# Patient Record
Sex: FEMALE | Race: WHITE | NOT HISPANIC OR LATINO | Employment: OTHER | URBAN - METROPOLITAN AREA
[De-identification: names, ages, dates, MRNs, and addresses within clinical notes are randomized per-mention and may not be internally consistent; named-entity substitution may affect disease eponyms.]

---

## 2017-01-25 RX ORDER — AMLODIPINE BESYLATE 5 MG/1
TABLET ORAL
Qty: 30 TABLET | Refills: 4 | Status: SHIPPED | OUTPATIENT
Start: 2017-01-25 | End: 2019-04-22 | Stop reason: SDUPTHER

## 2017-03-13 ENCOUNTER — OFFICE VISIT (OUTPATIENT)
Dept: CARDIOLOGY | Facility: CLINIC | Age: 77
End: 2017-03-13

## 2017-03-13 VITALS
WEIGHT: 152 LBS | HEART RATE: 68 BPM | OXYGEN SATURATION: 98 % | SYSTOLIC BLOOD PRESSURE: 110 MMHG | DIASTOLIC BLOOD PRESSURE: 62 MMHG | BODY MASS INDEX: 29.84 KG/M2 | HEIGHT: 60 IN

## 2017-03-13 DIAGNOSIS — I10 ESSENTIAL HYPERTENSION: ICD-10-CM

## 2017-03-13 DIAGNOSIS — T44.7X5D: ICD-10-CM

## 2017-03-13 DIAGNOSIS — R00.2 PALPITATIONS: Primary | ICD-10-CM

## 2017-03-13 PROCEDURE — 99214 OFFICE O/P EST MOD 30 MIN: CPT | Performed by: INTERNAL MEDICINE

## 2017-03-13 NOTE — PROGRESS NOTES
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     History of Present Illness:        Dear Dr. Morales:      I again had the pleasure of seeing your patient in cardiology office on 2017.   As you well know, she is a very pleasant 77 year-old Frisian female who presents for  follow-up. The patient was admitted in 2015 after a syncopal episode. Upon  presentation in the emergency department, she was found to have severe bradycardia with  what appeared to be sinus arrest with junctional escape beats. Her heart rate was as low  as the 20s at times, and she was having very significant pauses. She was actually given  atropine in the emergency room initially. She was on both clonidine and metoprolol at the  time and these were discontinued. She was briefly placed on a Dopamine drip, and her  heart rate actually improved. She ultimately did not require a permanent pacemaker. An  echocardiogram obtained on 2015 showed an ejection fraction of 60% to 65% and no  significant valvular disease. She also has a history of significant hypertension, which  has been somewhat difficult to control in the past.      The patient presents today for follow-up.  Her only complaint is palpitations.  She does state   that at times, she will feel her heart fluttering, and this can last for up to 2 hours.  She does   not feel her heart racing during these occasions, but states that when she lies down, it   typically goes away.  She has not had any chest pain or shortness of breath.  Her blood   pressure has been excellent.    Past Medical History   Diagnosis Date   • Colon cancer      s/p partial colectomy in    • Diabetes mellitus    • Gallbladder disease    • Hyperlipidemia   "  • Hypertension    • Left arm pain    • Syncope      Secondary to medications (clonidine and metoprolol) in 3/15.  Had severe bradycardia (sinus arrest with junctional escape beats).  Resolved after medications discontinued.       Past Surgical History   Procedure Laterality Date   • Colectomy partial / total       2011 for colon cancer   • Uterine fibroid surgery       s/p uterine fibroma resection       Current Outpatient Prescriptions on File Prior to Visit   Medication Sig Dispense Refill   • amLODIPine (NORVASC) 5 MG tablet TAKE 1 TABLET BY MOUTH EVERY DAY 30 tablet 4   • aspirin 81 MG tablet Take by mouth.     • atorvastatin (LIPITOR) 20 MG tablet Take by mouth.     • fenofibrate (TRICOR) 145 MG tablet Take 1 tablet by mouth daily.     • isosorbide mononitrate (IMDUR) 60 MG 24 hr tablet Take by mouth.     • metFORMIN (GLUCOPHAGE) 500 MG tablet Take 1 tablet by mouth daily.     • valsartan-hydrochlorothiazide (DIOVAN-HCT) 320-25 MG per tablet Take 1 tablet by mouth daily.       No current facility-administered medications on file prior to visit.      Allergies as of 03/13/2017   • (No Known Allergies)     Social History     Social History   • Marital status:      Spouse name: N/A   • Number of children: N/A   • Years of education: N/A     Occupational History   • Not on file.     Social History Main Topics   • Smoking status: Never Smoker   • Smokeless tobacco: Never Used   • Alcohol use No   • Drug use: No   • Sexual activity: Not on file     Other Topics Concern   • Not on file     Social History Narrative     Family History   Problem Relation Age of Onset   • Heart disease Mother      pacemaker placement   • Colon cancer Brother        Review of Systems   Cardiovascular: Positive for palpitations.   All other systems reviewed and are negative.    Objective:     Vitals:    03/13/17 1307   BP: 110/62   Pulse: 68   SpO2: 98%   Weight: 152 lb (68.9 kg)   Height: 60\" (152.4 cm)     Body mass index is " 29.69 kg/(m^2).    Physical Exam   Constitutional: She is oriented to person, place, and time. She appears well-developed and well-nourished.   HENT:   Head: Normocephalic and atraumatic.   Eyes: Conjunctivae are normal.   Neck: Neck supple.   Cardiovascular: Normal rate and regular rhythm.  Exam reveals no gallop and no friction rub.    No murmur heard.  Pulmonary/Chest: Effort normal and breath sounds normal.   Abdominal: Soft. There is no tenderness.   Musculoskeletal: She exhibits no edema.   Neurological: She is alert and oriented to person, place, and time.   Skin: Skin is warm.   Psychiatric: She has a normal mood and affect. Her behavior is normal.     Lab Review:   Procedures    Cardiac Procedures:  1. Echocardiogram on 03/23/2015 revealed an ejection fraction of 60-65%. There was   no significant valvular disease noted.   2. Lexiscan Myoview stress test on 10/05/2015 was normal with no evidence of ischemia   or infarction.     Assessment:       Diagnosis Plan   1. Palpitations  Holter Monitor - 24 Hour   2. Essential hypertension     3. Adverse reaction to beta-blocker, subsequent encounter       Plan:       As noted, the patient has been having palpitations recently as described above.   I am going to   check a 24-hour Holter monitor at this time to ensure that she does not have any significant   arrhythmia such as atrial fibrillation.  Her blood pressure has been under excellent control on   the amlodipine, Imdur, and Diovan HCT.  Again, she has had significant bradycardia with beta   blockers in the past and is not a good candidate for these.  I would also avoid AV abby blockers   as well.  I will see her back in the office in 6 months, and further plans will be made pending the   results of the Holter monitor.

## 2017-09-14 ENCOUNTER — OFFICE VISIT (OUTPATIENT)
Dept: CARDIOLOGY | Facility: CLINIC | Age: 77
End: 2017-09-14

## 2017-09-14 VITALS
WEIGHT: 151.5 LBS | OXYGEN SATURATION: 98 % | DIASTOLIC BLOOD PRESSURE: 64 MMHG | SYSTOLIC BLOOD PRESSURE: 112 MMHG | HEIGHT: 61 IN | BODY MASS INDEX: 28.6 KG/M2 | HEART RATE: 76 BPM

## 2017-09-14 DIAGNOSIS — I10 ESSENTIAL HYPERTENSION: Primary | ICD-10-CM

## 2017-09-14 DIAGNOSIS — T44.7X5D: ICD-10-CM

## 2017-09-14 PROCEDURE — 99213 OFFICE O/P EST LOW 20 MIN: CPT | Performed by: INTERNAL MEDICINE

## 2017-09-14 RX ORDER — LANCETS
EACH MISCELLANEOUS
Refills: 8 | COMMUNITY
Start: 2017-06-30 | End: 2021-09-13 | Stop reason: SDUPTHER

## 2017-09-14 RX ORDER — OFLOXACIN 3 MG/ML
SOLUTION/ DROPS OPHTHALMIC
Refills: 1 | COMMUNITY
Start: 2017-08-28 | End: 2021-01-22 | Stop reason: SDUPTHER

## 2017-09-14 RX ORDER — OXYBUTYNIN CHLORIDE 5 MG/1
TABLET ORAL
Refills: 5 | COMMUNITY
Start: 2017-08-28 | End: 2021-09-13 | Stop reason: SDUPTHER

## 2017-09-17 NOTE — PROGRESS NOTES
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     History of Present Illness:    Dear Dr. Morales:      I again had the pleasure of seeing your patient in cardiology office on 2017.  As   you well know, she is a very pleasant 77 year-old Spanish female who presents for  follow-up. The patient was admitted in 2015 after a syncopal episode. Upon  presentation in the emergency department, she was found to have severe bradycardia   with what appeared to be sinus arrest with junctional escape beats. Her heart rate was   as low as the 20s at times, and she was having very significant pauses. She was   actually given atropine in the emergency room initially. She was on both clonidine and   metoprolol at the time and these were discontinued. She was briefly placed on a   Dopamine drip, and her heart rate actually improved. She ultimately did not require a   permanent pacemaker. An echocardiogram obtained on 2015 showed an   ejection fraction of 60% to 65% and no significant valvular disease. She also has a   history of hypertension,     The patient presents today for follow-up.   overall, she states that she is doing very well.    Her blood pressure is excellent today at 112/64.  She has not had any significant chest   pain or shortness of breath.  She has had no event since her last visit.    Past Medical History:   Diagnosis Date   • Colon cancer     s/p partial colectomy in    • Diabetes mellitus    • Gallbladder disease    • Hyperlipidemia    • Hypertension    • Left arm pain    • Syncope     Secondary to medications (clonidine and metoprolol) in 3/15.  Had severe bradycardia (sinus arrest with junctional escape beats).  Resolved after medications  "discontinued.       Past Surgical History:   Procedure Laterality Date   • COLECTOMY PARTIAL / TOTAL      2011 for colon cancer   • UTERINE FIBROID SURGERY      s/p uterine fibroma resection       Current Outpatient Prescriptions on File Prior to Visit   Medication Sig Dispense Refill   • amLODIPine (NORVASC) 5 MG tablet TAKE 1 TABLET BY MOUTH EVERY DAY 30 tablet 4   • aspirin 81 MG tablet Take by mouth.     • fenofibrate (TRICOR) 145 MG tablet Take 1 tablet by mouth daily.     • isosorbide mononitrate (IMDUR) 60 MG 24 hr tablet Take by mouth.     • metFORMIN (GLUCOPHAGE) 500 MG tablet Take 1 tablet by mouth daily.     • valsartan-hydrochlorothiazide (DIOVAN-HCT) 320-25 MG per tablet Take 1 tablet by mouth daily.       No current facility-administered medications on file prior to visit.      Allergies as of 09/14/2017   • (No Known Allergies)     Social History     Social History   • Marital status:      Spouse name: N/A   • Number of children: N/A   • Years of education: N/A     Occupational History   • Not on file.     Social History Main Topics   • Smoking status: Never Smoker   • Smokeless tobacco: Never Used   • Alcohol use No   • Drug use: No   • Sexual activity: Not on file     Other Topics Concern   • Not on file     Social History Narrative     Family History   Problem Relation Age of Onset   • Heart disease Mother      pacemaker placement   • Colon cancer Brother        Review of Systems   All other systems reviewed and are negative.    Objective:     Vitals:    09/14/17 1348   BP: 112/64   BP Location: Right arm   Pulse: 76   SpO2: 98%   Weight: 151 lb 8 oz (68.7 kg)   Height: 61\" (154.9 cm)     Body mass index is 28.63 kg/(m^2).    Physical Exam   Constitutional: She is oriented to person, place, and time. She appears well-developed and well-nourished.   HENT:   Head: Normocephalic and atraumatic.   Eyes: Conjunctivae are normal.   Neck: Neck supple.   Cardiovascular: Normal rate and regular " rhythm.  Exam reveals no gallop and no friction rub.    No murmur heard.  Pulmonary/Chest: Effort normal and breath sounds normal.   Abdominal: Soft. There is no tenderness.   Musculoskeletal: She exhibits no edema.   Neurological: She is alert and oriented to person, place, and time.   Skin: Skin is warm.   Psychiatric: She has a normal mood and affect. Her behavior is normal.     Lab Review:   Procedures    Cardiac Procedures:  1. Echocardiogram on 03/23/2015 revealed an ejection fraction of 60-65%. There   was no significant valvular disease noted.   2. Lexiscan Myoview stress test on 10/05/2015 was normal with no evidence of   ischemia or infarction.     Assessment:       Diagnosis Plan   1. Essential hypertension     2. Adverse reaction to beta-blocker, subsequent encounter       Plan:       The patient seems to be doing very well at this point.  She has had no chest pain   or shortness of breath.  Her blood pressure is under excellent control.  She is   going to continue on the amlodipine at 5 mg per day, Imdur at 60 mg per day, and   Diovan HCT at 320/25 mg per day.  She continues to take the aspirin empirically   given her diabetes.  Again, she had significant bradycardia with beta blockers in   the past, and is not a good candidate for these medications (or any AV abby   blocking agents in general).  I will see her back in the office within the next 6   months unless other issues arise.

## 2018-01-05 RX ORDER — ISOSORBIDE MONONITRATE 60 MG/1
60 TABLET, EXTENDED RELEASE ORAL DAILY
Qty: 90 TABLET | Refills: 3 | Status: SHIPPED | OUTPATIENT
Start: 2018-01-05 | End: 2018-12-26 | Stop reason: SDUPTHER

## 2018-04-12 ENCOUNTER — OFFICE VISIT (OUTPATIENT)
Dept: CARDIOLOGY | Facility: CLINIC | Age: 78
End: 2018-04-12

## 2018-04-12 VITALS
SYSTOLIC BLOOD PRESSURE: 108 MMHG | OXYGEN SATURATION: 98 % | HEART RATE: 70 BPM | WEIGHT: 150 LBS | HEIGHT: 59 IN | BODY MASS INDEX: 30.24 KG/M2 | DIASTOLIC BLOOD PRESSURE: 62 MMHG

## 2018-04-12 DIAGNOSIS — I10 ESSENTIAL HYPERTENSION: Primary | ICD-10-CM

## 2018-04-12 DIAGNOSIS — T44.7X5D: ICD-10-CM

## 2018-04-12 PROCEDURE — 99213 OFFICE O/P EST LOW 20 MIN: CPT | Performed by: INTERNAL MEDICINE

## 2018-04-12 NOTE — PROGRESS NOTES
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     History of Present Illness:    Dear Dr. Morales:      I again had the pleasure of seeing your patient in cardiology office on 2018.  As   you well know, she is a very pleasant 78 year-old [place]bourgish female who presents for  follow-up. The patient was admitted in 2015 after a syncopal episode. Upon  presentation in the emergency department, she was found to have severe bradycardia   with what appeared to be sinus arrest with junctional escape beats. Her heart rate was   as low as the 20s at times, and she was having very significant pauses. She was   actually given atropine in the emergency room initially. She was on both clonidine and   metoprolol at the time and these were discontinued. She was briefly placed on a   Dopamine drip, and her heart rate actually improved. She ultimately did not require a   permanent pacemaker. An echocardiogram obtained on 2015 showed an   ejection fraction of 60% to 65% and no significant valvular disease. She also has a   history of hypertension,      The patient presents today for follow-up.  Overall, she is doing very well.  Her blood   pressure has been excellent.  Today, her blood pressure is 108/62.  She has not had   any chest discomfort.  She still has baseline shortness of breath with exertion,   although this is unchanged.    Past Medical History:   Diagnosis Date   • Colon cancer     s/p partial colectomy in    • Diabetes mellitus    • Gallbladder disease    • Hyperlipidemia    • Hypertension    • Left arm pain    • Syncope     Secondary to medications (clonidine and metoprolol) in 3/15.  Had severe bradycardia (sinus arrest with junctional escape beats).   Resolved after medications discontinued.       Past Surgical History:   Procedure Laterality Date   • COLECTOMY PARTIAL / TOTAL      2011 for colon cancer   • UTERINE FIBROID SURGERY      s/p uterine fibroma resection       Current Outpatient Prescriptions on File Prior to Visit   Medication Sig Dispense Refill   • ACCU-CHEK FASTCLIX LANCETS misc USE TO TEST TID  8   • amLODIPine (NORVASC) 5 MG tablet TAKE 1 TABLET BY MOUTH EVERY DAY 30 tablet 4   • aspirin 81 MG tablet Take by mouth.     • fenofibrate (TRICOR) 145 MG tablet Take 1 tablet by mouth daily.     • isosorbide mononitrate (IMDUR) 60 MG 24 hr tablet Take 1 tablet by mouth Daily. 90 tablet 3   • metFORMIN (GLUCOPHAGE) 500 MG tablet Take 1 tablet by mouth daily.     • ofloxacin (OCUFLOX) 0.3 % ophthalmic solution INSTILL 1 TO 2 GTS AEY Q 2-3 HOURS DURING DAY  1   • ONE TOUCH ULTRA TEST test strip U TO TEST BLOOD SUGAR QD UTD  2   • oxybutynin (DITROPAN) 5 MG tablet TK 1/2 - 1 T PO 1-2 TIMES DAILY  5   • valsartan-hydrochlorothiazide (DIOVAN-HCT) 320-25 MG per tablet Take 1 tablet by mouth daily.       No current facility-administered medications on file prior to visit.      Allergies as of 04/12/2018   • (No Known Allergies)     Social History     Social History   • Marital status:      Spouse name: N/A   • Number of children: N/A   • Years of education: N/A     Occupational History   • Not on file.     Social History Main Topics   • Smoking status: Never Smoker   • Smokeless tobacco: Never Used   • Alcohol use No   • Drug use: No   • Sexual activity: Not on file     Other Topics Concern   • Not on file     Social History Narrative   • No narrative on file     Family History   Problem Relation Age of Onset   • Heart disease Mother      pacemaker placement   • Colon cancer Brother        Review of Systems   All other systems reviewed and are negative.     Objective:     Vitals:    04/12/18 1158   BP: 108/62   Pulse: 70   SpO2: 98%   Weight: 68 kg (150  "lb)   Height: 151 cm (59.45\")     Body mass index is 29.84 kg/m².    Physical Exam   Constitutional: She is oriented to person, place, and time. She appears well-developed and well-nourished.   HENT:   Head: Normocephalic and atraumatic.   Eyes: Conjunctivae are normal.   Neck: Neck supple.   Cardiovascular: Normal rate and regular rhythm.  Exam reveals no gallop and no friction rub.    No murmur heard.  Pulmonary/Chest: Effort normal and breath sounds normal.   Abdominal: Soft. There is no tenderness.   Musculoskeletal: She exhibits no edema.   Neurological: She is alert and oriented to person, place, and time.   Skin: Skin is warm.   Psychiatric: She has a normal mood and affect. Her behavior is normal.     Lab Review:   Procedures    Cardiac Procedures:  1. Echocardiogram on 03/23/2015 revealed an ejection fraction of 60-65%. There   was no significant valvular disease noted.   2. Lexiscan Myoview stress test on 10/05/2015 was normal with no evidence of   ischemia or infarction.     Assessment:       Diagnosis Plan   1. Essential hypertension     2. Adverse effect of beta-blocker, subsequent encounter       Plan:       The patient seems to be doing very well.  Again, her blood pressure is excellent.  She is   going to remain on the amlodipine 5 mg per day, Imdur 60 mg per day, and Diovan HCT   320/25 mg per day.  She will continue on the aspirin 81 mg per day empirically given her   diabetes.  Again, she did have significant bradycardia with beta blockers in the past, and   is not a good candidate for AV abby blocking agents or beta blockers in the future.  For   now, I will plan on seeing her back in the office within 6 months.      "

## 2018-10-08 ENCOUNTER — OFFICE VISIT (OUTPATIENT)
Dept: CARDIOLOGY | Facility: CLINIC | Age: 78
End: 2018-10-08

## 2018-10-08 VITALS
SYSTOLIC BLOOD PRESSURE: 118 MMHG | DIASTOLIC BLOOD PRESSURE: 62 MMHG | WEIGHT: 151.6 LBS | HEART RATE: 71 BPM | OXYGEN SATURATION: 99 % | BODY MASS INDEX: 30.56 KG/M2 | HEIGHT: 59 IN

## 2018-10-08 DIAGNOSIS — I10 ESSENTIAL HYPERTENSION: Primary | ICD-10-CM

## 2018-10-08 DIAGNOSIS — T44.7X5D: ICD-10-CM

## 2018-10-08 PROCEDURE — 99213 OFFICE O/P EST LOW 20 MIN: CPT | Performed by: INTERNAL MEDICINE

## 2018-12-26 RX ORDER — ISOSORBIDE MONONITRATE 60 MG/1
60 TABLET, EXTENDED RELEASE ORAL DAILY
Qty: 90 TABLET | Refills: 3 | Status: SHIPPED | OUTPATIENT
Start: 2018-12-26 | End: 2018-12-27 | Stop reason: SDUPTHER

## 2018-12-27 RX ORDER — ISOSORBIDE MONONITRATE 60 MG/1
60 TABLET, EXTENDED RELEASE ORAL DAILY
Qty: 90 TABLET | Refills: 3 | Status: SHIPPED | OUTPATIENT
Start: 2018-12-27 | End: 2019-04-22 | Stop reason: SDUPTHER

## 2018-12-28 RX ORDER — ISOSORBIDE MONONITRATE 60 MG/1
60 TABLET, EXTENDED RELEASE ORAL DAILY
Qty: 90 TABLET | Refills: 3 | Status: SHIPPED | OUTPATIENT
Start: 2018-12-28 | End: 2019-04-22 | Stop reason: SDUPTHER

## 2019-04-22 ENCOUNTER — OFFICE VISIT (OUTPATIENT)
Dept: CARDIOLOGY | Facility: CLINIC | Age: 79
End: 2019-04-22

## 2019-04-22 VITALS
SYSTOLIC BLOOD PRESSURE: 126 MMHG | OXYGEN SATURATION: 99 % | HEIGHT: 59 IN | DIASTOLIC BLOOD PRESSURE: 62 MMHG | WEIGHT: 143 LBS | HEART RATE: 75 BPM | BODY MASS INDEX: 28.83 KG/M2

## 2019-04-22 DIAGNOSIS — I10 ESSENTIAL HYPERTENSION: ICD-10-CM

## 2019-04-22 DIAGNOSIS — R42 LIGHTHEADEDNESS: Primary | ICD-10-CM

## 2019-04-22 PROCEDURE — 99214 OFFICE O/P EST MOD 30 MIN: CPT | Performed by: NURSE PRACTITIONER

## 2019-04-22 PROCEDURE — 93000 ELECTROCARDIOGRAM COMPLETE: CPT | Performed by: NURSE PRACTITIONER

## 2019-04-22 RX ORDER — ISOSORBIDE MONONITRATE 60 MG/1
60 TABLET, EXTENDED RELEASE ORAL DAILY
Qty: 90 TABLET | Refills: 3 | Status: SHIPPED | OUTPATIENT
Start: 2019-04-22 | End: 2021-06-01

## 2019-04-22 RX ORDER — AMLODIPINE BESYLATE 5 MG/1
5 TABLET ORAL DAILY
Qty: 90 TABLET | Refills: 3 | Status: SHIPPED | OUTPATIENT
Start: 2019-04-22 | End: 2019-12-16 | Stop reason: SDUPTHER

## 2019-04-22 RX ORDER — PHENAZOPYRIDINE HYDROCHLORIDE 100 MG/1
TABLET, FILM COATED ORAL
Refills: 3 | COMMUNITY
Start: 2019-03-07 | End: 2021-02-02 | Stop reason: SDDI

## 2019-04-22 RX ORDER — ATORVASTATIN CALCIUM 20 MG/1
TABLET, FILM COATED ORAL DAILY
Refills: 0 | COMMUNITY
Start: 2019-03-02 | End: 2019-09-10 | Stop reason: SDUPTHER

## 2019-04-22 NOTE — PROGRESS NOTES
[facility]      Patient Name: Ayla Castellano  :1940  Age: 79 y.o.  Primary Cardiologist: Enrrique Lanza MD  Encounter Provider:  LENA Jackman      Chief Complaint:   Chief Complaint   Patient presents with   • Follow-up     6 months         HPI  Ayla Castellano is a 79 y.o. female with a history significant for bradycardia secondary to beta-blockers, syncope, hypertension, hyperlipidemia, diabetes.  Patient presents today for semiannual reevaluation.  Patient is new to me but I have reviewed her prior medical records.  Patient's daughter presents and is acting as .  Patient states that from a cardiac standpoint she is doing well.  She does report some episodes of lightheadedness.  Reported that patient has had diarrhea secondary to a known history of gallbladder disease.  She states that she gets lightheaded from time to time, especially with position changes.  There have been no episodes of hypotension or low heart rate.  She also reports some fatigue that resolves after taking an afternoon nap.  She is requesting refills of amlodipine and isosorbide mononitrate.  She denies chest pain, shortness of breath, palpitations, swelling to the lower extremities.    The following portions of the patient's history were reviewed and updated as appropriate: allergies, current medications, past family history, past medical history, past social history, past surgical history and problem list.    Current Outpatient Medications on File Prior to Visit   Medication Sig   • ACCU-CHEK FASTCLIX LANCETS misc USE TO TEST TID   • amLODIPine (NORVASC) 5 MG tablet TAKE 1 TABLET BY MOUTH EVERY DAY   • aspirin 81 MG tablet Take by mouth.   • atorvastatin (LIPITOR) 20 MG tablet Take  by mouth Daily.   • fenofibrate (TRICOR) 145 MG tablet Take 1 tablet by mouth daily.   • isosorbide mononitrate (IMDUR) 60 MG 24 hr tablet TAKE 1 TABLET BY MOUTH DAILY   • metFORMIN (GLUCOPHAGE)  "500 MG tablet Take 1 tablet by mouth daily.   • ofloxacin (OCUFLOX) 0.3 % ophthalmic solution INSTILL 1 TO 2 GTS AEY Q 2-3 HOURS DURING DAY   • ONE TOUCH ULTRA TEST test strip U TO TEST BLOOD SUGAR QD UTD   • oxybutynin (DITROPAN) 5 MG tablet TK 1/2 - 1 T PO 1-2 TIMES DAILY   • phenazopyridine (PYRIDIUM) 100 MG tablet TK 1 T PO TID AFTER MEALS PRN   • valsartan-hydrochlorothiazide (DIOVAN-HCT) 320-25 MG per tablet Take 1 tablet by mouth daily.   • [DISCONTINUED] isosorbide mononitrate (IMDUR) 60 MG 24 hr tablet Take 1 tablet by mouth Daily.     No current facility-administered medications on file prior to visit.          Review of Systems   Constitution: Negative for malaise/fatigue.   Cardiovascular: Negative for chest pain and leg swelling.   Respiratory: Negative for shortness of breath.    Gastrointestinal: Positive for diarrhea.   Neurological: Positive for light-headedness.   All other systems reviewed and are negative.      OBJECTIVE:   Vital Signs  Vitals:    04/22/19 1354   BP: 126/62   Pulse: 75   SpO2: 99%     Estimated body mass index is 28.88 kg/m² as calculated from the following:    Height as of this encounter: 149.9 cm (59\").    Weight as of this encounter: 64.9 kg (143 lb).    Physical Exam   Constitutional: She is oriented to person, place, and time. Vital signs are normal. She appears well-developed and well-nourished. She is active.   Eyes: Conjunctivae are normal.   Neck: Carotid bruit is not present.   Cardiovascular: Normal rate, regular rhythm and normal heart sounds.   Pulmonary/Chest: Breath sounds normal.   Abdominal: Normal appearance.   Musculoskeletal:   No cyanosis, clubbing, edema  Normal ROM   Neurological: She is alert and oriented to person, place, and time. GCS eye subscore is 4. GCS verbal subscore is 5. GCS motor subscore is 6.   Skin: Skin is warm, dry and intact.   Psychiatric: She has a normal mood and affect. Her speech is normal and behavior is normal. Judgment and thought " content normal. Cognition and memory are normal.         ECG 12 Lead  Date/Time: 4/22/2019 2:04 PM  Performed by: Soraya Juan APRN  Authorized by: Soraya Juan APRN   Previous ECG: no previous ECG available  Rhythm: sinus rhythm  Rate: normal  BPM: 72  Conduction: conduction normal  ST Segments: ST segments normal  T Waves: T waves normal  QRS axis: normal    Clinical impression: normal ECG            Cardiac Procedures:  1. Echo 3/23/15.  EF 60-65%.  No significant valvular disease noted.  2. Lexiscan stress test 10/5/15.  Normal without evidence of ischemia or infarction.        ASSESSMENT:      Diagnosis Plan   1. Lightheadedness     2. Essential hypertension           PLAN OF CARE:     1. Lightheadedness.  Patient reports episodes of lightheadedness that are worse with position changes.  She adds that she has chronic diarrhea secondary to known gallbladder disease.  Patient states that it has been recommended that she have her gallbladder removed, however she is not willing to have the procedure.  She states that she feels her lightheadedness is directly related to her diarrhea.  She denies any episodes of hypotension and reports that her blood pressure averages in the 120s/60s.  She also denies any episodes of low heart rate.  2. Hypertension.  Blood pressure averaging in the 120s/60s.  Blood pressure is good in office today.  She will continue with amlodipine 5 mg daily, valsartan HCTZ 320/25 mg daily.  3. Follow-up with Dr. Lanza in 1 year.  Sooner for any problems or complications.        Thank you for allowing me to participate in the care of your patient,      Sincerely,   LENA Jackman  [facility]  04/22/19  2:16 PM    **Dragon Disclaimer:**  Much of this encounter note is an electronic transcription/translation of spoken language to printed text. The electronic translation of spoken language may permit erroneous, or at times, nonsensical words or phrases to be  inadvertently transcribed. Although I have reviewed the note for such errors, some may still exist.

## 2019-08-15 RX ORDER — FLUOCINONIDE TOPICAL SOLUTION USP, 0.05% 0.5 MG/ML
SOLUTION TOPICAL
Qty: 60 ML | Refills: 0 | Status: SHIPPED | OUTPATIENT
Start: 2019-08-15 | End: 2021-02-04

## 2019-09-10 RX ORDER — ATORVASTATIN CALCIUM 20 MG/1
20 TABLET, FILM COATED ORAL DAILY
Qty: 90 TABLET | Refills: 0 | Status: SHIPPED | OUTPATIENT
Start: 2019-09-10 | End: 2020-07-08 | Stop reason: SDUPTHER

## 2019-09-26 DIAGNOSIS — E13.8 DIABETES MELLITUS OF OTHER TYPE WITH COMPLICATION, UNSPECIFIED WHETHER LONG TERM INSULIN USE: Primary | ICD-10-CM

## 2019-09-30 DIAGNOSIS — E13.8 DIABETES MELLITUS OF OTHER TYPE WITH COMPLICATION, UNSPECIFIED WHETHER LONG TERM INSULIN USE: ICD-10-CM

## 2019-10-03 DIAGNOSIS — E13.69 OTHER SPECIFIED DIABETES MELLITUS WITH OTHER SPECIFIED COMPLICATION, UNSPECIFIED WHETHER LONG TERM INSULIN USE (HCC): ICD-10-CM

## 2019-10-07 DIAGNOSIS — E13.69 OTHER SPECIFIED DIABETES MELLITUS WITH OTHER SPECIFIED COMPLICATION, UNSPECIFIED WHETHER LONG TERM INSULIN USE (HCC): ICD-10-CM

## 2019-11-11 ENCOUNTER — OFFICE VISIT (OUTPATIENT)
Dept: CARDIOLOGY | Facility: CLINIC | Age: 79
End: 2019-11-11

## 2019-11-11 VITALS
WEIGHT: 143 LBS | BODY MASS INDEX: 28.83 KG/M2 | RESPIRATION RATE: 15 BRPM | HEART RATE: 72 BPM | HEIGHT: 59 IN | DIASTOLIC BLOOD PRESSURE: 82 MMHG | SYSTOLIC BLOOD PRESSURE: 120 MMHG | OXYGEN SATURATION: 96 %

## 2019-11-11 DIAGNOSIS — T44.7X5D: ICD-10-CM

## 2019-11-11 DIAGNOSIS — I10 ESSENTIAL HYPERTENSION: Primary | ICD-10-CM

## 2019-11-11 PROCEDURE — 99213 OFFICE O/P EST LOW 20 MIN: CPT | Performed by: INTERNAL MEDICINE

## 2019-11-11 RX ORDER — CYANOCOBALAMIN (VITAMIN B-12) 1000 MCG
1 TABLET, EXTENDED RELEASE ORAL DAILY
Refills: 0 | COMMUNITY
Start: 2019-11-01 | End: 2021-02-02 | Stop reason: SDDI

## 2019-11-11 RX ORDER — HYDROCHLOROTHIAZIDE 25 MG/1
25 TABLET ORAL DAILY
Refills: 0 | COMMUNITY
Start: 2019-11-01 | End: 2020-05-28

## 2019-11-11 RX ORDER — CLONIDINE HYDROCHLORIDE 0.1 MG/1
TABLET ORAL DAILY
Refills: 11 | COMMUNITY
Start: 2019-10-06 | End: 2021-02-02 | Stop reason: SDDI

## 2019-11-11 RX ORDER — VALSARTAN 160 MG/1
TABLET ORAL DAILY
Refills: 0 | COMMUNITY
Start: 2019-11-01 | End: 2020-05-28

## 2019-11-11 NOTE — PROGRESS NOTES
Date of Office Visit: 2019  Encounter Provider: Israel Lanza MD  Place of Service: [facility] CARDIOLOGY  Patient Name: Ayla Castellano  :1940    Chief complaint: Follow-up for syncope secondary to medication induced   bradycardia, hypertension.     History of Present Illness:    Dear Dr. Morales:      I again had the pleasure of seeing your patient in cardiology office on 2019.  As   you well know, she is a very pleasant 79 year-old Arabic female who presents for  follow-up. The patient was admitted in 3/2015 after a syncopal episode. Upon  presentation in the emergency department, she was found to have severe bradycardia   with what appeared to be sinus arrest with junctional escape beats. Her heart rate was   as low as the 20s at times, and she was having very significant pauses. She was   actually given atropine in the emergency room initially. She was on both clonidine and   metoprolol at the time and these were discontinued. She was briefly placed on a   Dopamine drip, and her heart rate actually improved. She ultimately did not require a   permanent pacemaker. An echocardiogram obtained on 3/23/2015 showed an   ejection fraction of 60% to 65% and no significant valvular disease. She also has a   history of hypertension,      The patient presents today for follow-up.   Unfortunately, she did develop acute   cholecystitis, and underwent a cholecystectomy several weeks ago at [facility].    She did well with the surgery, and is recovering postoperatively.  However, she is   anemic, and her hemoglobin has recently been around 8.8.  It is felt that she is losing   blood in her GI tract, she needs a repeat colonoscopy.  She has had a history of colon   cancer.  She denied any significant chest pain or shortness of breath.      Past Medical History:   Diagnosis Date   • Colon cancer (CMS/HCC)     s/p partial colectomy in    • Diabetes mellitus  (CMS/HCC)    • Gallbladder disease    • Hyperlipidemia    • Hypertension    • Left arm pain    • Syncope     Secondary to medications (clonidine and metoprolol) in 3/15.  Had severe bradycardia (sinus arrest with junctional escape beats).  Resolved after medications discontinued.       Past Surgical History:   Procedure Laterality Date   • CHOLECYSTECTOMY  10/2019   • COLECTOMY PARTIAL / TOTAL      2011 for colon cancer   • UTERINE FIBROID SURGERY      s/p uterine fibroma resection       Current Outpatient Medications on File Prior to Visit   Medication Sig Dispense Refill   • ACCU-CHEK FASTCLIX LANCETS misc USE TO TEST TID  8   • amLODIPine (NORVASC) 5 MG tablet Take 1 tablet by mouth Daily. 90 tablet 3   • aspirin 81 MG tablet Take by mouth.     • Cyanocobalamin (VITAMIN B-12 ER) 1000 MCG tablet controlled-release Take 1 tablet by mouth Daily.  0   • fenofibrate (TRICOR) 145 MG tablet Take 1 tablet by mouth daily.     • hydroCHLOROthiazide (HYDRODIURIL) 25 MG tablet Take 25 mg by mouth Daily.  0   • isosorbide mononitrate (IMDUR) 60 MG 24 hr tablet Take 1 tablet by mouth Daily. 90 tablet 3   • metFORMIN (GLUCOPHAGE) 500 MG tablet Take 1 tablet by mouth 3 (Three) Times a Day. 90 tablet 1   • ONE TOUCH ULTRA TEST test strip USE TO TEST BLOOD SUGAR EVERY DAY. (E11.9) 100 each 2   • oxybutynin (DITROPAN) 5 MG tablet TK 1/2 - 1 T PO 1-2 TIMES DAILY  5   • phenazopyridine (PYRIDIUM) 100 MG tablet TK 1 T PO TID AFTER MEALS PRN  3   • atorvastatin (LIPITOR) 20 MG tablet Take 1 tablet by mouth Daily. 90 tablet 0   • cloNIDine (CATAPRES) 0.1 MG tablet Take  by mouth Daily.  11   • fluocinonide (LIDEX) 0.05 % external solution APPLY SPARINGLY EXTERNALLY TO THE AFFECTED AREA 2 TO 4 TIMES A DAY AS DIRECTED. 60 mL 0   • ofloxacin (OCUFLOX) 0.3 % ophthalmic solution INSTILL 1 TO 2 GTS AEY Q 2-3 HOURS DURING DAY  1   • valsartan (DIOVAN) 160 MG tablet Take  by mouth Daily.  0   • valsartan-hydrochlorothiazide (DIOVAN-HCT) 320-25  "MG per tablet Take 1 tablet by mouth daily.       No current facility-administered medications on file prior to visit.      Allergies as of 11/11/2019   • (No Known Allergies)     Social History     Socioeconomic History   • Marital status:      Spouse name: Not on file   • Number of children: Not on file   • Years of education: Not on file   • Highest education level: Not on file   Tobacco Use   • Smoking status: Never Smoker   • Smokeless tobacco: Never Used   Substance and Sexual Activity   • Alcohol use: No   • Drug use: No     Family History   Problem Relation Age of Onset   • Heart disease Mother         pacemaker placement   • Colon cancer Brother        Review of Systems   Constitution: Positive for malaise/fatigue.   All other systems reviewed and are negative.     Objective:     Vitals:    11/11/19 1410   BP: 120/82   BP Location: Right arm   Patient Position: Sitting   Cuff Size: Adult   Pulse: 72   Resp: 15   SpO2: 96%   Weight: 64.9 kg (143 lb)   Height: 149.9 cm (59.02\")     Body mass index is 28.87 kg/m².    Physical Exam   Constitutional: She is oriented to person, place, and time. She appears well-developed and well-nourished.   HENT:   Head: Normocephalic and atraumatic.   Eyes: Conjunctivae are normal.   Neck: Neck supple.   Cardiovascular: Normal rate and regular rhythm. Exam reveals no gallop and no friction rub.   No murmur heard.  Pulmonary/Chest: Effort normal and breath sounds normal.   Abdominal: Soft. There is no tenderness.   Musculoskeletal: She exhibits no edema.   Neurological: She is alert and oriented to person, place, and time.   Skin: Skin is warm.   Psychiatric: She has a normal mood and affect. Her behavior is normal.     Lab Review:   Procedures    Cardiac Procedures:  1. Echocardiogram on 3/23/2015 revealed an ejection fraction of 60-65%. There   was no significant valvular disease noted.   2. Lexiscan Myoview stress test on 10/5/2015 was normal with no evidence of "   ischemia or infarction.     Assessment:       Diagnosis Plan   1. Essential hypertension     2. Adverse effect of beta-blocker, subsequent encounter       Plan:       Again, she did recently undergo an urgent cholecystectomy for acute cholecystitis at [facility] at the end of October 2019.  She has been asymptomatic from a cardiac standpoint with no chest pain or shortness of breath.  Her blood pressure has been good at home, and her pulse is 72 today.  She will continue on the amlodipine at 5 mg/day, Diovan/HCT, and Imdur.  Again, she is not a good candidate for beta-blockers or AV abby blockers secondary to significant bradycardia with beta-blockers in the past.  She does need a colonoscopy for her anemia and history of colon cancer.  She is clear from a cardiac standpoint to proceed if needed.  I will see her back in the office in 6 months unless other issues arise.

## 2019-12-16 RX ORDER — AMLODIPINE BESYLATE 5 MG/1
5 TABLET ORAL DAILY
Qty: 90 TABLET | Refills: 0 | Status: SHIPPED | OUTPATIENT
Start: 2019-12-16 | End: 2020-03-19 | Stop reason: SDUPTHER

## 2020-03-19 RX ORDER — AMLODIPINE BESYLATE 5 MG/1
5 TABLET ORAL DAILY
Qty: 90 TABLET | Refills: 0 | Status: SHIPPED | OUTPATIENT
Start: 2020-03-19 | End: 2020-07-09 | Stop reason: SDUPTHER

## 2020-05-15 DIAGNOSIS — E13.69 OTHER SPECIFIED DIABETES MELLITUS WITH OTHER SPECIFIED COMPLICATION, UNSPECIFIED WHETHER LONG TERM INSULIN USE (HCC): ICD-10-CM

## 2020-05-20 ENCOUNTER — OFFICE VISIT (OUTPATIENT)
Dept: CARDIOLOGY | Facility: CLINIC | Age: 80
End: 2020-05-20

## 2020-05-20 VITALS
SYSTOLIC BLOOD PRESSURE: 122 MMHG | HEIGHT: 59 IN | OXYGEN SATURATION: 96 % | BODY MASS INDEX: 29.35 KG/M2 | WEIGHT: 145.6 LBS | HEART RATE: 83 BPM | DIASTOLIC BLOOD PRESSURE: 78 MMHG

## 2020-05-20 DIAGNOSIS — I10 ESSENTIAL HYPERTENSION: Primary | ICD-10-CM

## 2020-05-20 DIAGNOSIS — T44.7X5D: ICD-10-CM

## 2020-05-20 PROCEDURE — 99213 OFFICE O/P EST LOW 20 MIN: CPT | Performed by: INTERNAL MEDICINE

## 2020-05-20 RX ORDER — HYDROXYUREA 500 MG/1
500 CAPSULE ORAL 3 TIMES WEEKLY
COMMUNITY
Start: 2020-04-25 | End: 2022-07-08 | Stop reason: SDUPTHER

## 2020-05-21 NOTE — PROGRESS NOTES
Date of Office Visit:  2020  Encounter Provider: Israel Lanza MD  Place of Service: [facility] CARDIOLOGY  Patient Name: Ayla Castellano  :1940    Chief complaint: Follow-up for syncope secondary to medication induced   bradycardia, hypertension.     History of Present Illness:    Dear Dr. Morales:      I again had the pleasure of seeing your patient in cardiology office on 2020.  As   you well know, she is a very pleasant 80 year-old [place] female who presents for  follow-up. The patient was admitted in 3/2015 after a syncopal episode. Upon  presentation in the emergency department, she was found to have severe bradycardia   with what appeared to be sinus arrest with junctional escape beats. Her heart rate was   as low as the 20s at times, and she was having very significant pauses. She was   actually given atropine in the emergency room initially. She was on both clonidine and   metoprolol at the time and these were discontinued. She was briefly placed on a   Dopamine drip, and her heart rate actually improved. She ultimately did not require a   permanent pacemaker. An echocardiogram obtained on 3/23/2015 showed an   ejection fraction of 60% to 65% and no significant valvular disease. She also has a   history of hypertension,      The patient presents today for follow-up.   She has been doing well.  She did have a   full work-up for her anemia, and this evidently did not show any significant issues.    She got IV iron, and her hemoglobin is now above 12.  She feels much better.  She   has not had any chest pain or significant shortness of breath.  She does have edema   in her ankles which gets worse at the end of the day.      Past Medical History:   Diagnosis Date   • Colon cancer (CMS/HCC)     s/p partial colectomy in    • Diabetes mellitus (CMS/HCC)    • Gallbladder disease    • Hyperlipidemia    • Hypertension    • Left arm pain    • Syncope      Secondary to medications (clonidine and metoprolol) in 3/15.  Had severe bradycardia (sinus arrest with junctional escape beats).  Resolved after medications discontinued.       Past Surgical History:   Procedure Laterality Date   • CHOLECYSTECTOMY  10/2019   • COLECTOMY PARTIAL / TOTAL      2011 for colon cancer   • UTERINE FIBROID SURGERY      s/p uterine fibroma resection       Current Outpatient Medications on File Prior to Visit   Medication Sig Dispense Refill   • ACCU-CHEK FASTCLIX LANCETS misc USE TO TEST TID  8   • amLODIPine (NORVASC) 5 MG tablet Take 1 tablet by mouth Daily. 90 tablet 0   • aspirin 81 MG tablet Take by mouth.     • atorvastatin (LIPITOR) 20 MG tablet Take 1 tablet by mouth Daily. 90 tablet 0   • cloNIDine (CATAPRES) 0.1 MG tablet Take  by mouth Daily.  11   • Cyanocobalamin (B-12) 2500 MCG sublingual tablet DISSOLVE 1 T UNDER THE TONGUE QD     • Cyanocobalamin (VITAMIN B-12 ER) 1000 MCG tablet controlled-release Take 1 tablet by mouth Daily.  0   • fenofibrate (TRICOR) 145 MG tablet Take 1 tablet by mouth daily.     • fluocinonide (LIDEX) 0.05 % external solution APPLY SPARINGLY EXTERNALLY TO THE AFFECTED AREA 2 TO 4 TIMES A DAY AS DIRECTED. 60 mL 0   • hydroCHLOROthiazide (HYDRODIURIL) 25 MG tablet Take 25 mg by mouth Daily.  0   • hydroxyurea (HYDREA) 500 MG capsule TK 1 C PO QOD     • isosorbide mononitrate (IMDUR) 60 MG 24 hr tablet Take 1 tablet by mouth Daily. 90 tablet 3   • metFORMIN (GLUCOPHAGE) 500 MG tablet Take 1 tablet by mouth 3 (Three) Times a Day. 90 tablet 0   • ofloxacin (OCUFLOX) 0.3 % ophthalmic solution INSTILL 1 TO 2 GTS AEY Q 2-3 HOURS DURING DAY  1   • ONE TOUCH ULTRA TEST test strip USE TO TEST BLOOD SUGAR EVERY DAY. (E11.9) 100 each 2   • oxybutynin (DITROPAN) 5 MG tablet TK 1/2 - 1 T PO 1-2 TIMES DAILY  5   • phenazopyridine (PYRIDIUM) 100 MG tablet TK 1 T PO TID AFTER MEALS PRN  3   • valsartan (DIOVAN) 160 MG tablet Take  by mouth Daily.  0   •  "valsartan-hydrochlorothiazide (DIOVAN-HCT) 320-25 MG per tablet Take 1 tablet by mouth daily.       No current facility-administered medications on file prior to visit.      Allergies as of 05/20/2020   • (No Known Allergies)     Social History     Socioeconomic History   • Marital status:      Spouse name: Not on file   • Number of children: Not on file   • Years of education: Not on file   • Highest education level: Not on file   Tobacco Use   • Smoking status: Never Smoker   • Smokeless tobacco: Never Used   Substance and Sexual Activity   • Alcohol use: No   • Drug use: No     Family History   Problem Relation Age of Onset   • Heart disease Mother         pacemaker placement   • Colon cancer Brother        Review of Systems   Constitution: Positive for malaise/fatigue.   All other systems reviewed and are negative.     Objective:     Vitals:    05/20/20 1339   BP: 122/78   Pulse: 83   SpO2: 96%   Weight: 66 kg (145 lb 9.6 oz)   Height: 149.9 cm (59.02\")     Body mass index is 29.39 kg/m².    Physical Exam   Constitutional: She is oriented to person, place, and time. She appears well-developed and well-nourished.   HENT:   Head: Normocephalic and atraumatic.   Eyes: Conjunctivae are normal.   Neck: Neck supple.   Cardiovascular: Normal rate and regular rhythm. Exam reveals no gallop and no friction rub.   No murmur heard.  Pulmonary/Chest: Effort normal and breath sounds normal.   Abdominal: Soft. There is no tenderness.   Musculoskeletal: She exhibits no edema.   Neurological: She is alert and oriented to person, place, and time.   Skin: Skin is warm.   Psychiatric: She has a normal mood and affect. Her behavior is normal.     Lab Review:   Procedures    Cardiac Procedures:  1. Echocardiogram on 3/23/2015 revealed an ejection fraction of 60-65%. There was   no significant valvular disease noted.   2. Lexiscan Myoview stress test on 10/5/2015: Normal with no evidence of ischemia   or infarction. "     Assessment:       Diagnosis Plan   1. Essential hypertension     2. Adverse effect of beta-blocker, subsequent encounter       Plan:       The patient seems to be stable at this point.  Again, her hemoglobin has improved substantially with IV iron treatments.  The swelling in her ankles is not obvious today, although it evidently gets significant by the end of the day.  I feel that this is very likely from the amlodipine.  I initially wanted to change to another medication, although she is comfortable with taking the amlodipine, and she wants to stay on it.  Instead, we discussed elevating her legs at a 45 degree angle when recumbent at home, as well as potentially using compression stockings.  If the edema becomes a significant issue in the future, she will call me.    She will remain on the amlodipine for now.  She will also continue on the Diovan HCT and Imdur.  She is not a good candidate for beta-blockers or AV abby blockers secondary to significant bradycardia with beta-blockers in the past.  I will plan on seeing her back in the office in 6 months.

## 2020-05-28 ENCOUNTER — OFFICE VISIT (OUTPATIENT)
Dept: FAMILY MEDICINE CLINIC | Facility: CLINIC | Age: 80
End: 2020-05-28

## 2020-05-28 VITALS
TEMPERATURE: 97 F | WEIGHT: 147 LBS | HEART RATE: 84 BPM | HEIGHT: 59 IN | BODY MASS INDEX: 29.64 KG/M2 | OXYGEN SATURATION: 98 % | SYSTOLIC BLOOD PRESSURE: 124 MMHG | DIASTOLIC BLOOD PRESSURE: 78 MMHG

## 2020-05-28 DIAGNOSIS — I25.118 CORONARY ARTERY DISEASE OF NATIVE HEART WITH STABLE ANGINA PECTORIS, UNSPECIFIED VESSEL OR LESION TYPE (HCC): ICD-10-CM

## 2020-05-28 DIAGNOSIS — I10 ESSENTIAL HYPERTENSION: ICD-10-CM

## 2020-05-28 DIAGNOSIS — E78.5 HYPERLIPIDEMIA, UNSPECIFIED HYPERLIPIDEMIA TYPE: ICD-10-CM

## 2020-05-28 DIAGNOSIS — E11.9 TYPE 2 DIABETES MELLITUS WITHOUT COMPLICATION, WITHOUT LONG-TERM CURRENT USE OF INSULIN (HCC): Primary | ICD-10-CM

## 2020-05-28 PROCEDURE — 99214 OFFICE O/P EST MOD 30 MIN: CPT | Performed by: FAMILY MEDICINE

## 2020-05-28 RX ORDER — KETOCONAZOLE 20 MG/G
CREAM TOPICAL DAILY
Qty: 30 G | Refills: 11 | Status: SHIPPED | OUTPATIENT
Start: 2020-05-28 | End: 2021-09-13 | Stop reason: SDUPTHER

## 2020-05-28 RX ORDER — ISOSORBIDE MONONITRATE 60 MG/1
60 TABLET, EXTENDED RELEASE ORAL DAILY
Qty: 30 TABLET | Refills: 11 | Status: SHIPPED | OUTPATIENT
Start: 2020-05-28 | End: 2021-02-04 | Stop reason: SDUPTHER

## 2020-05-28 RX ORDER — TRIAMCINOLONE ACETONIDE 1 MG/G
CREAM TOPICAL 3 TIMES DAILY
Qty: 80 G | Refills: 6 | Status: SHIPPED | OUTPATIENT
Start: 2020-05-28 | End: 2021-02-04

## 2020-05-28 NOTE — PROGRESS NOTES
Juliet Castellano is a 80 y.o. female.     Chief Complaint   Patient presents with   • Establish Care   • Hyperlipidemia   • Hypertension   • Diabetes       Hyperlipidemia   This is a chronic problem. The current episode started more than 1 year ago. The problem is controlled. Recent lipid tests were reviewed and are normal.   Hypertension   This is a chronic problem. The current episode started more than 1 year ago. The problem has been waxing and waning since onset. The problem is controlled.   Diabetes   She presents for her follow-up diabetic visit. She has type 2 diabetes mellitus. Her disease course has been stable. There are no hypoglycemic associated symptoms. There are no diabetic associated symptoms. Symptoms are stable.          The following portions of the patient's history were reviewed and updated as appropriate: allergies, current medications, past family history, past medical history, past social history, past surgical history and problem list.    Past Medical History:   Diagnosis Date   • Colon cancer (CMS/HCC)     s/p partial colectomy in 2011   • Diabetes mellitus (CMS/HCC)    • Gallbladder disease    • Hyperlipidemia    • Hypertension    • Left arm pain    • Syncope     Secondary to medications (clonidine and metoprolol) in 3/15.  Had severe bradycardia (sinus arrest with junctional escape beats).  Resolved after medications discontinued.       Past Surgical History:   Procedure Laterality Date   • CHOLECYSTECTOMY  10/2019   • COLECTOMY PARTIAL / TOTAL      2011 for colon cancer   • UTERINE FIBROID SURGERY      s/p uterine fibroma resection       Family History   Problem Relation Age of Onset   • Heart disease Mother         pacemaker placement   • Colon cancer Brother        Social History     Socioeconomic History   • Marital status:      Spouse name: Not on file   • Number of children: Not on file   • Years of education: Not on file   • Highest education level: Not on  file   Tobacco Use   • Smoking status: Never Smoker   • Smokeless tobacco: Never Used   Substance and Sexual Activity   • Alcohol use: No   • Drug use: No       Current Outpatient Medications on File Prior to Visit   Medication Sig Dispense Refill   • ACCU-CHEK FASTCLIX LANCETS misc USE TO TEST TID  8   • amLODIPine (NORVASC) 5 MG tablet Take 1 tablet by mouth Daily. 90 tablet 0   • aspirin 81 MG tablet Take by mouth.     • atorvastatin (LIPITOR) 20 MG tablet Take 1 tablet by mouth Daily. 90 tablet 0   • Cyanocobalamin (B-12) 2500 MCG sublingual tablet DISSOLVE 1 T UNDER THE TONGUE QD     • Cyanocobalamin (VITAMIN B-12 ER) 1000 MCG tablet controlled-release Take 1 tablet by mouth Daily.  0   • hydroxyurea (HYDREA) 500 MG capsule TK 1 C PO QOD     • isosorbide mononitrate (IMDUR) 60 MG 24 hr tablet Take 1 tablet by mouth Daily. 90 tablet 3   • metFORMIN (GLUCOPHAGE) 500 MG tablet Take 1 tablet by mouth 3 (Three) Times a Day. 90 tablet 0   • ofloxacin (OCUFLOX) 0.3 % ophthalmic solution INSTILL 1 TO 2 GTS AEY Q 2-3 HOURS DURING DAY  1   • ONE TOUCH ULTRA TEST test strip USE TO TEST BLOOD SUGAR EVERY DAY. (E11.9) 100 each 2   • oxybutynin (DITROPAN) 5 MG tablet TK 1/2 - 1 T PO 1-2 TIMES DAILY  5   • valsartan-hydrochlorothiazide (DIOVAN-HCT) 320-25 MG per tablet Take 1 tablet by mouth daily.     • cloNIDine (CATAPRES) 0.1 MG tablet Take  by mouth Daily.  11   • fenofibrate (TRICOR) 145 MG tablet Take 1 tablet by mouth daily.     • fluocinonide (LIDEX) 0.05 % external solution APPLY SPARINGLY EXTERNALLY TO THE AFFECTED AREA 2 TO 4 TIMES A DAY AS DIRECTED. 60 mL 0   • phenazopyridine (PYRIDIUM) 100 MG tablet TK 1 T PO TID AFTER MEALS PRN  3   • [DISCONTINUED] hydroCHLOROthiazide (HYDRODIURIL) 25 MG tablet Take 25 mg by mouth Daily.  0   • [DISCONTINUED] valsartan (DIOVAN) 160 MG tablet Take  by mouth Daily.  0     No current facility-administered medications on file prior to visit.        Review of Systems  "  Constitutional: Negative.    Respiratory: Negative.    Cardiovascular: Negative.        No results found for this or any previous visit (from the past 4704 hour(s)).  Objective   Vitals:    05/28/20 1137   BP: 124/78   BP Location: Left arm   Patient Position: Sitting   Cuff Size: Large Adult   Pulse: 84   Temp: 97 °F (36.1 °C)   TempSrc: Temporal   SpO2: 98%   Weight: 66.7 kg (147 lb)   Height: 149.9 cm (59.02\")     Body mass index is 29.67 kg/m².  Physical Exam   Constitutional: She appears well-developed and well-nourished. No distress.   Cardiovascular: Normal rate and regular rhythm.   Pulmonary/Chest: Effort normal and breath sounds normal. No respiratory distress. She has no wheezes.   Skin: She is not diaphoretic.   Nursing note and vitals reviewed.        Assessment/Plan   Ayla was seen today for establish care, hyperlipidemia, hypertension and diabetes.    Diagnoses and all orders for this visit:    Type 2 diabetes mellitus without complication, without long-term current use of insulin (CMS/[facility])  -     Hemoglobin A1c  -     Comprehensive Metabolic Panel  -     Lipid Panel With LDL / HDL Ratio  -     Microalbumin / Creatinine Urine Ratio - Urine, Clean Catch    Coronary artery disease of native heart with stable angina pectoris, unspecified vessel or lesion type (CMS/[facility])  -     isosorbide mononitrate (IMDUR) 60 MG 24 hr tablet; Take 1 tablet by mouth Daily.  -     diclofenac (VOLTAREN) 1 % gel gel; Apply 4 g topically to the appropriate area as directed 3 (Three) Times a Day.  -     triamcinolone (KENALOG) 0.1 % cream; Apply  topically to the appropriate area as directed 3 (Three) Times a Day.  -     ketoconazole (NIZORAL) 2 % cream; Apply  topically to the appropriate area as directed Daily.    Essential hypertension  -     Comprehensive Metabolic Panel  -     Lipid Panel With LDL / HDL Ratio    Hyperlipidemia, unspecified hyperlipidemia type  -     Comprehensive Metabolic Panel  -     Lipid Panel " With LDL / HDL Ratio  -     Microalbumin / Creatinine Urine Ratio - Urine, Clean Catch    Return in about 3 months (around 8/28/2020) for HYPERTENSION, DIABETES.

## 2020-05-29 LAB
ALBUMIN SERPL-MCNC: 4.8 G/DL (ref 3.5–5.2)
ALBUMIN/CREAT UR: 6 MG/G CREAT (ref 0–29)
ALBUMIN/GLOB SERPL: 2.3 G/DL
ALP SERPL-CCNC: 53 U/L (ref 39–117)
ALT SERPL-CCNC: 18 U/L (ref 1–33)
AST SERPL-CCNC: 20 U/L (ref 1–32)
BILIRUB SERPL-MCNC: 0.6 MG/DL (ref 0.2–1.2)
BUN SERPL-MCNC: 24 MG/DL (ref 8–23)
BUN/CREAT SERPL: 27.9 (ref 7–25)
CALCIUM SERPL-MCNC: 10.8 MG/DL (ref 8.6–10.5)
CHLORIDE SERPL-SCNC: 100 MMOL/L (ref 98–107)
CHOLEST SERPL-MCNC: 137 MG/DL (ref 0–200)
CO2 SERPL-SCNC: 31.5 MMOL/L (ref 22–29)
CREAT SERPL-MCNC: 0.86 MG/DL (ref 0.57–1)
CREAT UR-MCNC: 102.2 MG/DL
GLOBULIN SER CALC-MCNC: 2.1 GM/DL
GLUCOSE SERPL-MCNC: 90 MG/DL (ref 65–99)
HBA1C MFR BLD: 5.32 % (ref 4.8–5.6)
HDLC SERPL-MCNC: 33 MG/DL (ref 40–60)
LDLC SERPL CALC-MCNC: 70 MG/DL (ref 0–100)
LDLC/HDLC SERPL: 2.13 {RATIO}
MICROALBUMIN UR-MCNC: 5.9 UG/ML
POTASSIUM SERPL-SCNC: 4.1 MMOL/L (ref 3.5–5.2)
PROT SERPL-MCNC: 6.9 G/DL (ref 6–8.5)
SODIUM SERPL-SCNC: 141 MMOL/L (ref 136–145)
TRIGL SERPL-MCNC: 168 MG/DL (ref 0–150)
VLDLC SERPL CALC-MCNC: 33.6 MG/DL (ref 5–40)

## 2020-06-04 ENCOUNTER — TELEPHONE (OUTPATIENT)
Dept: FAMILY MEDICINE CLINIC | Facility: CLINIC | Age: 80
End: 2020-06-04

## 2020-06-04 DIAGNOSIS — M81.0 OSTEOPOROSIS WITHOUT CURRENT PATHOLOGICAL FRACTURE, UNSPECIFIED OSTEOPOROSIS TYPE: Primary | ICD-10-CM

## 2020-06-04 PROBLEM — M81.8 OTHER OSTEOPOROSIS WITHOUT CURRENT PATHOLOGICAL FRACTURE: Status: ACTIVE | Noted: 2020-06-04

## 2020-06-04 NOTE — TELEPHONE ENCOUNTER
Patients daughter called and would like to know where patient can go to get a Prolia shot. Patient used to get them done at [facility]    Please advise     [phone]

## 2020-06-04 NOTE — TELEPHONE ENCOUNTER
Returned call and let them know that it will be schedule but most likely at [facility]. Patient is aware and will wait for call.

## 2020-06-08 ENCOUNTER — TELEPHONE (OUTPATIENT)
Dept: FAMILY MEDICINE CLINIC | Facility: CLINIC | Age: 80
End: 2020-06-08

## 2020-06-08 NOTE — TELEPHONE ENCOUNTER
Johnson called regarding a prolia injection for patient.  She is trying to get the PA approved and is needing either a Dexa or any mention in a office note regarding osteopetrosis.  I didn't see anything, so she wanted to send you a message to see if you guys remember this being done.  Her phone number is [phone].

## 2020-06-11 DIAGNOSIS — E13.69 OTHER SPECIFIED DIABETES MELLITUS WITH OTHER SPECIFIED COMPLICATION, UNSPECIFIED WHETHER LONG TERM INSULIN USE (HCC): ICD-10-CM

## 2020-06-17 ENCOUNTER — APPOINTMENT (OUTPATIENT)
Dept: ONCOLOGY | Facility: HOSPITAL | Age: 80
End: 2020-06-17

## 2020-07-08 DIAGNOSIS — E78.5 HYPERLIPIDEMIA, UNSPECIFIED HYPERLIPIDEMIA TYPE: Primary | ICD-10-CM

## 2020-07-08 RX ORDER — ATORVASTATIN CALCIUM 20 MG/1
20 TABLET, FILM COATED ORAL DAILY
Qty: 90 TABLET | Refills: 0 | Status: SHIPPED | OUTPATIENT
Start: 2020-07-08 | End: 2020-07-09 | Stop reason: SDUPTHER

## 2020-07-09 ENCOUNTER — TELEPHONE (OUTPATIENT)
Dept: FAMILY MEDICINE CLINIC | Facility: CLINIC | Age: 80
End: 2020-07-09

## 2020-07-09 ENCOUNTER — OFFICE VISIT (OUTPATIENT)
Dept: FAMILY MEDICINE CLINIC | Facility: CLINIC | Age: 80
End: 2020-07-09

## 2020-07-09 VITALS
HEIGHT: 59 IN | DIASTOLIC BLOOD PRESSURE: 76 MMHG | SYSTOLIC BLOOD PRESSURE: 122 MMHG | HEART RATE: 75 BPM | TEMPERATURE: 97.1 F | WEIGHT: 149.13 LBS | OXYGEN SATURATION: 97 % | BODY MASS INDEX: 30.06 KG/M2

## 2020-07-09 DIAGNOSIS — E11.9 TYPE 2 DIABETES MELLITUS WITHOUT COMPLICATION, WITHOUT LONG-TERM CURRENT USE OF INSULIN (HCC): ICD-10-CM

## 2020-07-09 DIAGNOSIS — E78.5 HYPERLIPIDEMIA, UNSPECIFIED HYPERLIPIDEMIA TYPE: ICD-10-CM

## 2020-07-09 DIAGNOSIS — I10 ESSENTIAL HYPERTENSION: ICD-10-CM

## 2020-07-09 DIAGNOSIS — Z00.00 MEDICARE ANNUAL WELLNESS VISIT, SUBSEQUENT: Primary | ICD-10-CM

## 2020-07-09 DIAGNOSIS — E83.52 HYPERCALCEMIA: ICD-10-CM

## 2020-07-09 PROCEDURE — G0439 PPPS, SUBSEQ VISIT: HCPCS | Performed by: FAMILY MEDICINE

## 2020-07-09 PROCEDURE — 99213 OFFICE O/P EST LOW 20 MIN: CPT | Performed by: FAMILY MEDICINE

## 2020-07-09 RX ORDER — VALSARTAN AND HYDROCHLOROTHIAZIDE 320; 25 MG/1; MG/1
1 TABLET, FILM COATED ORAL DAILY
Qty: 90 TABLET | Refills: 3 | Status: SHIPPED | OUTPATIENT
Start: 2020-07-09 | End: 2021-06-24

## 2020-07-09 RX ORDER — AMLODIPINE BESYLATE 5 MG/1
5 TABLET ORAL DAILY
Qty: 90 TABLET | Refills: 3 | Status: SHIPPED | OUTPATIENT
Start: 2020-07-09 | End: 2021-06-28

## 2020-07-09 RX ORDER — ATORVASTATIN CALCIUM 20 MG/1
20 TABLET, FILM COATED ORAL DAILY
Qty: 90 TABLET | Refills: 3 | Status: SHIPPED | OUTPATIENT
Start: 2020-07-09 | End: 2021-08-30

## 2020-07-09 NOTE — PROGRESS NOTES
The ABCs of the Annual Wellness Visit  Subsequent Medicare Wellness Visit    Chief Complaint   Patient presents with   • Medicare Wellness-subsequent       Subjective   History of Present Illness:  Ayla Castellano is a 80 y.o. female who presents for a Subsequent Medicare Wellness Visit.    HEALTH RISK ASSESSMENT    Recent Hospitalizations:  Recently treated at the following:  [facility]    Current Medical Providers:  Patient Care Team:  Manisha Morales MD as PCP - General  Manisha Morales MD as PCP - Family Medicine    Smoking Status:  Social History     Tobacco Use   Smoking Status Never Smoker   Smokeless Tobacco Never Used       Alcohol Consumption:  Social History     Substance and Sexual Activity   Alcohol Use No       Depression Screen:   No flowsheet data found.    Fall Risk Screen:  STEADI Fall Risk Assessment has not been completed.    Health Habits and Functional and Cognitive Screening:  No flowsheet data found.      Does the patient have evidence of cognitive impairment? No    Asprin use counseling:Does not need ASA (and currently is not on it)    Age-appropriate Screening Schedule:  Refer to the list below for future screening recommendations based on patient's age, sex and/or medical conditions. Orders for these recommended tests are listed in the plan section. The patient has been provided with a written plan.    Health Maintenance   Topic Date Due   • TDAP/TD VACCINES (1 - Tdap) 01/01/1951   • ZOSTER VACCINE (1 of 2) 01/01/1992   • DXA SCAN  06/08/2020   • INFLUENZA VACCINE  08/01/2020   • DIABETIC EYE EXAM  10/23/2020   • HEMOGLOBIN A1C  11/28/2020   • LIPID PANEL  05/28/2021   • URINE MICROALBUMIN  05/28/2021   • COLONOSCOPY  12/17/2029   • MAMMOGRAM  Discontinued          The following portions of the patient's history were reviewed and updated as appropriate: allergies, current medications, past family history, past medical history, past social history, past surgical  history and problem list.    Outpatient Medications Prior to Visit   Medication Sig Dispense Refill   • ACCU-CHEK FASTCLIX LANCETS misc USE TO TEST TID  8   • amLODIPine (NORVASC) 5 MG tablet Take 1 tablet by mouth Daily. 90 tablet 0   • aspirin 81 MG tablet Take by mouth.     • atorvastatin (LIPITOR) 20 MG tablet Take 1 tablet by mouth Daily. 90 tablet 0   • cloNIDine (CATAPRES) 0.1 MG tablet Take  by mouth Daily.  11   • Cyanocobalamin (B-12) 2500 MCG sublingual tablet DISSOLVE 1 T UNDER THE TONGUE QD     • Cyanocobalamin (VITAMIN B-12 ER) 1000 MCG tablet controlled-release Take 1 tablet by mouth Daily.  0   • diclofenac (VOLTAREN) 1 % gel gel Apply 4 g topically to the appropriate area as directed 3 (Three) Times a Day. 5 tube 11   • fenofibrate (TRICOR) 145 MG tablet Take 1 tablet by mouth daily.     • fluocinonide (LIDEX) 0.05 % external solution APPLY SPARINGLY EXTERNALLY TO THE AFFECTED AREA 2 TO 4 TIMES A DAY AS DIRECTED. 60 mL 0   • hydroxyurea (HYDREA) 500 MG capsule TK 1 C PO QOD     • isosorbide mononitrate (IMDUR) 60 MG 24 hr tablet Take 1 tablet by mouth Daily. 90 tablet 3   • isosorbide mononitrate (IMDUR) 60 MG 24 hr tablet Take 1 tablet by mouth Daily. 30 tablet 11   • ketoconazole (NIZORAL) 2 % cream Apply  topically to the appropriate area as directed Daily. 30 g 11   • metFORMIN (GLUCOPHAGE) 500 MG tablet TAKE 1 TABLET BY MOUTH THREE TIMES DAILY 270 tablet 1   • ofloxacin (OCUFLOX) 0.3 % ophthalmic solution INSTILL 1 TO 2 GTS AEY Q 2-3 HOURS DURING DAY  1   • ONE TOUCH ULTRA TEST test strip USE TO TEST BLOOD SUGAR EVERY DAY. (E11.9) 100 each 2   • oxybutynin (DITROPAN) 5 MG tablet TK 1/2 - 1 T PO 1-2 TIMES DAILY  5   • phenazopyridine (PYRIDIUM) 100 MG tablet TK 1 T PO TID AFTER MEALS PRN  3   • triamcinolone (KENALOG) 0.1 % cream Apply  topically to the appropriate area as directed 3 (Three) Times a Day. 80 g 6   • valsartan-hydrochlorothiazide (DIOVAN-HCT) 320-25 MG per tablet Take 1 tablet by  "mouth daily.       No facility-administered medications prior to visit.        Patient Active Problem List   Diagnosis   • Hypertension   • Coronary artery disease of native heart with stable angina pectoris (CMS/[facility])   • Type 2 diabetes mellitus without complication, without long-term current use of insulin (CMS/[facility])   • Hyperlipidemia   • Other osteoporosis without current pathological fracture   • Medicare annual wellness visit, subsequent       Advanced Care Planning:  ACP discussion was held with the patient during this visit. Patient has an advance directive in EMR which is still valid.     Review of Systems    Compared to one year ago, the patient feels her physical health is worse.  Compared to one year ago, the patient feels her mental health is the same.    Reviewed chart for potential of high risk medication in the elderly: yes  Reviewed chart for potential of harmful drug interactions in the elderly:yes    Objective         Vitals:    07/09/20 1433   BP: 122/76   Pulse: 75   Temp: 97.1 °F (36.2 °C)   SpO2: 97%   Weight: 67.6 kg (149 lb 2 oz)   Height: 149.9 cm (59.02\")       Body mass index is 30.1 kg/m².  Discussed the patient's BMI with her. The BMI is above average; BMI management plan is completed.    Physical Exam    Lab Results   Component Value Date    GLU 90 05/28/2020    CHLPL 137 05/28/2020    TRIG 168 (H) 05/28/2020    HDL 33 (L) 05/28/2020    LDL 70 05/28/2020    VLDL 33.6 05/28/2020    HGBA1C 5.32 05/28/2020        Assessment/Plan   Medicare Risks and Personalized Health Plan  CMS Preventative Services Quick Reference  Fall Risk    The above risks/problems have been discussed with the patient.  Pertinent information has been shared with the patient in the After Visit Summary.  Follow up plans and orders are seen below in the Assessment/Plan Section.    Diagnoses and all orders for this visit:    1. Medicare annual wellness visit, subsequent (Primary)    2. Hyperlipidemia, unspecified " hyperlipidemia type    3. Type 2 diabetes mellitus without complication, without long-term current use of insulin (CMS/[facility])    4. Hypercalcemia    5. Essential hypertension      Follow Up:  Return in about 3 months (around 10/9/2020) for DIABETES.      An After Visit Summary and PPPS were given to the patient.

## 2020-07-09 NOTE — TELEPHONE ENCOUNTER
I have no number to contact Nataly, please contact nataly and tell her these are the labs that were drawn today.    Order Code Tests Ordered (Total: 2)    Order Code Tests Ordered      [id number] PTH, Intact & Calcium    [id number] Calcium, Ionized

## 2020-07-09 NOTE — PROGRESS NOTES
Juliet Castellano is a 80 y.o. female.     Chief Complaint   Patient presents with   • Medicare Wellness-subsequent       Diabetes   She presents for her follow-up diabetic visit. She has type 2 diabetes mellitus. Her disease course has been stable. Pertinent negatives for hypoglycemia include no dizziness. There are no diabetic associated symptoms. Pertinent negatives for diabetes include no blurred vision, no chest pain, no fatigue, no polydipsia and no polyuria.   Hypertension   This is a chronic problem. The current episode started more than 1 year ago. The problem is unchanged. The problem is controlled. Pertinent negatives include no blurred vision, chest pain, palpitations or shortness of breath.          The following portions of the patient's history were reviewed and updated as appropriate: allergies, current medications, past family history, past medical history, past social history, past surgical history and problem list.    Past Medical History:   Diagnosis Date   • Colon cancer (CMS/[facility])     s/p partial colectomy in 2011   • Diabetes mellitus (CMS/[facility])    • Gallbladder disease    • Hyperlipidemia    • Hypertension    • Left arm pain    • Syncope     Secondary to medications (clonidine and metoprolol) in 3/15.  Had severe bradycardia (sinus arrest with junctional escape beats).  Resolved after medications discontinued.       Past Surgical History:   Procedure Laterality Date   • CHOLECYSTECTOMY  10/2019   • COLECTOMY PARTIAL / TOTAL      2011 for colon cancer   • UTERINE FIBROID SURGERY      s/p uterine fibroma resection       Family History   Problem Relation Age of Onset   • Heart disease Mother         pacemaker placement   • Colon cancer Brother        Social History     Socioeconomic History   • Marital status:      Spouse name: Not on file   • Number of children: Not on file   • Years of education: Not on file   • Highest education level: Not on file   Tobacco Use   •  Smoking status: Never Smoker   • Smokeless tobacco: Never Used   Substance and Sexual Activity   • Alcohol use: No   • Drug use: No       Current Outpatient Medications on File Prior to Visit   Medication Sig Dispense Refill   • ACCU-CHEK FASTCLIX LANCETS misc USE TO TEST TID  8   • aspirin 81 MG tablet Take by mouth.     • cloNIDine (CATAPRES) 0.1 MG tablet Take  by mouth Daily.  11   • Cyanocobalamin (B-12) 2500 MCG sublingual tablet DISSOLVE 1 T UNDER THE TONGUE QD     • Cyanocobalamin (VITAMIN B-12 ER) 1000 MCG tablet controlled-release Take 1 tablet by mouth Daily.  0   • diclofenac (VOLTAREN) 1 % gel gel Apply 4 g topically to the appropriate area as directed 3 (Three) Times a Day. 5 tube 11   • fenofibrate (TRICOR) 145 MG tablet Take 1 tablet by mouth daily.     • fluocinonide (LIDEX) 0.05 % external solution APPLY SPARINGLY EXTERNALLY TO THE AFFECTED AREA 2 TO 4 TIMES A DAY AS DIRECTED. 60 mL 0   • hydroxyurea (HYDREA) 500 MG capsule TK 1 C PO QOD     • isosorbide mononitrate (IMDUR) 60 MG 24 hr tablet Take 1 tablet by mouth Daily. 90 tablet 3   • isosorbide mononitrate (IMDUR) 60 MG 24 hr tablet Take 1 tablet by mouth Daily. 30 tablet 11   • ketoconazole (NIZORAL) 2 % cream Apply  topically to the appropriate area as directed Daily. 30 g 11   • metFORMIN (GLUCOPHAGE) 500 MG tablet TAKE 1 TABLET BY MOUTH THREE TIMES DAILY 270 tablet 1   • ofloxacin (OCUFLOX) 0.3 % ophthalmic solution INSTILL 1 TO 2 GTS AEY Q 2-3 HOURS DURING DAY  1   • ONE TOUCH ULTRA TEST test strip USE TO TEST BLOOD SUGAR EVERY DAY. (E11.9) 100 each 2   • oxybutynin (DITROPAN) 5 MG tablet TK 1/2 - 1 T PO 1-2 TIMES DAILY  5   • phenazopyridine (PYRIDIUM) 100 MG tablet TK 1 T PO TID AFTER MEALS PRN  3   • triamcinolone (KENALOG) 0.1 % cream Apply  topically to the appropriate area as directed 3 (Three) Times a Day. 80 g 6   • [DISCONTINUED] amLODIPine (NORVASC) 5 MG tablet Take 1 tablet by mouth Daily. 90 tablet 0   • [DISCONTINUED]  atorvastatin (LIPITOR) 20 MG tablet Take 1 tablet by mouth Daily. 90 tablet 0   • [DISCONTINUED] valsartan-hydrochlorothiazide (DIOVAN-HCT) 320-25 MG per tablet Take 1 tablet by mouth daily.       No current facility-administered medications on file prior to visit.        Review of Systems   Constitutional: Negative for fatigue and unexpected weight gain.   Eyes: Negative for blurred vision.   Respiratory: Negative for cough, chest tightness and shortness of breath.    Cardiovascular: Negative for chest pain, palpitations and leg swelling.   Gastrointestinal: Negative for nausea and vomiting.   Endocrine: Negative for polydipsia and polyuria.   Genitourinary: Negative for frequency.   Skin: Negative for skin lesions.   Neurological: Negative for dizziness, light-headedness, numbness and headache.       Recent Results (from the past 4704 hour(s))   Hemoglobin A1c    Collection Time: 05/28/20 12:04 PM   Result Value Ref Range    Hemoglobin A1C 5.32 4.80 - 5.60 %   Comprehensive Metabolic Panel    Collection Time: 05/28/20 12:04 PM   Result Value Ref Range    Glucose 90 65 - 99 mg/dL    BUN 24 (H) 8 - 23 mg/dL    Creatinine 0.86 0.57 - 1.00 mg/dL    eGFR Non African Am 64 >60 mL/min/1.73    eGFR African Am 77 >60 mL/min/1.73    BUN/Creatinine Ratio 27.9 (H) 7.0 - 25.0    Sodium 141 136 - 145 mmol/L    Potassium 4.1 3.5 - 5.2 mmol/L    Chloride 100 98 - 107 mmol/L    Total CO2 31.5 (H) 22.0 - 29.0 mmol/L    Calcium 10.8 (H) 8.6 - 10.5 mg/dL    Total Protein 6.9 6.0 - 8.5 g/dL    Albumin 4.80 3.50 - 5.20 g/dL    Globulin 2.1 gm/dL    A/G Ratio 2.3 g/dL    Total Bilirubin 0.6 0.2 - 1.2 mg/dL    Alkaline Phosphatase 53 39 - 117 U/L    AST (SGOT) 20 1 - 32 U/L    ALT (SGPT) 18 1 - 33 U/L   Lipid Panel With LDL / HDL Ratio    Collection Time: 05/28/20 12:04 PM   Result Value Ref Range    Total Cholesterol 137 0 - 200 mg/dL    Triglycerides 168 (H) 0 - 150 mg/dL    HDL Cholesterol 33 (L) 40 - 60 mg/dL    VLDL Cholesterol 33.6  "5 - 40 mg/dL    LDL Cholesterol  70 0 - 100 mg/dL    LDL/HDL Ratio 2.13    Microalbumin / Creatinine Urine Ratio - Urine, Clean Catch    Collection Time: 05/28/20 12:04 PM   Result Value Ref Range    Creatinine, Urine 102.2 Not Estab. mg/dL    Microalbumin, Urine 5.9 Not Estab. ug/mL    Microalbumin/Creatinine Ratio 6 0 - 29 mg/g creat     Objective   Vitals:    07/09/20 1433   BP: 122/76   Pulse: 75   Temp: 97.1 °F (36.2 °C)   SpO2: 97%   Weight: 67.6 kg (149 lb 2 oz)   Height: 149.9 cm (59.02\")     Body mass index is 30.1 kg/m².  Physical Exam   Constitutional: She appears well-developed and well-nourished. No distress.   Cardiovascular: Normal rate and regular rhythm.   Pulmonary/Chest: Effort normal and breath sounds normal. No respiratory distress. She has no wheezes.   Skin: She is not diaphoretic.   Nursing note and vitals reviewed.        Assessment/Plan   Ayla was seen today for medicare wellness-subsequent.    Diagnoses and all orders for this visit:    Medicare annual wellness visit, subsequent  -     Calcium, Ionized    Hyperlipidemia, unspecified hyperlipidemia type  -     atorvastatin (LIPITOR) 20 MG tablet; Take 1 tablet by mouth Daily.    Type 2 diabetes mellitus without complication, without long-term current use of insulin (CMS/[facility])    Hypercalcemia  -     PTH, Intact & Calcium    Essential hypertension  -     valsartan-hydrochlorothiazide (DIOVAN-HCT) 320-25 MG per tablet; Take 1 tablet by mouth Daily.  -     amLODIPine (NORVASC) 5 MG tablet; Take 1 tablet by mouth Daily.      Return in about 3 months (around 10/9/2020) for DIABETES.           "

## 2020-07-09 NOTE — TELEPHONE ENCOUNTER
Nataly from [facility] called regarding the procedure we scheduled for tomorrow at the hospital and states in the blood work, they need just a calcium not a idonized calcium.  She wants to know if there is still blood here it can be run off that.

## 2020-07-10 ENCOUNTER — APPOINTMENT (OUTPATIENT)
Dept: ONCOLOGY | Facility: HOSPITAL | Age: 80
End: 2020-07-10

## 2020-07-10 NOTE — TELEPHONE ENCOUNTER
Called and spoke Nataly and she stated that she needed the Calcium results prior to her proila test.  Informed Nataly her Dr. Morales that the test results should be in some time today.

## 2020-07-10 NOTE — TELEPHONE ENCOUNTER
She is within [facility], she could see those labs, but she said they need just a calcium ran.  She was hoping that her blood was still here and just a calcium test could be ordered and ran using the blood that was already drawn.  Actually, she just called asking about the labs in the system, asking if they could get resulted so they wouldn't have to draw labs again when she arrives this afternoon.  Her number if needed is [phone].

## 2020-07-11 LAB
CA-I SERPL ISE-MCNC: 6 MG/DL (ref 4.5–5.6)
CALCIUM SERPL-MCNC: 10.6 MG/DL (ref 8.7–10.3)
INTACT PTH: ABNORMAL
PTH-INTACT SERPL-MCNC: 38 PG/ML (ref 15–65)

## 2020-10-05 ENCOUNTER — OFFICE VISIT (OUTPATIENT)
Dept: FAMILY MEDICINE CLINIC | Facility: CLINIC | Age: 80
End: 2020-10-05

## 2020-10-05 VITALS
SYSTOLIC BLOOD PRESSURE: 112 MMHG | OXYGEN SATURATION: 95 % | DIASTOLIC BLOOD PRESSURE: 84 MMHG | HEART RATE: 87 BPM | TEMPERATURE: 98.1 F | WEIGHT: 153 LBS | HEIGHT: 59 IN | BODY MASS INDEX: 30.84 KG/M2

## 2020-10-05 DIAGNOSIS — I10 ESSENTIAL HYPERTENSION: Primary | ICD-10-CM

## 2020-10-05 DIAGNOSIS — H61.23 BILATERAL IMPACTED CERUMEN: ICD-10-CM

## 2020-10-05 DIAGNOSIS — E78.2 MIXED HYPERLIPIDEMIA: ICD-10-CM

## 2020-10-05 DIAGNOSIS — E11.9 TYPE 2 DIABETES MELLITUS WITHOUT COMPLICATION, WITHOUT LONG-TERM CURRENT USE OF INSULIN (HCC): ICD-10-CM

## 2020-10-05 PROCEDURE — 99214 OFFICE O/P EST MOD 30 MIN: CPT | Performed by: FAMILY MEDICINE

## 2020-10-05 NOTE — PROGRESS NOTES
Juliet Castellano is a 80 y.o. female.     Follow up on diabetes    Diabetes  She presents for her follow-up diabetic visit. She has type 2 diabetes mellitus. Her disease course has been stable. Pertinent negatives for hypoglycemia include no dizziness. There are no diabetic associated symptoms. Pertinent negatives for diabetes include no blurred vision, no chest pain, no fatigue, no polydipsia and no polyuria. Symptoms are stable.   Hypertension  This is a chronic problem. The current episode started more than 1 year ago. The problem is unchanged. The problem is controlled. Pertinent negatives include no blurred vision, chest pain, palpitations or shortness of breath.   Hyperlipidemia  This is a chronic problem. The current episode started more than 1 year ago. The problem is controlled. Recent lipid tests were reviewed and are normal. Pertinent negatives include no chest pain or shortness of breath.          The following portions of the patient's history were reviewed and updated as appropriate: allergies, current medications, past family history, past medical history, past social history, past surgical history and problem list.    Past Medical History:   Diagnosis Date   • Colon cancer (CMS/[facility])     s/p partial colectomy in 2011   • Diabetes mellitus (CMS/[facility])    • Gallbladder disease    • Hyperlipidemia    • Hypertension    • Left arm pain    • Syncope     Secondary to medications (clonidine and metoprolol) in 3/15.  Had severe bradycardia (sinus arrest with junctional escape beats).  Resolved after medications discontinued.       Past Surgical History:   Procedure Laterality Date   • CHOLECYSTECTOMY  10/2019   • COLECTOMY PARTIAL / TOTAL      2011 for colon cancer   • UTERINE FIBROID SURGERY      s/p uterine fibroma resection       Family History   Problem Relation Age of Onset   • Heart disease Mother         pacemaker placement   • Colon cancer Brother        Social History     Socioeconomic  History   • Marital status:      Spouse name: Not on file   • Number of children: Not on file   • Years of education: Not on file   • Highest education level: Not on file   Tobacco Use   • Smoking status: Never Smoker   • Smokeless tobacco: Never Used   Substance and Sexual Activity   • Alcohol use: No   • Drug use: No       Current Outpatient Medications on File Prior to Visit   Medication Sig Dispense Refill   • ACCU-CHEK FASTCLIX LANCETS misc USE TO TEST TID  8   • amLODIPine (NORVASC) 5 MG tablet Take 1 tablet by mouth Daily. 90 tablet 3   • aspirin 81 MG tablet Take by mouth.     • atorvastatin (LIPITOR) 20 MG tablet Take 1 tablet by mouth Daily. 90 tablet 3   • cloNIDine (CATAPRES) 0.1 MG tablet Take  by mouth Daily.  11   • Cyanocobalamin (B-12) 2500 MCG sublingual tablet DISSOLVE 1 T UNDER THE TONGUE QD     • Cyanocobalamin (VITAMIN B-12 ER) 1000 MCG tablet controlled-release Take 1 tablet by mouth Daily.  0   • diclofenac (VOLTAREN) 1 % gel gel Apply 4 g topically to the appropriate area as directed 3 (Three) Times a Day. 5 tube 11   • fenofibrate (TRICOR) 145 MG tablet Take 1 tablet by mouth daily.     • fluocinonide (LIDEX) 0.05 % external solution APPLY SPARINGLY EXTERNALLY TO THE AFFECTED AREA 2 TO 4 TIMES A DAY AS DIRECTED. 60 mL 0   • hydroxyurea (HYDREA) 500 MG capsule TK 1 C PO QOD     • isosorbide mononitrate (IMDUR) 60 MG 24 hr tablet Take 1 tablet by mouth Daily. 90 tablet 3   • isosorbide mononitrate (IMDUR) 60 MG 24 hr tablet Take 1 tablet by mouth Daily. 30 tablet 11   • ketoconazole (NIZORAL) 2 % cream Apply  topically to the appropriate area as directed Daily. 30 g 11   • metFORMIN (GLUCOPHAGE) 500 MG tablet TAKE 1 TABLET BY MOUTH THREE TIMES DAILY 270 tablet 1   • ofloxacin (OCUFLOX) 0.3 % ophthalmic solution INSTILL 1 TO 2 GTS AEY Q 2-3 HOURS DURING DAY  1   • ONE TOUCH ULTRA TEST test strip USE TO TEST BLOOD SUGAR EVERY DAY. (E11.9) 100 each 2   • oxybutynin (DITROPAN) 5 MG tablet  TK 1/2 - 1 T PO 1-2 TIMES DAILY  5   • phenazopyridine (PYRIDIUM) 100 MG tablet TK 1 T PO TID AFTER MEALS PRN  3   • triamcinolone (KENALOG) 0.1 % cream Apply  topically to the appropriate area as directed 3 (Three) Times a Day. 80 g 6   • valsartan-hydrochlorothiazide (DIOVAN-HCT) 320-25 MG per tablet Take 1 tablet by mouth Daily. 90 tablet 3     No current facility-administered medications on file prior to visit.        Review of Systems   Constitutional: Negative for fatigue and unexpected weight gain.   Eyes: Negative for blurred vision.   Respiratory: Negative for cough, chest tightness and shortness of breath.    Cardiovascular: Negative for chest pain, palpitations and leg swelling.   Gastrointestinal: Negative for nausea and vomiting.   Endocrine: Negative for polydipsia and polyuria.   Genitourinary: Negative for frequency.   Skin: Negative for skin lesions.        Cold feet   Neurological: Negative for dizziness, light-headedness, numbness and headache.       Recent Results (from the past 4704 hour(s))   Hemoglobin A1c    Collection Time: 05/28/20 12:04 PM    Specimen: Blood    BLOOD   Result Value Ref Range    Hemoglobin A1C 5.32 4.80 - 5.60 %   Comprehensive Metabolic Panel    Collection Time: 05/28/20 12:04 PM    Specimen: Blood    BLOOD   Result Value Ref Range    Glucose 90 65 - 99 mg/dL    BUN 24 (H) 8 - 23 mg/dL    Creatinine 0.86 0.57 - 1.00 mg/dL    eGFR Non African Am 64 >60 mL/min/1.73    eGFR African Am 77 >60 mL/min/1.73    BUN/Creatinine Ratio 27.9 (H) 7.0 - 25.0    Sodium 141 136 - 145 mmol/L    Potassium 4.1 3.5 - 5.2 mmol/L    Chloride 100 98 - 107 mmol/L    Total CO2 31.5 (H) 22.0 - 29.0 mmol/L    Calcium 10.8 (H) 8.6 - 10.5 mg/dL    Total Protein 6.9 6.0 - 8.5 g/dL    Albumin 4.80 3.50 - 5.20 g/dL    Globulin 2.1 gm/dL    A/G Ratio 2.3 g/dL    Total Bilirubin 0.6 0.2 - 1.2 mg/dL    Alkaline Phosphatase 53 39 - 117 U/L    AST (SGOT) 20 1 - 32 U/L    ALT (SGPT) 18 1 - 33 U/L   Lipid Panel  "With LDL / HDL Ratio    Collection Time: 05/28/20 12:04 PM    Specimen: Blood    BLOOD   Result Value Ref Range    Total Cholesterol 137 0 - 200 mg/dL    Triglycerides 168 (H) 0 - 150 mg/dL    HDL Cholesterol 33 (L) 40 - 60 mg/dL    VLDL Cholesterol 33.6 5 - 40 mg/dL    LDL Cholesterol  70 0 - 100 mg/dL    LDL/HDL Ratio 2.13    Microalbumin / Creatinine Urine Ratio - Urine, Clean Catch    Collection Time: 05/28/20 12:04 PM    Specimen: Urine, Clean Catch    BLOOD   Result Value Ref Range    Creatinine, Urine 102.2 Not Estab. mg/dL    Microalbumin, Urine 5.9 Not Estab. ug/mL    Microalbumin/Creatinine Ratio 6 0 - 29 mg/g creat   PTH, Intact & Calcium    Collection Time: 07/09/20  3:04 PM    Specimen: Blood   Result Value Ref Range    Calcium 10.6 (H) 8.7 - 10.3 mg/dL    PTH, Intact 38 15 - 65 pg/mL    PTH, Intact Comment    Calcium, Ionized    Collection Time: 07/09/20  3:04 PM    Specimen: Blood   Result Value Ref Range    Ionized Calcium 6.0 (H) 4.5 - 5.6 mg/dL     Objective   Vitals:    10/05/20 0756   BP: 112/84   BP Location: Left arm   Patient Position: Sitting   Cuff Size: Adult   Pulse: 87   Temp: 98.1 °F (36.7 °C)   TempSrc: Temporal   SpO2: 95%   Weight: 69.4 kg (153 lb)   Height: 149.9 cm (59.02\")     Body mass index is 30.89 kg/m².  Physical Exam  Vitals signs and nursing note reviewed.   Constitutional:       General: She is not in acute distress.     Appearance: She is well-developed. She is not diaphoretic.   HENT:      Right Ear: There is impacted cerumen.      Left Ear: There is impacted cerumen.   Cardiovascular:      Rate and Rhythm: Normal rate and regular rhythm.      Pulses: Normal pulses.      Heart sounds: Normal heart sounds.   Pulmonary:      Effort: Pulmonary effort is normal. No respiratory distress.      Breath sounds: Normal breath sounds. No wheezing.             Ayla was seen today for diabetes, hyperlipidemia and hypertension.    Diagnoses and all orders for this " visit:    Essential hypertension  -     Comprehensive Metabolic Panel  -     Lipid Panel With LDL / HDL Ratio  -     Microalbumin / Creatinine Urine Ratio - Urine, Clean Catch    Type 2 diabetes mellitus without complication, without long-term current use of insulin (CMS/[facility])  -     Hemoglobin A1c  -     Comprehensive Metabolic Panel  -     Lipid Panel With LDL / HDL Ratio  -     Microalbumin / Creatinine Urine Ratio - Urine, Clean Catch  -     Urinalysis without microscopic (no culture) - Urine, Clean Catch; Future    Mixed hyperlipidemia  -     Comprehensive Metabolic Panel  -     Lipid Panel With LDL / HDL Ratio    Bilateral impacted cerumen  -     Cancel: Ear Cerumen Removal  -     Ambulatory Referral to ENT (Otolaryngology)        Ear Cerumen Removal    Date/Time: 10/5/2020 8:08 AM  Performed by: Manisha Morales MD  Authorized by: Manisha Morales MD   Location details: left ear and right ear  Procedure type: irrigation       Return in about 3 months (around 1/5/2021) for DIABETES, HYPERTENSION.

## 2020-10-06 LAB
ALBUMIN SERPL-MCNC: 4.5 G/DL (ref 3.5–5.2)
ALBUMIN/CREAT UR: 21 MG/G CREAT (ref 0–29)
ALBUMIN/GLOB SERPL: 2.4 G/DL
ALP SERPL-CCNC: 86 U/L (ref 39–117)
ALT SERPL-CCNC: 18 U/L (ref 1–33)
AST SERPL-CCNC: 21 U/L (ref 1–32)
BILIRUB SERPL-MCNC: 0.5 MG/DL (ref 0–1.2)
BUN SERPL-MCNC: 24 MG/DL (ref 8–23)
BUN/CREAT SERPL: 25.8 (ref 7–25)
CALCIUM SERPL-MCNC: 10.6 MG/DL (ref 8.6–10.5)
CHLORIDE SERPL-SCNC: 106 MMOL/L (ref 98–107)
CHOLEST SERPL-MCNC: 144 MG/DL (ref 0–200)
CO2 SERPL-SCNC: 27.9 MMOL/L (ref 22–29)
CREAT SERPL-MCNC: 0.93 MG/DL (ref 0.57–1)
CREAT UR-MCNC: 94 MG/DL
GLOBULIN SER CALC-MCNC: 1.9 GM/DL
GLUCOSE SERPL-MCNC: 105 MG/DL (ref 65–99)
HBA1C MFR BLD: 5.3 % (ref 4.8–5.6)
HDLC SERPL-MCNC: 33 MG/DL (ref 40–60)
LDLC SERPL CALC-MCNC: 84 MG/DL (ref 0–100)
LDLC/HDLC SERPL: 2.54 {RATIO}
MICROALBUMIN UR-MCNC: 20.1 UG/ML
POTASSIUM SERPL-SCNC: 4.8 MMOL/L (ref 3.5–5.2)
PROT SERPL-MCNC: 6.4 G/DL (ref 6–8.5)
SODIUM SERPL-SCNC: 143 MMOL/L (ref 136–145)
TRIGL SERPL-MCNC: 136 MG/DL (ref 0–150)
VLDLC SERPL CALC-MCNC: 27.2 MG/DL

## 2020-12-08 DIAGNOSIS — E13.69 OTHER SPECIFIED DIABETES MELLITUS WITH OTHER SPECIFIED COMPLICATION, UNSPECIFIED WHETHER LONG TERM INSULIN USE (HCC): ICD-10-CM

## 2021-01-06 ENCOUNTER — TELEPHONE (OUTPATIENT)
Dept: ONCOLOGY | Facility: HOSPITAL | Age: 81
End: 2021-01-06

## 2021-01-06 NOTE — TELEPHONE ENCOUNTER
Spoke with patient's daughter who states patient needs appt with MD for labs to be collected for prolia injection on Monday 1/11/21.  Pt unable to get last injection due to elevated calcium level.  Daughter instructed of need for CBC, CMP, magnesium and phosporus and she v/u. She plans to contact MD today to make appt for this week.

## 2021-01-06 NOTE — TELEPHONE ENCOUNTER
----- Message from Jay Blandon [facility] sent at 1/5/2021  1:54 PM EST -----  Regarding: prolia labs  This patient will need electrolyte labs (or lab results) prior to their scheduled Prolia injection scheduled for 1/11/21.   Thank you!

## 2021-01-11 ENCOUNTER — APPOINTMENT (OUTPATIENT)
Dept: ONCOLOGY | Facility: HOSPITAL | Age: 81
End: 2021-01-11

## 2021-01-22 ENCOUNTER — OFFICE VISIT (OUTPATIENT)
Dept: FAMILY MEDICINE CLINIC | Facility: CLINIC | Age: 81
End: 2021-01-22

## 2021-01-22 VITALS
HEART RATE: 69 BPM | SYSTOLIC BLOOD PRESSURE: 108 MMHG | HEIGHT: 59 IN | TEMPERATURE: 98.3 F | DIASTOLIC BLOOD PRESSURE: 65 MMHG | OXYGEN SATURATION: 95 % | BODY MASS INDEX: 31.04 KG/M2 | WEIGHT: 154 LBS

## 2021-01-22 DIAGNOSIS — E11.9 TYPE 2 DIABETES MELLITUS WITHOUT COMPLICATION, WITHOUT LONG-TERM CURRENT USE OF INSULIN (HCC): ICD-10-CM

## 2021-01-22 DIAGNOSIS — R53.82 CHRONIC FATIGUE: ICD-10-CM

## 2021-01-22 DIAGNOSIS — E78.2 MIXED HYPERLIPIDEMIA: ICD-10-CM

## 2021-01-22 DIAGNOSIS — I25.118 CORONARY ARTERY DISEASE OF NATIVE HEART WITH STABLE ANGINA PECTORIS, UNSPECIFIED VESSEL OR LESION TYPE (HCC): ICD-10-CM

## 2021-01-22 DIAGNOSIS — Z78.0 POSTMENOPAUSE: ICD-10-CM

## 2021-01-22 DIAGNOSIS — E55.9 VITAMIN D DEFICIENCY: ICD-10-CM

## 2021-01-22 DIAGNOSIS — I10 ESSENTIAL HYPERTENSION: Primary | ICD-10-CM

## 2021-01-22 DIAGNOSIS — M81.8 OTHER OSTEOPOROSIS WITHOUT CURRENT PATHOLOGICAL FRACTURE: ICD-10-CM

## 2021-01-22 PROCEDURE — 99214 OFFICE O/P EST MOD 30 MIN: CPT | Performed by: FAMILY MEDICINE

## 2021-01-22 RX ORDER — FERROUS SULFATE 325(65) MG
TABLET ORAL
COMMUNITY
Start: 2020-10-22 | End: 2021-02-02 | Stop reason: SDDI

## 2021-01-22 RX ORDER — OFLOXACIN 3 MG/ML
2 SOLUTION/ DROPS OPHTHALMIC 4 TIMES DAILY
Qty: 10 ML | Refills: 11 | Status: SHIPPED | OUTPATIENT
Start: 2021-01-22

## 2021-01-23 LAB
25(OH)D3+25(OH)D2 SERPL-MCNC: 50.1 NG/ML (ref 30–100)
ALBUMIN SERPL-MCNC: 4.4 G/DL (ref 3.5–5.2)
ALBUMIN/GLOB SERPL: 1.8 G/DL
ALP SERPL-CCNC: 70 U/L (ref 39–117)
ALT SERPL-CCNC: 18 U/L (ref 1–33)
AST SERPL-CCNC: 25 U/L (ref 1–32)
BASOPHILS # BLD AUTO: ABNORMAL 10*3/UL
BASOPHILS # BLD MANUAL: 0 10*3/MM3 (ref 0–0.2)
BASOPHILS NFR BLD MANUAL: 0 % (ref 0–1.5)
BILIRUB SERPL-MCNC: 0.6 MG/DL (ref 0–1.2)
BUN SERPL-MCNC: 28 MG/DL (ref 8–23)
BUN/CREAT SERPL: 30.8 (ref 7–25)
CALCIUM SERPL-MCNC: 10.9 MG/DL (ref 8.6–10.5)
CHLORIDE SERPL-SCNC: 105 MMOL/L (ref 98–107)
CHOLEST SERPL-MCNC: 145 MG/DL (ref 0–200)
CO2 SERPL-SCNC: 27.6 MMOL/L (ref 22–29)
CREAT SERPL-MCNC: 0.91 MG/DL (ref 0.57–1)
DIFFERENTIAL COMMENT: ABNORMAL
EOSINOPHIL # BLD AUTO: ABNORMAL 10*3/UL
EOSINOPHIL # BLD MANUAL: 0.55 10*3/MM3 (ref 0–0.4)
EOSINOPHIL NFR BLD AUTO: ABNORMAL %
EOSINOPHIL NFR BLD MANUAL: 3 % (ref 0.3–6.2)
ERYTHROCYTE [DISTWIDTH] IN BLOOD BY AUTOMATED COUNT: 15.5 % (ref 12.3–15.4)
FOLATE SERPL-MCNC: >20 NG/ML (ref 4.78–24.2)
GLOBULIN SER CALC-MCNC: 2.4 GM/DL
GLUCOSE SERPL-MCNC: 96 MG/DL (ref 65–99)
HBA1C MFR BLD: 5.4 % (ref 4.8–5.6)
HCT VFR BLD AUTO: 35.5 % (ref 34–46.6)
HDLC SERPL-MCNC: 32 MG/DL (ref 40–60)
HGB BLD-MCNC: 12 G/DL (ref 12–15.9)
LDLC SERPL CALC-MCNC: 83 MG/DL (ref 0–100)
LDLC/HDLC SERPL: 2.43 {RATIO}
LYMPHOCYTES # BLD AUTO: ABNORMAL 10*3/UL
LYMPHOCYTES # BLD MANUAL: 3.14 10*3/MM3 (ref 0.7–3.1)
LYMPHOCYTES NFR BLD AUTO: ABNORMAL %
LYMPHOCYTES NFR BLD MANUAL: 17 % (ref 19.6–45.3)
MAGNESIUM SERPL-MCNC: 1.7 MG/DL (ref 1.6–2.4)
MCH RBC QN AUTO: 31.5 PG (ref 26.6–33)
MCHC RBC AUTO-ENTMCNC: 33.8 G/DL (ref 31.5–35.7)
MCV RBC AUTO: 93.2 FL (ref 79–97)
MONOCYTES # BLD MANUAL: 0.55 10*3/MM3 (ref 0.1–0.9)
MONOCYTES NFR BLD AUTO: ABNORMAL %
MONOCYTES NFR BLD MANUAL: 3 % (ref 5–12)
NEUTROPHILS # BLD MANUAL: 14.21 10*3/MM3 (ref 1.7–7)
NEUTROPHILS NFR BLD AUTO: ABNORMAL %
NEUTROPHILS NFR BLD MANUAL: 77 % (ref 42.7–76)
PHOSPHATE SERPL-MCNC: 3.3 MG/DL (ref 2.5–4.5)
PLATELET # BLD AUTO: 433 10*3/MM3 (ref 140–450)
PLATELET BLD QL SMEAR: ABNORMAL
POTASSIUM SERPL-SCNC: 4.7 MMOL/L (ref 3.5–5.2)
PROT SERPL-MCNC: 6.8 G/DL (ref 6–8.5)
RBC # BLD AUTO: 3.81 10*6/MM3 (ref 3.77–5.28)
RBC MORPH BLD: ABNORMAL
SODIUM SERPL-SCNC: 140 MMOL/L (ref 136–145)
TRIGL SERPL-MCNC: 176 MG/DL (ref 0–150)
VIT B12 SERPL-MCNC: >2000 PG/ML (ref 211–946)
VLDLC SERPL CALC-MCNC: 30 MG/DL (ref 5–40)
WBC # BLD AUTO: 18.46 10*3/MM3 (ref 3.4–10.8)

## 2021-01-25 ENCOUNTER — TELEPHONE (OUTPATIENT)
Dept: FAMILY MEDICINE CLINIC | Facility: CLINIC | Age: 81
End: 2021-01-25

## 2021-01-25 ENCOUNTER — TELEPHONE (OUTPATIENT)
Dept: ONCOLOGY | Facility: CLINIC | Age: 81
End: 2021-01-25

## 2021-01-25 DIAGNOSIS — E83.52 HYPERCALCEMIA: ICD-10-CM

## 2021-01-25 DIAGNOSIS — D72.823 LEUKEMOID REACTION: Primary | ICD-10-CM

## 2021-01-25 NOTE — TELEPHONE ENCOUNTER
Caller: Daily Castellano    Relationship to patient: Daughter    Best call back number: [phone]    Additional notes: Daughter is asking if she can come to the 2/2/21 new pt appt.    Pt needs her assistance and only speaks [place] citizen so she can translate.

## 2021-01-27 LAB
BASOPHILS # BLD AUTO: ABNORMAL 10*3/UL
BASOPHILS # BLD MANUAL: 0.5 10*3/MM3 (ref 0–0.2)
BASOPHILS NFR BLD MANUAL: 3.1 % (ref 0–1.5)
DIFFERENTIAL COMMENT: ABNORMAL
EOSINOPHIL # BLD AUTO: ABNORMAL 10*3/UL
EOSINOPHIL # BLD MANUAL: 1.16 10*3/MM3 (ref 0–0.4)
EOSINOPHIL NFR BLD AUTO: ABNORMAL %
EOSINOPHIL NFR BLD MANUAL: 7.2 % (ref 0.3–6.2)
ERYTHROCYTE [DISTWIDTH] IN BLOOD BY AUTOMATED COUNT: 15.6 % (ref 12.3–15.4)
HCT VFR BLD AUTO: 32.7 % (ref 34–46.6)
HGB BLD-MCNC: 11.1 G/DL (ref 12–15.9)
LYMPHOCYTES # BLD AUTO: ABNORMAL 10*3/UL
LYMPHOCYTES # BLD MANUAL: 2.5 10*3/MM3 (ref 0.7–3.1)
LYMPHOCYTES NFR BLD AUTO: ABNORMAL %
LYMPHOCYTES NFR BLD MANUAL: 15.5 % (ref 19.6–45.3)
MCH RBC QN AUTO: 31.4 PG (ref 26.6–33)
MCHC RBC AUTO-ENTMCNC: 33.9 G/DL (ref 31.5–35.7)
MCV RBC AUTO: 92.4 FL (ref 79–97)
MONOCYTES # BLD MANUAL: 0.84 10*3/MM3 (ref 0.1–0.9)
MONOCYTES NFR BLD AUTO: ABNORMAL %
MONOCYTES NFR BLD MANUAL: 5.2 % (ref 5–12)
NEUTROPHILS # BLD MANUAL: 11.64 10*3/MM3 (ref 1.7–7)
NEUTROPHILS NFR BLD AUTO: ABNORMAL %
NEUTROPHILS NFR BLD MANUAL: 72.2 % (ref 42.7–76)
PLATELET # BLD AUTO: 388 10*3/MM3 (ref 140–450)
PLATELET BLD QL SMEAR: ABNORMAL
RBC # BLD AUTO: 3.54 10*6/MM3 (ref 3.77–5.28)
RBC MORPH BLD: ABNORMAL
WBC # BLD AUTO: 16.12 10*3/MM3 (ref 3.4–10.8)

## 2021-01-28 LAB
CA-I SERPL ISE-MCNC: 6 MG/DL (ref 4.5–5.6)
CALCIUM SERPL-MCNC: 10.4 MG/DL (ref 8.7–10.3)
INTACT PTH: ABNORMAL
PTH-INTACT SERPL-MCNC: 43 PG/ML (ref 15–65)

## 2021-02-02 ENCOUNTER — LAB (OUTPATIENT)
Dept: LAB | Facility: HOSPITAL | Age: 81
End: 2021-02-02

## 2021-02-02 ENCOUNTER — CONSULT (OUTPATIENT)
Dept: ONCOLOGY | Facility: CLINIC | Age: 81
End: 2021-02-02

## 2021-02-02 VITALS
SYSTOLIC BLOOD PRESSURE: 123 MMHG | HEART RATE: 68 BPM | DIASTOLIC BLOOD PRESSURE: 59 MMHG | OXYGEN SATURATION: 99 % | BODY MASS INDEX: 32.72 KG/M2 | RESPIRATION RATE: 18 BRPM | TEMPERATURE: 97.3 F | HEIGHT: 58 IN | WEIGHT: 155.9 LBS

## 2021-02-02 DIAGNOSIS — Z85.038 HISTORY OF COLON CANCER: ICD-10-CM

## 2021-02-02 DIAGNOSIS — D50.9 IRON DEFICIENCY ANEMIA, UNSPECIFIED IRON DEFICIENCY ANEMIA TYPE: ICD-10-CM

## 2021-02-02 DIAGNOSIS — D47.1 MYELOPROLIFERATIVE DISORDER (HCC): Primary | ICD-10-CM

## 2021-02-02 DIAGNOSIS — E83.52 HYPERCALCEMIA: ICD-10-CM

## 2021-02-02 DIAGNOSIS — T45.4X5A ADVERSE EFFECT OF PREPARATION OF IRON COMPOUND, INITIAL ENCOUNTER: ICD-10-CM

## 2021-02-02 DIAGNOSIS — D72.823 LEUKEMOID REACTION: Primary | ICD-10-CM

## 2021-02-02 LAB
ALBUMIN SERPL-MCNC: 4.4 G/DL (ref 3.5–5.2)
ALBUMIN/GLOB SERPL: 1.6 G/DL (ref 1.1–2.4)
ALP SERPL-CCNC: 73 U/L (ref 38–116)
ALT SERPL W P-5'-P-CCNC: 19 U/L (ref 0–33)
ANION GAP SERPL CALCULATED.3IONS-SCNC: 11.3 MMOL/L (ref 5–15)
AST SERPL-CCNC: 24 U/L (ref 0–32)
BASOPHILS # BLD AUTO: 0.26 10*3/MM3 (ref 0–0.2)
BASOPHILS NFR BLD AUTO: 1.4 % (ref 0–1.5)
BILIRUB SERPL-MCNC: 0.6 MG/DL (ref 0.2–1.2)
BUN SERPL-MCNC: 28 MG/DL (ref 6–20)
BUN/CREAT SERPL: 32.6 (ref 7.3–30)
CALCIUM SPEC-SCNC: 10.9 MG/DL (ref 8.5–10.2)
CHLORIDE SERPL-SCNC: 105 MMOL/L (ref 98–107)
CO2 SERPL-SCNC: 24.7 MMOL/L (ref 22–29)
CREAT SERPL-MCNC: 0.86 MG/DL (ref 0.6–1.1)
DEPRECATED RDW RBC AUTO: 52.8 FL (ref 37–54)
EOSINOPHIL # BLD AUTO: 0.71 10*3/MM3 (ref 0–0.4)
EOSINOPHIL NFR BLD AUTO: 3.9 % (ref 0.3–6.2)
ERYTHROCYTE [DISTWIDTH] IN BLOOD BY AUTOMATED COUNT: 15.9 % (ref 12.3–15.4)
FERRITIN SERPL-MCNC: 26.4 NG/ML (ref 13–150)
GFR SERPL CREATININE-BSD FRML MDRD: 64 ML/MIN/1.73
GLOBULIN UR ELPH-MCNC: 2.7 GM/DL (ref 1.8–3.5)
GLUCOSE SERPL-MCNC: 103 MG/DL (ref 74–124)
HCT VFR BLD AUTO: 34.9 % (ref 34–46.6)
HGB BLD-MCNC: 11.6 G/DL (ref 12–15.9)
HGB RETIC QN AUTO: 36.8 PG (ref 29.8–36.1)
IMM GRANULOCYTES # BLD AUTO: 1.3 10*3/MM3 (ref 0–0.05)
IMM GRANULOCYTES NFR BLD AUTO: 7 % (ref 0–0.5)
IMM RETICS NFR: 25.8 % (ref 3–15.8)
IRON 24H UR-MRATE: 64 MCG/DL (ref 37–145)
IRON SATN MFR SERPL: 17 % (ref 14–48)
LDH SERPL-CCNC: 441 U/L (ref 99–259)
LYMPHOCYTES # BLD AUTO: 1.97 10*3/MM3 (ref 0.7–3.1)
LYMPHOCYTES NFR BLD AUTO: 10.7 % (ref 19.6–45.3)
MCH RBC QN AUTO: 30.4 PG (ref 26.6–33)
MCHC RBC AUTO-ENTMCNC: 33.2 G/DL (ref 31.5–35.7)
MCV RBC AUTO: 91.6 FL (ref 79–97)
MONOCYTES # BLD AUTO: 1.08 10*3/MM3 (ref 0.1–0.9)
MONOCYTES NFR BLD AUTO: 5.9 % (ref 5–12)
NEUTROPHILS NFR BLD AUTO: 13.12 10*3/MM3 (ref 1.7–7)
NEUTROPHILS NFR BLD AUTO: 71.1 % (ref 42.7–76)
NRBC BLD AUTO-RTO: 0.2 /100 WBC (ref 0–0.2)
PLATELET # BLD AUTO: 396 10*3/MM3 (ref 140–450)
PMV BLD AUTO: 11.5 FL (ref 6–12)
POTASSIUM SERPL-SCNC: 3.9 MMOL/L (ref 3.5–4.7)
PROT SERPL-MCNC: 7.1 G/DL (ref 6.3–8)
RBC # BLD AUTO: 3.81 10*6/MM3 (ref 3.77–5.28)
RETICS # AUTO: 0.08 10*6/MM3 (ref 0.02–0.13)
RETICS/RBC NFR AUTO: 2.05 % (ref 0.7–1.9)
SODIUM SERPL-SCNC: 141 MMOL/L (ref 134–145)
TIBC SERPL-MCNC: 379 MCG/DL (ref 249–505)
TRANSFERRIN SERPL-MCNC: 271 MG/DL (ref 200–360)
URATE SERPL-MCNC: 9.1 MG/DL (ref 2.8–7.4)
WBC # BLD AUTO: 18.44 10*3/MM3 (ref 3.4–10.8)

## 2021-02-02 PROCEDURE — 82728 ASSAY OF FERRITIN: CPT | Performed by: INTERNAL MEDICINE

## 2021-02-02 PROCEDURE — 85025 COMPLETE CBC W/AUTO DIFF WBC: CPT

## 2021-02-02 PROCEDURE — 83615 LACTATE (LD) (LDH) ENZYME: CPT | Performed by: INTERNAL MEDICINE

## 2021-02-02 PROCEDURE — 84550 ASSAY OF BLOOD/URIC ACID: CPT | Performed by: INTERNAL MEDICINE

## 2021-02-02 PROCEDURE — 88377 M/PHMTRC ALYS ISHQUANT/SEMIQ: CPT

## 2021-02-02 PROCEDURE — 85046 RETICYTE/HGB CONCENTRATE: CPT | Performed by: INTERNAL MEDICINE

## 2021-02-02 PROCEDURE — 99205 OFFICE O/P NEW HI 60 MIN: CPT | Performed by: INTERNAL MEDICINE

## 2021-02-02 PROCEDURE — 36415 COLL VENOUS BLD VENIPUNCTURE: CPT

## 2021-02-02 PROCEDURE — 83540 ASSAY OF IRON: CPT | Performed by: INTERNAL MEDICINE

## 2021-02-02 PROCEDURE — 80053 COMPREHEN METABOLIC PANEL: CPT | Performed by: INTERNAL MEDICINE

## 2021-02-02 PROCEDURE — 84466 ASSAY OF TRANSFERRIN: CPT | Performed by: INTERNAL MEDICINE

## 2021-02-02 RX ORDER — DIPHENHYDRAMINE HCL 25 MG
25 CAPSULE ORAL ONCE
Status: CANCELLED | OUTPATIENT
Start: 2021-02-12

## 2021-02-02 RX ORDER — DIPHENHYDRAMINE HCL 25 MG
25 CAPSULE ORAL ONCE
Status: CANCELLED | OUTPATIENT
Start: 2021-02-05

## 2021-02-02 RX ORDER — SODIUM CHLORIDE 9 MG/ML
250 INJECTION, SOLUTION INTRAVENOUS ONCE
Status: CANCELLED | OUTPATIENT
Start: 2021-02-05

## 2021-02-02 RX ORDER — FAMOTIDINE 10 MG/ML
20 INJECTION, SOLUTION INTRAVENOUS ONCE
Status: CANCELLED | OUTPATIENT
Start: 2021-02-12

## 2021-02-02 RX ORDER — SODIUM CHLORIDE 9 MG/ML
250 INJECTION, SOLUTION INTRAVENOUS ONCE
Status: CANCELLED | OUTPATIENT
Start: 2021-02-12

## 2021-02-02 RX ORDER — FAMOTIDINE 10 MG/ML
20 INJECTION, SOLUTION INTRAVENOUS ONCE
Status: CANCELLED | OUTPATIENT
Start: 2021-02-05

## 2021-02-02 NOTE — PROGRESS NOTES
Subjective     REASON FOR CONSULTATION:  Provide an opinion on any further workup or treatment on:    Elevated WBC count                       REQUESTING PHYSICIAN: Manisha Morales MD    RECORDS OBTAINED: Records of the patients history including those obtained from the referring provider were reviewed and summarized in detail.    HISTORY OF PRESENT ILLNESS:    Ayla Castellano is a 81 y.o. patient who was referred for evaluation of elevated WBC count. She is accompanied by her daughter who assisted with the history. She was noted by her PCP to have an elevated WBC count. On 1/22/2021, WBC was elevated at 18,460. Labs were repeated on 1/26/2020 and WBC were elevated at 16,120.    The patient was hospitalized in 2020 at [facility]. She was found to have an elevated PLT count. She was found to be anemic as well. She was seen by Dr. Wilson. She was given IV iron during the hospital stay. 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 She was placed on Hydroxyurea 500 mg every other day. She was noted to have a decrease in her Hgb level and the dose of Hydrea was reduced to 500 mg every 3 days and subsequently to every 4 days.    The patient started feeling tired recently. She used to be more active but her activity currently consists of cooking her food. She is not able to clean her home.    Patient has decreased bone density. She was previously on Prolia. She was found to have a high calcium level and Prolia was placed on hold.      REVIEW OF SYSTEMS:  Review of Systems   Constitutional: Positive for fatigue. Negative for fever and unexpected weight change.   HENT: Negative for nosebleeds and voice change.    Eyes: Negative for visual disturbance.   Respiratory: Negative for cough and shortness of breath.    Cardiovascular: Negative for chest pain and leg swelling.   Gastrointestinal: Negative for abdominal pain, blood in stool,  constipation, diarrhea, nausea and vomiting.   Genitourinary: Negative for frequency and hematuria.   Musculoskeletal: Negative for back pain and joint swelling.   Skin: Negative for rash.   Neurological: Negative for dizziness and headaches.   Hematological: Negative for adenopathy. Does not bruise/bleed easily.   Psychiatric/Behavioral: Negative for dysphoric mood. The patient is not nervous/anxious.          Past Medical History:   Diagnosis Date   • Colon cancer (CMS/HCC)     s/p partial colectomy in 2011   • Diabetes mellitus (CMS/HCC)    • Gallbladder disease    • Hyperlipidemia    • Hypertension    • Left arm pain    • Syncope     Secondary to medications (clonidine and metoprolol) in 3/15.  Had severe bradycardia (sinus arrest with junctional escape beats).  Resolved after medications discontinued.       Past Surgical History:   Procedure Laterality Date   • CHOLECYSTECTOMY  10/2019   • COLECTOMY PARTIAL / TOTAL      2011 for colon cancer   • COLONOSCOPY  01/20/2020   • UTERINE FIBROID SURGERY      s/p uterine fibroma resection       Social History     Socioeconomic History   • Marital status:      Spouse name: Not on file   • Number of children: Not on file   • Years of education: Not on file   • Highest education level: Not on file   Tobacco Use   • Smoking status: Never Smoker   • Smokeless tobacco: Never Used   Substance and Sexual Activity   • Alcohol use: No   • Drug use: No   • Sexual activity: Defer       Family History   Problem Relation Age of Onset   • Heart disease Mother         pacemaker placement   • Colon cancer Brother         MEDICATIONS:    Current Outpatient Medications:   •  ACCU-CHEK FASTCLIX LANCETS misc, USE TO TEST TID, Disp: , Rfl: 8  •  amLODIPine (NORVASC) 5 MG tablet, Take 1 tablet by mouth Daily., Disp: 90 tablet, Rfl: 3  •  aspirin 81 MG tablet, Take by mouth., Disp: , Rfl:   •  hydroxyurea (HYDREA) 500 MG capsule, TK 1 C PO QOD, Disp: , Rfl:   •  metFORMIN (GLUCOPHAGE)  500 MG tablet, TAKE 1 TABLET BY MOUTH THREE TIMES DAILY, Disp: 270 tablet, Rfl: 1  •  oxybutynin (DITROPAN) 5 MG tablet, TK 1/2 - 1 T PO 1-2 TIMES DAILY, Disp: , Rfl: 5  •  valsartan-hydrochlorothiazide (DIOVAN-HCT) 320-25 MG per tablet, Take 1 tablet by mouth Daily., Disp: 90 tablet, Rfl: 3  •  atorvastatin (LIPITOR) 20 MG tablet, Take 1 tablet by mouth Daily., Disp: 90 tablet, Rfl: 3  •  diclofenac (VOLTAREN) 1 % gel gel, Apply 4 g topically to the appropriate area as directed 3 (Three) Times a Day., Disp: 5 tube, Rfl: 11  •  fenofibrate (TRICOR) 145 MG tablet, Take 1 tablet by mouth daily., Disp: , Rfl:   •  fluocinonide (LIDEX) 0.05 % external solution, APPLY SPARINGLY EXTERNALLY TO THE AFFECTED AREA 2 TO 4 TIMES A DAY AS DIRECTED., Disp: 60 mL, Rfl: 0  •  fluocinonide-emollient (LIDEX-E) 0.05 % cream, Apply  topically to the appropriate area as directed Daily., Disp: 60 g, Rfl: 11  •  isosorbide mononitrate (IMDUR) 60 MG 24 hr tablet, Take 1 tablet by mouth Daily., Disp: 90 tablet, Rfl: 3  •  isosorbide mononitrate (IMDUR) 60 MG 24 hr tablet, Take 1 tablet by mouth Daily., Disp: 30 tablet, Rfl: 11  •  ketoconazole (NIZORAL) 2 % cream, Apply  topically to the appropriate area as directed Daily., Disp: 30 g, Rfl: 11  •  mupirocin (Bactroban) 2 % ointment, Apply  topically to the appropriate area as directed 3 (Three) Times a Day., Disp: 30 g, Rfl: 11  •  ofloxacin (OCUFLOX) 0.3 % ophthalmic solution, Administer 2 drops to both eyes 4 (Four) Times a Day., Disp: 10 mL, Rfl: 11  •  ONE TOUCH ULTRA TEST test strip, USE TO TEST BLOOD SUGAR EVERY DAY. (E11.9), Disp: 100 each, Rfl: 2  •  triamcinolone (KENALOG) 0.1 % cream, Apply  topically to the appropriate area as directed 3 (Three) Times a Day., Disp: 80 g, Rfl: 6     ALLERGIES:  Allergies   Allergen Reactions   • Latex Unknown - Low Severity     blisters        Objective   VITAL SIGNS:  Vitals:    02/02/21 0757   BP: 123/59   Pulse: 68   Resp: 18   Temp: 97.3 °F  "(36.3 °C)   TempSrc: Temporal   SpO2: 99%   Weight: 70.7 kg (155 lb 14.4 oz)   Height: 148 cm (58.27\")  Comment: new ht   PainSc: 0-No pain       Wt Readings from Last 3 Encounters:   02/02/21 70.7 kg (155 lb 14.4 oz)   01/22/21 69.9 kg (154 lb)   10/05/20 69.4 kg (153 lb)       PHYSICAL EXAMINATION  GENERAL:  The patient appears in fair general condition, not in acute distress.  SKIN: No skin rashes. No ecchymosis.  HEAD:  Normocephalic.  EYES:  No Jaundice. No Pallor. Pupils equal. EOMI.  NECK:  Supple with Good ROM. No Thyromegaly. No Masses.  LYMPHATICS:  No cervical or supraclavicular lymphadenopathy.  CHEST: Normal respiratory effort. Lungs clear to auscultation.   CARDIAC:  Normal S1 & S2. No murmur. No edema.  ABDOMEN:  Soft. RUQ tenderness. No rebound or rigidity.  No Hepatomegaly. No Splenomegaly. No masses.  EXTREMITIES:  No Calf tenderness.  NEUROLOGICAL:  No Focal neurological deficits.       RESULT REVIEW:   Results from last 7 days   Lab Units 02/02/21  0730 01/26/21  0933   WBC 10*3/mm3 18.44* 16.12*   NEUTROS ABS 10*3/mm3 13.12* 11.64*   HEMOGLOBIN g/dL 11.6* 11.1*   HEMATOCRIT % 34.9 32.7*   PLATELETS 10*3/mm3 396 388     Results from last 7 days   Lab Units 02/02/21  0907   SODIUM mmol/L 141   POTASSIUM mmol/L 3.9   CHLORIDE mmol/L 105   CO2 mmol/L 24.7   BUN mg/dL 28*   CREATININE mg/dL 0.86   CALCIUM mg/dL 10.9*   ALBUMIN g/dL 4.40   BILIRUBIN mg/dL 0.6   ALK PHOS U/L 73   ALT (SGPT) U/L 19   AST (SGOT) U/L 24       Component      Latest Ref Rng & Units 1/22/2021 2/2/2021   Iron      37 - 145 mcg/dL  64   Iron Saturation      14 - 48 %  17   Transferrin      200 - 360 mg/dL  271   TIBC      249 - 505 mcg/dL  379   Vitamin B-12      211 - 946 pg/mL >2000 (H)    Folate      4.78 - 24.20 ng/mL >20.00    Ferritin      13.00 - 150.00 ng/mL  26.40     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    Assessment/Plan   *Myeloproliferative disorder, OBINNA-2 mutation positive. OBINNA-2 mutation test " was positive for the V617F on 2/20/2020. She was under the care of an outside Hematologist. According to the daughter, she was diagnosed with Essential thrombocythemia. On review of the labs, she had an elevated WBC and basophil counts. Therefore, a diagnosis of myeloproliferative disorder or polycythemia vera is favored since ET is limited to elevation of the platelet count.     The WBC count has been gradually increasing indicating that her disease is not under a good control likely due to the dose of Hydroxyurea not being effective with the dose being currently 500 mg every 4 days.     *Anemia. This is attributed to iron deficiency with a transferrin saturation of 17% and a ferritin of 26. The patient did not tolerate oral in the past with development of upset stomach, abdominal pain, headaches and dizziness when she took the oral iron in the past.    Patient received IV iron the 2020 and tolerated that well.     *Hypercalcemia. She has a history of osteopenia. She was previously on Prolia but it was placed on hold due to the elevated calcium level. I explained to the patient and daughter that Prolia treatment is expected to result in improvement in the calcium level by allowing the calcium to shift to the bone with improvement in the osteoblastic activity. Her PCP is evaluating the cause of hypercalcemia.    *History of colon cancer, stage I. She is s/p partial colectomy by Dr. Ku. She did not require adjuvant chemotherapy or radiation therapy.      PLAN:    1.  We will give IV Injectafer x 2 doses, 1 week apart.   2.  Increase Hydrea 500 mg every 3 days for now.  3.  Ok to receive Prolia. I explained that this medicine can be given at our office if she wishes to receive it here.  4.  Obtain BCR-ABL by FISH.   5.  Follow up in 2 months with a repeat CBC, CMP, Ferritin and iron panel. If the hemoglobin level improves and WBC remains elevated, I would recommend increasing the dose of Hydroxyurea to every  other day.      Izzy Martinez MD  02/02/21

## 2021-02-03 ENCOUNTER — TELEPHONE (OUTPATIENT)
Dept: ONCOLOGY | Facility: CLINIC | Age: 81
End: 2021-02-03

## 2021-02-03 NOTE — TELEPHONE ENCOUNTER
Spoke with pt daughter, infomred her of Dr Martinez's recommendation to increase hydrea to 500mg every third day.  Confirmed injectafer appts with her as well.  Asked her to notify they pharmacy should they need a refill on the hydrea.  She v/u.

## 2021-02-03 NOTE — TELEPHONE ENCOUNTER
Provider: MONET  Caller: IVONNE JUSTICE  Relationship to Patient: CARE GIVER   Phone Number: [phone]  Reason for Call: PT NEEDS CARE GIVER TO COME TO APT WITH HER.  PLEASE CALL IVONNE JUSTICE TO LET HIM KNOW IF OK TO COME WITH PT

## 2021-02-03 NOTE — TELEPHONE ENCOUNTER
----- Message from Izzy Martinez MD sent at 2/2/2021  9:41 PM EST -----  Please inform the patient's daughter that her iron levels are low. I would recommend Injectafer x 2 doses as we discussed during the office consultation.    Since we expect the hemoglobin to improve with the IV iron, please ask the daugher to increase Hydrea to 500 mg every 3 days.    Ami, please schedule the Injectafer x 2 doses.    Thank you

## 2021-02-04 ENCOUNTER — OFFICE VISIT (OUTPATIENT)
Dept: CARDIOLOGY | Facility: CLINIC | Age: 81
End: 2021-02-04

## 2021-02-04 VITALS
BODY MASS INDEX: 29.83 KG/M2 | OXYGEN SATURATION: 98 % | HEIGHT: 61 IN | DIASTOLIC BLOOD PRESSURE: 68 MMHG | WEIGHT: 158 LBS | SYSTOLIC BLOOD PRESSURE: 132 MMHG | HEART RATE: 72 BPM

## 2021-02-04 DIAGNOSIS — I10 ESSENTIAL HYPERTENSION: Primary | ICD-10-CM

## 2021-02-04 DIAGNOSIS — R07.89 CHEST PAIN, ATYPICAL: ICD-10-CM

## 2021-02-04 PROCEDURE — 93000 ELECTROCARDIOGRAM COMPLETE: CPT | Performed by: NURSE PRACTITIONER

## 2021-02-04 PROCEDURE — 99213 OFFICE O/P EST LOW 20 MIN: CPT | Performed by: NURSE PRACTITIONER

## 2021-02-04 NOTE — PROGRESS NOTES
"    CARDIOLOGY        Patient Name: Ayla Castellano  :1940  Age: 81 y.o.  Primary Cardiologist: Enrrique Lanza MD  Encounter Provider:  LENA Jackman    Date of Service: 21          CHIEF COMPLAINT / REASON FOR OFFICE VISIT     Hypertension (6 month f/u )      HISTORY OF PRESENT ILLNESS       HPI  Ayla Castellano is a 81 y.o. female who presents today for semiannual evaluation.     Pt has a  history significant for CAD, hyperlipidemia, type 2 diabetes, hypertension.    Patient reports that she has done fairly well over the past 6 months.  Presents today with her daughter who acts as a Citizen of [place] .  Patient reports that a couple of months ago she did have a few elevations in her blood pressure was averaging in the upper 140s.  No readings greater than 150.  Patient also notes that 2 to 3 weeks ago she had 1 episode of chest discomfort with radiation down the left arm that lasted for several minutes.  She has had no subsequent episodes since that time.  She did check her blood pressure at that time and states that it was actually low for her.  Patient does have history of chronic anemia secondary to low iron.  She has an appointment for an iron infusion tomorrow.  She does report some generalized fatigue secondary to her anemia.  Denies any blood in the stool, blood in the urine.  She is otherwise asymptomatic and denies shortness of breath, dyspnea with exertion, palpitations, lightheadedness, lower extremity edema.      The following portions of the patient's history were reviewed and updated as appropriate: allergies, current medications, past family history, past medical history, past social history, past surgical history and problem list.      VITAL SIGNS     Visit Vitals  /68 (BP Location: Right arm, Patient Position: Sitting, Cuff Size: Adult)   Pulse 72   Ht 154.9 cm (61\")   Wt 71.7 kg (158 lb)   SpO2 98%   BMI 29.85 kg/m²         Wt Readings from Last 3 " Encounters:   02/04/21 71.7 kg (158 lb)   02/02/21 70.7 kg (155 lb 14.4 oz)   01/22/21 69.9 kg (154 lb)     Body mass index is 29.85 kg/m².      REVIEW OF SYSTEMS   Review of Systems   Constitution: Positive for malaise/fatigue. Negative for chills, fever, weight gain and weight loss.   Cardiovascular: Positive for chest pain. Negative for leg swelling.   Respiratory: Negative for cough, snoring and wheezing.    Hematologic/Lymphatic: Negative for bleeding problem. Does not bruise/bleed easily.   Skin: Negative for color change.   Musculoskeletal: Negative for falls, joint pain and myalgias.   Gastrointestinal: Negative for melena.   Genitourinary: Negative for hematuria.   Neurological: Negative for excessive daytime sleepiness.   Psychiatric/Behavioral: Negative for depression. The patient is not nervous/anxious.            PHYSICAL EXAMINATION     Vitals signs and nursing note reviewed.   Constitutional:       Appearance: Normal appearance. Well-developed.   Eyes:      Conjunctiva/sclera: Conjunctivae normal.   Neck:      Vascular: No carotid bruit.   Pulmonary:      Breath sounds: Normal breath sounds.   Cardiovascular:      Normal rate. Regular rhythm. Normal S1 with normal intensity. Normal S2 with normal intensity.      Murmurs: There is no murmur.      No gallop. No click. No rub.   Edema:     Peripheral edema absent.   Musculoskeletal: Normal range of motion.   Skin:     General: Skin is warm and dry.   Neurological:      Mental Status: Alert and oriented to person, place, and time.      GCS: GCS eye subscore is 4. GCS verbal subscore is 5. GCS motor subscore is 6.   Psychiatric:         Speech: Speech normal.         Behavior: Behavior normal.         Thought Content: Thought content normal.         Judgment: Judgment normal.           REVIEWED DATA       ECG 12 Lead    Date/Time: 2/4/2021 2:00 PM  Performed by: Soraya Juan APRN  Authorized by: Soraya Juan APRN   Comparison: compared with  previous ECG from 4/22/2019  Similar to previous ECG  Rhythm: sinus rhythm  Rate: normal  BPM: 75  Conduction: conduction normal  ST Segments: ST segments normal  T Waves: T waves normal  QRS axis: normal  Other: no other findings    Clinical impression: normal ECG            Cardiac Procedures:  1. Echo 3/23/15.  EF 60-65%.  No significant valvular disease noted.  2. Lexiscan stress test 10/5/15.  Normal without evidence of ischemia or infarction.  3. 24-hour Holter 3/16/2017.  Normal monitor study.             Lipid Panel    Lipid Panel 5/28/20 10/5/20 1/22/21   Total Cholesterol 137 144 145   Triglycerides 168 (A) 136 176 (A)   HDL Cholesterol 33 (A) 33 (A) 32 (A)   VLDL Cholesterol 33.6 27.2 30   LDL Cholesterol  70 84 83   LDL/HDL Ratio 2.13 2.54 2.43   (A) Abnormal value       Comments are available for some flowsheets but are not being displayed.               ASSESSMENT & PLAN      Diagnosis Plan   1. Essential hypertension     2. Chest pain, atypical           SUMMARY/DISCUSSION  1. Hypertension.  Patient blood pressure controlled in office today at 132/68.  Reports that she has had a few intermittent readings in the upper 140s.  I informed patient and daughter to continue monitoring the blood pressure readings to ensure that she does not have consistent readings in the upper 140s-150s.  She will call the office if she does.  At this time she will continue with her current regimen of amlodipine 5 mg/day, valsartan/HCTZ 320/25 mg/day.  2. Atypical chest pain.  Patient had 1 episode 2 to 3 weeks ago of chest discomfort with radiation down the left arm.  She was unable to ascertain any exacerbating or alleviating symptoms.  Pain lasted for several minutes and then resolved.  She has had no subsequent episodes.  I advised the patient to monitor for recurrent episodes and to notify the office if they occur so that we can determine if any subsequent testing is required.  3. No changes to patient's regimen.  She  will follow-up with Dr. Lanza in 6 months.        MEDICATIONS         Discharge Medications          Accurate as of February 4, 2021  2:13 PM. If you have any questions, ask your nurse or doctor.            Continue These Medications      Instructions Start Date   Accu-Chek FastClix Lancets misc   USE TO TEST TID      amLODIPine 5 MG tablet  Commonly known as: NORVASC   5 mg, Oral, Daily      aspirin 81 MG tablet   Oral      atorvastatin 20 MG tablet  Commonly known as: LIPITOR   20 mg, Oral, Daily      diclofenac 1 % gel gel  Commonly known as: VOLTAREN   4 g, Topical, 3 Times Daily      hydroxyurea 500 MG capsule  Commonly known as: HYDREA   TK 1 C PO QOD      isosorbide mononitrate 60 MG 24 hr tablet  Commonly known as: IMDUR   60 mg, Oral, Daily      ketoconazole 2 % cream  Commonly known as: NIZORAL   Topical, Daily      metFORMIN 500 MG tablet  Commonly known as: GLUCOPHAGE   TAKE 1 TABLET BY MOUTH THREE TIMES DAILY      mupirocin 2 % ointment  Commonly known as: Bactroban   Topical, 3 Times Daily      ofloxacin 0.3 % ophthalmic solution  Commonly known as: OCUFLOX   2 drops, Both Eyes, 4 Times Daily      ONE TOUCH ULTRA TEST test strip  Generic drug: glucose blood   USE TO TEST BLOOD SUGAR EVERY DAY. (E11.9)      oxybutynin 5 MG tablet  Commonly known as: DITROPAN   TK 1/2 - 1 T PO 1-2 TIMES DAILY      valsartan-hydrochlorothiazide 320-25 MG per tablet  Commonly known as: DIOVAN-HCT   1 tablet, Oral, Daily         Stop These Medications    fluocinonide 0.05 % external solution  Commonly known as: LIDEX  Stopped by: LENA Jackman     fluocinonide-emollient 0.05 % cream  Commonly known as: LIDEX-E  Stopped by: LENA Jackman     triamcinolone 0.1 % cream  Commonly known as: KENALOG  Stopped by: LENA Jackman                **Dragon Disclaimer:   Much of this encounter note is an electronic transcription/translation of spoken language to printed text. The electronic translation of  spoken language may permit erroneous, or at times, nonsensical words or phrases to be inadvertently transcribed. Although I have reviewed the note for such errors, some may still exist.

## 2021-02-05 ENCOUNTER — INFUSION (OUTPATIENT)
Dept: ONCOLOGY | Facility: HOSPITAL | Age: 81
End: 2021-02-05

## 2021-02-05 VITALS
TEMPERATURE: 97.5 F | OXYGEN SATURATION: 95 % | WEIGHT: 155.2 LBS | DIASTOLIC BLOOD PRESSURE: 79 MMHG | RESPIRATION RATE: 16 BRPM | HEART RATE: 65 BPM | BODY MASS INDEX: 29.32 KG/M2 | SYSTOLIC BLOOD PRESSURE: 159 MMHG

## 2021-02-05 DIAGNOSIS — T45.4X5A ADVERSE EFFECT OF PREPARATION OF IRON COMPOUND, INITIAL ENCOUNTER: ICD-10-CM

## 2021-02-05 DIAGNOSIS — D50.9 IRON DEFICIENCY ANEMIA, UNSPECIFIED IRON DEFICIENCY ANEMIA TYPE: Primary | ICD-10-CM

## 2021-02-05 LAB — REF LAB TEST METHOD: NORMAL

## 2021-02-05 PROCEDURE — 96375 TX/PRO/DX INJ NEW DRUG ADDON: CPT

## 2021-02-05 PROCEDURE — 25010000002 FERRIC CARBOXYMALTOSE 750 MG/15ML SOLUTION 15 ML VIAL: Performed by: INTERNAL MEDICINE

## 2021-02-05 PROCEDURE — 63710000001 DIPHENHYDRAMINE PER 50 MG: Performed by: INTERNAL MEDICINE

## 2021-02-05 PROCEDURE — 96374 THER/PROPH/DIAG INJ IV PUSH: CPT

## 2021-02-05 RX ORDER — DIPHENHYDRAMINE HCL 25 MG
25 CAPSULE ORAL ONCE
Status: COMPLETED | OUTPATIENT
Start: 2021-02-05 | End: 2021-02-05

## 2021-02-05 RX ORDER — SODIUM CHLORIDE 9 MG/ML
250 INJECTION, SOLUTION INTRAVENOUS ONCE
Status: COMPLETED | OUTPATIENT
Start: 2021-02-05 | End: 2021-02-05

## 2021-02-05 RX ORDER — FAMOTIDINE 10 MG/ML
20 INJECTION, SOLUTION INTRAVENOUS ONCE
Status: COMPLETED | OUTPATIENT
Start: 2021-02-05 | End: 2021-02-05

## 2021-02-05 RX ADMIN — FERRIC CARBOXYMALTOSE INJECTION 750 MG: 50 INJECTION, SOLUTION INTRAVENOUS at 10:42

## 2021-02-05 RX ADMIN — SODIUM CHLORIDE 250 ML: 9 INJECTION, SOLUTION INTRAVENOUS at 10:22

## 2021-02-05 RX ADMIN — DIPHENHYDRAMINE HYDROCHLORIDE 25 MG: 25 CAPSULE ORAL at 10:23

## 2021-02-05 RX ADMIN — FAMOTIDINE 20 MG: 10 INJECTION, SOLUTION INTRAVENOUS at 10:23

## 2021-02-09 LAB
Lab: NORMAL
METHYLMALONATE SERPL-SCNC: 124 NMOL/L (ref 0–378)

## 2021-02-12 ENCOUNTER — INFUSION (OUTPATIENT)
Dept: ONCOLOGY | Facility: HOSPITAL | Age: 81
End: 2021-02-12

## 2021-02-12 VITALS
RESPIRATION RATE: 16 BRPM | OXYGEN SATURATION: 97 % | WEIGHT: 152.8 LBS | SYSTOLIC BLOOD PRESSURE: 133 MMHG | TEMPERATURE: 97.2 F | HEART RATE: 77 BPM | BODY MASS INDEX: 28.87 KG/M2 | DIASTOLIC BLOOD PRESSURE: 70 MMHG

## 2021-02-12 DIAGNOSIS — D50.9 IRON DEFICIENCY ANEMIA, UNSPECIFIED IRON DEFICIENCY ANEMIA TYPE: Primary | ICD-10-CM

## 2021-02-12 DIAGNOSIS — T45.4X5A ADVERSE EFFECT OF PREPARATION OF IRON COMPOUND, INITIAL ENCOUNTER: ICD-10-CM

## 2021-02-12 PROCEDURE — 63710000001 DIPHENHYDRAMINE PER 50 MG: Performed by: INTERNAL MEDICINE

## 2021-02-12 PROCEDURE — 96375 TX/PRO/DX INJ NEW DRUG ADDON: CPT

## 2021-02-12 PROCEDURE — 96374 THER/PROPH/DIAG INJ IV PUSH: CPT

## 2021-02-12 PROCEDURE — 25010000002 FERRIC CARBOXYMALTOSE 750 MG/15ML SOLUTION 15 ML VIAL: Performed by: INTERNAL MEDICINE

## 2021-02-12 PROCEDURE — 96365 THER/PROPH/DIAG IV INF INIT: CPT

## 2021-02-12 RX ORDER — DIPHENHYDRAMINE HCL 25 MG
25 CAPSULE ORAL ONCE
Status: COMPLETED | OUTPATIENT
Start: 2021-02-12 | End: 2021-02-12

## 2021-02-12 RX ORDER — SODIUM CHLORIDE 9 MG/ML
250 INJECTION, SOLUTION INTRAVENOUS ONCE
Status: COMPLETED | OUTPATIENT
Start: 2021-02-12 | End: 2021-02-12

## 2021-02-12 RX ORDER — FAMOTIDINE 10 MG/ML
20 INJECTION, SOLUTION INTRAVENOUS ONCE
Status: COMPLETED | OUTPATIENT
Start: 2021-02-12 | End: 2021-02-12

## 2021-02-12 RX ADMIN — SODIUM CHLORIDE 250 ML: 9 INJECTION, SOLUTION INTRAVENOUS at 13:55

## 2021-02-12 RX ADMIN — FAMOTIDINE 20 MG: 10 INJECTION, SOLUTION INTRAVENOUS at 13:55

## 2021-02-12 RX ADMIN — FERRIC CARBOXYMALTOSE INJECTION 750 MG: 50 INJECTION, SOLUTION INTRAVENOUS at 13:58

## 2021-02-12 RX ADMIN — DIPHENHYDRAMINE HYDROCHLORIDE 25 MG: 25 CAPSULE ORAL at 13:55

## 2021-03-30 ENCOUNTER — LAB (OUTPATIENT)
Dept: LAB | Facility: HOSPITAL | Age: 81
End: 2021-03-30

## 2021-03-30 ENCOUNTER — OFFICE VISIT (OUTPATIENT)
Dept: ONCOLOGY | Facility: CLINIC | Age: 81
End: 2021-03-30

## 2021-03-30 VITALS
OXYGEN SATURATION: 99 % | HEIGHT: 58 IN | DIASTOLIC BLOOD PRESSURE: 73 MMHG | RESPIRATION RATE: 16 BRPM | WEIGHT: 154.5 LBS | HEART RATE: 65 BPM | SYSTOLIC BLOOD PRESSURE: 119 MMHG | BODY MASS INDEX: 32.43 KG/M2 | TEMPERATURE: 97.7 F

## 2021-03-30 DIAGNOSIS — E83.52 HYPERCALCEMIA: ICD-10-CM

## 2021-03-30 DIAGNOSIS — D50.9 IRON DEFICIENCY ANEMIA, UNSPECIFIED IRON DEFICIENCY ANEMIA TYPE: ICD-10-CM

## 2021-03-30 DIAGNOSIS — Z79.899 ENCOUNTER FOR LONG-TERM CURRENT USE OF HIGH RISK MEDICATION: ICD-10-CM

## 2021-03-30 DIAGNOSIS — D47.1 MYELOPROLIFERATIVE DISORDER (HCC): Primary | ICD-10-CM

## 2021-03-30 DIAGNOSIS — D47.1 MYELOPROLIFERATIVE DISORDER (HCC): ICD-10-CM

## 2021-03-30 DIAGNOSIS — Z85.038 HISTORY OF COLON CANCER: ICD-10-CM

## 2021-03-30 LAB
ALBUMIN SERPL-MCNC: 4.2 G/DL (ref 3.5–5.2)
ALBUMIN/GLOB SERPL: 1.6 G/DL (ref 1.1–2.4)
ALP SERPL-CCNC: 81 U/L (ref 38–116)
ALT SERPL W P-5'-P-CCNC: 18 U/L (ref 0–33)
ANION GAP SERPL CALCULATED.3IONS-SCNC: 8.3 MMOL/L (ref 5–15)
AST SERPL-CCNC: 19 U/L (ref 0–32)
BASOPHILS # BLD AUTO: 0.28 10*3/MM3 (ref 0–0.2)
BASOPHILS NFR BLD AUTO: 1.5 % (ref 0–1.5)
BILIRUB SERPL-MCNC: 0.7 MG/DL (ref 0.2–1.2)
BUN SERPL-MCNC: 19 MG/DL (ref 6–20)
BUN/CREAT SERPL: 19.8 (ref 7.3–30)
CALCIUM SPEC-SCNC: 10.9 MG/DL (ref 8.5–10.2)
CHLORIDE SERPL-SCNC: 106 MMOL/L (ref 98–107)
CO2 SERPL-SCNC: 26.7 MMOL/L (ref 22–29)
CREAT SERPL-MCNC: 0.96 MG/DL (ref 0.6–1.1)
DEPRECATED RDW RBC AUTO: 66.8 FL (ref 37–54)
EOSINOPHIL # BLD AUTO: 0.78 10*3/MM3 (ref 0–0.4)
EOSINOPHIL NFR BLD AUTO: 4.3 % (ref 0.3–6.2)
ERYTHROCYTE [DISTWIDTH] IN BLOOD BY AUTOMATED COUNT: 18.1 % (ref 12.3–15.4)
FERRITIN SERPL-MCNC: 601.6 NG/ML (ref 13–150)
GFR SERPL CREATININE-BSD FRML MDRD: 56 ML/MIN/1.73
GLOBULIN UR ELPH-MCNC: 2.6 GM/DL (ref 1.8–3.5)
GLUCOSE SERPL-MCNC: 111 MG/DL (ref 74–124)
HCT VFR BLD AUTO: 34.2 % (ref 34–46.6)
HGB BLD-MCNC: 11.7 G/DL (ref 12–15.9)
IMM GRANULOCYTES # BLD AUTO: 1.61 10*3/MM3 (ref 0–0.05)
IMM GRANULOCYTES NFR BLD AUTO: 8.9 % (ref 0–0.5)
IRON 24H UR-MRATE: 147 MCG/DL (ref 37–145)
IRON SATN MFR SERPL: 58 % (ref 14–48)
LYMPHOCYTES # BLD AUTO: 1.73 10*3/MM3 (ref 0.7–3.1)
LYMPHOCYTES NFR BLD AUTO: 9.6 % (ref 19.6–45.3)
MCH RBC QN AUTO: 34.3 PG (ref 26.6–33)
MCHC RBC AUTO-ENTMCNC: 34.2 G/DL (ref 31.5–35.7)
MCV RBC AUTO: 100.3 FL (ref 79–97)
MONOCYTES # BLD AUTO: 0.99 10*3/MM3 (ref 0.1–0.9)
MONOCYTES NFR BLD AUTO: 5.5 % (ref 5–12)
NEUTROPHILS NFR BLD AUTO: 12.7 10*3/MM3 (ref 1.7–7)
NEUTROPHILS NFR BLD AUTO: 70.2 % (ref 42.7–76)
NRBC BLD AUTO-RTO: 0.1 /100 WBC (ref 0–0.2)
PLATELET # BLD AUTO: 362 10*3/MM3 (ref 140–450)
PMV BLD AUTO: 9.9 FL (ref 6–12)
POTASSIUM SERPL-SCNC: 4.3 MMOL/L (ref 3.5–4.7)
PROT SERPL-MCNC: 6.8 G/DL (ref 6.3–8)
RBC # BLD AUTO: 3.41 10*6/MM3 (ref 3.77–5.28)
SODIUM SERPL-SCNC: 141 MMOL/L (ref 134–145)
TIBC SERPL-MCNC: 253 MCG/DL (ref 249–505)
TRANSFERRIN SERPL-MCNC: 181 MG/DL (ref 200–360)
WBC # BLD AUTO: 18.09 10*3/MM3 (ref 3.4–10.8)

## 2021-03-30 PROCEDURE — 84466 ASSAY OF TRANSFERRIN: CPT

## 2021-03-30 PROCEDURE — 99214 OFFICE O/P EST MOD 30 MIN: CPT | Performed by: INTERNAL MEDICINE

## 2021-03-30 PROCEDURE — 82728 ASSAY OF FERRITIN: CPT

## 2021-03-30 PROCEDURE — 80053 COMPREHEN METABOLIC PANEL: CPT

## 2021-03-30 PROCEDURE — 83540 ASSAY OF IRON: CPT

## 2021-03-30 PROCEDURE — 85025 COMPLETE CBC W/AUTO DIFF WBC: CPT

## 2021-03-30 PROCEDURE — 36415 COLL VENOUS BLD VENIPUNCTURE: CPT

## 2021-03-30 NOTE — PROGRESS NOTES
Subjective     CHIEF COMPLAINT:      Chief Complaint   Patient presents with   • Follow-up     no concerns       HISTORY OF PRESENT ILLNESS:     Ayla Castellano is a 81 y.o. female patient who returns today for follow up on her myeloproliferative disorder. She returns today for follow up accompanied by her daughter. She is now taking Hydrea 500 mg every 3 days. She states that she felt better after the iron infusions.     Patient reports pain in the right groin and back. She noticed a change in her urine and passed a stone. After she passed the stone, the pain subsided. She has follow up with Urology, Dr. Reynoso, in April.     ROS:  Relevant ROS is in the HPI.     Past medical, surgical, social and family history were reviewed.     MEDICATIONS:    Current Outpatient Medications:   •  ACCU-CHEK FASTCLIX LANCETS misc, USE TO TEST TID, Disp: , Rfl: 8  •  amLODIPine (NORVASC) 5 MG tablet, Take 1 tablet by mouth Daily., Disp: 90 tablet, Rfl: 3  •  aspirin 81 MG tablet, Take by mouth., Disp: , Rfl:   •  atorvastatin (LIPITOR) 20 MG tablet, Take 1 tablet by mouth Daily., Disp: 90 tablet, Rfl: 3  •  diclofenac (VOLTAREN) 1 % gel gel, Apply 4 g topically to the appropriate area as directed 3 (Three) Times a Day., Disp: 5 tube, Rfl: 11  •  hydroxyurea (HYDREA) 500 MG capsule, TK 1 C PO QOD, Disp: , Rfl:   •  isosorbide mononitrate (IMDUR) 60 MG 24 hr tablet, Take 1 tablet by mouth Daily., Disp: 90 tablet, Rfl: 3  •  ketoconazole (NIZORAL) 2 % cream, Apply  topically to the appropriate area as directed Daily., Disp: 30 g, Rfl: 11  •  metFORMIN (GLUCOPHAGE) 500 MG tablet, TAKE 1 TABLET BY MOUTH THREE TIMES DAILY, Disp: 270 tablet, Rfl: 1  •  ofloxacin (OCUFLOX) 0.3 % ophthalmic solution, Administer 2 drops to both eyes 4 (Four) Times a Day., Disp: 10 mL, Rfl: 11  •  ONE TOUCH ULTRA TEST test strip, USE TO TEST BLOOD SUGAR EVERY DAY. (E11.9), Disp: 100 each, Rfl: 2  •  oxybutynin (DITROPAN) 5 MG tablet, TK 1/2 - 1 T PO 1-2  "TIMES DAILY, Disp: , Rfl: 5  •  valsartan-hydrochlorothiazide (DIOVAN-HCT) 320-25 MG per tablet, Take 1 tablet by mouth Daily., Disp: 90 tablet, Rfl: 3  •  mupirocin (Bactroban) 2 % ointment, Apply  topically to the appropriate area as directed 3 (Three) Times a Day., Disp: 30 g, Rfl: 11    Objective   VITAL SIGNS:     Vitals:    03/30/21 0849   BP: 119/73   Pulse: 65   Resp: 16   Temp: 97.7 °F (36.5 °C)   TempSrc: Temporal   SpO2: 99%   Weight: 70.1 kg (154 lb 8 oz)   Height: 148 cm (58.27\")   PainSc: 0-No pain     Body mass index is 31.99 kg/m².     Wt Readings from Last 5 Encounters:   03/30/21 70.1 kg (154 lb 8 oz)   02/12/21 69.3 kg (152 lb 12.8 oz)   02/05/21 70.4 kg (155 lb 3.2 oz)   02/04/21 71.7 kg (158 lb)   02/02/21 70.7 kg (155 lb 14.4 oz)       PHYSICAL EXAMINATION:   GENERAL: The patient appears in good general condition, not in acute distress.   SKIN: No ecchymosis.  EYES: No jaundice.  LYMPHATICS: No cervical lymphadenopathy.   CHEST: Normal respiratory effort. Lung clear bilaterally. No added sounds.   CVS: Normal S1 and S2. No murmurs. No edema.  ABDOMEN: Soft. RUQ tenderness. No Hepatomegaly. No Splenomegaly. No masses.  EXTREMITIES: No calf tenderness.     DIAGNOSTIC DATA:     Results from last 7 days   Lab Units 03/30/21  0828   WBC 10*3/mm3 18.09*   NEUTROS ABS 10*3/mm3 12.70*   HEMOGLOBIN g/dL 11.7*   HEMATOCRIT % 34.2   PLATELETS 10*3/mm3 362     Results from last 7 days   Lab Units 03/30/21  0828   SODIUM mmol/L 141   POTASSIUM mmol/L 4.3   CHLORIDE mmol/L 106   CO2 mmol/L 26.7   BUN mg/dL 19   CREATININE mg/dL 0.96   CALCIUM mg/dL 10.9*   ALBUMIN g/dL 4.20   BILIRUBIN mg/dL 0.7   ALK PHOS U/L 81   ALT (SGPT) U/L 18   AST (SGOT) U/L 19   GLUCOSE mg/dL 111     Component      Latest Ref Rng & Units 1/22/2021 2/2/2021 3/30/2021   Iron      37 - 145 mcg/dL  64 147 (H)   Iron Saturation      14 - 48 %  17 58 (H)   Transferrin      200 - 360 mg/dL  271 181 (L)   TIBC      249 - 505 mcg/dL  379 253 "   Vitamin B-12      211 - 946 pg/mL >2000 (H)     Folate      4.78 - 24.20 ng/mL >20.00     Methylmalonic Acid      0 - 378 nmol/L  124    DISCLAIMER        Comment    Ferritin      13.00 - 150.00 ng/mL  26.40 601.60 (H)         Assessment/Plan   *Myeloproliferative disorder, OBINNA-2 mutation positive.   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   · She was under the care of an outside Hematologist.   · According to the daughter, she was diagnosed with Essential thrombocythemia.   · On review of the labs, she had an elevated WBC and basophil counts.   · Therefore, a diagnosis of myeloproliferative disorder or polycythemia vera is favored over ET since ET is limited to elevation of the platelet count.   · BCR ABL was obtained and was negative by FISH on 2/2/2021.  · The WBC count was gradually increasing indicating that her disease is not under a good control likely due to the dose of Hydroxyurea not being effective (500 mg every 4 days).   · The dose was increased to every 3 days on 2/2/2021.  · WBC is today at 18,090.  · Due to the persistence of the leukocytosis, I recommended increasing the dose of Hydrea to 4 days a week.     *Iron deficiency anemia.   · This is attributed to iron deficiency with a transferrin saturation of 17% and a ferritin of 26.   · The patient did not tolerate oral in the past with development of upset stomach, abdominal pain, headaches and dizziness when she took the oral iron in the past.  · Patient received IV iron the 2020 and tolerated that well.   · Patient was given IV Injectafer on 2/5/2021 and 2/12/2021.  · Hemoglobin improved to 11.7 today. Iron stores improved.  · Patient noticed improvement in her fatigue.     *Hypercalcemia. This is being evaluated by her PCP. She has a history of osteopenia. She is going to have a bone density in April, requested by her PCP.     *History of colon cancer, stage I.   · She is s/p partial colectomy by Dr. Ku.   · She did  not require adjuvant chemotherapy or radiation therapy.      PLAN:    1.  Increase Hydrea to 500 mg on Mondays Wednesdays Fridays and Sundays.  2.  Patient does not need additional IV iron at this point.  3.  We will schedule the patient for a CBC in 2 months with RN review.   4.  I will see the patient in 4 months for follow up with a CBC CMP Ferritin and Iron panel.         Izzy Martinez MD  03/30/21

## 2021-04-12 ENCOUNTER — HOSPITAL ENCOUNTER (OUTPATIENT)
Dept: BONE DENSITY | Facility: HOSPITAL | Age: 81
Discharge: HOME | End: 2021-04-12
Admitting: FAMILY MEDICINE

## 2021-04-12 DIAGNOSIS — Z78.0 POSTMENOPAUSE: ICD-10-CM

## 2021-04-12 PROCEDURE — 77080 DXA BONE DENSITY AXIAL: CPT

## 2021-04-23 ENCOUNTER — OFFICE VISIT (OUTPATIENT)
Dept: FAMILY MEDICINE CLINIC | Facility: CLINIC | Age: 81
End: 2021-04-23

## 2021-04-23 VITALS
WEIGHT: 155.13 LBS | OXYGEN SATURATION: 95 % | TEMPERATURE: 97 F | DIASTOLIC BLOOD PRESSURE: 68 MMHG | HEIGHT: 58 IN | BODY MASS INDEX: 32.57 KG/M2 | SYSTOLIC BLOOD PRESSURE: 128 MMHG | HEART RATE: 69 BPM

## 2021-04-23 DIAGNOSIS — E83.52 HYPERCALCEMIA: ICD-10-CM

## 2021-04-23 DIAGNOSIS — M81.0 AGE-RELATED OSTEOPOROSIS WITHOUT CURRENT PATHOLOGICAL FRACTURE: ICD-10-CM

## 2021-04-23 DIAGNOSIS — E11.9 TYPE 2 DIABETES MELLITUS WITHOUT COMPLICATION, WITHOUT LONG-TERM CURRENT USE OF INSULIN (HCC): ICD-10-CM

## 2021-04-23 DIAGNOSIS — I10 ESSENTIAL HYPERTENSION: Primary | ICD-10-CM

## 2021-04-23 DIAGNOSIS — E78.2 MIXED HYPERLIPIDEMIA: ICD-10-CM

## 2021-04-23 DIAGNOSIS — I25.118 CORONARY ARTERY DISEASE OF NATIVE HEART WITH STABLE ANGINA PECTORIS, UNSPECIFIED VESSEL OR LESION TYPE (HCC): ICD-10-CM

## 2021-04-23 PROBLEM — M79.603 ARM PAIN: Status: ACTIVE | Noted: 2021-04-23

## 2021-04-23 PROBLEM — R07.9 CHEST PAIN: Status: ACTIVE | Noted: 2021-04-23

## 2021-04-23 PROCEDURE — 99214 OFFICE O/P EST MOD 30 MIN: CPT | Performed by: FAMILY MEDICINE

## 2021-04-23 RX ORDER — FLUOCINONIDE CREAM (EMULSIFIED BASE) 0.5 MG/G
CREAM TOPICAL
COMMUNITY
Start: 2021-04-02 | End: 2021-09-13 | Stop reason: SDUPTHER

## 2021-04-23 NOTE — PROGRESS NOTES
Jluiet Castellano is a 81 y.o. female.     Chief Complaint   Patient presents with   • Hypertension   • Follow-up       History of Present Illness     DM stable  HTN: stable  Osteoporosis follow up.    The following portions of the patient's history were reviewed and updated as appropriate: allergies, current medications, past family history, past medical history, past social history, past surgical history and problem list.    Past Medical History:   Diagnosis Date   • Colitis    • Colon cancer (CMS/HCC)     s/p partial colectomy in 2011   • Diabetes mellitus (CMS/HCC)    • Gallbladder disease    • Hyperlipidemia    • Hypertension    • Left arm pain    • Syncope     Secondary to medications (clonidine and metoprolol) in 3/15.  Had severe bradycardia (sinus arrest with junctional escape beats).  Resolved after medications discontinued.       Past Surgical History:   Procedure Laterality Date   • CHOLECYSTECTOMY  10/2019   • COLECTOMY PARTIAL / TOTAL      2011 for colon cancer   • COLONOSCOPY  01/20/2020   • UTERINE FIBROID SURGERY      s/p uterine fibroma resection       Family History   Problem Relation Age of Onset   • Heart disease Mother         pacemaker placement   • Stroke Mother    • Colon cancer Brother    • Stroke Father    • Diabetes Other    • Kidney disease Other    • Hyperlipidemia Other    • Arthritis Other        Social History     Socioeconomic History   • Marital status:      Spouse name: Not on file   • Number of children: Not on file   • Years of education: Not on file   • Highest education level: Not on file   Tobacco Use   • Smoking status: Never Smoker   • Smokeless tobacco: Never Used   Substance and Sexual Activity   • Alcohol use: No   • Drug use: No   • Sexual activity: Defer       Current Outpatient Medications on File Prior to Visit   Medication Sig Dispense Refill   • ACCU-CHEK FASTCLIX LANCETS misc USE TO TEST TID  8   • amLODIPine (NORVASC) 5 MG tablet Take 1  tablet by mouth Daily. 90 tablet 3   • aspirin 81 MG tablet Take by mouth.     • atorvastatin (LIPITOR) 20 MG tablet Take 1 tablet by mouth Daily. 90 tablet 3   • diclofenac (VOLTAREN) 1 % gel gel Apply 4 g topically to the appropriate area as directed 3 (Three) Times a Day. 5 tube 11   • Fluocinonide Emulsified Base 0.05 % cream APPLY TOPICALLY TO THE AFFECTED AREA AS DIRECTED DAILY     • hydroxyurea (HYDREA) 500 MG capsule Take 500 mg by mouth Take As Directed. Monday Wednesday Friday and Sunday     • isosorbide mononitrate (IMDUR) 60 MG 24 hr tablet Take 1 tablet by mouth Daily. 90 tablet 3   • ketoconazole (NIZORAL) 2 % cream Apply  topically to the appropriate area as directed Daily. 30 g 11   • metFORMIN (GLUCOPHAGE) 500 MG tablet TAKE 1 TABLET BY MOUTH THREE TIMES DAILY 270 tablet 1   • mupirocin (Bactroban) 2 % ointment Apply  topically to the appropriate area as directed 3 (Three) Times a Day. 30 g 11   • ofloxacin (OCUFLOX) 0.3 % ophthalmic solution Administer 2 drops to both eyes 4 (Four) Times a Day. 10 mL 11   • ONE TOUCH ULTRA TEST test strip USE TO TEST BLOOD SUGAR EVERY DAY. (E11.9) 100 each 2   • oxybutynin (DITROPAN) 5 MG tablet TK 1/2 - 1 T PO 1-2 TIMES DAILY  5   • valsartan-hydrochlorothiazide (DIOVAN-HCT) 320-25 MG per tablet Take 1 tablet by mouth Daily. 90 tablet 3     No current facility-administered medications on file prior to visit.       Review of Systems    Recent Results (from the past 4704 hour(s))   Hemoglobin A1c    Collection Time: 01/22/21  8:32 AM    Specimen: Blood   Result Value Ref Range    Hemoglobin A1C 5.40 4.80 - 5.60 %   Comprehensive Metabolic Panel    Collection Time: 01/22/21  8:32 AM    Specimen: Blood   Result Value Ref Range    Glucose 96 65 - 99 mg/dL    BUN 28 (H) 8 - 23 mg/dL    Creatinine 0.91 0.57 - 1.00 mg/dL    eGFR Non African Am 60 (L) >60 mL/min/1.73    eGFR African Am 72 >60 mL/min/1.73    BUN/Creatinine Ratio 30.8 (H) 7.0 - 25.0    Sodium 140 136 - 145  mmol/L    Potassium 4.7 3.5 - 5.2 mmol/L    Chloride 105 98 - 107 mmol/L    Total CO2 27.6 22.0 - 29.0 mmol/L    Calcium 10.9 (H) 8.6 - 10.5 mg/dL    Total Protein 6.8 6.0 - 8.5 g/dL    Albumin 4.40 3.50 - 5.20 g/dL    Globulin 2.4 gm/dL    A/G Ratio 1.8 g/dL    Total Bilirubin 0.6 0.0 - 1.2 mg/dL    Alkaline Phosphatase 70 39 - 117 U/L    AST (SGOT) 25 1 - 32 U/L    ALT (SGPT) 18 1 - 33 U/L   Lipid Panel With LDL / HDL Ratio    Collection Time: 01/22/21  8:32 AM    Specimen: Blood   Result Value Ref Range    Total Cholesterol 145 0 - 200 mg/dL    Triglycerides 176 (H) 0 - 150 mg/dL    HDL Cholesterol 32 (L) 40 - 60 mg/dL    VLDL Cholesterol Jeff 30 5 - 40 mg/dL    LDL Chol Calc (NIH) 83 0 - 100 mg/dL    LDL/HDL RATIO 2.43    Vitamin B12 & Folate    Collection Time: 01/22/21  8:32 AM    Specimen: Blood   Result Value Ref Range    Vitamin B-12 >2000 (H) 211 - 946 pg/mL    Folate >20.00 4.78 - 24.20 ng/mL   Magnesium    Collection Time: 01/22/21  8:32 AM    Specimen: Blood   Result Value Ref Range    Magnesium 1.7 1.6 - 2.4 mg/dL   Phosphorus    Collection Time: 01/22/21  8:32 AM    Specimen: Blood   Result Value Ref Range    Phosphorus 3.3 2.5 - 4.5 mg/dL   Vitamin D 25 Hydroxy    Collection Time: 01/22/21  8:32 AM    Specimen: Blood   Result Value Ref Range    25 Hydroxy, Vitamin D 50.1 30.0 - 100.0 ng/mL   CBC & Differential    Collection Time: 01/22/21  8:32 AM   Result Value Ref Range    WBC 18.46 (H) 3.40 - 10.80 10*3/mm3    RBC 3.81 3.77 - 5.28 10*6/mm3    Hemoglobin 12.0 12.0 - 15.9 g/dL    Hematocrit 35.5 34.0 - 46.6 %    MCV 93.2 79.0 - 97.0 fL    MCH 31.5 26.6 - 33.0 pg    MCHC 33.8 31.5 - 35.7 g/dL    RDW 15.5 (H) 12.3 - 15.4 %    Platelets 433 140 - 450 10*3/mm3    Neutrophil Rel % CANCELED     Lymphocyte Rel % CANCELED     Monocyte Rel % CANCELED     Eosinophil Rel % CANCELED     Lymphocytes Absolute CANCELED     Eosinophils Absolute CANCELED     Basophils Absolute CANCELED    Manual Differential     Collection Time: 01/22/21  8:32 AM   Result Value Ref Range    Neutrophil Rel % 77.0 (H) 42.7 - 76.0 %    Lymphocyte Rel % 17.0 (L) 19.6 - 45.3 %    Monocyte Rel % 3.0 (L) 5.0 - 12.0 %    Eosinophil Rel % 3.0 0.3 - 6.2 %    Basophil Rel % 0.0 0.0 - 1.5 %    Neutrophils Absolute 14.21 (H) 1.70 - 7.00 10*3/mm3    Lymphocytes Absolute 3.14 (H) 0.70 - 3.10 10*3/mm3    Monocytes Absolute 0.55 0.10 - 0.90 10*3/mm3    Eosinophil Abs 0.55 (H) 0.00 - 0.40 10*3/mm3    Basophils Absolute 0.00 0.00 - 0.20 10*3/mm3    Differential Comment Comment     Comment Comment     Plt Comment Comment    PTH, Intact & Calcium    Collection Time: 01/26/21  9:32 AM    Specimen: Blood   Result Value Ref Range    Calcium 10.4 (H) 8.7 - 10.3 mg/dL    PTH, Intact 43 15 - 65 pg/mL    PTH, Intact Comment    Calcium, Ionized    Collection Time: 01/26/21  9:32 AM    Specimen: Blood   Result Value Ref Range    Ionized Calcium 6.0 (H) 4.5 - 5.6 mg/dL   CBC & Differential    Collection Time: 01/26/21  9:33 AM    Specimen: Blood   Result Value Ref Range    WBC 16.12 (H) 3.40 - 10.80 10*3/mm3    RBC 3.54 (L) 3.77 - 5.28 10*6/mm3    Hemoglobin 11.1 (L) 12.0 - 15.9 g/dL    Hematocrit 32.7 (L) 34.0 - 46.6 %    MCV 92.4 79.0 - 97.0 fL    MCH 31.4 26.6 - 33.0 pg    MCHC 33.9 31.5 - 35.7 g/dL    RDW 15.6 (H) 12.3 - 15.4 %    Platelets 388 140 - 450 10*3/mm3    Neutrophil Rel % CANCELED     Lymphocyte Rel % CANCELED     Monocyte Rel % CANCELED     Eosinophil Rel % CANCELED     Lymphocytes Absolute CANCELED     Eosinophils Absolute CANCELED     Basophils Absolute CANCELED    Manual Differential    Collection Time: 01/26/21  9:33 AM   Result Value Ref Range    Neutrophil Rel % 72.2 42.7 - 76.0 %    Lymphocyte Rel % 15.5 (L) 19.6 - 45.3 %    Monocyte Rel % 5.2 5.0 - 12.0 %    Eosinophil Rel % 7.2 (H) 0.3 - 6.2 %    Basophil Rel % 3.1 (H) 0.0 - 1.5 %    Neutrophils Absolute 11.64 (H) 1.70 - 7.00 10*3/mm3    Lymphocytes Absolute 2.50 0.70 - 3.10 10*3/mm3    Monocytes  Absolute 0.84 0.10 - 0.90 10*3/mm3    Eosinophil Abs 1.16 (H) 0.00 - 0.40 10*3/mm3    Basophils Absolute 0.50 (H) 0.00 - 0.20 10*3/mm3    Differential Comment Comment     Comment Comment     Plt Comment Comment    CBC Auto Differential    Collection Time: 02/02/21  7:30 AM    Specimen: Blood   Result Value Ref Range    WBC 18.44 (H) 3.40 - 10.80 10*3/mm3    RBC 3.81 3.77 - 5.28 10*6/mm3    Hemoglobin 11.6 (L) 12.0 - 15.9 g/dL    Hematocrit 34.9 34.0 - 46.6 %    MCV 91.6 79.0 - 97.0 fL    MCH 30.4 26.6 - 33.0 pg    MCHC 33.2 31.5 - 35.7 g/dL    RDW 15.9 (H) 12.3 - 15.4 %    RDW-SD 52.8 37.0 - 54.0 fl    MPV 11.5 6.0 - 12.0 fL    Platelets 396 140 - 450 10*3/mm3    Neutrophil % 71.1 42.7 - 76.0 %    Lymphocyte % 10.7 (L) 19.6 - 45.3 %    Monocyte % 5.9 5.0 - 12.0 %    Eosinophil % 3.9 0.3 - 6.2 %    Basophil % 1.4 0.0 - 1.5 %    Immature Grans % 7.0 (H) 0.0 - 0.5 %    Neutrophils, Absolute 13.12 (H) 1.70 - 7.00 10*3/mm3    Lymphocytes, Absolute 1.97 0.70 - 3.10 10*3/mm3    Monocytes, Absolute 1.08 (H) 0.10 - 0.90 10*3/mm3    Eosinophils, Absolute 0.71 (H) 0.00 - 0.40 10*3/mm3    Basophils, Absolute 0.26 (H) 0.00 - 0.20 10*3/mm3    Immature Grans, Absolute 1.30 (H) 0.00 - 0.05 10*3/mm3    nRBC 0.2 0.0 - 0.2 /100 WBC   Ferritin    Collection Time: 02/02/21  9:07 AM    Specimen: Blood   Result Value Ref Range    Ferritin 26.40 13.00 - 150.00 ng/mL   Iron Profile    Collection Time: 02/02/21  9:07 AM    Specimen: Blood   Result Value Ref Range    Iron 64 37 - 145 mcg/dL    Iron Saturation 17 14 - 48 %    Transferrin 271 200 - 360 mg/dL    TIBC 379 249 - 505 mcg/dL   Retic With IRF & RET-He    Collection Time: 02/02/21  9:07 AM    Specimen: Blood   Result Value Ref Range    Immature Reticulocyte Fraction 25.8 (H) 3.0 - 15.8 %    Reticulocyte % 2.05 (H) 0.70 - 1.90 %    Reticulocyte Absolute 0.0789 0.0200 - 0.1300 10*6/mm3    Reticulocyte Hgb 36.8 (H) 29.8 - 36.1 pg   Methylmalonic Acid, Serum    Collection Time: 02/02/21   9:07 AM    Specimen: Blood   Result Value Ref Range    Methylmalonic Acid 124 0 - 378 nmol/L    Disclaimer: Comment    Comprehensive Metabolic Panel    Collection Time: 02/02/21  9:07 AM    Specimen: Blood   Result Value Ref Range    Glucose 103 74 - 124 mg/dL    BUN 28 (H) 6 - 20 mg/dL    Creatinine 0.86 0.60 - 1.10 mg/dL    Sodium 141 134 - 145 mmol/L    Potassium 3.9 3.5 - 4.7 mmol/L    Chloride 105 98 - 107 mmol/L    CO2 24.7 22.0 - 29.0 mmol/L    Calcium 10.9 (C) 8.5 - 10.2 mg/dL    Total Protein 7.1 6.3 - 8.0 g/dL    Albumin 4.40 3.50 - 5.20 g/dL    ALT (SGPT) 19 0 - 33 U/L    AST (SGOT) 24 0 - 32 U/L    Alkaline Phosphatase 73 38 - 116 U/L    Total Bilirubin 0.6 0.2 - 1.2 mg/dL    eGFR Non African Amer 64 >60 mL/min/1.73    Globulin 2.7 1.8 - 3.5 gm/dL    A/G Ratio 1.6 1.1 - 2.4 g/dL    BUN/Creatinine Ratio 32.6 (H) 7.3 - 30.0    Anion Gap 11.3 5.0 - 15.0 mmol/L   Lactate Dehydrogenase    Collection Time: 02/02/21  9:07 AM    Specimen: Blood   Result Value Ref Range     (H) 99 - 259 U/L   Uric Acid    Collection Time: 02/02/21  9:07 AM    Specimen: Blood   Result Value Ref Range    Uric Acid 9.1 (H) 2.8 - 7.4 mg/dL   BCR-ABL FISH    Collection Time: 02/02/21  9:07 AM    Specimen: Blood   Result Value Ref Range    Reference Lab Report BCR/ABL FISH    Ferritin    Collection Time: 03/30/21  8:28 AM    Specimen: Blood   Result Value Ref Range    Ferritin 601.60 (H) 13.00 - 150.00 ng/mL   Iron Profile    Collection Time: 03/30/21  8:28 AM    Specimen: Blood   Result Value Ref Range    Iron 147 (H) 37 - 145 mcg/dL    Iron Saturation 58 (H) 14 - 48 %    Transferrin 181 (L) 200 - 360 mg/dL    TIBC 253 249 - 505 mcg/dL   Comprehensive Metabolic Panel    Collection Time: 03/30/21  8:28 AM    Specimen: Blood   Result Value Ref Range    Glucose 111 74 - 124 mg/dL    BUN 19 6 - 20 mg/dL    Creatinine 0.96 0.60 - 1.10 mg/dL    Sodium 141 134 - 145 mmol/L    Potassium 4.3 3.5 - 4.7 mmol/L    Chloride 106 98 - 107  "mmol/L    CO2 26.7 22.0 - 29.0 mmol/L    Calcium 10.9 (C) 8.5 - 10.2 mg/dL    Total Protein 6.8 6.3 - 8.0 g/dL    Albumin 4.20 3.50 - 5.20 g/dL    ALT (SGPT) 18 0 - 33 U/L    AST (SGOT) 19 0 - 32 U/L    Alkaline Phosphatase 81 38 - 116 U/L    Total Bilirubin 0.7 0.2 - 1.2 mg/dL    eGFR Non African Amer 56 (L) >60 mL/min/1.73    Globulin 2.6 1.8 - 3.5 gm/dL    A/G Ratio 1.6 1.1 - 2.4 g/dL    BUN/Creatinine Ratio 19.8 7.3 - 30.0    Anion Gap 8.3 5.0 - 15.0 mmol/L   CBC Auto Differential    Collection Time: 03/30/21  8:28 AM    Specimen: Blood   Result Value Ref Range    WBC 18.09 (H) 3.40 - 10.80 10*3/mm3    RBC 3.41 (L) 3.77 - 5.28 10*6/mm3    Hemoglobin 11.7 (L) 12.0 - 15.9 g/dL    Hematocrit 34.2 34.0 - 46.6 %    .3 (H) 79.0 - 97.0 fL    MCH 34.3 (H) 26.6 - 33.0 pg    MCHC 34.2 31.5 - 35.7 g/dL    RDW 18.1 (H) 12.3 - 15.4 %    RDW-SD 66.8 (H) 37.0 - 54.0 fl    MPV 9.9 6.0 - 12.0 fL    Platelets 362 140 - 450 10*3/mm3    Neutrophil % 70.2 42.7 - 76.0 %    Lymphocyte % 9.6 (L) 19.6 - 45.3 %    Monocyte % 5.5 5.0 - 12.0 %    Eosinophil % 4.3 0.3 - 6.2 %    Basophil % 1.5 0.0 - 1.5 %    Immature Grans % 8.9 (H) 0.0 - 0.5 %    Neutrophils, Absolute 12.70 (H) 1.70 - 7.00 10*3/mm3    Lymphocytes, Absolute 1.73 0.70 - 3.10 10*3/mm3    Monocytes, Absolute 0.99 (H) 0.10 - 0.90 10*3/mm3    Eosinophils, Absolute 0.78 (H) 0.00 - 0.40 10*3/mm3    Basophils, Absolute 0.28 (H) 0.00 - 0.20 10*3/mm3    Immature Grans, Absolute 1.61 (H) 0.00 - 0.05 10*3/mm3    nRBC 0.1 0.0 - 0.2 /100 WBC     Objective   Vitals:    04/23/21 0743   BP: 128/68   Pulse: 69   Temp: 97 °F (36.1 °C)   SpO2: 95%   Weight: 70.4 kg (155 lb 2 oz)   Height: 148 cm (58.27\")     Body mass index is 32.12 kg/m².  Physical Exam  Vitals and nursing note reviewed.   Constitutional:       General: She is not in acute distress.     Appearance: She is well-developed. She is not diaphoretic.   Cardiovascular:      Rate and Rhythm: Normal rate and regular rhythm. "   Pulmonary:      Effort: Pulmonary effort is normal. No respiratory distress.      Breath sounds: Normal breath sounds. No wheezing.           Diagnoses and all orders for this visit:    1. Essential hypertension (Primary)  Comments:  stable on meds    Orders:  -     Comprehensive Metabolic Panel  -     Lipid Panel  -     CBC & Differential    2. Coronary artery disease of native heart with stable angina pectoris, unspecified vessel or lesion type (CMS/[facility])  Comments:  stable    3. Mixed hyperlipidemia  Comments:  stable    4. Type 2 diabetes mellitus without complication, without long-term current use of insulin (CMS/[facility])  Comments:  stable  Orders:  -     Comprehensive Metabolic Panel  -     Lipid Panel  -     CBC & Differential  -     Hemoglobin A1c    5. Age-related osteoporosis without current pathological fracture  -     Vitamin D 25 Hydroxy  -     Ambulatory Referral to [facility] For Infusion Treatment    6. Hypercalcemia  -     PTH, Intact & Calcium    Return in about 3 months (around 7/12/2021) for Medicare Wellness, HYPERTENSION, DIABETES.

## 2021-04-26 LAB
25(OH)D3+25(OH)D2 SERPL-MCNC: 42.8 NG/ML (ref 30–100)
ALBUMIN SERPL-MCNC: 4.5 G/DL (ref 3.5–5.2)
ALBUMIN/GLOB SERPL: 2.1 G/DL
ALP SERPL-CCNC: 82 U/L (ref 39–117)
ALT SERPL-CCNC: 33 U/L (ref 1–33)
AST SERPL-CCNC: 25 U/L (ref 1–32)
BASOPHILS # BLD MANUAL: 0.15 10*3/MM3 (ref 0–0.2)
BASOPHILS NFR BLD MANUAL: 1 % (ref 0–1.5)
BILIRUB SERPL-MCNC: 0.7 MG/DL (ref 0–1.2)
BUN SERPL-MCNC: 16 MG/DL (ref 8–23)
BUN/CREAT SERPL: 17.6 (ref 7–25)
CALCIUM SERPL-MCNC: 11.1 MG/DL (ref 8.6–10.5)
CHLORIDE SERPL-SCNC: 107 MMOL/L (ref 98–107)
CHOLEST SERPL-MCNC: 134 MG/DL (ref 0–200)
CO2 SERPL-SCNC: 27.9 MMOL/L (ref 22–29)
CREAT SERPL-MCNC: 0.91 MG/DL (ref 0.57–1)
DIFFERENTIAL COMMENT: ABNORMAL
EOSINOPHIL # BLD MANUAL: 0.62 10*3/MM3 (ref 0–0.4)
EOSINOPHIL NFR BLD MANUAL: 4.1 % (ref 0.3–6.2)
ERYTHROCYTE [DISTWIDTH] IN BLOOD BY AUTOMATED COUNT: 18.2 % (ref 12.3–15.4)
GLOBULIN SER CALC-MCNC: 2.1 GM/DL
GLUCOSE SERPL-MCNC: 117 MG/DL (ref 65–99)
HBA1C MFR BLD: 5.6 % (ref 4.8–5.6)
HCT VFR BLD AUTO: 34.2 % (ref 34–46.6)
HDLC SERPL-MCNC: 33 MG/DL (ref 40–60)
HGB BLD-MCNC: 11.7 G/DL (ref 12–15.9)
INTACT PTH: NORMAL
LDLC SERPL CALC-MCNC: 70 MG/DL (ref 0–100)
LYMPHOCYTES # BLD MANUAL: 0.92 10*3/MM3 (ref 0.7–3.1)
LYMPHOCYTES NFR BLD MANUAL: 6.1 % (ref 19.6–45.3)
MCH RBC QN AUTO: 35 PG (ref 26.6–33)
MCHC RBC AUTO-ENTMCNC: 34.2 G/DL (ref 31.5–35.7)
MCV RBC AUTO: 102.4 FL (ref 79–97)
MONOCYTES # BLD MANUAL: 0.47 10*3/MM3 (ref 0.1–0.9)
MONOCYTES NFR BLD MANUAL: 3.1 % (ref 5–12)
NEUTROPHILS # BLD MANUAL: 12.36 10*3/MM3 (ref 1.7–7)
NEUTROPHILS NFR BLD MANUAL: 81.6 % (ref 42.7–76)
NRBC BLD AUTO-RTO: 0.1 /100 WBC (ref 0–0.2)
PLATELET # BLD AUTO: 375 10*3/MM3 (ref 140–450)
PLATELET BLD QL SMEAR: ABNORMAL
POTASSIUM SERPL-SCNC: 4.5 MMOL/L (ref 3.5–5.2)
PROT SERPL-MCNC: 6.6 G/DL (ref 6–8.5)
PTH-INTACT SERPL-MCNC: 58 PG/ML (ref 15–65)
RBC # BLD AUTO: 3.34 10*6/MM3 (ref 3.77–5.28)
RBC MORPH BLD: ABNORMAL
SODIUM SERPL-SCNC: 145 MMOL/L (ref 136–145)
TRIGL SERPL-MCNC: 180 MG/DL (ref 0–150)
VLDLC SERPL CALC-MCNC: 31 MG/DL (ref 5–40)
WBC # BLD AUTO: 15.15 10*3/MM3 (ref 3.4–10.8)

## 2021-05-04 ENCOUNTER — INFUSION (OUTPATIENT)
Dept: ONCOLOGY | Facility: HOSPITAL | Age: 81
End: 2021-05-04

## 2021-05-04 VITALS — BODY MASS INDEX: 32.42 KG/M2 | RESPIRATION RATE: 18 BRPM | TEMPERATURE: 97.1 F | HEIGHT: 58 IN

## 2021-05-04 DIAGNOSIS — M81.8 OTHER OSTEOPOROSIS WITHOUT CURRENT PATHOLOGICAL FRACTURE: Primary | ICD-10-CM

## 2021-05-04 PROCEDURE — 96372 THER/PROPH/DIAG INJ SC/IM: CPT

## 2021-05-04 PROCEDURE — 25010000002 DENOSUMAB 60 MG/ML SOLUTION PREFILLED SYRINGE: Performed by: FAMILY MEDICINE

## 2021-05-04 RX ADMIN — DENOSUMAB 60 MG: 60 INJECTION SUBCUTANEOUS at 14:05

## 2021-05-04 NOTE — PROGRESS NOTES
Arrived ambulatory for prolia injection, accompanied by daughter. Daughter states pt has had prolia before and has been over six months since last injection. Indication and side effects reviewed. Denies recent dental work. Labs and medications verified. Pt is not currently taking calcium or Vitamin D. Pt and daughter instructed to discuss with Dr. Morales if she should begin taking. Pt and daughter v/u. Prolia administered in right arm without incidence. Instructed to call prescribing MD for any concerns or questions and instructed on how to schedule future appts.  Pt vu and discharged ambulatory.

## 2021-05-25 ENCOUNTER — CLINICAL SUPPORT (OUTPATIENT)
Dept: ONCOLOGY | Facility: HOSPITAL | Age: 81
End: 2021-05-25

## 2021-05-25 ENCOUNTER — LAB (OUTPATIENT)
Dept: LAB | Facility: HOSPITAL | Age: 81
End: 2021-05-25

## 2021-05-25 DIAGNOSIS — Z79.899 ENCOUNTER FOR LONG-TERM CURRENT USE OF HIGH RISK MEDICATION: ICD-10-CM

## 2021-05-25 DIAGNOSIS — D47.1 MYELOPROLIFERATIVE DISORDER (HCC): ICD-10-CM

## 2021-05-25 DIAGNOSIS — D50.9 IRON DEFICIENCY ANEMIA, UNSPECIFIED IRON DEFICIENCY ANEMIA TYPE: ICD-10-CM

## 2021-05-25 LAB
BASOPHILS # BLD AUTO: 0.29 10*3/MM3 (ref 0–0.2)
BASOPHILS NFR BLD AUTO: 1.4 % (ref 0–1.5)
DEPRECATED RDW RBC AUTO: 61.7 FL (ref 37–54)
EOSINOPHIL # BLD AUTO: 0.7 10*3/MM3 (ref 0–0.4)
EOSINOPHIL NFR BLD AUTO: 3.4 % (ref 0.3–6.2)
ERYTHROCYTE [DISTWIDTH] IN BLOOD BY AUTOMATED COUNT: 16.3 % (ref 12.3–15.4)
HCT VFR BLD AUTO: 33 % (ref 34–46.6)
HGB BLD-MCNC: 11.6 G/DL (ref 12–15.9)
IMM GRANULOCYTES # BLD AUTO: 1.49 10*3/MM3 (ref 0–0.05)
IMM GRANULOCYTES NFR BLD AUTO: 7.3 % (ref 0–0.5)
LYMPHOCYTES # BLD AUTO: 2.03 10*3/MM3 (ref 0.7–3.1)
LYMPHOCYTES NFR BLD AUTO: 9.9 % (ref 19.6–45.3)
MCH RBC QN AUTO: 36.4 PG (ref 26.6–33)
MCHC RBC AUTO-ENTMCNC: 35.2 G/DL (ref 31.5–35.7)
MCV RBC AUTO: 103.4 FL (ref 79–97)
MONOCYTES # BLD AUTO: 0.89 10*3/MM3 (ref 0.1–0.9)
MONOCYTES NFR BLD AUTO: 4.3 % (ref 5–12)
NEUTROPHILS NFR BLD AUTO: 15.11 10*3/MM3 (ref 1.7–7)
NEUTROPHILS NFR BLD AUTO: 73.7 % (ref 42.7–76)
NRBC BLD AUTO-RTO: 0 /100 WBC (ref 0–0.2)
PLATELET # BLD AUTO: 393 10*3/MM3 (ref 140–450)
PMV BLD AUTO: 11.7 FL (ref 6–12)
RBC # BLD AUTO: 3.19 10*6/MM3 (ref 3.77–5.28)
WBC # BLD AUTO: 20.51 10*3/MM3 (ref 3.4–10.8)

## 2021-05-25 PROCEDURE — 85025 COMPLETE CBC W/AUTO DIFF WBC: CPT

## 2021-05-25 PROCEDURE — G0463 HOSPITAL OUTPT CLINIC VISIT: HCPCS

## 2021-05-25 PROCEDURE — 36415 COLL VENOUS BLD VENIPUNCTURE: CPT

## 2021-05-25 NOTE — PROGRESS NOTES
Lab Results   Component Value Date    WBC 20.51 (H) 05/25/2021    HGB 11.6 (L) 05/25/2021    HCT 33.0 (L) 05/25/2021    .4 (H) 05/25/2021     05/25/2021     CBC RESULTS R/W PT AND HER GRANDSON (HE TRANSLATED FOR THE PT). PT DENIED ANY C/O. PT CONTINUES ON HYDREA (AS FAR AS GRANDSON KNOWS, HE'LL CHECK W/ HIS MOM LATER) 500MG Q M/W/F AND SUN. THEY WILL LET US KNOW IF THIS IS NOT THE CASE. WENT OVER NEXT F/U (GAVE WRITTEN PRINTOUT). COPY OF LABS GIVEN TO PT AS WELL.

## 2021-06-01 RX ORDER — ISOSORBIDE MONONITRATE 60 MG/1
60 TABLET, EXTENDED RELEASE ORAL DAILY
Qty: 30 TABLET | Refills: 11 | Status: SHIPPED | OUTPATIENT
Start: 2021-06-01 | End: 2021-09-13 | Stop reason: SDUPTHER

## 2021-06-06 DIAGNOSIS — E13.69 OTHER SPECIFIED DIABETES MELLITUS WITH OTHER SPECIFIED COMPLICATION, UNSPECIFIED WHETHER LONG TERM INSULIN USE (HCC): ICD-10-CM

## 2021-06-24 DIAGNOSIS — I10 ESSENTIAL HYPERTENSION: ICD-10-CM

## 2021-06-24 RX ORDER — VALSARTAN AND HYDROCHLOROTHIAZIDE 320; 25 MG/1; MG/1
1 TABLET, FILM COATED ORAL DAILY
Qty: 90 TABLET | Refills: 3 | Status: SHIPPED | OUTPATIENT
Start: 2021-06-24 | End: 2021-09-13 | Stop reason: SDUPTHER

## 2021-06-27 DIAGNOSIS — I10 ESSENTIAL HYPERTENSION: ICD-10-CM

## 2021-06-28 RX ORDER — AMLODIPINE BESYLATE 5 MG/1
5 TABLET ORAL DAILY
Qty: 90 TABLET | Refills: 3 | Status: SHIPPED | OUTPATIENT
Start: 2021-06-28 | End: 2021-09-13 | Stop reason: SDUPTHER

## 2021-07-22 ENCOUNTER — OFFICE VISIT (OUTPATIENT)
Dept: FAMILY MEDICINE CLINIC | Facility: CLINIC | Age: 81
End: 2021-07-22

## 2021-07-22 VITALS
WEIGHT: 153 LBS | SYSTOLIC BLOOD PRESSURE: 140 MMHG | HEART RATE: 75 BPM | OXYGEN SATURATION: 98 % | DIASTOLIC BLOOD PRESSURE: 63 MMHG | TEMPERATURE: 97.1 F | BODY MASS INDEX: 32.12 KG/M2 | HEIGHT: 58 IN

## 2021-07-22 DIAGNOSIS — Z00.00 MEDICARE ANNUAL WELLNESS VISIT, SUBSEQUENT: Primary | ICD-10-CM

## 2021-07-22 DIAGNOSIS — E11.9 TYPE 2 DIABETES MELLITUS WITHOUT COMPLICATION, WITHOUT LONG-TERM CURRENT USE OF INSULIN (HCC): ICD-10-CM

## 2021-07-22 DIAGNOSIS — E78.2 MIXED HYPERLIPIDEMIA: ICD-10-CM

## 2021-07-22 DIAGNOSIS — I25.118 CORONARY ARTERY DISEASE OF NATIVE HEART WITH STABLE ANGINA PECTORIS, UNSPECIFIED VESSEL OR LESION TYPE (HCC): ICD-10-CM

## 2021-07-22 DIAGNOSIS — I10 ESSENTIAL HYPERTENSION: ICD-10-CM

## 2021-07-22 PROCEDURE — G0439 PPPS, SUBSEQ VISIT: HCPCS | Performed by: FAMILY MEDICINE

## 2021-07-22 PROCEDURE — 99214 OFFICE O/P EST MOD 30 MIN: CPT | Performed by: FAMILY MEDICINE

## 2021-07-22 NOTE — PROGRESS NOTES
Juliet Castellano is a 81 y.o. female.     Chief Complaint   Patient presents with   • Follow-up   • Hypertension       History of Present Illness       DM stable  HTN: stable  Osteoporosis follow up.  Huseyin artery disease follow-up stable.  Seeing cardiologist.  Myeloproliferative disorder stable.  Seeing oncologist.    The following portions of the patient's history were reviewed and updated as appropriate: allergies, current medications, past family history, past medical history, past social history, past surgical history and problem list.    Past Medical History:   Diagnosis Date   • Colitis    • Colon cancer (CMS/HCC)     s/p partial colectomy in 2011   • Diabetes mellitus (CMS/HCC)    • Gallbladder disease    • Hyperlipidemia    • Hypertension    • Left arm pain    • Syncope     Secondary to medications (clonidine and metoprolol) in 3/15.  Had severe bradycardia (sinus arrest with junctional escape beats).  Resolved after medications discontinued.       Past Surgical History:   Procedure Laterality Date   • CHOLECYSTECTOMY  10/2019   • COLECTOMY PARTIAL / TOTAL      2011 for colon cancer   • COLONOSCOPY  01/20/2020   • UTERINE FIBROID SURGERY      s/p uterine fibroma resection       Family History   Problem Relation Age of Onset   • Heart disease Mother         pacemaker placement   • Stroke Mother    • Colon cancer Brother    • Stroke Father    • Diabetes Other    • Kidney disease Other    • Hyperlipidemia Other    • Arthritis Other        Social History     Socioeconomic History   • Marital status:      Spouse name: Not on file   • Number of children: Not on file   • Years of education: Not on file   • Highest education level: Not on file   Tobacco Use   • Smoking status: Never Smoker   • Smokeless tobacco: Never Used   Substance and Sexual Activity   • Alcohol use: No   • Drug use: No   • Sexual activity: Defer       Current Outpatient Medications on File Prior to Visit   Medication  Sig Dispense Refill   • ACCU-CHEK FASTCLIX LANCETS misc USE TO TEST TID  8   • amLODIPine (NORVASC) 5 MG tablet TAKE 1 TABLET BY MOUTH DAILY 90 tablet 3   • aspirin 81 MG tablet Take by mouth.     • atorvastatin (LIPITOR) 20 MG tablet Take 1 tablet by mouth Daily. 90 tablet 3   • Cyanocobalamin (B-12) 2500 MCG sublingual tablet Place 1 tablet under the tongue Daily.     • diclofenac (VOLTAREN) 1 % gel gel Apply 4 g topically to the appropriate area as directed 3 (Three) Times a Day. 5 tube 11   • Fluocinonide Emulsified Base 0.05 % cream APPLY TOPICALLY TO THE AFFECTED AREA AS DIRECTED DAILY     • hydroxyurea (HYDREA) 500 MG capsule Take 500 mg by mouth Take As Directed. Monday Wednesday Friday and Sunday     • isosorbide mononitrate (IMDUR) 60 MG 24 hr tablet TAKE 1 TABLET BY MOUTH DAILY 30 tablet 11   • ketoconazole (NIZORAL) 2 % cream Apply  topically to the appropriate area as directed Daily. 30 g 11   • metFORMIN (GLUCOPHAGE) 500 MG tablet Take 1 tablet by mouth 3 (Three) Times a Day. 270 tablet 1   • mupirocin (Bactroban) 2 % ointment Apply  topically to the appropriate area as directed 3 (Three) Times a Day. 30 g 11   • ofloxacin (OCUFLOX) 0.3 % ophthalmic solution Administer 2 drops to both eyes 4 (Four) Times a Day. 10 mL 11   • ONE TOUCH ULTRA TEST test strip USE TO TEST BLOOD SUGAR EVERY DAY. (E11.9) 100 each 2   • oxybutynin (DITROPAN) 5 MG tablet TK 1/2 - 1 T PO 1-2 TIMES DAILY  5   • valsartan-hydrochlorothiazide (DIOVAN-HCT) 320-25 MG per tablet TAKE 1 TABLET BY MOUTH DAILY 90 tablet 3     No current facility-administered medications on file prior to visit.       Review of Systems   Constitutional: Negative.        Recent Results (from the past 4704 hour(s))   Hemoglobin A1c    Collection Time: 01/22/21  8:32 AM    Specimen: Blood   Result Value Ref Range    Hemoglobin A1C 5.40 4.80 - 5.60 %   Comprehensive Metabolic Panel    Collection Time: 01/22/21  8:32 AM    Specimen: Blood   Result Value Ref  Range    Glucose 96 65 - 99 mg/dL    BUN 28 (H) 8 - 23 mg/dL    Creatinine 0.91 0.57 - 1.00 mg/dL    eGFR Non African Am 60 (L) >60 mL/min/1.73    eGFR African Am 72 >60 mL/min/1.73    BUN/Creatinine Ratio 30.8 (H) 7.0 - 25.0    Sodium 140 136 - 145 mmol/L    Potassium 4.7 3.5 - 5.2 mmol/L    Chloride 105 98 - 107 mmol/L    Total CO2 27.6 22.0 - 29.0 mmol/L    Calcium 10.9 (H) 8.6 - 10.5 mg/dL    Total Protein 6.8 6.0 - 8.5 g/dL    Albumin 4.40 3.50 - 5.20 g/dL    Globulin 2.4 gm/dL    A/G Ratio 1.8 g/dL    Total Bilirubin 0.6 0.0 - 1.2 mg/dL    Alkaline Phosphatase 70 39 - 117 U/L    AST (SGOT) 25 1 - 32 U/L    ALT (SGPT) 18 1 - 33 U/L   Lipid Panel With LDL / HDL Ratio    Collection Time: 01/22/21  8:32 AM    Specimen: Blood   Result Value Ref Range    Total Cholesterol 145 0 - 200 mg/dL    Triglycerides 176 (H) 0 - 150 mg/dL    HDL Cholesterol 32 (L) 40 - 60 mg/dL    VLDL Cholesterol Jeff 30 5 - 40 mg/dL    LDL Chol Calc (NIH) 83 0 - 100 mg/dL    LDL/HDL RATIO 2.43    Vitamin B12 & Folate    Collection Time: 01/22/21  8:32 AM    Specimen: Blood   Result Value Ref Range    Vitamin B-12 >2000 (H) 211 - 946 pg/mL    Folate >20.00 4.78 - 24.20 ng/mL   Magnesium    Collection Time: 01/22/21  8:32 AM    Specimen: Blood   Result Value Ref Range    Magnesium 1.7 1.6 - 2.4 mg/dL   Phosphorus    Collection Time: 01/22/21  8:32 AM    Specimen: Blood   Result Value Ref Range    Phosphorus 3.3 2.5 - 4.5 mg/dL   Vitamin D 25 Hydroxy    Collection Time: 01/22/21  8:32 AM    Specimen: Blood   Result Value Ref Range    25 Hydroxy, Vitamin D 50.1 30.0 - 100.0 ng/mL   CBC & Differential    Collection Time: 01/22/21  8:32 AM   Result Value Ref Range    WBC 18.46 (H) 3.40 - 10.80 10*3/mm3    RBC 3.81 3.77 - 5.28 10*6/mm3    Hemoglobin 12.0 12.0 - 15.9 g/dL    Hematocrit 35.5 34.0 - 46.6 %    MCV 93.2 79.0 - 97.0 fL    MCH 31.5 26.6 - 33.0 pg    MCHC 33.8 31.5 - 35.7 g/dL    RDW 15.5 (H) 12.3 - 15.4 %    Platelets 433 140 - 450  10*3/mm3    Neutrophil Rel % CANCELED     Lymphocyte Rel % CANCELED     Monocyte Rel % CANCELED     Eosinophil Rel % CANCELED     Lymphocytes Absolute CANCELED     Eosinophils Absolute CANCELED     Basophils Absolute CANCELED    Manual Differential    Collection Time: 01/22/21  8:32 AM   Result Value Ref Range    Neutrophil Rel % 77.0 (H) 42.7 - 76.0 %    Lymphocyte Rel % 17.0 (L) 19.6 - 45.3 %    Monocyte Rel % 3.0 (L) 5.0 - 12.0 %    Eosinophil Rel % 3.0 0.3 - 6.2 %    Basophil Rel % 0.0 0.0 - 1.5 %    Neutrophils Absolute 14.21 (H) 1.70 - 7.00 10*3/mm3    Lymphocytes Absolute 3.14 (H) 0.70 - 3.10 10*3/mm3    Monocytes Absolute 0.55 0.10 - 0.90 10*3/mm3    Eosinophil Abs 0.55 (H) 0.00 - 0.40 10*3/mm3    Basophils Absolute 0.00 0.00 - 0.20 10*3/mm3    Differential Comment Comment     Comment Comment     Plt Comment Comment    PTH, Intact & Calcium    Collection Time: 01/26/21  9:32 AM    Specimen: Blood   Result Value Ref Range    Calcium 10.4 (H) 8.7 - 10.3 mg/dL    PTH, Intact 43 15 - 65 pg/mL    PTH, Intact Comment    Calcium, Ionized    Collection Time: 01/26/21  9:32 AM    Specimen: Blood   Result Value Ref Range    Ionized Calcium 6.0 (H) 4.5 - 5.6 mg/dL   CBC & Differential    Collection Time: 01/26/21  9:33 AM    Specimen: Blood   Result Value Ref Range    WBC 16.12 (H) 3.40 - 10.80 10*3/mm3    RBC 3.54 (L) 3.77 - 5.28 10*6/mm3    Hemoglobin 11.1 (L) 12.0 - 15.9 g/dL    Hematocrit 32.7 (L) 34.0 - 46.6 %    MCV 92.4 79.0 - 97.0 fL    MCH 31.4 26.6 - 33.0 pg    MCHC 33.9 31.5 - 35.7 g/dL    RDW 15.6 (H) 12.3 - 15.4 %    Platelets 388 140 - 450 10*3/mm3    Neutrophil Rel % CANCELED     Lymphocyte Rel % CANCELED     Monocyte Rel % CANCELED     Eosinophil Rel % CANCELED     Lymphocytes Absolute CANCELED     Eosinophils Absolute CANCELED     Basophils Absolute CANCELED    Manual Differential    Collection Time: 01/26/21  9:33 AM   Result Value Ref Range    Neutrophil Rel % 72.2 42.7 - 76.0 %    Lymphocyte Rel %  15.5 (L) 19.6 - 45.3 %    Monocyte Rel % 5.2 5.0 - 12.0 %    Eosinophil Rel % 7.2 (H) 0.3 - 6.2 %    Basophil Rel % 3.1 (H) 0.0 - 1.5 %    Neutrophils Absolute 11.64 (H) 1.70 - 7.00 10*3/mm3    Lymphocytes Absolute 2.50 0.70 - 3.10 10*3/mm3    Monocytes Absolute 0.84 0.10 - 0.90 10*3/mm3    Eosinophil Abs 1.16 (H) 0.00 - 0.40 10*3/mm3    Basophils Absolute 0.50 (H) 0.00 - 0.20 10*3/mm3    Differential Comment Comment     Comment Comment     Plt Comment Comment    CBC Auto Differential    Collection Time: 02/02/21  7:30 AM    Specimen: Blood   Result Value Ref Range    WBC 18.44 (H) 3.40 - 10.80 10*3/mm3    RBC 3.81 3.77 - 5.28 10*6/mm3    Hemoglobin 11.6 (L) 12.0 - 15.9 g/dL    Hematocrit 34.9 34.0 - 46.6 %    MCV 91.6 79.0 - 97.0 fL    MCH 30.4 26.6 - 33.0 pg    MCHC 33.2 31.5 - 35.7 g/dL    RDW 15.9 (H) 12.3 - 15.4 %    RDW-SD 52.8 37.0 - 54.0 fl    MPV 11.5 6.0 - 12.0 fL    Platelets 396 140 - 450 10*3/mm3    Neutrophil % 71.1 42.7 - 76.0 %    Lymphocyte % 10.7 (L) 19.6 - 45.3 %    Monocyte % 5.9 5.0 - 12.0 %    Eosinophil % 3.9 0.3 - 6.2 %    Basophil % 1.4 0.0 - 1.5 %    Immature Grans % 7.0 (H) 0.0 - 0.5 %    Neutrophils, Absolute 13.12 (H) 1.70 - 7.00 10*3/mm3    Lymphocytes, Absolute 1.97 0.70 - 3.10 10*3/mm3    Monocytes, Absolute 1.08 (H) 0.10 - 0.90 10*3/mm3    Eosinophils, Absolute 0.71 (H) 0.00 - 0.40 10*3/mm3    Basophils, Absolute 0.26 (H) 0.00 - 0.20 10*3/mm3    Immature Grans, Absolute 1.30 (H) 0.00 - 0.05 10*3/mm3    nRBC 0.2 0.0 - 0.2 /100 WBC   Ferritin    Collection Time: 02/02/21  9:07 AM    Specimen: Blood   Result Value Ref Range    Ferritin 26.40 13.00 - 150.00 ng/mL   Iron Profile    Collection Time: 02/02/21  9:07 AM    Specimen: Blood   Result Value Ref Range    Iron 64 37 - 145 mcg/dL    Iron Saturation 17 14 - 48 %    Transferrin 271 200 - 360 mg/dL    TIBC 379 249 - 505 mcg/dL   Retic With IRF & RET-He    Collection Time: 02/02/21  9:07 AM    Specimen: Blood   Result Value Ref Range     Immature Reticulocyte Fraction 25.8 (H) 3.0 - 15.8 %    Reticulocyte % 2.05 (H) 0.70 - 1.90 %    Reticulocyte Absolute 0.0789 0.0200 - 0.1300 10*6/mm3    Reticulocyte Hgb 36.8 (H) 29.8 - 36.1 pg   Methylmalonic Acid, Serum    Collection Time: 02/02/21  9:07 AM    Specimen: Blood   Result Value Ref Range    Methylmalonic Acid 124 0 - 378 nmol/L    Disclaimer: Comment    Comprehensive Metabolic Panel    Collection Time: 02/02/21  9:07 AM    Specimen: Blood   Result Value Ref Range    Glucose 103 74 - 124 mg/dL    BUN 28 (H) 6 - 20 mg/dL    Creatinine 0.86 0.60 - 1.10 mg/dL    Sodium 141 134 - 145 mmol/L    Potassium 3.9 3.5 - 4.7 mmol/L    Chloride 105 98 - 107 mmol/L    CO2 24.7 22.0 - 29.0 mmol/L    Calcium 10.9 (C) 8.5 - 10.2 mg/dL    Total Protein 7.1 6.3 - 8.0 g/dL    Albumin 4.40 3.50 - 5.20 g/dL    ALT (SGPT) 19 0 - 33 U/L    AST (SGOT) 24 0 - 32 U/L    Alkaline Phosphatase 73 38 - 116 U/L    Total Bilirubin 0.6 0.2 - 1.2 mg/dL    eGFR Non African Amer 64 >60 mL/min/1.73    Globulin 2.7 1.8 - 3.5 gm/dL    A/G Ratio 1.6 1.1 - 2.4 g/dL    BUN/Creatinine Ratio 32.6 (H) 7.3 - 30.0    Anion Gap 11.3 5.0 - 15.0 mmol/L   Lactate Dehydrogenase    Collection Time: 02/02/21  9:07 AM    Specimen: Blood   Result Value Ref Range     (H) 99 - 259 U/L   Uric Acid    Collection Time: 02/02/21  9:07 AM    Specimen: Blood   Result Value Ref Range    Uric Acid 9.1 (H) 2.8 - 7.4 mg/dL   BCR-ABL FISH    Collection Time: 02/02/21  9:07 AM    Specimen: Blood   Result Value Ref Range    Reference Lab Report BCR/ABL FISH    Ferritin    Collection Time: 03/30/21  8:28 AM    Specimen: Blood   Result Value Ref Range    Ferritin 601.60 (H) 13.00 - 150.00 ng/mL   Iron Profile    Collection Time: 03/30/21  8:28 AM    Specimen: Blood   Result Value Ref Range    Iron 147 (H) 37 - 145 mcg/dL    Iron Saturation 58 (H) 14 - 48 %    Transferrin 181 (L) 200 - 360 mg/dL    TIBC 253 249 - 505 mcg/dL   Comprehensive Metabolic Panel     Collection Time: 03/30/21  8:28 AM    Specimen: Blood   Result Value Ref Range    Glucose 111 74 - 124 mg/dL    BUN 19 6 - 20 mg/dL    Creatinine 0.96 0.60 - 1.10 mg/dL    Sodium 141 134 - 145 mmol/L    Potassium 4.3 3.5 - 4.7 mmol/L    Chloride 106 98 - 107 mmol/L    CO2 26.7 22.0 - 29.0 mmol/L    Calcium 10.9 (C) 8.5 - 10.2 mg/dL    Total Protein 6.8 6.3 - 8.0 g/dL    Albumin 4.20 3.50 - 5.20 g/dL    ALT (SGPT) 18 0 - 33 U/L    AST (SGOT) 19 0 - 32 U/L    Alkaline Phosphatase 81 38 - 116 U/L    Total Bilirubin 0.7 0.2 - 1.2 mg/dL    eGFR Non African Amer 56 (L) >60 mL/min/1.73    Globulin 2.6 1.8 - 3.5 gm/dL    A/G Ratio 1.6 1.1 - 2.4 g/dL    BUN/Creatinine Ratio 19.8 7.3 - 30.0    Anion Gap 8.3 5.0 - 15.0 mmol/L   CBC Auto Differential    Collection Time: 03/30/21  8:28 AM    Specimen: Blood   Result Value Ref Range    WBC 18.09 (H) 3.40 - 10.80 10*3/mm3    RBC 3.41 (L) 3.77 - 5.28 10*6/mm3    Hemoglobin 11.7 (L) 12.0 - 15.9 g/dL    Hematocrit 34.2 34.0 - 46.6 %    .3 (H) 79.0 - 97.0 fL    MCH 34.3 (H) 26.6 - 33.0 pg    MCHC 34.2 31.5 - 35.7 g/dL    RDW 18.1 (H) 12.3 - 15.4 %    RDW-SD 66.8 (H) 37.0 - 54.0 fl    MPV 9.9 6.0 - 12.0 fL    Platelets 362 140 - 450 10*3/mm3    Neutrophil % 70.2 42.7 - 76.0 %    Lymphocyte % 9.6 (L) 19.6 - 45.3 %    Monocyte % 5.5 5.0 - 12.0 %    Eosinophil % 4.3 0.3 - 6.2 %    Basophil % 1.5 0.0 - 1.5 %    Immature Grans % 8.9 (H) 0.0 - 0.5 %    Neutrophils, Absolute 12.70 (H) 1.70 - 7.00 10*3/mm3    Lymphocytes, Absolute 1.73 0.70 - 3.10 10*3/mm3    Monocytes, Absolute 0.99 (H) 0.10 - 0.90 10*3/mm3    Eosinophils, Absolute 0.78 (H) 0.00 - 0.40 10*3/mm3    Basophils, Absolute 0.28 (H) 0.00 - 0.20 10*3/mm3    Immature Grans, Absolute 1.61 (H) 0.00 - 0.05 10*3/mm3    nRBC 0.1 0.0 - 0.2 /100 WBC   Vitamin D 25 Hydroxy    Collection Time: 04/23/21  8:26 AM    Specimen: Blood   Result Value Ref Range    25 Hydroxy, Vitamin D 42.8 30.0 - 100.0 ng/mL   PTH, Intact & Calcium     Collection Time: 04/23/21  8:26 AM    Specimen: Blood   Result Value Ref Range    PTH, Intact 58 15 - 65 pg/mL    PTH, Intact Comment    Comprehensive Metabolic Panel    Collection Time: 04/23/21  8:26 AM    Specimen: Blood   Result Value Ref Range    Glucose 117 (H) 65 - 99 mg/dL    BUN 16 8 - 23 mg/dL    Creatinine 0.91 0.57 - 1.00 mg/dL    eGFR Non African Am 60 (L) >60 mL/min/1.73    eGFR African Am 72 >60 mL/min/1.73    BUN/Creatinine Ratio 17.6 7.0 - 25.0    Sodium 145 136 - 145 mmol/L    Potassium 4.5 3.5 - 5.2 mmol/L    Chloride 107 98 - 107 mmol/L    Total CO2 27.9 22.0 - 29.0 mmol/L    Calcium 11.1 (H) 8.6 - 10.5 mg/dL    Total Protein 6.6 6.0 - 8.5 g/dL    Albumin 4.50 3.50 - 5.20 g/dL    Globulin 2.1 gm/dL    A/G Ratio 2.1 g/dL    Total Bilirubin 0.7 0.0 - 1.2 mg/dL    Alkaline Phosphatase 82 39 - 117 U/L    AST (SGOT) 25 1 - 32 U/L    ALT (SGPT) 33 1 - 33 U/L   Lipid Panel    Collection Time: 04/23/21  8:26 AM    Specimen: Blood   Result Value Ref Range    Total Cholesterol 134 0 - 200 mg/dL    Triglycerides 180 (H) 0 - 150 mg/dL    HDL Cholesterol 33 (L) 40 - 60 mg/dL    VLDL Cholesterol Jeff 31 5 - 40 mg/dL    LDL Chol Calc (NIH) 70 0 - 100 mg/dL   CBC & Differential    Collection Time: 04/23/21  8:26 AM    Specimen: Blood   Result Value Ref Range    WBC 15.15 (H) 3.40 - 10.80 10*3/mm3    RBC 3.34 (L) 3.77 - 5.28 10*6/mm3    Hemoglobin 11.7 (L) 12.0 - 15.9 g/dL    Hematocrit 34.2 34.0 - 46.6 %    .4 (H) 79.0 - 97.0 fL    MCH 35.0 (H) 26.6 - 33.0 pg    MCHC 34.2 31.5 - 35.7 g/dL    RDW 18.2 (H) 12.3 - 15.4 %    Platelets 375 140 - 450 10*3/mm3    nRBC 0.1 0.0 - 0.2 /100 WBC   Hemoglobin A1c    Collection Time: 04/23/21  8:26 AM    Specimen: Blood   Result Value Ref Range    Hemoglobin A1C 5.60 4.80 - 5.60 %   Manual Differential    Collection Time: 04/23/21  8:26 AM   Result Value Ref Range    Neutrophil Rel % 81.6 (H) 42.7 - 76.0 %    Lymphocyte Rel % 6.1 (L) 19.6 - 45.3 %    Monocyte Rel % 3.1  "(L) 5.0 - 12.0 %    Eosinophil Rel % 4.1 0.3 - 6.2 %    Basophil Rel % 1.0 0.0 - 1.5 %    Neutrophils Absolute 12.36 (H) 1.70 - 7.00 10*3/mm3    Lymphocytes Absolute 0.92 0.70 - 3.10 10*3/mm3    Monocytes Absolute 0.47 0.10 - 0.90 10*3/mm3    Eosinophil Abs 0.62 (H) 0.00 - 0.40 10*3/mm3    Basophils Absolute 0.15 0.00 - 0.20 10*3/mm3    Differential Comment Comment     Comment Comment     Plt Comment Comment    CBC Auto Differential    Collection Time: 05/25/21  9:58 AM    Specimen: Blood   Result Value Ref Range    WBC 20.51 (H) 3.40 - 10.80 10*3/mm3    RBC 3.19 (L) 3.77 - 5.28 10*6/mm3    Hemoglobin 11.6 (L) 12.0 - 15.9 g/dL    Hematocrit 33.0 (L) 34.0 - 46.6 %    .4 (H) 79.0 - 97.0 fL    MCH 36.4 (H) 26.6 - 33.0 pg    MCHC 35.2 31.5 - 35.7 g/dL    RDW 16.3 (H) 12.3 - 15.4 %    RDW-SD 61.7 (H) 37.0 - 54.0 fl    MPV 11.7 6.0 - 12.0 fL    Platelets 393 140 - 450 10*3/mm3    Neutrophil % 73.7 42.7 - 76.0 %    Lymphocyte % 9.9 (L) 19.6 - 45.3 %    Monocyte % 4.3 (L) 5.0 - 12.0 %    Eosinophil % 3.4 0.3 - 6.2 %    Basophil % 1.4 0.0 - 1.5 %    Immature Grans % 7.3 (H) 0.0 - 0.5 %    Neutrophils, Absolute 15.11 (H) 1.70 - 7.00 10*3/mm3    Lymphocytes, Absolute 2.03 0.70 - 3.10 10*3/mm3    Monocytes, Absolute 0.89 0.10 - 0.90 10*3/mm3    Eosinophils, Absolute 0.70 (H) 0.00 - 0.40 10*3/mm3    Basophils, Absolute 0.29 (H) 0.00 - 0.20 10*3/mm3    Immature Grans, Absolute 1.49 (H) 0.00 - 0.05 10*3/mm3    nRBC 0.0 0.0 - 0.2 /100 WBC     Objective   Vitals:    07/22/21 0745   BP: 140/63   Pulse: 75   Temp: 97.1 °F (36.2 °C)   SpO2: 98%   Weight: 69.4 kg (153 lb)   Height: 147.3 cm (57.99\")     Body mass index is 31.99 kg/m².  Physical Exam  Vitals and nursing note reviewed.   Constitutional:       General: She is not in acute distress.     Appearance: She is well-developed. She is not diaphoretic.   Cardiovascular:      Rate and Rhythm: Normal rate and regular rhythm.   Pulmonary:      Effort: Pulmonary effort is " normal. No respiratory distress.      Breath sounds: Normal breath sounds. No wheezing.           Diagnoses and all orders for this visit:    1. Medicare annual wellness visit, subsequent (Primary)    2. Essential hypertension  -     Comprehensive Metabolic Panel  -     Lipid Panel With LDL / HDL Ratio    3. Type 2 diabetes mellitus without complication, without long-term current use of insulin (CMS/[facility])  -     Hemoglobin A1c  -     Comprehensive Metabolic Panel  -     Lipid Panel With LDL / HDL Ratio    4. Mixed hyperlipidemia  -     Comprehensive Metabolic Panel  -     Lipid Panel With LDL / HDL Ratio    5. Coronary artery disease of native heart with stable angina pectoris, unspecified vessel or lesion type (CMS/[facility])    Return in about 3 months (around 10/22/2021) for HYPERTENSION, HYPERLIPIDEMIA, DIABETES.

## 2021-07-22 NOTE — PROGRESS NOTES
The ABCs of the Annual Wellness Visit  Subsequent Medicare Wellness Visit    Chief Complaint   Patient presents with   • Follow-up   • Hypertension       Subjective   History of Present Illness:  Ayla Castellano is a 81 y.o. female who presents for a Subsequent Medicare Wellness Visit.    HEALTH RISK ASSESSMENT    Recent Hospitalizations:  No hospitalization(s) within the last year.    Current Medical Providers:  Patient Care Team:  Manisha Morales MD as PCP - General  Manisha Morales MD as PCP - Family Medicine  Manisha Morales MD as Referring Physician (Family Medicine)  Izzy Martinez MD as Consulting Physician (Hematology and Oncology)    Smoking Status:  Social History     Tobacco Use   Smoking Status Never Smoker   Smokeless Tobacco Never Used       Alcohol Consumption:  Social History     Substance and Sexual Activity   Alcohol Use No       Depression Screen:   PHQ-2/PHQ-9 Depression Screening 4/23/2021   Little interest or pleasure in doing things 0   Feeling down, depressed, or hopeless 0   Total Score 0       Fall Risk Screen:  IGNACIO Fall Risk Assessment was completed, and patient is at HIGH risk for falls. Assessment completed on:7/22/2021    Health Habits and Functional and Cognitive Screening:  Functional & Cognitive Status 7/22/2021   Do you have difficulty preparing food and eating? No   Do you have difficulty bathing yourself, getting dressed or grooming yourself? No   Do you have difficulty using the toilet? No   Do you have difficulty moving around from place to place? No   Do you have trouble with steps or getting out of a bed or a chair? No   Current Diet Well Balanced Diet   Dental Exam Up to date   Eye Exam Up to date   Exercise (times per week) 0 times per week   Current Exercises Include No Regular Exercise   Do you need help using the phone?  No   Are you deaf or do you have serious difficulty hearing?  No   Do you need help with transportation? Yes   Do you need help  shopping? Yes   Do you need help preparing meals?  No   Do you need help with housework?  Yes   Do you need help with laundry? Yes   Do you need help taking your medications? No   Do you need help managing money? No   Do you ever drive or ride in a car without wearing a seat belt? No   Have you felt unusual stress, anger or loneliness in the last month? No   Who do you live with? Spouse   If you need help, do you have trouble finding someone available to you? No   Have you been bothered in the last four weeks by sexual problems? No   Do you have difficulty concentrating, remembering or making decisions? No         Does the patient have evidence of cognitive impairment? No    Asprin use counseling:Does not need ASA (and currently is not on it)    Age-appropriate Screening Schedule:  Refer to the list below for future screening recommendations based on patient's age, sex and/or medical conditions. Orders for these recommended tests are listed in the plan section. The patient has been provided with a written plan.    Health Maintenance   Topic Date Due   • TDAP/TD VACCINES (1 - Tdap) Never done   • ZOSTER VACCINE (1 of 2) Never done   • INFLUENZA VACCINE  10/01/2021   • URINE MICROALBUMIN  10/05/2021   • HEMOGLOBIN A1C  10/23/2021   • DIABETIC EYE EXAM  10/28/2021   • LIPID PANEL  04/23/2022   • DXA SCAN  04/12/2023   • COLONOSCOPY  12/17/2029   • MAMMOGRAM  Discontinued          The following portions of the patient's history were reviewed and updated as appropriate: allergies, current medications, past family history, past medical history, past social history, past surgical history and problem list.    Outpatient Medications Prior to Visit   Medication Sig Dispense Refill   • ACCU-CHEK FASTCLIX LANCETS [facility] USE TO TEST TID  8   • amLODIPine (NORVASC) 5 MG tablet TAKE 1 TABLET BY MOUTH DAILY 90 tablet 3   • aspirin 81 MG tablet Take by mouth.     • atorvastatin (LIPITOR) 20 MG tablet Take 1 tablet by mouth Daily. 90  tablet 3   • Cyanocobalamin (B-12) 2500 MCG sublingual tablet Place 1 tablet under the tongue Daily.     • diclofenac (VOLTAREN) 1 % gel gel Apply 4 g topically to the appropriate area as directed 3 (Three) Times a Day. 5 tube 11   • Fluocinonide Emulsified Base 0.05 % cream APPLY TOPICALLY TO THE AFFECTED AREA AS DIRECTED DAILY     • hydroxyurea (HYDREA) 500 MG capsule Take 500 mg by mouth Take As Directed. Monday Wednesday Friday and Sunday     • isosorbide mononitrate (IMDUR) 60 MG 24 hr tablet TAKE 1 TABLET BY MOUTH DAILY 30 tablet 11   • ketoconazole (NIZORAL) 2 % cream Apply  topically to the appropriate area as directed Daily. 30 g 11   • metFORMIN (GLUCOPHAGE) 500 MG tablet Take 1 tablet by mouth 3 (Three) Times a Day. 270 tablet 1   • mupirocin (Bactroban) 2 % ointment Apply  topically to the appropriate area as directed 3 (Three) Times a Day. 30 g 11   • ofloxacin (OCUFLOX) 0.3 % ophthalmic solution Administer 2 drops to both eyes 4 (Four) Times a Day. 10 mL 11   • ONE TOUCH ULTRA TEST test strip USE TO TEST BLOOD SUGAR EVERY DAY. (E11.9) 100 each 2   • oxybutynin (DITROPAN) 5 MG tablet TK 1/2 - 1 T PO 1-2 TIMES DAILY  5   • valsartan-hydrochlorothiazide (DIOVAN-HCT) 320-25 MG per tablet TAKE 1 TABLET BY MOUTH DAILY 90 tablet 3     No facility-administered medications prior to visit.       Patient Active Problem List   Diagnosis   • Hypertension   • Coronary artery disease of native heart with stable angina pectoris (CMS/HCC)   • Type 2 diabetes mellitus without complication, without long-term current use of insulin (CMS/HCC)   • Hyperlipidemia   • Other osteoporosis without current pathological fracture   • Medicare annual wellness visit, subsequent   • Iron deficiency anemia   • History of colon cancer   • Myeloproliferative disorder (CMS/HCC)   • Adverse reaction to compound iron preparation   • Arm pain   • Chest pain       Advanced Care Planning:  ACP discussion was held with the patient during this  "visit. Patient does not have an advance directive, information provided.    Review of Systems   Constitutional: Negative.        Compared to one year ago, the patient feels her physical health is the same.  Compared to one year ago, the patient feels her mental health is the same.    Reviewed chart for potential of high risk medication in the elderly: yes  Reviewed chart for potential of harmful drug interactions in the elderly:yes    Objective         Vitals:    07/22/21 0745   BP: 140/63   Pulse: 75   Temp: 97.1 °F (36.2 °C)   SpO2: 98%   Weight: 69.4 kg (153 lb)   Height: 147.3 cm (57.99\")       Body mass index is 31.99 kg/m².  Discussed the patient's BMI with her. The BMI is above average; BMI management plan is completed.    Physical Exam  Vitals and nursing note reviewed.   Constitutional:       Appearance: She is obese.   Neurological:      Mental Status: She is alert.         Lab Results   Component Value Date     (H) 04/23/2021    CHLPL 134 04/23/2021    TRIG 180 (H) 04/23/2021    HDL 33 (L) 04/23/2021    LDL 70 04/23/2021    VLDL 31 04/23/2021    HGBA1C 5.60 04/23/2021        Assessment/Plan   Medicare Risks and Personalized Health Plan  CMS Preventative Services Quick Reference  Fall Risk    The above risks/problems have been discussed with the patient.  Pertinent information has been shared with the patient in the After Visit Summary.  Follow up plans and orders are seen below in the Assessment/Plan Section.    Diagnoses and all orders for this visit:    1. Medicare annual wellness visit, subsequent (Primary)    2. Essential hypertension    3. Type 2 diabetes mellitus without complication, without long-term current use of insulin (CMS/[facility])    4. Mixed hyperlipidemia    5. Coronary artery disease of native heart with stable angina pectoris, unspecified vessel or lesion type (CMS/[facility])      Follow Up:     Return in about 3 months (around 10/22/2021) for HYPERTENSION, HYPERLIPIDEMIA, DIABETES.    An " After Visit Summary and PPPS were given to the patient.

## 2021-07-23 LAB
ALBUMIN SERPL-MCNC: 4.6 G/DL (ref 3.5–5.2)
ALBUMIN/GLOB SERPL: 2.1 G/DL
ALP SERPL-CCNC: 59 U/L (ref 39–117)
ALT SERPL-CCNC: 21 U/L (ref 1–33)
AST SERPL-CCNC: 19 U/L (ref 1–32)
BILIRUB SERPL-MCNC: 0.7 MG/DL (ref 0–1.2)
BUN SERPL-MCNC: 27 MG/DL (ref 8–23)
BUN/CREAT SERPL: 28.1 (ref 7–25)
CALCIUM SERPL-MCNC: 11 MG/DL (ref 8.6–10.5)
CHLORIDE SERPL-SCNC: 109 MMOL/L (ref 98–107)
CHOLEST SERPL-MCNC: 132 MG/DL (ref 0–200)
CO2 SERPL-SCNC: 26.3 MMOL/L (ref 22–29)
CREAT SERPL-MCNC: 0.96 MG/DL (ref 0.57–1)
GLOBULIN SER CALC-MCNC: 2.2 GM/DL
GLUCOSE SERPL-MCNC: 113 MG/DL (ref 65–99)
HBA1C MFR BLD: 5.1 % (ref 4.8–5.6)
HDLC SERPL-MCNC: 28 MG/DL (ref 40–60)
LDLC SERPL CALC-MCNC: 73 MG/DL (ref 0–100)
LDLC/HDLC SERPL: 2.44 {RATIO}
POTASSIUM SERPL-SCNC: 4.8 MMOL/L (ref 3.5–5.2)
PROT SERPL-MCNC: 6.8 G/DL (ref 6–8.5)
SODIUM SERPL-SCNC: 148 MMOL/L (ref 136–145)
TRIGL SERPL-MCNC: 179 MG/DL (ref 0–150)
VLDLC SERPL CALC-MCNC: 31 MG/DL (ref 5–40)

## 2021-08-03 ENCOUNTER — OFFICE VISIT (OUTPATIENT)
Dept: ONCOLOGY | Facility: CLINIC | Age: 81
End: 2021-08-03

## 2021-08-03 ENCOUNTER — LAB (OUTPATIENT)
Dept: LAB | Facility: HOSPITAL | Age: 81
End: 2021-08-03

## 2021-08-03 ENCOUNTER — TELEPHONE (OUTPATIENT)
Dept: ONCOLOGY | Facility: CLINIC | Age: 81
End: 2021-08-03

## 2021-08-03 VITALS
HEART RATE: 70 BPM | WEIGHT: 151.7 LBS | RESPIRATION RATE: 16 BRPM | HEIGHT: 58 IN | DIASTOLIC BLOOD PRESSURE: 74 MMHG | TEMPERATURE: 97.3 F | BODY MASS INDEX: 31.84 KG/M2 | OXYGEN SATURATION: 98 % | SYSTOLIC BLOOD PRESSURE: 118 MMHG

## 2021-08-03 DIAGNOSIS — E78.2 MIXED HYPERLIPIDEMIA: Primary | ICD-10-CM

## 2021-08-03 DIAGNOSIS — D47.1 MYELOPROLIFERATIVE DISORDER (HCC): Primary | ICD-10-CM

## 2021-08-03 DIAGNOSIS — D47.1 MYELOPROLIFERATIVE DISORDER (HCC): ICD-10-CM

## 2021-08-03 DIAGNOSIS — E83.52 HYPERCALCEMIA: ICD-10-CM

## 2021-08-03 DIAGNOSIS — D50.9 IRON DEFICIENCY ANEMIA, UNSPECIFIED IRON DEFICIENCY ANEMIA TYPE: ICD-10-CM

## 2021-08-03 DIAGNOSIS — Z79.899 ENCOUNTER FOR LONG-TERM CURRENT USE OF HIGH RISK MEDICATION: ICD-10-CM

## 2021-08-03 LAB
BASOPHILS # BLD AUTO: 0.14 10*3/MM3 (ref 0–0.2)
BASOPHILS NFR BLD AUTO: 0.9 % (ref 0–1.5)
DEPRECATED RDW RBC AUTO: 59.7 FL (ref 37–54)
EOSINOPHIL # BLD AUTO: 0.5 10*3/MM3 (ref 0–0.4)
EOSINOPHIL NFR BLD AUTO: 3 % (ref 0.3–6.2)
ERYTHROCYTE [DISTWIDTH] IN BLOOD BY AUTOMATED COUNT: 15.2 % (ref 12.3–15.4)
FERRITIN SERPL-MCNC: 393.1 NG/ML (ref 13–150)
HCT VFR BLD AUTO: 31.7 % (ref 34–46.6)
HGB BLD-MCNC: 10.7 G/DL (ref 12–15.9)
IMM GRANULOCYTES # BLD AUTO: 1.44 10*3/MM3 (ref 0–0.05)
IMM GRANULOCYTES NFR BLD AUTO: 8.8 % (ref 0–0.5)
IRON 24H UR-MRATE: 83 MCG/DL (ref 37–145)
IRON SATN MFR SERPL: 33 % (ref 14–48)
LYMPHOCYTES # BLD AUTO: 1.56 10*3/MM3 (ref 0.7–3.1)
LYMPHOCYTES NFR BLD AUTO: 9.5 % (ref 19.6–45.3)
MCH RBC QN AUTO: 36.6 PG (ref 26.6–33)
MCHC RBC AUTO-ENTMCNC: 33.8 G/DL (ref 31.5–35.7)
MCV RBC AUTO: 108.6 FL (ref 79–97)
MONOCYTES # BLD AUTO: 0.85 10*3/MM3 (ref 0.1–0.9)
MONOCYTES NFR BLD AUTO: 5.2 % (ref 5–12)
NEUTROPHILS NFR BLD AUTO: 11.92 10*3/MM3 (ref 1.7–7)
NEUTROPHILS NFR BLD AUTO: 72.6 % (ref 42.7–76)
NRBC BLD AUTO-RTO: 0 /100 WBC (ref 0–0.2)
PLATELET # BLD AUTO: 391 10*3/MM3 (ref 140–450)
PMV BLD AUTO: 10.2 FL (ref 6–12)
RBC # BLD AUTO: 2.92 10*6/MM3 (ref 3.77–5.28)
TIBC SERPL-MCNC: 248 MCG/DL (ref 249–505)
TRANSFERRIN SERPL-MCNC: 177 MG/DL (ref 200–360)
WBC # BLD AUTO: 16.41 10*3/MM3 (ref 3.4–10.8)

## 2021-08-03 PROCEDURE — 99214 OFFICE O/P EST MOD 30 MIN: CPT | Performed by: INTERNAL MEDICINE

## 2021-08-03 PROCEDURE — 82728 ASSAY OF FERRITIN: CPT

## 2021-08-03 PROCEDURE — 36415 COLL VENOUS BLD VENIPUNCTURE: CPT

## 2021-08-03 PROCEDURE — 85025 COMPLETE CBC W/AUTO DIFF WBC: CPT

## 2021-08-03 PROCEDURE — 83540 ASSAY OF IRON: CPT

## 2021-08-03 PROCEDURE — 84466 ASSAY OF TRANSFERRIN: CPT

## 2021-08-03 NOTE — TELEPHONE ENCOUNTER
Contacted Ms Castellano's daughter and reviewed Dr Martinez's note and instructions.  She v/u and was thankful for the call. Informed her that Dr Martinez's  would be calling for appt dates/time.

## 2021-08-03 NOTE — TELEPHONE ENCOUNTER
----- Message from Izzy Martinez MD sent at 8/3/2021  3:59 PM EDT -----  Please inform the patient's daughter that the iron stores are adequate.  She does not need IV iron at this point.  I would recommend continuing the same dose of Hydrea (4 days a week).    I would recommend a repeat CBC with RN review in 1 month and a follow-up in 2 months with CBC stat CMP ferritin iron panel.    Thank you

## 2021-08-03 NOTE — PROGRESS NOTES
Subjective     CHIEF COMPLAINT:      Chief Complaint   Patient presents with   • Follow-up     no concerns       HISTORY OF PRESENT ILLNESS:     Ayla Castellano is a 81 y.o. female patient who returns today for follow up on her myeloproliferative disorder and iron deficiency anemia.  She returns today for follow-up reporting having episodes of recurrent nausea and vomiting that started about 2 weeks ago and lasted for about a week.  No sick contacts.  She lost her dentures when she was vomiting.  She started having difficulty chewing as a result.  She has an appointment to have new ones today.    Patient is tolerating Hydrea.  She is not complaining of side effects today.    ROS:  Pertinent ROS is in the HPI.     Past medical, surgical, social and family history were reviewed.     MEDICATIONS:    Current Outpatient Medications:   •  ACCU-CHEK FASTCLIX LANCETS misc, USE TO TEST TID, Disp: , Rfl: 8  •  amLODIPine (NORVASC) 5 MG tablet, TAKE 1 TABLET BY MOUTH DAILY, Disp: 90 tablet, Rfl: 3  •  aspirin 81 MG tablet, Take by mouth., Disp: , Rfl:   •  atorvastatin (LIPITOR) 20 MG tablet, Take 1 tablet by mouth Daily., Disp: 90 tablet, Rfl: 3  •  Cyanocobalamin (B-12) 2500 MCG sublingual tablet, Place 1 tablet under the tongue Daily., Disp: , Rfl:   •  diclofenac (VOLTAREN) 1 % gel gel, Apply 4 g topically to the appropriate area as directed 3 (Three) Times a Day., Disp: 5 tube, Rfl: 11  •  Fluocinonide Emulsified Base 0.05 % cream, APPLY TOPICALLY TO THE AFFECTED AREA AS DIRECTED DAILY, Disp: , Rfl:   •  hydroxyurea (HYDREA) 500 MG capsule, Take 500 mg by mouth Take As Directed. Monday Wednesday Friday and Sunday, Disp: , Rfl:   •  isosorbide mononitrate (IMDUR) 60 MG 24 hr tablet, TAKE 1 TABLET BY MOUTH DAILY, Disp: 30 tablet, Rfl: 11  •  ketoconazole (NIZORAL) 2 % cream, Apply  topically to the appropriate area as directed Daily., Disp: 30 g, Rfl: 11  •  metFORMIN (GLUCOPHAGE) 500 MG tablet, Take 1 tablet by mouth  "3 (Three) Times a Day., Disp: 270 tablet, Rfl: 1  •  mupirocin (Bactroban) 2 % ointment, Apply  topically to the appropriate area as directed 3 (Three) Times a Day., Disp: 30 g, Rfl: 11  •  ofloxacin (OCUFLOX) 0.3 % ophthalmic solution, Administer 2 drops to both eyes 4 (Four) Times a Day., Disp: 10 mL, Rfl: 11  •  ONE TOUCH ULTRA TEST test strip, USE TO TEST BLOOD SUGAR EVERY DAY. (E11.9), Disp: 100 each, Rfl: 2  •  oxybutynin (DITROPAN) 5 MG tablet, TK 1/2 - 1 T PO 1-2 TIMES DAILY, Disp: , Rfl: 5  •  valsartan-hydrochlorothiazide (DIOVAN-HCT) 320-25 MG per tablet, TAKE 1 TABLET BY MOUTH DAILY, Disp: 90 tablet, Rfl: 3    Objective   VITAL SIGNS:     Vitals:    08/03/21 1040   BP: 118/74   Pulse: 70   Resp: 16   Temp: 97.3 °F (36.3 °C)   TempSrc: Temporal   SpO2: 98%   Weight: 68.8 kg (151 lb 11.2 oz)   Height: 147.3 cm (57.99\")   PainSc: 0-No pain     Body mass index is 31.71 kg/m².     Wt Readings from Last 5 Encounters:   08/03/21 68.8 kg (151 lb 11.2 oz)   07/22/21 69.4 kg (153 lb)   04/23/21 70.4 kg (155 lb 2 oz)   03/30/21 70.1 kg (154 lb 8 oz)   02/12/21 69.3 kg (152 lb 12.8 oz)       PHYSICAL EXAMINATION:   GENERAL: The patient appears in good general condition, not in acute distress.   SKIN: No ecchymosis.  EYES: Pallor.  No jaundice.  LYMPHATICS: No cervical lymphadenopathy.  CHEST: Normal respiratory effort.   CVS: No edema.  ABDOMEN: Nondistended    DIAGNOSTIC DATA:     Results from last 7 days   Lab Units 08/03/21  0958   WBC 10*3/mm3 16.41*   NEUTROS ABS 10*3/mm3 11.92*   HEMOGLOBIN g/dL 10.7*   HEMATOCRIT % 31.7*   PLATELETS 10*3/mm3 391     Component      Latest Ref Rng & Units 7/22/2021   Glucose      65 - 99 mg/dL 113 (H)   BUN      8 - 23 mg/dL 27 (H)   Creatinine      0.57 - 1.00 mg/dL 0.96   eGFR Non African Am      >60 mL/min/1.73 56 (L)   eGFR African Am      >60 mL/min/1.73 68   BUN/Creatinine Ratio      7.0 - 25.0 28.1 (H)   Sodium      136 - 145 mmol/L 148 (H)   Potassium      3.5 - 5.2 " mmol/L 4.8   Chloride      98 - 107 mmol/L 109 (H)   CO2      22.0 - 29.0 mmol/L 26.3   Calcium      8.6 - 10.5 mg/dL 11.0 (H)   Total Protein      6.0 - 8.5 g/dL 6.8   Albumin      3.50 - 5.20 g/dL 4.60   Globulin      gm/dL 2.2   A/G Ratio      g/dL 2.1   Total Bilirubin      0.0 - 1.2 mg/dL 0.7   Alkaline Phosphatase      39 - 117 U/L 59   AST (SGOT)      1 - 32 U/L 19   ALT (SGPT)      1 - 33 U/L 21     Component      Latest Ref Rng & Units 2/2/2021 3/30/2021 8/3/2021   Iron      37 - 145 mcg/dL 64 147 (H) 83   Iron Saturation      14 - 48 % 17 58 (H) 33   Transferrin      200 - 360 mg/dL 271 181 (L) 177 (L)   TIBC      249 - 505 mcg/dL 379 253 248 (L)   Ferritin      13.00 - 150.00 ng/mL 26.40 601.60 (H) 393.10 (H)         Assessment/Plan   *Myeloproliferative disorder, OBINNA-2 mutation positive.   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   · She was under the care of an outside Hematologist.   · According to the daughter, she was diagnosed with Essential thrombocythemia.   · On review of the labs, she had an elevated WBC and basophil counts.   · Therefore, a diagnosis of myeloproliferative disorder or polycythemia vera is favored over ET since ET is limited to elevation of the platelet count.   · BCR ABL was obtained and was negative by FISH on 2/2/2021.  · The WBC count was gradually increasing indicating that her disease is not under a good control likely due to the dose of Hydroxyurea not being effective (500 mg every 4 days).   · The dose was increased to every 3 days on 2/2/2021.  · WBC is today at 18,090.  · Due to the persistence of the leukocytosis, the dose of Hydrea was increased to 4 days a week.  · Patient is tolerating Hydrea well.  · WBC is elevated today at 16,410.  Is not clear if this is due to persistence of the myeloproliferative disorder or if it is due to recent infection given the patient's recent nausea and vomiting episodes.    *Iron deficiency anemia.   · This is  attributed to iron deficiency with a transferrin saturation of 17% and a ferritin of 26.   · The patient did not tolerate oral in the past with development of upset stomach, abdominal pain, headaches and dizziness when she took the oral iron in the past.  · Patient received IV iron the 2020 and tolerated that well.   · Patient was given IV Injectafer on 2/5/2021 and 2/12/2021.  · Hemoglobin improved to 11.7 on 3/30/2021. Iron stores improved.  · Hemoglobin decreased to 10.7 today.  Iron stores remain adequate.    *Hypercalcemia. This is being evaluated by her PCP. She has a history of osteopenia. Bone density on 4/12/2021 revealed osteoporosis.  She was given Prolia on 5/4/2021.  Calcium was 11.0 on 7/22/2021.      *History of colon cancer, stage I.   · She is s/p partial colectomy by Dr. Ku.   · She did not require adjuvant chemotherapy or radiation therapy.      PLAN:    1.  Continue Hydrea on Mondays Wednesdays Fridays and Sundays.  2.  Patient does not need IV iron at this point.    3.  Return in 1 month for CBC with RN review.  4.  Follow-up in 2 months with CBC CMP ferritin iron panel.        Izzy Martinez MD  08/03/21

## 2021-08-04 ENCOUNTER — OFFICE VISIT (OUTPATIENT)
Dept: CARDIOLOGY | Facility: CLINIC | Age: 81
End: 2021-08-04

## 2021-08-04 VITALS
BODY MASS INDEX: 32.5 KG/M2 | HEART RATE: 86 BPM | WEIGHT: 154.8 LBS | SYSTOLIC BLOOD PRESSURE: 122 MMHG | OXYGEN SATURATION: 99 % | DIASTOLIC BLOOD PRESSURE: 64 MMHG | HEIGHT: 58 IN

## 2021-08-04 DIAGNOSIS — R00.2 PALPITATIONS: Primary | ICD-10-CM

## 2021-08-04 DIAGNOSIS — R00.1 BRADYCARDIA, SINUS: ICD-10-CM

## 2021-08-04 DIAGNOSIS — I10 ESSENTIAL HYPERTENSION: ICD-10-CM

## 2021-08-04 PROCEDURE — 99214 OFFICE O/P EST MOD 30 MIN: CPT | Performed by: INTERNAL MEDICINE

## 2021-08-10 ENCOUNTER — TELEPHONE (OUTPATIENT)
Dept: ONCOLOGY | Facility: CLINIC | Age: 81
End: 2021-08-10

## 2021-08-10 NOTE — TELEPHONE ENCOUNTER
Caller: Daily Castellano    Relationship to patient: DAUGHTER     Best call back number: [phone]    Chief complaint: CANC./LEE.    Type of visit: LAB/RN REVIEW    Requested date: 9/3/2021    If rescheduling, when is the original appointment: 9/1/2021    Additional notes: DAILY'S STATED PATIENTS APPT. WAS TO BE FOR 9/3/2021 BUT WHEN THEY RCV'D THE LETTER FROM THE OFFICE IT WAS SET FOR 9/1/2021 & DAIYL HAS ALREADY TAKEN THAT DAY OFF, IS THERE ANY WAY IT CAN BE LEE. FOR THE 3RD?  THEY CAN COME IN ANYTIME ON THE 3RD, IF DAILY DOES NOT ANSWER, PLEASE LEAVE A DETAILED MSG.

## 2021-08-12 DIAGNOSIS — I45.5 SINUS PAUSE: Primary | ICD-10-CM

## 2021-08-22 NOTE — PROGRESS NOTES
Date of Office Visit: 2021  Encounter Provider: Israel Lanza MD  Place of Service: [facility] CARDIOLOGY  Patient Name: Ayla Castellano  :1940    Chief complaint: Follow-up for syncope secondary to medication induced   bradycardia, hypertension.     History of Present Illness:    Dear Dr. Morales:      I again had the pleasure of seeing your patient in cardiology office on 2021.  As   you well know, she is a very pleasant 81 year-old [place] female who presents for  follow-up. The patient was admitted in 3/2015 after a syncopal episode. Upon  presentation in the emergency department, she was found to have severe bradycardia   with what appeared to be sinus arrest with junctional escape beats. Her heart rate was   as low as the 20s at times, and she was having very significant pauses. She was   actually given atropine in the emergency room initially. She was on both clonidine and   metoprolol at the time and these were discontinued. She was briefly placed on a   Dopamine drip, and her heart rate actually improved. She ultimately did not require a   permanent pacemaker. An echocardiogram obtained on 3/23/2015 showed an   ejection fraction of 60% to 65% and no significant valvular disease. She also has a   history of hypertension.     The patient presents today for follow-up.   She has been having palpitations in which   she feels like her heart rate fluttering at times.  She was recently at Dr. Morales' office,   and her heart rate was low.  She has had occasional lightheaded episodes, although   these seem to be fairly brief in general.  She has not had any chest pain.  She still   has shortness of breath with exertion with moderate activity or more, although this is   unchanged.      Past Medical History:   Diagnosis Date   • Colitis    • Colon cancer (CMS/HCC)     s/p partial colectomy in    • Diabetes mellitus (CMS/HCC)    • Gallbladder disease    •  Hyperlipidemia    • Hypertension    • Left arm pain    • Syncope     Secondary to medications (clonidine and metoprolol) in 3/15.  Had severe bradycardia (sinus arrest with junctional escape beats).  Resolved after medications discontinued.       Past Surgical History:   Procedure Laterality Date   • CHOLECYSTECTOMY  10/2019   • COLECTOMY PARTIAL / TOTAL      2011 for colon cancer   • COLONOSCOPY  01/20/2020   • UTERINE FIBROID SURGERY      s/p uterine fibroma resection       Current Outpatient Medications on File Prior to Visit   Medication Sig Dispense Refill   • ACCU-CHEK FASTCLIX LANCETS misc USE TO TEST TID  8   • amLODIPine (NORVASC) 5 MG tablet TAKE 1 TABLET BY MOUTH DAILY 90 tablet 3   • aspirin 81 MG tablet Take by mouth.     • atorvastatin (LIPITOR) 20 MG tablet Take 1 tablet by mouth Daily. 90 tablet 3   • Cyanocobalamin (B-12) 2500 MCG sublingual tablet Place 1 tablet under the tongue Daily.     • diclofenac (VOLTAREN) 1 % gel gel Apply 4 g topically to the appropriate area as directed 3 (Three) Times a Day. 5 tube 11   • Fluocinonide Emulsified Base 0.05 % cream APPLY TOPICALLY TO THE AFFECTED AREA AS DIRECTED DAILY     • hydroxyurea (HYDREA) 500 MG capsule Take 500 mg by mouth Take As Directed. Monday Wednesday Friday and Sunday     • isosorbide mononitrate (IMDUR) 60 MG 24 hr tablet TAKE 1 TABLET BY MOUTH DAILY 30 tablet 11   • ketoconazole (NIZORAL) 2 % cream Apply  topically to the appropriate area as directed Daily. 30 g 11   • metFORMIN (GLUCOPHAGE) 500 MG tablet Take 1 tablet by mouth 3 (Three) Times a Day. 270 tablet 1   • mupirocin (Bactroban) 2 % ointment Apply  topically to the appropriate area as directed 3 (Three) Times a Day. 30 g 11   • ofloxacin (OCUFLOX) 0.3 % ophthalmic solution Administer 2 drops to both eyes 4 (Four) Times a Day. 10 mL 11   • ONE TOUCH ULTRA TEST test strip USE TO TEST BLOOD SUGAR EVERY DAY. (E11.9) 100 each 2   • oxybutynin (DITROPAN) 5 MG tablet TK 1/2 - 1 T PO 1-2  "TIMES DAILY  5   • valsartan-hydrochlorothiazide (DIOVAN-HCT) 320-25 MG per tablet TAKE 1 TABLET BY MOUTH DAILY 90 tablet 3     No current facility-administered medications on file prior to visit.     Allergies as of 08/04/2021 - Reviewed 08/04/2021   Allergen Reaction Noted   • Latex Unknown - Low Severity 02/02/2021     Social History     Socioeconomic History   • Marital status:      Spouse name: Not on file   • Number of children: Not on file   • Years of education: Not on file   • Highest education level: Not on file   Tobacco Use   • Smoking status: Never Smoker   • Smokeless tobacco: Never Used   Substance and Sexual Activity   • Alcohol use: No   • Drug use: No   • Sexual activity: Defer     Family History   Problem Relation Age of Onset   • Heart disease Mother         pacemaker placement   • Stroke Mother    • Colon cancer Brother    • Stroke Father    • Diabetes Other    • Kidney disease Other    • Hyperlipidemia Other    • Arthritis Other        Review of Systems   Constitutional: Positive for malaise/fatigue.   Cardiovascular: Positive for palpitations.   Neurological: Positive for light-headedness.   All other systems reviewed and are negative.     Objective:     Vitals:    08/04/21 1339   BP: 122/64   Pulse: 86   SpO2: 99%   Weight: 70.2 kg (154 lb 12.8 oz)   Height: 147.3 cm (57.99\")     Body mass index is 32.36 kg/m².    Physical Exam  Constitutional:       Appearance: She is well-developed.   HENT:      Head: Normocephalic and atraumatic.   Eyes:      Conjunctiva/sclera: Conjunctivae normal.   Cardiovascular:      Rate and Rhythm: Normal rate and regular rhythm.      Heart sounds: No murmur heard.   No friction rub. No gallop.    Pulmonary:      Effort: Pulmonary effort is normal.      Breath sounds: Normal breath sounds.   Abdominal:      Palpations: Abdomen is soft.      Tenderness: There is no abdominal tenderness.   Musculoskeletal:      Cervical back: Neck supple.   Skin:     General: " Skin is warm.   Neurological:      Mental Status: She is alert and oriented to person, place, and time.   Psychiatric:         Behavior: Behavior normal.       Lab Review:   Procedures    Cardiac Procedures:  1. Echocardiogram on 3/23/2015 revealed an ejection fraction of 60-65%. There was   no significant valvular disease noted.   2. Lexiscan Myoview stress test on 10/5/2015: Normal with no evidence of ischemia   or infarction.     Assessment:       Diagnosis Plan   1. Palpitations  Holter Monitor - 24 Hour   2. Bradycardia, sinus     3. Essential hypertension       Plan:        She has had some palpitations, and her heart rate has been up and down as well.  Again, it was recently decreased at her primary care physician's office.  I am going to check a Holter monitor at this point to ensure that she is not having any arrhythmias or significant rhythm issues.  She also has had some lightheaded issues, and I want to make sure she has not had any significant bradycardia given her history.    She will continue on the amlodipine at 5 mg/day, isosorbide 60 mg/day, and Diovan HCT at 320/25 mg/day.  Her blood pressure is excellent at 122/64.  The amlodipine may be causing some lower extremity edema, although she is comfortable with this, and is elevating her legs.  She wants to stay on this medication.  She is not a good candidate for beta-blockers or AV abby blockers secondary to her significant bradycardia with beta-blockers in the past.    I will see her back in 6 months, and further plans will be made pending the results of the Holter monitor.

## 2021-08-30 DIAGNOSIS — E78.5 HYPERLIPIDEMIA, UNSPECIFIED HYPERLIPIDEMIA TYPE: ICD-10-CM

## 2021-08-30 RX ORDER — ATORVASTATIN CALCIUM 20 MG/1
20 TABLET, FILM COATED ORAL DAILY
Qty: 90 TABLET | Refills: 3 | Status: SHIPPED | OUTPATIENT
Start: 2021-08-30 | End: 2021-09-13 | Stop reason: SDUPTHER

## 2021-09-03 ENCOUNTER — LAB (OUTPATIENT)
Dept: LAB | Facility: HOSPITAL | Age: 81
End: 2021-09-03

## 2021-09-03 ENCOUNTER — CLINICAL SUPPORT (OUTPATIENT)
Dept: ONCOLOGY | Facility: HOSPITAL | Age: 81
End: 2021-09-03

## 2021-09-03 DIAGNOSIS — D50.9 IRON DEFICIENCY ANEMIA, UNSPECIFIED IRON DEFICIENCY ANEMIA TYPE: ICD-10-CM

## 2021-09-03 DIAGNOSIS — D47.1 MYELOPROLIFERATIVE DISORDER (HCC): ICD-10-CM

## 2021-09-03 DIAGNOSIS — Z79.899 ENCOUNTER FOR LONG-TERM CURRENT USE OF HIGH RISK MEDICATION: ICD-10-CM

## 2021-09-03 LAB
BASOPHILS # BLD AUTO: 0.3 10*3/MM3 (ref 0–0.2)
BASOPHILS NFR BLD AUTO: 1.7 % (ref 0–1.5)
DEPRECATED RDW RBC AUTO: 57.3 FL (ref 37–54)
EOSINOPHIL # BLD AUTO: 0.64 10*3/MM3 (ref 0–0.4)
EOSINOPHIL NFR BLD AUTO: 3.6 % (ref 0.3–6.2)
ERYTHROCYTE [DISTWIDTH] IN BLOOD BY AUTOMATED COUNT: 14.8 % (ref 12.3–15.4)
HCT VFR BLD AUTO: 32.8 % (ref 34–46.6)
HGB BLD-MCNC: 11.2 G/DL (ref 12–15.9)
IMM GRANULOCYTES # BLD AUTO: 1.51 10*3/MM3 (ref 0–0.05)
IMM GRANULOCYTES NFR BLD AUTO: 8.5 % (ref 0–0.5)
LYMPHOCYTES # BLD AUTO: 1.97 10*3/MM3 (ref 0.7–3.1)
LYMPHOCYTES NFR BLD AUTO: 11.1 % (ref 19.6–45.3)
MCH RBC QN AUTO: 36 PG (ref 26.6–33)
MCHC RBC AUTO-ENTMCNC: 34.1 G/DL (ref 31.5–35.7)
MCV RBC AUTO: 105.5 FL (ref 79–97)
MONOCYTES # BLD AUTO: 0.91 10*3/MM3 (ref 0.1–0.9)
MONOCYTES NFR BLD AUTO: 5.1 % (ref 5–12)
NEUTROPHILS NFR BLD AUTO: 12.35 10*3/MM3 (ref 1.7–7)
NEUTROPHILS NFR BLD AUTO: 70 % (ref 42.7–76)
NRBC BLD AUTO-RTO: 0.2 /100 WBC (ref 0–0.2)
PLATELET # BLD AUTO: 429 10*3/MM3 (ref 140–450)
PMV BLD AUTO: 11.1 FL (ref 6–12)
RBC # BLD AUTO: 3.11 10*6/MM3 (ref 3.77–5.28)
WBC # BLD AUTO: 17.68 10*3/MM3 (ref 3.4–10.8)

## 2021-09-03 PROCEDURE — G0463 HOSPITAL OUTPT CLINIC VISIT: HCPCS

## 2021-09-03 PROCEDURE — 36415 COLL VENOUS BLD VENIPUNCTURE: CPT

## 2021-09-03 PROCEDURE — 85025 COMPLETE CBC W/AUTO DIFF WBC: CPT

## 2021-09-03 NOTE — NURSING NOTE
Pt here with  for CBC review. She states she is doing well and happy with her counts today. Tolerating the hydrea on MWF,Sun. She has no questions/concerns today and will stay on the same dose.    Lab Results   Component Value Date    WBC 17.68 (H) 09/03/2021    HGB 11.2 (L) 09/03/2021    HCT 32.8 (L) 09/03/2021    .5 (H) 09/03/2021     09/03/2021

## 2021-09-13 DIAGNOSIS — I49.5 SSS (SICK SINUS SYNDROME) (HCC): Primary | ICD-10-CM

## 2021-09-13 DIAGNOSIS — I45.5 SINUS PAUSE: ICD-10-CM

## 2021-09-14 ENCOUNTER — TRANSCRIBE ORDERS (OUTPATIENT)
Dept: CARDIOLOGY | Facility: CLINIC | Age: 81
End: 2021-09-14

## 2021-09-14 DIAGNOSIS — Z01.810 PREPROCEDURAL CARDIOVASCULAR EXAMINATION: ICD-10-CM

## 2021-09-14 DIAGNOSIS — Z01.818 OTHER SPECIFIED PRE-OPERATIVE EXAMINATION: Primary | ICD-10-CM

## 2021-09-14 DIAGNOSIS — Z13.6 SCREENING FOR CARDIOVASCULAR CONDITION: Primary | ICD-10-CM

## 2021-09-14 PROBLEM — I49.5 SSS (SICK SINUS SYNDROME) (HCC): Status: ACTIVE | Noted: 2021-09-14

## 2021-09-14 PROBLEM — I45.5 SINUS PAUSE: Status: ACTIVE | Noted: 2021-09-14

## 2021-09-15 ENCOUNTER — LAB (OUTPATIENT)
Dept: LAB | Facility: HOSPITAL | Age: 81
End: 2021-09-15

## 2021-09-15 DIAGNOSIS — Z13.6 SCREENING FOR CARDIOVASCULAR CONDITION: ICD-10-CM

## 2021-09-15 DIAGNOSIS — Z01.810 PREPROCEDURAL CARDIOVASCULAR EXAMINATION: ICD-10-CM

## 2021-09-15 DIAGNOSIS — Z01.818 OTHER SPECIFIED PRE-OPERATIVE EXAMINATION: ICD-10-CM

## 2021-09-15 LAB
ANION GAP SERPL CALCULATED.3IONS-SCNC: 13.5 MMOL/L (ref 5–15)
BASOPHILS # BLD MANUAL: 0.18 10*3/MM3 (ref 0–0.2)
BASOPHILS NFR BLD AUTO: 1 % (ref 0–1.5)
BUN SERPL-MCNC: 20 MG/DL (ref 8–23)
BUN/CREAT SERPL: 21.5 (ref 7–25)
CALCIUM SPEC-SCNC: 10.2 MG/DL (ref 8.6–10.5)
CHLORIDE SERPL-SCNC: 104 MMOL/L (ref 98–107)
CO2 SERPL-SCNC: 24.5 MMOL/L (ref 22–29)
CREAT SERPL-MCNC: 0.93 MG/DL (ref 0.57–1)
DEPRECATED RDW RBC AUTO: 56 FL (ref 37–54)
EOSINOPHIL # BLD MANUAL: 0.36 10*3/MM3 (ref 0–0.4)
EOSINOPHIL NFR BLD MANUAL: 2 % (ref 0.3–6.2)
ERYTHROCYTE [DISTWIDTH] IN BLOOD BY AUTOMATED COUNT: 14.6 % (ref 12.3–15.4)
GFR SERPL CREATININE-BSD FRML MDRD: 58 ML/MIN/1.73
GLUCOSE SERPL-MCNC: 159 MG/DL (ref 65–99)
HCT VFR BLD AUTO: 32.1 % (ref 34–46.6)
HGB BLD-MCNC: 10.9 G/DL (ref 12–15.9)
LYMPHOCYTES # BLD MANUAL: 1.1 10*3/MM3 (ref 0.7–3.1)
LYMPHOCYTES NFR BLD MANUAL: 1 % (ref 5–12)
LYMPHOCYTES NFR BLD MANUAL: 6.1 % (ref 19.6–45.3)
MCH RBC QN AUTO: 35.9 PG (ref 26.6–33)
MCHC RBC AUTO-ENTMCNC: 34 G/DL (ref 31.5–35.7)
MCV RBC AUTO: 105.6 FL (ref 79–97)
MONOCYTES # BLD AUTO: 0.18 10*3/MM3 (ref 0.1–0.9)
NEUTROPHILS # BLD AUTO: 16.21 10*3/MM3 (ref 1.7–7)
NEUTROPHILS NFR BLD MANUAL: 89.9 % (ref 42.7–76)
PLAT MORPH BLD: NORMAL
PLATELET # BLD AUTO: 407 10*3/MM3 (ref 140–450)
PMV BLD AUTO: 10.5 FL (ref 6–12)
POTASSIUM SERPL-SCNC: 3.7 MMOL/L (ref 3.5–5.2)
RBC # BLD AUTO: 3.04 10*6/MM3 (ref 3.77–5.28)
RBC MORPH BLD: NORMAL
SARS-COV-2 ORF1AB RESP QL NAA+PROBE: NOT DETECTED
SODIUM SERPL-SCNC: 142 MMOL/L (ref 136–145)
WBC # BLD AUTO: 18.03 10*3/MM3 (ref 3.4–10.8)
WBC MORPH BLD: NORMAL

## 2021-09-15 PROCEDURE — C9803 HOPD COVID-19 SPEC COLLECT: HCPCS

## 2021-09-15 PROCEDURE — 85007 BL SMEAR W/DIFF WBC COUNT: CPT

## 2021-09-15 PROCEDURE — 36415 COLL VENOUS BLD VENIPUNCTURE: CPT

## 2021-09-15 PROCEDURE — U0004 COV-19 TEST NON-CDC HGH THRU: HCPCS

## 2021-09-15 PROCEDURE — 80048 BASIC METABOLIC PNL TOTAL CA: CPT

## 2021-09-15 PROCEDURE — 85025 COMPLETE CBC W/AUTO DIFF WBC: CPT

## 2021-09-17 ENCOUNTER — HOSPITAL ENCOUNTER (OUTPATIENT)
Facility: HOSPITAL | Age: 81
Setting detail: HOSPITAL OUTPATIENT SURGERY
Discharge: HOME | End: 2021-09-17
Attending: INTERNAL MEDICINE | Admitting: INTERNAL MEDICINE

## 2021-09-17 VITALS
WEIGHT: 149 LBS | BODY MASS INDEX: 28.13 KG/M2 | OXYGEN SATURATION: 97 % | HEART RATE: 62 BPM | HEIGHT: 61 IN | RESPIRATION RATE: 18 BRPM | TEMPERATURE: 98.5 F | SYSTOLIC BLOOD PRESSURE: 122 MMHG | DIASTOLIC BLOOD PRESSURE: 64 MMHG

## 2021-09-17 DIAGNOSIS — I45.5 SINUS PAUSE: ICD-10-CM

## 2021-09-17 DIAGNOSIS — G89.18 POST-OP PAIN: Primary | ICD-10-CM

## 2021-09-17 DIAGNOSIS — I49.5 SSS (SICK SINUS SYNDROME) (HCC): ICD-10-CM

## 2021-09-17 LAB — GLUCOSE BLDC GLUCOMTR-MCNC: 102 MG/DL (ref 70–130)

## 2021-09-17 PROCEDURE — C1769 GUIDE WIRE: HCPCS | Performed by: INTERNAL MEDICINE

## 2021-09-17 PROCEDURE — C1898 LEAD, PMKR, OTHER THAN TRANS: HCPCS | Performed by: INTERNAL MEDICINE

## 2021-09-17 PROCEDURE — C1894 INTRO/SHEATH, NON-LASER: HCPCS | Performed by: INTERNAL MEDICINE

## 2021-09-17 PROCEDURE — C1785 PMKR, DUAL, RATE-RESP: HCPCS | Performed by: INTERNAL MEDICINE

## 2021-09-17 PROCEDURE — 33208 INSRT HEART PM ATRIAL & VENT: CPT | Performed by: INTERNAL MEDICINE

## 2021-09-17 PROCEDURE — 25010000003 LIDOCAINE 1 % SOLUTION: Performed by: INTERNAL MEDICINE

## 2021-09-17 PROCEDURE — 82962 GLUCOSE BLOOD TEST: CPT

## 2021-09-17 PROCEDURE — 25010000002 MIDAZOLAM PER 1 MG: Performed by: INTERNAL MEDICINE

## 2021-09-17 PROCEDURE — 99152 MOD SED SAME PHYS/QHP 5/>YRS: CPT | Performed by: INTERNAL MEDICINE

## 2021-09-17 PROCEDURE — 25010000002 FENTANYL CITRATE (PF) 50 MCG/ML SOLUTION: Performed by: INTERNAL MEDICINE

## 2021-09-17 PROCEDURE — 25010000002 CEFAZOLIN 1-4 GM/50ML-% SOLUTION: Performed by: INTERNAL MEDICINE

## 2021-09-17 PROCEDURE — 99153 MOD SED SAME PHYS/QHP EA: CPT | Performed by: INTERNAL MEDICINE

## 2021-09-17 DEVICE — IMPLANTABLE DEVICE: Type: IMPLANTABLE DEVICE | Status: FUNCTIONAL

## 2021-09-17 DEVICE — PACEMAKER
Type: IMPLANTABLE DEVICE | Status: FUNCTIONAL
Brand: ACCOLADE™ MRI DR

## 2021-09-17 RX ORDER — SODIUM CHLORIDE 0.9 % (FLUSH) 0.9 %
10 SYRINGE (ML) INJECTION AS NEEDED
Status: DISCONTINUED | OUTPATIENT
Start: 2021-09-17 | End: 2021-09-17 | Stop reason: HOSPADM

## 2021-09-17 RX ORDER — FENTANYL CITRATE 50 UG/ML
INJECTION, SOLUTION INTRAMUSCULAR; INTRAVENOUS AS NEEDED
Status: DISCONTINUED | OUTPATIENT
Start: 2021-09-17 | End: 2021-09-17 | Stop reason: HOSPADM

## 2021-09-17 RX ORDER — MIDAZOLAM HYDROCHLORIDE 1 MG/ML
INJECTION INTRAMUSCULAR; INTRAVENOUS AS NEEDED
Status: DISCONTINUED | OUTPATIENT
Start: 2021-09-17 | End: 2021-09-17 | Stop reason: HOSPADM

## 2021-09-17 RX ORDER — LIDOCAINE HYDROCHLORIDE 10 MG/ML
INJECTION, SOLUTION INFILTRATION; PERINEURAL AS NEEDED
Status: DISCONTINUED | OUTPATIENT
Start: 2021-09-17 | End: 2021-09-17 | Stop reason: HOSPADM

## 2021-09-17 RX ORDER — OXYCODONE HYDROCHLORIDE AND ACETAMINOPHEN 5; 325 MG/1; MG/1
1-2 TABLET ORAL EVERY 4 HOURS PRN
Qty: 10 TABLET | Refills: 0 | Status: SHIPPED | OUTPATIENT
Start: 2021-09-17 | End: 2023-02-15

## 2021-09-17 RX ORDER — CEFAZOLIN SODIUM 1 G/50ML
INJECTION, SOLUTION INTRAVENOUS CONTINUOUS PRN
Status: COMPLETED | OUTPATIENT
Start: 2021-09-17 | End: 2021-09-17

## 2021-09-17 RX ORDER — SODIUM CHLORIDE 0.9 % (FLUSH) 0.9 %
3 SYRINGE (ML) INJECTION EVERY 12 HOURS SCHEDULED
Status: DISCONTINUED | OUTPATIENT
Start: 2021-09-17 | End: 2021-09-17 | Stop reason: HOSPADM

## 2021-09-17 RX ORDER — SODIUM CHLORIDE 9 MG/ML
75 INJECTION, SOLUTION INTRAVENOUS CONTINUOUS
Status: DISCONTINUED | OUTPATIENT
Start: 2021-09-17 | End: 2021-09-17 | Stop reason: HOSPADM

## 2021-09-17 RX ADMIN — SODIUM CHLORIDE 75 ML/HR: 9 INJECTION, SOLUTION INTRAVENOUS at 09:42

## 2021-09-17 NOTE — H&P
Hospital Consult    Patient Name: Ayla Castellano  Age/Sex: 81 y.o. female  : 1940  MRN: [id number]    Date of Admission: 2021  Date of Encounter Visit: 21  Encounter Provider: Richard Alaniz MD        Referring Provider: Richard Alaniz MD  Patient Care Team:  Manisha Morales MD as PCP - General  Manisha Morales MD as PCP - Family Medicine  Manisha Morales MD as Referring Physician (Family Medicine)  Izzy Martinez MD as Consulting Physician (Hematology and Oncology)    Subjective:   Admitted for: Sick sinus syndrome   Chief Complaint: Dizziness    History of Present Illness:  Ayla Castellano is a 81 y.o. female with what appears to be sick sinus syndrome.  Recent outpatient evaluation indicated significant pauses associated with symptoms.  She is now here for permanent pacemaker implant.        Past Medical History:  Past Medical History:   Diagnosis Date    Arrhythmia     Colitis     Colon cancer (CMS/HCC)     s/p partial colectomy in     Diabetes mellitus (CMS/HCC)     Gallbladder disease     Hard to intubate     Hyperlipidemia     Hypertension     Left arm pain     SSS (sick sinus syndrome) (CMS/HCC)     Syncope     Secondary to medications (clonidine and metoprolol) in 3/15.  Had severe bradycardia (sinus arrest with junctional escape beats).  Resolved after medications discontinued.       Past Surgical History:   Procedure Laterality Date    CHOLECYSTECTOMY  10/2019    COLECTOMY PARTIAL / TOTAL       for colon cancer    COLONOSCOPY  2020    UTERINE FIBROID SURGERY      s/p uterine fibroma resection       Home Medications:   Medications Prior to Admission   Medication Sig Dispense Refill Last Dose    amLODIPine (NORVASC) 5 MG tablet Take 1 tablet by mouth Daily. 90 tablet 3 2021 at Unknown time    aspirin 81 MG tablet Take 81 mg by mouth Daily.   2021 at Unknown time    atorvastatin (LIPITOR) 20 MG tablet Take 1 tablet by mouth  Daily. 90 tablet 3 9/16/2021 at Unknown time    Cyanocobalamin (B-12) 2500 MCG sublingual tablet Place 1 tablet under the tongue Daily. 30 tablet 3 9/16/2021 at Unknown time    diclofenac (VOLTAREN) 1 % gel gel Apply 4 g topically to the appropriate area as directed 3 (Three) Times a Day. 5 tube 11 Past Week at Unknown time    hydroxyurea (HYDREA) 500 MG capsule Take 500 mg by mouth Every Other Day. Monday Wednesday Friday and Sunday 9/16/2021 at Unknown time    isosorbide mononitrate (IMDUR) 60 MG 24 hr tablet Take 1 tablet by mouth Daily. 30 tablet 11 9/16/2021 at Unknown time    metFORMIN (GLUCOPHAGE) 500 MG tablet Take 1 tablet by mouth 3 (Three) Times a Day. 270 tablet 1 9/16/2021 at Unknown time    ofloxacin (OCUFLOX) 0.3 % ophthalmic solution Administer 2 drops to both eyes 4 (Four) Times a Day. 10 mL 11 Past Week at Unknown time    oxybutynin (DITROPAN) 5 MG tablet Take 1 tablet by mouth 2 (Two) Times a Day. 60 tablet 5 9/16/2021 at Unknown time    valsartan-hydrochlorothiazide (DIOVAN-HCT) 320-25 MG per tablet Take 1 tablet by mouth Daily. 90 tablet 3 9/16/2021 at Unknown time    Accu-Chek FastClix Lancets misc To test blood sugar 3 times daily. 100 each 8     glucose blood (ONE TOUCH ULTRA TEST) test strip USE TO TEST BLOOD SUGAR EVERY DAY. (E11.9) 100 each 2     mupirocin (Bactroban) 2 % ointment Apply  topically to the appropriate area as directed 3 (Three) Times a Day. 30 g 11 More than a month at Unknown time       Allergies:  Allergies   Allergen Reactions    Latex Unknown - Low Severity     blisters       Past Social History:  Social History     Socioeconomic History    Marital status:      Spouse name: Not on file    Number of children: Not on file    Years of education: Not on file    Highest education level: Not on file   Tobacco Use    Smoking status: Never Smoker    Smokeless tobacco: Never Used   Substance and Sexual Activity    Alcohol use: No    Drug use: No    Sexual activity: Defer         Past Family History:  Family History   Problem Relation Age of Onset    Heart disease Mother         pacemaker placement    Stroke Mother     Colon cancer Brother     Stroke Father     Diabetes Other     Kidney disease Other     Hyperlipidemia Other     Arthritis Other        Review of Systems: All systems reviewed. Pertinent positives identified in HPI. All other systems are negative.     REVIEW OF SYSTEMS:   CONSTITUTIONAL: No weight loss, fever, chills, weakness or fatigue.   HEENT: Eyes: No visual loss, blurred vision, double vision or yellow sclerae. Ears, Nose, Throat: No hearing loss, sneezing, congestion, runny nose or sore throat.   SKIN: No rash or itching.     RESPIRATORY: No shortness of breath, hemoptysis, cough or sputum.   GASTROINTESTINAL: No anorexia, nausea, vomiting or diarrhea. No abdominal pain, bright red blood per rectum or melena.  GENITOURINARY: No burning on urination, hematuria or increased frequency.  NEUROLOGICAL: No headache, dizziness, syncope, paralysis, ataxia, numbness or tingling in the extremities. No change in bowel or bladder control.   MUSCULOSKELETAL: No muscle, back pain, joint pain or stiffness.   HEMATOLOGIC: No anemia, bleeding or bruising.   LYMPHATICS: No enlarged nodes. No history of splenectomy.   PSYCHIATRIC: No history of depression, anxiety, hallucinations.   ENDOCRINOLOGIC: No reports of sweating, cold or heat intolerance. No polyuria or polydipsia.       Objective:     Objective:  Temp:  [98.5 °F (36.9 °C)] 98.5 °F (36.9 °C)  Heart Rate:  [72] 72  Resp:  [20] 20  BP: (148)/(70) 148/70  No intake or output data in the 24 hours ending 09/17/21 1001  Body mass index is 28.15 kg/m².      09/17/21  0915   Weight: 67.6 kg (149 lb)           Physical Exam:   Vitals reviewed.   Constitutional:       Appearance: Healthy appearance. Well-developed and not in distress.   HENT:      Head: Normocephalic.   Neck:      Thyroid: No thyromegaly.      Vascular: No carotid  bruit, JVD or JVR.   Pulmonary:      Effort: Pulmonary effort is normal.      Breath sounds: Normal breath sounds. No wheezing. No rhonchi. No rales.   Chest:      Chest wall: Not tender to palpatation.   Cardiovascular:      Normal rate. Regular rhythm. Normal S1. Normal S2.      No gallop.   Pulses:     Intact distal pulses.   Edema:     Peripheral edema absent.   Abdominal:      Tenderness: There is no abdominal tenderness.   Musculoskeletal:         General: No tenderness. Skin:     General: Skin is warm and dry.      Findings: No erythema.   Neurological:      General: No focal deficit present.      Mental Status: Alert, oriented to person, place, and time and oriented to person, place and time.           Lab Review:     Results from last 7 days   Lab Units 09/15/21  0943   SODIUM mmol/L 142   POTASSIUM mmol/L 3.7   CHLORIDE mmol/L 104   CO2 mmol/L 24.5   BUN mg/dL 20   CREATININE mg/dL 0.93   GLUCOSE mg/dL 159*   CALCIUM mg/dL 10.2           Results from last 7 days   Lab Units 09/15/21  0943   WBC 10*3/mm3 18.03*   HEMOGLOBIN g/dL 10.9*   HEMATOCRIT % 32.1*   PLATELETS 10*3/mm3 407                                   Imaging:      Imaging Results (Most Recent)       None            EKG:         Assessment:   Assessment/Plan         SSS (sick sinus syndrome) (CMS/[facility])    Sinus pause            Plan:     Permanent pacemaker implant    Thank you for allowing me to participate in the care of Ayla Castellano. Feel free to contact me directly with any further questions or concerns.    Richard Alaniz MD  [facility]  09/17/21  10:01 EDT

## 2021-09-23 ENCOUNTER — CLINICAL SUPPORT NO REQUIREMENTS (OUTPATIENT)
Dept: CARDIOLOGY | Facility: CLINIC | Age: 81
End: 2021-09-23

## 2021-09-23 DIAGNOSIS — I49.5 SSS (SICK SINUS SYNDROME) (HCC): Primary | ICD-10-CM

## 2021-09-23 PROCEDURE — 93280 PM DEVICE PROGR EVAL DUAL: CPT | Performed by: INTERNAL MEDICINE

## 2021-10-04 ENCOUNTER — OFFICE VISIT (OUTPATIENT)
Dept: ONCOLOGY | Facility: CLINIC | Age: 81
End: 2021-10-04

## 2021-10-04 ENCOUNTER — TELEPHONE (OUTPATIENT)
Dept: ONCOLOGY | Facility: CLINIC | Age: 81
End: 2021-10-04

## 2021-10-04 ENCOUNTER — LAB (OUTPATIENT)
Dept: LAB | Facility: HOSPITAL | Age: 81
End: 2021-10-04

## 2021-10-04 VITALS
WEIGHT: 152.3 LBS | SYSTOLIC BLOOD PRESSURE: 130 MMHG | OXYGEN SATURATION: 100 % | DIASTOLIC BLOOD PRESSURE: 75 MMHG | BODY MASS INDEX: 31.97 KG/M2 | HEIGHT: 58 IN | HEART RATE: 74 BPM | RESPIRATION RATE: 16 BRPM | TEMPERATURE: 97.1 F

## 2021-10-04 DIAGNOSIS — E83.52 HYPERCALCEMIA: ICD-10-CM

## 2021-10-04 DIAGNOSIS — D50.9 IRON DEFICIENCY ANEMIA, UNSPECIFIED IRON DEFICIENCY ANEMIA TYPE: ICD-10-CM

## 2021-10-04 DIAGNOSIS — Z79.899 ENCOUNTER FOR LONG-TERM CURRENT USE OF HIGH RISK MEDICATION: ICD-10-CM

## 2021-10-04 DIAGNOSIS — D47.1 MYELOPROLIFERATIVE DISORDER (HCC): Primary | ICD-10-CM

## 2021-10-04 DIAGNOSIS — D47.1 MYELOPROLIFERATIVE DISORDER (HCC): ICD-10-CM

## 2021-10-04 LAB
ALBUMIN SERPL-MCNC: 4.5 G/DL (ref 3.5–5.2)
ALBUMIN/GLOB SERPL: 2.3 G/DL (ref 1.1–2.4)
ALP SERPL-CCNC: 70 U/L (ref 38–116)
ALT SERPL W P-5'-P-CCNC: 22 U/L (ref 0–33)
ANION GAP SERPL CALCULATED.3IONS-SCNC: 9.7 MMOL/L (ref 5–15)
AST SERPL-CCNC: 27 U/L (ref 0–32)
BASOPHILS # BLD AUTO: 0.16 10*3/MM3 (ref 0–0.2)
BASOPHILS NFR BLD AUTO: 1 % (ref 0–1.5)
BILIRUB SERPL-MCNC: 0.8 MG/DL (ref 0.2–1.2)
BUN SERPL-MCNC: 26 MG/DL (ref 6–20)
BUN/CREAT SERPL: 27.7 (ref 7.3–30)
CALCIUM SPEC-SCNC: 10.5 MG/DL (ref 8.5–10.2)
CHLORIDE SERPL-SCNC: 105 MMOL/L (ref 98–107)
CO2 SERPL-SCNC: 25.3 MMOL/L (ref 22–29)
CREAT SERPL-MCNC: 0.94 MG/DL (ref 0.6–1.1)
DEPRECATED RDW RBC AUTO: 62.7 FL (ref 37–54)
EOSINOPHIL # BLD AUTO: 0.61 10*3/MM3 (ref 0–0.4)
EOSINOPHIL NFR BLD AUTO: 4 % (ref 0.3–6.2)
ERYTHROCYTE [DISTWIDTH] IN BLOOD BY AUTOMATED COUNT: 15.9 % (ref 12.3–15.4)
FERRITIN SERPL-MCNC: 325.1 NG/ML (ref 13–150)
GFR SERPL CREATININE-BSD FRML MDRD: 57 ML/MIN/1.73
GLOBULIN UR ELPH-MCNC: 2 GM/DL (ref 1.8–3.5)
GLUCOSE SERPL-MCNC: 114 MG/DL (ref 74–124)
HCT VFR BLD AUTO: 30.4 % (ref 34–46.6)
HGB BLD-MCNC: 10 G/DL (ref 12–15.9)
IMM GRANULOCYTES # BLD AUTO: 1.35 10*3/MM3 (ref 0–0.05)
IMM GRANULOCYTES NFR BLD AUTO: 8.8 % (ref 0–0.5)
IRON 24H UR-MRATE: 77 MCG/DL (ref 37–145)
IRON SATN MFR SERPL: 29 % (ref 14–48)
LYMPHOCYTES # BLD AUTO: 1.82 10*3/MM3 (ref 0.7–3.1)
LYMPHOCYTES NFR BLD AUTO: 11.9 % (ref 19.6–45.3)
MCH RBC QN AUTO: 35.7 PG (ref 26.6–33)
MCHC RBC AUTO-ENTMCNC: 32.9 G/DL (ref 31.5–35.7)
MCV RBC AUTO: 108.6 FL (ref 79–97)
MONOCYTES # BLD AUTO: 0.78 10*3/MM3 (ref 0.1–0.9)
MONOCYTES NFR BLD AUTO: 5.1 % (ref 5–12)
NEUTROPHILS NFR BLD AUTO: 10.55 10*3/MM3 (ref 1.7–7)
NEUTROPHILS NFR BLD AUTO: 69.2 % (ref 42.7–76)
NRBC BLD AUTO-RTO: 0.2 /100 WBC (ref 0–0.2)
PLATELET # BLD AUTO: 375 10*3/MM3 (ref 140–450)
PMV BLD AUTO: 11 FL (ref 6–12)
POTASSIUM SERPL-SCNC: 4.7 MMOL/L (ref 3.5–4.7)
PROT SERPL-MCNC: 6.5 G/DL (ref 6.3–8)
RBC # BLD AUTO: 2.8 10*6/MM3 (ref 3.77–5.28)
SODIUM SERPL-SCNC: 140 MMOL/L (ref 134–145)
TIBC SERPL-MCNC: 267 MCG/DL (ref 249–505)
TRANSFERRIN SERPL-MCNC: 191 MG/DL (ref 200–360)
WBC # BLD AUTO: 15.27 10*3/MM3 (ref 3.4–10.8)

## 2021-10-04 PROCEDURE — 82728 ASSAY OF FERRITIN: CPT

## 2021-10-04 PROCEDURE — 99214 OFFICE O/P EST MOD 30 MIN: CPT | Performed by: INTERNAL MEDICINE

## 2021-10-04 PROCEDURE — 80053 COMPREHEN METABOLIC PANEL: CPT

## 2021-10-04 PROCEDURE — 85025 COMPLETE CBC W/AUTO DIFF WBC: CPT

## 2021-10-04 PROCEDURE — 84466 ASSAY OF TRANSFERRIN: CPT

## 2021-10-04 PROCEDURE — 83540 ASSAY OF IRON: CPT

## 2021-10-04 PROCEDURE — 36415 COLL VENOUS BLD VENIPUNCTURE: CPT

## 2021-10-04 NOTE — PROGRESS NOTES
Subjective     CHIEF COMPLAINT:      Chief Complaint   Patient presents with   • Follow-up     no concerns       HISTORY OF PRESENT ILLNESS:     Ayla Castellano is a 81 y.o. female patient who returns today for follow up on her myeloproliferative disorder.  She is on Hydrea 500 mg on Mondays Wednesdays Fridays and Sundays.  She is tolerating it.  She was having problems related to her heart and had symptomatic bradycardia.  She had a pacemaker placed 2 weeks ago.  She developed a hematoma after the procedure.  She has follow-up with cardiology tomorrow.    ROS:  Pertinent ROS is in the HPI.     Past medical, surgical, social and family history were reviewed.     MEDICATIONS:    Current Outpatient Medications:   •  Accu-Chek FastClix Lancets misc, To test blood sugar 3 times daily., Disp: 100 each, Rfl: 8  •  amLODIPine (NORVASC) 5 MG tablet, Take 1 tablet by mouth Daily., Disp: 90 tablet, Rfl: 3  •  aspirin 81 MG tablet, Take 81 mg by mouth Daily., Disp: , Rfl:   •  atorvastatin (LIPITOR) 20 MG tablet, Take 1 tablet by mouth Daily., Disp: 90 tablet, Rfl: 3  •  Cyanocobalamin (B-12) 2500 MCG sublingual tablet, Place 1 tablet under the tongue Daily., Disp: 30 tablet, Rfl: 3  •  diclofenac (VOLTAREN) 1 % gel gel, Apply 4 g topically to the appropriate area as directed 3 (Three) Times a Day., Disp: 5 tube, Rfl: 11  •  glucose blood (ONE TOUCH ULTRA TEST) test strip, USE TO TEST BLOOD SUGAR EVERY DAY. (E11.9), Disp: 100 each, Rfl: 2  •  hydroxyurea (HYDREA) 500 MG capsule, Take 500 mg by mouth Every Other Day. Monday Wednesday Friday and Sunday, Disp: , Rfl:   •  isosorbide mononitrate (IMDUR) 60 MG 24 hr tablet, Take 1 tablet by mouth Daily., Disp: 30 tablet, Rfl: 11  •  metFORMIN (GLUCOPHAGE) 500 MG tablet, Take 1 tablet by mouth 3 (Three) Times a Day., Disp: 270 tablet, Rfl: 1  •  mupirocin (Bactroban) 2 % ointment, Apply  topically to the appropriate area as directed 3 (Three) Times a Day., Disp: 30 g, Rfl:  "11  •  ofloxacin (OCUFLOX) 0.3 % ophthalmic solution, Administer 2 drops to both eyes 4 (Four) Times a Day., Disp: 10 mL, Rfl: 11  •  oxybutynin (DITROPAN) 5 MG tablet, Take 1 tablet by mouth 2 (Two) Times a Day., Disp: 60 tablet, Rfl: 5  •  oxyCODONE-acetaminophen (PERCOCET) 5-325 MG per tablet, Take 1-2 tablets by mouth Every 4 (Four) Hours As Needed (Pain)., Disp: 10 tablet, Rfl: 0  •  valsartan-hydrochlorothiazide (DIOVAN-HCT) 320-25 MG per tablet, Take 1 tablet by mouth Daily., Disp: 90 tablet, Rfl: 3    Objective   VITAL SIGNS:     Vitals:    10/04/21 0817   BP: 130/75   Pulse: 74   Resp: 16   Temp: 97.1 °F (36.2 °C)   TempSrc: Temporal   SpO2: 100%   Weight: 69.1 kg (152 lb 4.8 oz)   Height: 147.3 cm (57.99\")   PainSc: 0-No pain     Body mass index is 31.84 kg/m².     Wt Readings from Last 5 Encounters:   10/04/21 69.1 kg (152 lb 4.8 oz)   09/17/21 67.6 kg (149 lb)   08/04/21 70.2 kg (154 lb 12.8 oz)   08/03/21 68.8 kg (151 lb 11.2 oz)   07/22/21 69.4 kg (153 lb)       PHYSICAL EXAMINATION:   GENERAL: The patient appears in fair general condition, not in acute distress.   SKIN: No ecchymosis.  EYES: Pallor. No jaundice.  LYMPHATICS: No cervical lymphadenopathy.  CHEST: Normal respiratory effort.  Lungs clear bilaterally.  No added sounds.  Hematoma at the site of the pacemaker.  CVS: Normal S1-S2.  No murmurs.    ABDOMEN: Soft. No tenderness. No Hepatomegaly. No Splenomegaly. No masses.  EXTREMITIES: No edema.    DIAGNOSTIC DATA:     Results from last 7 days   Lab Units 10/04/21  0806   WBC 10*3/mm3 15.27*   NEUTROS ABS 10*3/mm3 10.55*   HEMOGLOBIN g/dL 10.0*   HEMATOCRIT % 30.4*   PLATELETS 10*3/mm3 375     Results from last 7 days   Lab Units 10/04/21  0806   SODIUM mmol/L 140   POTASSIUM mmol/L 4.7   CHLORIDE mmol/L 105   CO2 mmol/L 25.3   BUN mg/dL 26*   CREATININE mg/dL 0.94   CALCIUM mg/dL 10.5*   ALBUMIN g/dL 4.50   BILIRUBIN mg/dL 0.8   ALK PHOS U/L 70   ALT (SGPT) U/L 22   AST (SGOT) U/L 27   GLUCOSE " mg/dL 114         Lab 10/04/21  0806   IRON 77   IRON SATURATION 29   TIBC 267   TRANSFERRIN 191*   FERRITIN 325.10*        Assessment/Plan   *Myeloproliferative disorder, OBINNA-2 mutation positive.   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   · She was under the care of an outside Hematologist before transferring care to our practice. Records indicate that she was previously diagnosed with Essential thrombocythemia.   · On review of the labs, she had an elevated WBC and basophil counts.   · Therefore, a diagnosis of myeloproliferative disorder or polycythemia vera is favored over ET since ET is limited to elevation of the platelet count.   · BCR ABL was obtained and was negative by FISH on 2/2/2021.  · In February 2021, WBC count was gradually increasing indicating that her disease was not under a good control likely due to the dose of Hydroxyurea not being effective (500 mg every 4 days).   · The dose was increased to every 3 days on 2/2/2021.  · Due to the persistence of the leukocytosis, the dose of Hydrea was increased to 4 days a week.  · WBC is today at 15,270.  · Hemoglobin decreased to 10.0 today.   · It is not clear if the drop in the hemoglobin is attributed to Hydrea or if it is due to recent blood loss from the hematoma she developed after placement of pacemaker.    *Iron deficiency anemia.   · This is attributed to iron deficiency with a transferrin saturation of 17% and a ferritin of 26.   · The patient did not tolerate oral in the past with development of upset stomach, abdominal pain, headaches and dizziness when she took the oral iron in the past.  · Patient received IV iron the 2020 and tolerated that well.   · Patient was given IV Injectafer on 2/5/2021 and 2/12/2021.  · Hemoglobin improved to 11.7 on 3/30/2021. Iron stores improved.  · Hemoglobin decreased to 10.7 on 8/3/2021.  It was 10.9 on 9/15/2021 and it is down to 10.0 today.    · Iron stores are adequate  today.    *Hypercalcemia. This is being evaluated by her PCP. She has a history of osteopenia. Bone density on 4/12/2021 revealed osteoporosis.  She was given Prolia on 5/4/2021.  Calcium was 11.0 on 7/22/2021.  Calcium improved to 10.5 today.    *History of colon cancer, stage I.   · She is s/p partial colectomy by Dr. Ku.   · She did not require adjuvant chemotherapy or radiation therapy.      PLAN:    1.  Reduce Hydrea to 500 mg 3 days a week (Mondays, Wednesdays and Fridays).    2.  Patient does not need IV iron at this point.    3.  Return in 6 weeks for CBC ferritin iron panel with iron.    4.  Follow-up in 3 months with CBC ferritin iron panel and CMP.          Izzy Martinez MD  10/04/21

## 2021-10-04 NOTE — TELEPHONE ENCOUNTER
----- Message from Izzy Martinez MD sent at 10/4/2021  9:06 AM EDT -----  Please inform the patient that iron levels are good.  She does not need IV iron.  Calcium level improved to 10.5.    Thank you

## 2021-10-05 ENCOUNTER — CLINICAL SUPPORT NO REQUIREMENTS (OUTPATIENT)
Dept: CARDIOLOGY | Facility: CLINIC | Age: 81
End: 2021-10-05

## 2021-10-05 DIAGNOSIS — I49.5 SSS (SICK SINUS SYNDROME) (HCC): Primary | ICD-10-CM

## 2021-10-14 ENCOUNTER — OFFICE VISIT (OUTPATIENT)
Dept: FAMILY MEDICINE CLINIC | Facility: CLINIC | Age: 81
End: 2021-10-14

## 2021-10-14 VITALS
WEIGHT: 150 LBS | SYSTOLIC BLOOD PRESSURE: 101 MMHG | HEART RATE: 68 BPM | HEIGHT: 58 IN | BODY MASS INDEX: 31.49 KG/M2 | TEMPERATURE: 98.2 F | DIASTOLIC BLOOD PRESSURE: 64 MMHG | OXYGEN SATURATION: 97 %

## 2021-10-14 DIAGNOSIS — E11.9 TYPE 2 DIABETES MELLITUS WITHOUT COMPLICATION, WITHOUT LONG-TERM CURRENT USE OF INSULIN (HCC): Primary | ICD-10-CM

## 2021-10-14 DIAGNOSIS — I10 PRIMARY HYPERTENSION: ICD-10-CM

## 2021-10-14 DIAGNOSIS — E83.52 HYPERCALCEMIA: ICD-10-CM

## 2021-10-14 DIAGNOSIS — I25.118 CORONARY ARTERY DISEASE OF NATIVE HEART WITH STABLE ANGINA PECTORIS, UNSPECIFIED VESSEL OR LESION TYPE (HCC): ICD-10-CM

## 2021-10-14 PROCEDURE — 99214 OFFICE O/P EST MOD 30 MIN: CPT | Performed by: FAMILY MEDICINE

## 2021-10-14 RX ORDER — KETOCONAZOLE 20 MG/G
CREAM TOPICAL DAILY
Qty: 30 G | Refills: 11 | Status: SHIPPED | OUTPATIENT
Start: 2021-10-14

## 2021-10-14 NOTE — PROGRESS NOTES
Juliet Castellano is a 81 y.o. female.     Chief Complaint   Patient presents with   • Follow-up   • Hypertension   • Diabetes       History of Present Illness   DM: follow up  Stable, last A1c 5.1, counseled that she did not need Metrormin  HTN: stable on meds  Hyperlipidemia: stable  CAD stable  Counseled patient regarding necessity of Covid vaccine.  She is still having reservations.  Has not been immunized yet  The following portions of the patient's history were reviewed and updated as appropriate: allergies, current medications, past family history, past medical history, past social history, past surgical history and problem list.    Past Medical History:   Diagnosis Date   • Arrhythmia    • Colitis    • Colon cancer (HCC)     s/p partial colectomy in 2011   • Diabetes mellitus (HCC)    • Gallbladder disease    • Hard to intubate    • Hyperlipidemia    • Hypertension    • Left arm pain    • SSS (sick sinus syndrome) (HCC)    • Syncope     Secondary to medications (clonidine and metoprolol) in 3/15.  Had severe bradycardia (sinus arrest with junctional escape beats).  Resolved after medications discontinued.       Past Surgical History:   Procedure Laterality Date   • CARDIAC ELECTROPHYSIOLOGY PROCEDURE N/A 9/17/2021    Procedure: Pacemaker DC new;  Surgeon: Richard Alaniz MD;  Location: [facility] INVASIVE LOCATION;  Service: Cardiology;  Laterality: N/A;   • CHOLECYSTECTOMY  10/2019   • COLECTOMY PARTIAL / TOTAL      2011 for colon cancer   • COLONOSCOPY  01/20/2020   • UTERINE FIBROID SURGERY      s/p uterine fibroma resection       Family History   Problem Relation Age of Onset   • Heart disease Mother         pacemaker placement   • Stroke Mother    • Colon cancer Brother    • Stroke Father    • Diabetes Other    • Kidney disease Other    • Hyperlipidemia Other    • Arthritis Other        Social History     Socioeconomic History   • Marital status:    Tobacco Use   • Smoking  status: Never Smoker   • Smokeless tobacco: Never Used   Substance and Sexual Activity   • Alcohol use: No   • Drug use: No   • Sexual activity: Defer       Current Outpatient Medications on File Prior to Visit   Medication Sig Dispense Refill   • Accu-Chek FastClix Lancets misc To test blood sugar 3 times daily. 100 each 8   • amLODIPine (NORVASC) 5 MG tablet Take 1 tablet by mouth Daily. 90 tablet 3   • aspirin 81 MG tablet Take 81 mg by mouth Daily.     • atorvastatin (LIPITOR) 20 MG tablet Take 1 tablet by mouth Daily. 90 tablet 3   • Cyanocobalamin (B-12) 2500 MCG sublingual tablet Place 1 tablet under the tongue Daily. 30 tablet 3   • diclofenac (VOLTAREN) 1 % gel gel Apply 4 g topically to the appropriate area as directed 3 (Three) Times a Day. 5 tube 11   • glucose blood (ONE TOUCH ULTRA TEST) test strip USE TO TEST BLOOD SUGAR EVERY DAY. (E11.9) 100 each 2   • hydroxyurea (HYDREA) 500 MG capsule Take 500 mg by mouth 3 (Three) Times a Week. Monday Wednesday Friday     • isosorbide mononitrate (IMDUR) 60 MG 24 hr tablet Take 1 tablet by mouth Daily. 30 tablet 11   • mupirocin (Bactroban) 2 % ointment Apply  topically to the appropriate area as directed 3 (Three) Times a Day. 30 g 11   • ofloxacin (OCUFLOX) 0.3 % ophthalmic solution Administer 2 drops to both eyes 4 (Four) Times a Day. 10 mL 11   • oxybutynin (DITROPAN) 5 MG tablet Take 1 tablet by mouth 2 (Two) Times a Day. 60 tablet 5   • oxyCODONE-acetaminophen (PERCOCET) 5-325 MG per tablet Take 1-2 tablets by mouth Every 4 (Four) Hours As Needed (Pain). 10 tablet 0   • valsartan-hydrochlorothiazide (DIOVAN-HCT) 320-25 MG per tablet Take 1 tablet by mouth Daily. 90 tablet 3   • [DISCONTINUED] metFORMIN (GLUCOPHAGE) 500 MG tablet Take 1 tablet by mouth 3 (Three) Times a Day. 270 tablet 1     No current facility-administered medications on file prior to visit.       Review of Systems   Constitutional: Negative.        Recent Results (from the past 0124  hour(s))   Vitamin D 25 Hydroxy    Collection Time: 04/23/21  8:26 AM    Specimen: Blood   Result Value Ref Range    25 Hydroxy, Vitamin D 42.8 30.0 - 100.0 ng/mL   PTH, Intact & Calcium    Collection Time: 04/23/21  8:26 AM    Specimen: Blood   Result Value Ref Range    PTH, Intact 58 15 - 65 pg/mL    PTH, Intact Comment    Comprehensive Metabolic Panel    Collection Time: 04/23/21  8:26 AM    Specimen: Blood   Result Value Ref Range    Glucose 117 (H) 65 - 99 mg/dL    BUN 16 8 - 23 mg/dL    Creatinine 0.91 0.57 - 1.00 mg/dL    eGFR Non African Am 60 (L) >60 mL/min/1.73    eGFR African Am 72 >60 mL/min/1.73    BUN/Creatinine Ratio 17.6 7.0 - 25.0    Sodium 145 136 - 145 mmol/L    Potassium 4.5 3.5 - 5.2 mmol/L    Chloride 107 98 - 107 mmol/L    Total CO2 27.9 22.0 - 29.0 mmol/L    Calcium 11.1 (H) 8.6 - 10.5 mg/dL    Total Protein 6.6 6.0 - 8.5 g/dL    Albumin 4.50 3.50 - 5.20 g/dL    Globulin 2.1 gm/dL    A/G Ratio 2.1 g/dL    Total Bilirubin 0.7 0.0 - 1.2 mg/dL    Alkaline Phosphatase 82 39 - 117 U/L    AST (SGOT) 25 1 - 32 U/L    ALT (SGPT) 33 1 - 33 U/L   Lipid Panel    Collection Time: 04/23/21  8:26 AM    Specimen: Blood   Result Value Ref Range    Total Cholesterol 134 0 - 200 mg/dL    Triglycerides 180 (H) 0 - 150 mg/dL    HDL Cholesterol 33 (L) 40 - 60 mg/dL    VLDL Cholesterol Jeff 31 5 - 40 mg/dL    LDL Chol Calc (NIH) 70 0 - 100 mg/dL   CBC & Differential    Collection Time: 04/23/21  8:26 AM    Specimen: Blood   Result Value Ref Range    WBC 15.15 (H) 3.40 - 10.80 10*3/mm3    RBC 3.34 (L) 3.77 - 5.28 10*6/mm3    Hemoglobin 11.7 (L) 12.0 - 15.9 g/dL    Hematocrit 34.2 34.0 - 46.6 %    .4 (H) 79.0 - 97.0 fL    MCH 35.0 (H) 26.6 - 33.0 pg    MCHC 34.2 31.5 - 35.7 g/dL    RDW 18.2 (H) 12.3 - 15.4 %    Platelets 375 140 - 450 10*3/mm3    nRBC 0.1 0.0 - 0.2 /100 WBC   Hemoglobin A1c    Collection Time: 04/23/21  8:26 AM    Specimen: Blood   Result Value Ref Range    Hemoglobin A1C 5.60 4.80 - 5.60 %    Manual Differential    Collection Time: 04/23/21  8:26 AM   Result Value Ref Range    Neutrophil Rel % 81.6 (H) 42.7 - 76.0 %    Lymphocyte Rel % 6.1 (L) 19.6 - 45.3 %    Monocyte Rel % 3.1 (L) 5.0 - 12.0 %    Eosinophil Rel % 4.1 0.3 - 6.2 %    Basophil Rel % 1.0 0.0 - 1.5 %    Neutrophils Absolute 12.36 (H) 1.70 - 7.00 10*3/mm3    Lymphocytes Absolute 0.92 0.70 - 3.10 10*3/mm3    Monocytes Absolute 0.47 0.10 - 0.90 10*3/mm3    Eosinophil Abs 0.62 (H) 0.00 - 0.40 10*3/mm3    Basophils Absolute 0.15 0.00 - 0.20 10*3/mm3    Differential Comment Comment     Comment Comment     Plt Comment Comment    CBC Auto Differential    Collection Time: 05/25/21  9:58 AM    Specimen: Blood   Result Value Ref Range    WBC 20.51 (H) 3.40 - 10.80 10*3/mm3    RBC 3.19 (L) 3.77 - 5.28 10*6/mm3    Hemoglobin 11.6 (L) 12.0 - 15.9 g/dL    Hematocrit 33.0 (L) 34.0 - 46.6 %    .4 (H) 79.0 - 97.0 fL    MCH 36.4 (H) 26.6 - 33.0 pg    MCHC 35.2 31.5 - 35.7 g/dL    RDW 16.3 (H) 12.3 - 15.4 %    RDW-SD 61.7 (H) 37.0 - 54.0 fl    MPV 11.7 6.0 - 12.0 fL    Platelets 393 140 - 450 10*3/mm3    Neutrophil % 73.7 42.7 - 76.0 %    Lymphocyte % 9.9 (L) 19.6 - 45.3 %    Monocyte % 4.3 (L) 5.0 - 12.0 %    Eosinophil % 3.4 0.3 - 6.2 %    Basophil % 1.4 0.0 - 1.5 %    Immature Grans % 7.3 (H) 0.0 - 0.5 %    Neutrophils, Absolute 15.11 (H) 1.70 - 7.00 10*3/mm3    Lymphocytes, Absolute 2.03 0.70 - 3.10 10*3/mm3    Monocytes, Absolute 0.89 0.10 - 0.90 10*3/mm3    Eosinophils, Absolute 0.70 (H) 0.00 - 0.40 10*3/mm3    Basophils, Absolute 0.29 (H) 0.00 - 0.20 10*3/mm3    Immature Grans, Absolute 1.49 (H) 0.00 - 0.05 10*3/mm3    nRBC 0.0 0.0 - 0.2 /100 WBC   Hemoglobin A1c    Collection Time: 07/22/21  8:31 AM    Specimen: Blood   Result Value Ref Range    Hemoglobin A1C 5.10 4.80 - 5.60 %   Comprehensive Metabolic Panel    Collection Time: 07/22/21  8:31 AM    Specimen: Blood   Result Value Ref Range    Glucose 113 (H) 65 - 99 mg/dL    BUN 27 (H) 8 - 23  mg/dL    Creatinine 0.96 0.57 - 1.00 mg/dL    eGFR Non African Am 56 (L) >60 mL/min/1.73    eGFR African Am 68 >60 mL/min/1.73    BUN/Creatinine Ratio 28.1 (H) 7.0 - 25.0    Sodium 148 (H) 136 - 145 mmol/L    Potassium 4.8 3.5 - 5.2 mmol/L    Chloride 109 (H) 98 - 107 mmol/L    Total CO2 26.3 22.0 - 29.0 mmol/L    Calcium 11.0 (H) 8.6 - 10.5 mg/dL    Total Protein 6.8 6.0 - 8.5 g/dL    Albumin 4.60 3.50 - 5.20 g/dL    Globulin 2.2 gm/dL    A/G Ratio 2.1 g/dL    Total Bilirubin 0.7 0.0 - 1.2 mg/dL    Alkaline Phosphatase 59 39 - 117 U/L    AST (SGOT) 19 1 - 32 U/L    ALT (SGPT) 21 1 - 33 U/L   Lipid Panel With LDL / HDL Ratio    Collection Time: 07/22/21  8:31 AM    Specimen: Blood   Result Value Ref Range    Total Cholesterol 132 0 - 200 mg/dL    Triglycerides 179 (H) 0 - 150 mg/dL    HDL Cholesterol 28 (L) 40 - 60 mg/dL    VLDL Cholesterol Jeff 31 5 - 40 mg/dL    LDL Chol Calc (NIH) 73 0 - 100 mg/dL    LDL/HDL RATIO 2.44    Ferritin    Collection Time: 08/03/21  9:58 AM    Specimen: Blood   Result Value Ref Range    Ferritin 393.10 (H) 13.00 - 150.00 ng/mL   Iron Profile    Collection Time: 08/03/21  9:58 AM    Specimen: Blood   Result Value Ref Range    Iron 83 37 - 145 mcg/dL    Iron Saturation 33 14 - 48 %    Transferrin 177 (L) 200 - 360 mg/dL    TIBC 248 (L) 249 - 505 mcg/dL   CBC Auto Differential    Collection Time: 08/03/21  9:58 AM    Specimen: Blood   Result Value Ref Range    WBC 16.41 (H) 3.40 - 10.80 10*3/mm3    RBC 2.92 (L) 3.77 - 5.28 10*6/mm3    Hemoglobin 10.7 (L) 12.0 - 15.9 g/dL    Hematocrit 31.7 (L) 34.0 - 46.6 %    .6 (H) 79.0 - 97.0 fL    MCH 36.6 (H) 26.6 - 33.0 pg    MCHC 33.8 31.5 - 35.7 g/dL    RDW 15.2 12.3 - 15.4 %    RDW-SD 59.7 (H) 37.0 - 54.0 fl    MPV 10.2 6.0 - 12.0 fL    Platelets 391 140 - 450 10*3/mm3    Neutrophil % 72.6 42.7 - 76.0 %    Lymphocyte % 9.5 (L) 19.6 - 45.3 %    Monocyte % 5.2 5.0 - 12.0 %    Eosinophil % 3.0 0.3 - 6.2 %    Basophil % 0.9 0.0 - 1.5 %     Immature Grans % 8.8 (H) 0.0 - 0.5 %    Neutrophils, Absolute 11.92 (H) 1.70 - 7.00 10*3/mm3    Lymphocytes, Absolute 1.56 0.70 - 3.10 10*3/mm3    Monocytes, Absolute 0.85 0.10 - 0.90 10*3/mm3    Eosinophils, Absolute 0.50 (H) 0.00 - 0.40 10*3/mm3    Basophils, Absolute 0.14 0.00 - 0.20 10*3/mm3    Immature Grans, Absolute 1.44 (H) 0.00 - 0.05 10*3/mm3    nRBC 0.0 0.0 - 0.2 /100 WBC   Holter Monitor - 24 Hour    Collection Time: 08/09/21  8:57 AM   Result Value Ref Range    Target HR (85%) 120 bpm    Max. Pred. HR (100%) 141 bpm   Holter Monitor - 72 Hour Up To 15 Days    Collection Time: 08/16/21 11:09 AM   Result Value Ref Range    Target HR (85%) 120 bpm    Max. Pred. HR (100%) 141 bpm   CBC Auto Differential    Collection Time: 09/03/21  1:02 PM    Specimen: Blood   Result Value Ref Range    WBC 17.68 (H) 3.40 - 10.80 10*3/mm3    RBC 3.11 (L) 3.77 - 5.28 10*6/mm3    Hemoglobin 11.2 (L) 12.0 - 15.9 g/dL    Hematocrit 32.8 (L) 34.0 - 46.6 %    .5 (H) 79.0 - 97.0 fL    MCH 36.0 (H) 26.6 - 33.0 pg    MCHC 34.1 31.5 - 35.7 g/dL    RDW 14.8 12.3 - 15.4 %    RDW-SD 57.3 (H) 37.0 - 54.0 fl    MPV 11.1 6.0 - 12.0 fL    Platelets 429 140 - 450 10*3/mm3    Neutrophil % 70.0 42.7 - 76.0 %    Lymphocyte % 11.1 (L) 19.6 - 45.3 %    Monocyte % 5.1 5.0 - 12.0 %    Eosinophil % 3.6 0.3 - 6.2 %    Basophil % 1.7 (H) 0.0 - 1.5 %    Immature Grans % 8.5 (H) 0.0 - 0.5 %    Neutrophils, Absolute 12.35 (H) 1.70 - 7.00 10*3/mm3    Lymphocytes, Absolute 1.97 0.70 - 3.10 10*3/mm3    Monocytes, Absolute 0.91 (H) 0.10 - 0.90 10*3/mm3    Eosinophils, Absolute 0.64 (H) 0.00 - 0.40 10*3/mm3    Basophils, Absolute 0.30 (H) 0.00 - 0.20 10*3/mm3    Immature Grans, Absolute 1.51 (H) 0.00 - 0.05 10*3/mm3    nRBC 0.2 0.0 - 0.2 /100 WBC   Basic Metabolic Panel    Collection Time: 09/15/21  9:43 AM    Specimen: Blood   Result Value Ref Range    Glucose 159 (H) 65 - 99 mg/dL    BUN 20 8 - 23 mg/dL    Creatinine 0.93 0.57 - 1.00 mg/dL    Sodium  142 136 - 145 mmol/L    Potassium 3.7 3.5 - 5.2 mmol/L    Chloride 104 98 - 107 mmol/L    CO2 24.5 22.0 - 29.0 mmol/L    Calcium 10.2 8.6 - 10.5 mg/dL    eGFR Non African Amer 58 (L) >60 mL/min/1.73    BUN/Creatinine Ratio 21.5 7.0 - 25.0    Anion Gap 13.5 5.0 - 15.0 mmol/L   CBC Auto Differential    Collection Time: 09/15/21  9:43 AM    Specimen: Blood   Result Value Ref Range    WBC 18.03 (H) 3.40 - 10.80 10*3/mm3    RBC 3.04 (L) 3.77 - 5.28 10*6/mm3    Hemoglobin 10.9 (L) 12.0 - 15.9 g/dL    Hematocrit 32.1 (L) 34.0 - 46.6 %    .6 (H) 79.0 - 97.0 fL    MCH 35.9 (H) 26.6 - 33.0 pg    MCHC 34.0 31.5 - 35.7 g/dL    RDW 14.6 12.3 - 15.4 %    RDW-SD 56.0 (H) 37.0 - 54.0 fl    MPV 10.5 6.0 - 12.0 fL    Platelets 407 140 - 450 10*3/mm3   Manual Differential    Collection Time: 09/15/21  9:43 AM    Specimen: Blood   Result Value Ref Range    Neutrophil % 89.9 (H) 42.7 - 76.0 %    Lymphocyte % 6.1 (L) 19.6 - 45.3 %    Monocyte % 1.0 (L) 5.0 - 12.0 %    Eosinophil % 2.0 0.3 - 6.2 %    Basophil % 1.0 0.0 - 1.5 %    Neutrophils Absolute 16.21 (H) 1.70 - 7.00 10*3/mm3    Lymphocytes Absolute 1.10 0.70 - 3.10 10*3/mm3    Monocytes Absolute 0.18 0.10 - 0.90 10*3/mm3    Eosinophils Absolute 0.36 0.00 - 0.40 10*3/mm3    Basophils Absolute 0.18 0.00 - 0.20 10*3/mm3    RBC Morphology Normal Normal    WBC Morphology Normal Normal    Platelet Morphology Normal Normal   COVID-19,APTIMA PANTHER(HILDA),[facility], NP/OP SWAB IN UTM/VTM/[facility] TRANSPORT MEDIA,24 HR TAT - Swab, Nasal Cavity    Collection Time: 09/15/21  9:53 AM    Specimen: Nasal Cavity; Swab   Result Value Ref Range    COVID19 Not Detected Not Detected - Ref. Range   POC Glucose Once    Collection Time: 09/17/21  9:41 AM    Specimen: Blood   Result Value Ref Range    Glucose 102 70 - 130 mg/dL   Comprehensive Metabolic Panel    Collection Time: 10/04/21  8:06 AM    Specimen: Blood   Result Value Ref Range    Glucose 114 74 - 124 mg/dL    BUN 26 (H) 6 - 20 mg/dL     Creatinine 0.94 0.60 - 1.10 mg/dL    Sodium 140 134 - 145 mmol/L    Potassium 4.7 3.5 - 4.7 mmol/L    Chloride 105 98 - 107 mmol/L    CO2 25.3 22.0 - 29.0 mmol/L    Calcium 10.5 (H) 8.5 - 10.2 mg/dL    Total Protein 6.5 6.3 - 8.0 g/dL    Albumin 4.50 3.50 - 5.20 g/dL    ALT (SGPT) 22 0 - 33 U/L    AST (SGOT) 27 0 - 32 U/L    Alkaline Phosphatase 70 38 - 116 U/L    Total Bilirubin 0.8 0.2 - 1.2 mg/dL    eGFR Non African Amer 57 (L) >60 mL/min/1.73    Globulin 2.0 1.8 - 3.5 gm/dL    A/G Ratio 2.3 1.1 - 2.4 g/dL    BUN/Creatinine Ratio 27.7 7.3 - 30.0    Anion Gap 9.7 5.0 - 15.0 mmol/L   Ferritin    Collection Time: 10/04/21  8:06 AM    Specimen: Blood   Result Value Ref Range    Ferritin 325.10 (H) 13.00 - 150.00 ng/mL   Iron Profile    Collection Time: 10/04/21  8:06 AM    Specimen: Blood   Result Value Ref Range    Iron 77 37 - 145 mcg/dL    Iron Saturation 29 14 - 48 %    Transferrin 191 (L) 200 - 360 mg/dL    TIBC 267 249 - 505 mcg/dL   CBC Auto Differential    Collection Time: 10/04/21  8:06 AM    Specimen: Blood   Result Value Ref Range    WBC 15.27 (H) 3.40 - 10.80 10*3/mm3    RBC 2.80 (L) 3.77 - 5.28 10*6/mm3    Hemoglobin 10.0 (L) 12.0 - 15.9 g/dL    Hematocrit 30.4 (L) 34.0 - 46.6 %    .6 (H) 79.0 - 97.0 fL    MCH 35.7 (H) 26.6 - 33.0 pg    MCHC 32.9 31.5 - 35.7 g/dL    RDW 15.9 (H) 12.3 - 15.4 %    RDW-SD 62.7 (H) 37.0 - 54.0 fl    MPV 11.0 6.0 - 12.0 fL    Platelets 375 140 - 450 10*3/mm3    Neutrophil % 69.2 42.7 - 76.0 %    Lymphocyte % 11.9 (L) 19.6 - 45.3 %    Monocyte % 5.1 5.0 - 12.0 %    Eosinophil % 4.0 0.3 - 6.2 %    Basophil % 1.0 0.0 - 1.5 %    Immature Grans % 8.8 (H) 0.0 - 0.5 %    Neutrophils, Absolute 10.55 (H) 1.70 - 7.00 10*3/mm3    Lymphocytes, Absolute 1.82 0.70 - 3.10 10*3/mm3    Monocytes, Absolute 0.78 0.10 - 0.90 10*3/mm3    Eosinophils, Absolute 0.61 (H) 0.00 - 0.40 10*3/mm3    Basophils, Absolute 0.16 0.00 - 0.20 10*3/mm3    Immature Grans, Absolute 1.35 (H) 0.00 - 0.05  "10*3/mm3    nRBC 0.2 0.0 - 0.2 /100 WBC     Objective   Vitals:    10/14/21 0906   BP: 101/64   Pulse: 68   Temp: 98.2 °F (36.8 °C)   SpO2: 97%   Weight: 68 kg (150 lb)   Height: 147.3 cm (57.99\")     Body mass index is 31.36 kg/m².  Physical Exam  Vitals and nursing note reviewed.   Constitutional:       General: She is not in acute distress.     Appearance: She is well-developed. She is not diaphoretic.   Cardiovascular:      Rate and Rhythm: Normal rate and regular rhythm.   Pulmonary:      Effort: Pulmonary effort is normal. No respiratory distress.      Breath sounds: Normal breath sounds. No wheezing.           Diagnoses and all orders for this visit:    1. Type 2 diabetes mellitus without complication, without long-term current use of insulin ([facility]) (Primary)  -     Hemoglobin A1c  -     Comprehensive Metabolic Panel  -     Lipid Panel With LDL / HDL Ratio    2. Primary hypertension    3. Hypercalcemia  -     PTH, Intact & Calcium  -     Calcium, Ionized    4. Coronary artery disease of native heart with stable angina pectoris, unspecified vessel or lesion type (HCC)  -     ketoconazole (NIZORAL) 2 % cream; Apply  topically to the appropriate area as directed Daily.  Dispense: 30 g; Refill: 11      DM: follow up  Stable, last A1c 5.1, counseled that she did not need Metrormin  HTN: stable on meds  Hyperlipidemia: stable  CAD stable    Return in about 3 months (around 1/14/2022) for DIABETES.        "

## 2021-10-15 LAB
ALBUMIN SERPL-MCNC: 4.6 G/DL (ref 3.5–5.2)
ALBUMIN/GLOB SERPL: 2.3 G/DL
ALP SERPL-CCNC: 59 U/L (ref 39–117)
ALT SERPL-CCNC: 21 U/L (ref 1–33)
AST SERPL-CCNC: 23 U/L (ref 1–32)
BILIRUB SERPL-MCNC: 0.6 MG/DL (ref 0–1.2)
BUN SERPL-MCNC: 30 MG/DL (ref 8–23)
BUN/CREAT SERPL: 32.6 (ref 7–25)
CA-I SERPL ISE-MCNC: 5.9 MG/DL (ref 4.5–5.6)
CALCIUM SERPL-MCNC: 11.1 MG/DL (ref 8.6–10.5)
CHLORIDE SERPL-SCNC: 107 MMOL/L (ref 98–107)
CHOLEST SERPL-MCNC: 150 MG/DL (ref 0–200)
CO2 SERPL-SCNC: 23.6 MMOL/L (ref 22–29)
CREAT SERPL-MCNC: 0.92 MG/DL (ref 0.57–1)
GLOBULIN SER CALC-MCNC: 2 GM/DL
GLUCOSE SERPL-MCNC: 98 MG/DL (ref 65–99)
HBA1C MFR BLD: 4.6 % (ref 4.8–5.6)
HDLC SERPL-MCNC: 27 MG/DL (ref 40–60)
INTACT PTH: NORMAL
LDLC SERPL CALC-MCNC: 81 MG/DL (ref 0–100)
LDLC/HDLC SERPL: 2.67 {RATIO}
POTASSIUM SERPL-SCNC: 4.5 MMOL/L (ref 3.5–5.2)
PROT SERPL-MCNC: 6.6 G/DL (ref 6–8.5)
PTH-INTACT SERPL-MCNC: 44 PG/ML (ref 15–65)
SODIUM SERPL-SCNC: 143 MMOL/L (ref 136–145)
TRIGL SERPL-MCNC: 255 MG/DL (ref 0–150)
VLDLC SERPL CALC-MCNC: 42 MG/DL (ref 5–40)

## 2021-10-27 ENCOUNTER — CLINICAL SUPPORT NO REQUIREMENTS (OUTPATIENT)
Dept: CARDIOLOGY | Facility: CLINIC | Age: 81
End: 2021-10-27

## 2021-10-27 DIAGNOSIS — I49.5 SSS (SICK SINUS SYNDROME) (HCC): Primary | ICD-10-CM

## 2021-11-08 ENCOUNTER — INFUSION (OUTPATIENT)
Dept: ONCOLOGY | Facility: HOSPITAL | Age: 81
End: 2021-11-08

## 2021-11-08 VITALS — HEIGHT: 58 IN | BODY MASS INDEX: 31.35 KG/M2 | TEMPERATURE: 96.6 F | RESPIRATION RATE: 18 BRPM

## 2021-11-08 DIAGNOSIS — M81.8 OTHER OSTEOPOROSIS WITHOUT CURRENT PATHOLOGICAL FRACTURE: Primary | ICD-10-CM

## 2021-11-08 PROCEDURE — 25010000002 DENOSUMAB 60 MG/ML SOLUTION PREFILLED SYRINGE: Performed by: FAMILY MEDICINE

## 2021-11-08 PROCEDURE — 96372 THER/PROPH/DIAG INJ SC/IM: CPT

## 2021-11-08 RX ADMIN — DENOSUMAB 60 MG: 60 INJECTION SUBCUTANEOUS at 14:17

## 2021-11-15 ENCOUNTER — CLINICAL SUPPORT (OUTPATIENT)
Dept: ONCOLOGY | Facility: HOSPITAL | Age: 81
End: 2021-11-15

## 2021-11-15 ENCOUNTER — LAB (OUTPATIENT)
Dept: LAB | Facility: HOSPITAL | Age: 81
End: 2021-11-15

## 2021-11-15 DIAGNOSIS — D50.9 IRON DEFICIENCY ANEMIA, UNSPECIFIED IRON DEFICIENCY ANEMIA TYPE: ICD-10-CM

## 2021-11-15 DIAGNOSIS — Z79.899 ENCOUNTER FOR LONG-TERM CURRENT USE OF HIGH RISK MEDICATION: ICD-10-CM

## 2021-11-15 DIAGNOSIS — D47.1 MYELOPROLIFERATIVE DISORDER (HCC): ICD-10-CM

## 2021-11-15 LAB
BASOPHILS # BLD AUTO: 0.18 10*3/MM3 (ref 0–0.2)
BASOPHILS NFR BLD AUTO: 1.1 % (ref 0–1.5)
DEPRECATED RDW RBC AUTO: 56.3 FL (ref 37–54)
EOSINOPHIL # BLD AUTO: 0.55 10*3/MM3 (ref 0–0.4)
EOSINOPHIL NFR BLD AUTO: 3.4 % (ref 0.3–6.2)
ERYTHROCYTE [DISTWIDTH] IN BLOOD BY AUTOMATED COUNT: 14.9 % (ref 12.3–15.4)
FERRITIN SERPL-MCNC: 302.2 NG/ML (ref 13–150)
HCT VFR BLD AUTO: 30.7 % (ref 34–46.6)
HGB BLD-MCNC: 10.3 G/DL (ref 12–15.9)
IMM GRANULOCYTES # BLD AUTO: 1.49 10*3/MM3 (ref 0–0.05)
IMM GRANULOCYTES NFR BLD AUTO: 9.1 % (ref 0–0.5)
IRON 24H UR-MRATE: 77 MCG/DL (ref 37–145)
IRON SATN MFR SERPL: 29 % (ref 14–48)
LYMPHOCYTES # BLD AUTO: 1.57 10*3/MM3 (ref 0.7–3.1)
LYMPHOCYTES NFR BLD AUTO: 9.6 % (ref 19.6–45.3)
MCH RBC QN AUTO: 35.3 PG (ref 26.6–33)
MCHC RBC AUTO-ENTMCNC: 33.6 G/DL (ref 31.5–35.7)
MCV RBC AUTO: 105.1 FL (ref 79–97)
MONOCYTES # BLD AUTO: 0.88 10*3/MM3 (ref 0.1–0.9)
MONOCYTES NFR BLD AUTO: 5.4 % (ref 5–12)
NEUTROPHILS NFR BLD AUTO: 11.66 10*3/MM3 (ref 1.7–7)
NEUTROPHILS NFR BLD AUTO: 71.4 % (ref 42.7–76)
NRBC BLD AUTO-RTO: 0 /100 WBC (ref 0–0.2)
PLATELET # BLD AUTO: 391 10*3/MM3 (ref 140–450)
PMV BLD AUTO: 10.2 FL (ref 6–12)
RBC # BLD AUTO: 2.92 10*6/MM3 (ref 3.77–5.28)
TIBC SERPL-MCNC: 269 MCG/DL (ref 249–505)
TRANSFERRIN SERPL-MCNC: 192 MG/DL (ref 200–360)
WBC # BLD AUTO: 16.33 10*3/MM3 (ref 3.4–10.8)

## 2021-11-15 PROCEDURE — 83540 ASSAY OF IRON: CPT

## 2021-11-15 PROCEDURE — 36415 COLL VENOUS BLD VENIPUNCTURE: CPT

## 2021-11-15 PROCEDURE — G0463 HOSPITAL OUTPT CLINIC VISIT: HCPCS

## 2021-11-15 PROCEDURE — 84466 ASSAY OF TRANSFERRIN: CPT

## 2021-11-15 PROCEDURE — 85025 COMPLETE CBC W/AUTO DIFF WBC: CPT

## 2021-11-15 PROCEDURE — 82728 ASSAY OF FERRITIN: CPT

## 2021-11-15 NOTE — NURSING NOTE
Pt and her Daughter here today for CBC, iron panel and RN review. Daughter is translating for patient. CBC stable for this patient. She did not voice any new problems or complaints. Still taking Hydrea 500 mg m/w/f and tolerating well without problems. Copy of labs given to pt and Daughter. Both v/u.    Lab Results   Component Value Date    WBC 16.33 (H) 11/15/2021    HGB 10.3 (L) 11/15/2021    HCT 30.7 (L) 11/15/2021    .1 (H) 11/15/2021     11/15/2021

## 2021-12-26 DIAGNOSIS — E13.69 OTHER SPECIFIED DIABETES MELLITUS WITH OTHER SPECIFIED COMPLICATION, UNSPECIFIED WHETHER LONG TERM INSULIN USE (HCC): ICD-10-CM

## 2021-12-26 RX ORDER — FLUOCINONIDE CREAM (EMULSIFIED BASE) 0.5 MG/G
CREAM TOPICAL
Qty: 60 G | Refills: 11 | OUTPATIENT
Start: 2021-12-26

## 2022-01-06 ENCOUNTER — OFFICE VISIT (OUTPATIENT)
Dept: ONCOLOGY | Facility: CLINIC | Age: 82
End: 2022-01-06

## 2022-01-06 ENCOUNTER — LAB (OUTPATIENT)
Dept: LAB | Facility: HOSPITAL | Age: 82
End: 2022-01-06

## 2022-01-06 VITALS
OXYGEN SATURATION: 97 % | DIASTOLIC BLOOD PRESSURE: 60 MMHG | WEIGHT: 151.8 LBS | BODY MASS INDEX: 31.87 KG/M2 | RESPIRATION RATE: 16 BRPM | HEART RATE: 68 BPM | HEIGHT: 58 IN | SYSTOLIC BLOOD PRESSURE: 143 MMHG | TEMPERATURE: 97.3 F

## 2022-01-06 DIAGNOSIS — D47.1 MYELOPROLIFERATIVE DISORDER: ICD-10-CM

## 2022-01-06 DIAGNOSIS — Z79.899 ENCOUNTER FOR LONG-TERM CURRENT USE OF HIGH RISK MEDICATION: ICD-10-CM

## 2022-01-06 DIAGNOSIS — D50.9 IRON DEFICIENCY ANEMIA, UNSPECIFIED IRON DEFICIENCY ANEMIA TYPE: ICD-10-CM

## 2022-01-06 DIAGNOSIS — D47.1 MYELOPROLIFERATIVE DISORDER: Primary | ICD-10-CM

## 2022-01-06 DIAGNOSIS — R10.11 ABDOMINAL PAIN, RUQ: ICD-10-CM

## 2022-01-06 DIAGNOSIS — E83.52 HYPERCALCEMIA: ICD-10-CM

## 2022-01-06 LAB
ALBUMIN SERPL-MCNC: 4.3 G/DL (ref 3.5–5.2)
ALBUMIN/GLOB SERPL: 1.9 G/DL (ref 1.1–2.4)
ALP SERPL-CCNC: 79 U/L (ref 38–116)
ALT SERPL W P-5'-P-CCNC: 16 U/L (ref 0–33)
ANION GAP SERPL CALCULATED.3IONS-SCNC: 8.6 MMOL/L (ref 5–15)
AST SERPL-CCNC: 17 U/L (ref 0–32)
BASOPHILS # BLD AUTO: 0.17 10*3/MM3 (ref 0–0.2)
BASOPHILS NFR BLD AUTO: 1 % (ref 0–1.5)
BILIRUB SERPL-MCNC: 0.5 MG/DL (ref 0.2–1.2)
BUN SERPL-MCNC: 19 MG/DL (ref 6–20)
BUN/CREAT SERPL: 21.1 (ref 7.3–30)
CALCIUM SPEC-SCNC: 10.7 MG/DL (ref 8.5–10.2)
CHLORIDE SERPL-SCNC: 104 MMOL/L (ref 98–107)
CO2 SERPL-SCNC: 26.4 MMOL/L (ref 22–29)
CREAT SERPL-MCNC: 0.9 MG/DL (ref 0.6–1.1)
DEPRECATED RDW RBC AUTO: 59.5 FL (ref 37–54)
EOSINOPHIL # BLD AUTO: 0.66 10*3/MM3 (ref 0–0.4)
EOSINOPHIL NFR BLD AUTO: 4 % (ref 0.3–6.2)
ERYTHROCYTE [DISTWIDTH] IN BLOOD BY AUTOMATED COUNT: 15.4 % (ref 12.3–15.4)
FERRITIN SERPL-MCNC: 224 NG/ML (ref 13–150)
GFR SERPL CREATININE-BSD FRML MDRD: 60 ML/MIN/1.73
GLOBULIN UR ELPH-MCNC: 2.3 GM/DL (ref 1.8–3.5)
GLUCOSE SERPL-MCNC: 120 MG/DL (ref 74–124)
HCT VFR BLD AUTO: 30.6 % (ref 34–46.6)
HGB BLD-MCNC: 10.2 G/DL (ref 12–15.9)
IMM GRANULOCYTES # BLD AUTO: 1.35 10*3/MM3 (ref 0–0.05)
IMM GRANULOCYTES NFR BLD AUTO: 8.1 % (ref 0–0.5)
IRON 24H UR-MRATE: 54 MCG/DL (ref 37–145)
IRON SATN MFR SERPL: 21 % (ref 14–48)
LYMPHOCYTES # BLD AUTO: 1.63 10*3/MM3 (ref 0.7–3.1)
LYMPHOCYTES NFR BLD AUTO: 9.8 % (ref 19.6–45.3)
MCH RBC QN AUTO: 35.2 PG (ref 26.6–33)
MCHC RBC AUTO-ENTMCNC: 33.3 G/DL (ref 31.5–35.7)
MCV RBC AUTO: 105.5 FL (ref 79–97)
MONOCYTES # BLD AUTO: 0.85 10*3/MM3 (ref 0.1–0.9)
MONOCYTES NFR BLD AUTO: 5.1 % (ref 5–12)
NEUTROPHILS NFR BLD AUTO: 12 10*3/MM3 (ref 1.7–7)
NEUTROPHILS NFR BLD AUTO: 72 % (ref 42.7–76)
NRBC BLD AUTO-RTO: 0 /100 WBC (ref 0–0.2)
PLATELET # BLD AUTO: 352 10*3/MM3 (ref 140–450)
PMV BLD AUTO: 11.6 FL (ref 6–12)
POTASSIUM SERPL-SCNC: 4.2 MMOL/L (ref 3.5–4.7)
PROT SERPL-MCNC: 6.6 G/DL (ref 6.3–8)
RBC # BLD AUTO: 2.9 10*6/MM3 (ref 3.77–5.28)
SODIUM SERPL-SCNC: 139 MMOL/L (ref 134–145)
TIBC SERPL-MCNC: 260 MCG/DL (ref 249–505)
TRANSFERRIN SERPL-MCNC: 186 MG/DL (ref 200–360)
WBC NRBC COR # BLD: 16.66 10*3/MM3 (ref 3.4–10.8)

## 2022-01-06 PROCEDURE — 84466 ASSAY OF TRANSFERRIN: CPT

## 2022-01-06 PROCEDURE — 80053 COMPREHEN METABOLIC PANEL: CPT

## 2022-01-06 PROCEDURE — 82728 ASSAY OF FERRITIN: CPT

## 2022-01-06 PROCEDURE — 99214 OFFICE O/P EST MOD 30 MIN: CPT | Performed by: INTERNAL MEDICINE

## 2022-01-06 PROCEDURE — 36415 COLL VENOUS BLD VENIPUNCTURE: CPT

## 2022-01-06 PROCEDURE — 83540 ASSAY OF IRON: CPT

## 2022-01-06 PROCEDURE — 85025 COMPLETE CBC W/AUTO DIFF WBC: CPT

## 2022-01-06 NOTE — PROGRESS NOTES
Subjective     CHIEF COMPLAINT:      Chief Complaint   Patient presents with   • Follow-up     discuss labs       HISTORY OF PRESENT ILLNESS:     Ayla Castellano is a 82 y.o. female patient who returns today for follow up on her myeloproliferative disorder and anemia.  She is accompanied by her grandson.  She is on Hydrea 500 mg 3 days a week on Mondays Wednesdays and Fridays.  She is tolerating it without nausea or vomiting.  No chest pain or shortness of breath.    Patient reports abdominal pain that is intermittent.  The last time it affected her was yesterday.  She reports the pain to be in the right upper quadrant.  She states that she was eating more fatty food during the holidays.  She feels that mayonnaise was making her have more abdominal pain.  Patient had a cholecystectomy in 2019.      ROS:  Pertinent ROS is in the HPI.     Past medical, surgical, social and family history were reviewed.     MEDICATIONS:    Current Outpatient Medications:   •  Accu-Chek FastClix Lancets misc, To test blood sugar 3 times daily., Disp: 100 each, Rfl: 8  •  amLODIPine (NORVASC) 5 MG tablet, Take 1 tablet by mouth Daily., Disp: 90 tablet, Rfl: 3  •  aspirin 81 MG tablet, Take 81 mg by mouth Daily., Disp: , Rfl:   •  atorvastatin (LIPITOR) 20 MG tablet, Take 1 tablet by mouth Daily., Disp: 90 tablet, Rfl: 3  •  Cyanocobalamin (B-12) 2500 MCG sublingual tablet, Place 1 tablet under the tongue Daily., Disp: 30 tablet, Rfl: 3  •  diclofenac (VOLTAREN) 1 % gel gel, Apply 4 g topically to the appropriate area as directed 3 (Three) Times a Day., Disp: 5 tube, Rfl: 11  •  glucose blood (ONE TOUCH ULTRA TEST) test strip, USE TO TEST BLOOD SUGAR EVERY DAY. (E11.9), Disp: 100 each, Rfl: 2  •  hydroxyurea (HYDREA) 500 MG capsule, Take 500 mg by mouth 3 (Three) Times a Week. Monday Wednesday Friday, Disp: , Rfl:   •  isosorbide mononitrate (IMDUR) 60 MG 24 hr tablet, Take 1 tablet by mouth Daily., Disp: 30 tablet, Rfl: 11  •   "ketoconazole (NIZORAL) 2 % cream, Apply  topically to the appropriate area as directed Daily., Disp: 30 g, Rfl: 11  •  mupirocin (Bactroban) 2 % ointment, Apply  topically to the appropriate area as directed 3 (Three) Times a Day., Disp: 30 g, Rfl: 11  •  ofloxacin (OCUFLOX) 0.3 % ophthalmic solution, Administer 2 drops to both eyes 4 (Four) Times a Day., Disp: 10 mL, Rfl: 11  •  oxybutynin (DITROPAN) 5 MG tablet, Take 1 tablet by mouth 2 (Two) Times a Day., Disp: 60 tablet, Rfl: 5  •  oxyCODONE-acetaminophen (PERCOCET) 5-325 MG per tablet, Take 1-2 tablets by mouth Every 4 (Four) Hours As Needed (Pain)., Disp: 10 tablet, Rfl: 0  •  valsartan-hydrochlorothiazide (DIOVAN-HCT) 320-25 MG per tablet, Take 1 tablet by mouth Daily., Disp: 90 tablet, Rfl: 3    Objective   VITAL SIGNS:     Vitals:    01/06/22 1215   BP: 143/60   Pulse: 68   Resp: 16   Temp: 97.3 °F (36.3 °C)   TempSrc: Temporal   SpO2: 97%   Weight: 68.9 kg (151 lb 12.8 oz)   Height: 147.3 cm (57.99\")   PainSc: 0-No pain     Body mass index is 31.73 kg/m².     Wt Readings from Last 5 Encounters:   01/06/22 68.9 kg (151 lb 12.8 oz)   10/14/21 68 kg (150 lb)   10/04/21 69.1 kg (152 lb 4.8 oz)   09/17/21 67.6 kg (149 lb)   08/04/21 70.2 kg (154 lb 12.8 oz)       PHYSICAL EXAMINATION:   GENERAL: The patient appears in good general condition, not in acute distress.   SKIN: No ecchymosis.  EYES: No jaundice.  LYMPHATICS: No cervical lymphadenopathy.  CHEST: Normal respiratory effort.  Lungs clear bilaterally.  No added sounds.  CVS: Normal S1-S2.  No murmurs.  ABDOMEN: Soft.  Right upper quadrant tenderness.  No masses in the area. No Hepatomegaly. No Splenomegaly.   EXTREMITIES: No deforming arthritis.     DIAGNOSTIC DATA:     Results from last 7 days   Lab Units 01/06/22  1200   WBC 10*3/mm3 16.66*   NEUTROS ABS 10*3/mm3 12.00*   HEMOGLOBIN g/dL 10.2*   HEMATOCRIT % 30.6*   PLATELETS 10*3/mm3 352     Results from last 7 days   Lab Units 01/06/22  1200   SODIUM " mmol/L 139   POTASSIUM mmol/L 4.2   CHLORIDE mmol/L 104   CO2 mmol/L 26.4   BUN mg/dL 19   CREATININE mg/dL 0.90   CALCIUM mg/dL 10.7*   ALBUMIN g/dL 4.30   BILIRUBIN mg/dL 0.5   ALK PHOS U/L 79   ALT (SGPT) U/L 16   AST (SGOT) U/L 17   GLUCOSE mg/dL 120         Lab 01/06/22  1200   IRON 54   IRON SATURATION 21   TIBC 260   TRANSFERRIN 186*   FERRITIN 224.00*      Assessment/Plan   *Myeloproliferative disorder, OBINNA-2 mutation positive.   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   · She was under the care of an outside Hematologist before transferring care to our practice. Records indicate that she was previously diagnosed with Essential thrombocythemia.   · She has elevated WBC and basophil count.   · Therefore, a diagnosis of myeloproliferative disorder or polycythemia vera was favored over ET.  · BCR ABL was negative by FISH on 2/2/2021.  · In February 2021, WBC count was gradually increasing indicating that her disease was not under a good control likely due to the dose of Hydroxyurea not being effective (500 mg every 4 days).   · The dose was increased to every 3 days on 2/2/2021.  · Due to the persistence of the leukocytosis, the dose of Hydrea was increased to 4 days a week.  · Hemoglobin decreased to 10.0 on 10/4/2021.  WBC count was 15,270.  · Due to the worsening anemia, we reduced the dose of Hydrea back to 3 days a week.  · Hemoglobin is today at 10.2.  WBC count is 16,660.  · She is tolerating Hydrea at this dose.  She would not be able to tolerate a higher dose since it may worsen her anemia.  · I explained the option of Jakafi if there is significant difficulty with controlling her disease with Hydrea.  At this point, we would not consider this medicine.    *Iron deficiency anemia.   · This is attributed to iron deficiency with a transferrin saturation of 17% and a ferritin of 26.   · The patient did not tolerate oral in the past with development of upset stomach, abdominal pain,  headaches and dizziness when she took the oral iron in the past.  · Patient received IV iron the 2020 and tolerated that well.   · Patient was given IV Injectafer on 2/5/2021 and 2/12/2021.  · Hemoglobin was 10.0 on 10/4/2021.  · Hemoglobin is today at 10.2.  · Iron stores are adequate today.    *History of colon cancer, stage I.   · She is s/p partial colectomy by Dr. Ku.   · She did not require adjuvant chemotherapy or radiation therapy.    · Patient is complaining of abdominal pain in the right upper quadrant.  There is tenderness on exam.  She had her gallbladder removed in 2019.  · I recommended obtaining CT scan of the abdomen pelvis to further evaluate the pain especially with her history of colon cancer.      PLAN:    1.  Continue Hydrea 500 mg 3 days a week (on Mondays Wednesdays and Fridays).  2.  Patient does not need IV iron therapy at this point.  3.  Obtain CT scan of the abdomen pelvis in 1 week.  She has follow-up with her primary care physician on 1/17/2022 to evaluate abdominal pain.  4.  If the CT scan does not show evidence of recurrence of the colon cancer, we will obtain CBC ferritin iron panel in 2 months with RN review.  We will see her in follow-up in 4 months with CBC CMP ferritin iron panel.      Izzy Martinez MD  01/06/22

## 2022-01-07 ENCOUNTER — TELEPHONE (OUTPATIENT)
Dept: ONCOLOGY | Facility: CLINIC | Age: 82
End: 2022-01-07

## 2022-01-14 ENCOUNTER — HOSPITAL ENCOUNTER (OUTPATIENT)
Dept: CT IMAGING | Facility: HOSPITAL | Age: 82
Discharge: HOME | End: 2022-01-14

## 2022-01-14 DIAGNOSIS — R10.11 ABDOMINAL PAIN, RUQ: ICD-10-CM

## 2022-01-14 PROCEDURE — 25010000002 IOPAMIDOL 61 % SOLUTION: Performed by: INTERNAL MEDICINE

## 2022-01-14 PROCEDURE — 74177 CT ABD & PELVIS W/CONTRAST: CPT

## 2022-01-14 PROCEDURE — 0 DIATRIZOATE MEGLUMINE & SODIUM PER 1 ML: Performed by: INTERNAL MEDICINE

## 2022-01-14 RX ADMIN — IOPAMIDOL 85 ML: 612 INJECTION, SOLUTION INTRAVENOUS at 11:08

## 2022-01-14 RX ADMIN — DIATRIZOATE MEGLUMINE AND DIATRIZOATE SODIUM 30 ML: 600; 100 SOLUTION ORAL; RECTAL at 09:50

## 2022-01-14 NOTE — NURSING NOTE
Patient speaks [place], has  Vance Kelly.  Here for outpatient CT. Last Creatinine was 0.9, GFR was 64 on 01/06/22.  Ok to use per lead Ct techYary.

## 2022-01-18 ENCOUNTER — TELEPHONE (OUTPATIENT)
Dept: ONCOLOGY | Facility: CLINIC | Age: 82
End: 2022-01-18

## 2022-01-18 NOTE — TELEPHONE ENCOUNTER
----- Message from Izzy Martinez MD sent at 1/17/2022  9:20 PM EST -----  Please inform the patient that the CT scan showed no abnormality to explain the abdominal pain. She will need a follow up scan in the future to follow up on a kidney abnormality. I will defer to her PCP further evaluation of the abdominal pain.    Thank you

## 2022-01-19 ENCOUNTER — CLINICAL SUPPORT NO REQUIREMENTS (OUTPATIENT)
Dept: CARDIOLOGY | Facility: CLINIC | Age: 82
End: 2022-01-19

## 2022-01-19 DIAGNOSIS — I49.5 SSS (SICK SINUS SYNDROME): Primary | ICD-10-CM

## 2022-01-19 PROCEDURE — 93280 PM DEVICE PROGR EVAL DUAL: CPT | Performed by: INTERNAL MEDICINE

## 2022-02-01 ENCOUNTER — OFFICE VISIT (OUTPATIENT)
Dept: FAMILY MEDICINE CLINIC | Facility: CLINIC | Age: 82
End: 2022-02-01

## 2022-02-01 VITALS
BODY MASS INDEX: 32.24 KG/M2 | WEIGHT: 153.6 LBS | HEIGHT: 58 IN | HEART RATE: 97 BPM | OXYGEN SATURATION: 96 % | DIASTOLIC BLOOD PRESSURE: 80 MMHG | SYSTOLIC BLOOD PRESSURE: 142 MMHG | TEMPERATURE: 97.8 F

## 2022-02-01 DIAGNOSIS — I10 PRIMARY HYPERTENSION: Primary | ICD-10-CM

## 2022-02-01 DIAGNOSIS — E11.9 TYPE 2 DIABETES MELLITUS WITHOUT COMPLICATION, WITHOUT LONG-TERM CURRENT USE OF INSULIN: ICD-10-CM

## 2022-02-01 DIAGNOSIS — E78.2 MIXED HYPERLIPIDEMIA: ICD-10-CM

## 2022-02-01 PROCEDURE — 99214 OFFICE O/P EST MOD 30 MIN: CPT | Performed by: FAMILY MEDICINE

## 2022-02-01 NOTE — PROGRESS NOTES
Juliet Castellano is a 82 y.o. female.     Chief Complaint   Patient presents with   • Diabetes       History of Present Illness     Beatties follow-up stable.  Patient was counseled that with A1c less than 5 as per last labs she really does not need to take Metformin.  She is insisting that off Metformin for the last several weeks after the last appointment her blood sugar got up to 170 and she restarted taking Metformin and insisting on the refill.  Hyperlipidemia stable.  Hyper tension is stable.  All on current medications.  Labs today.    The following portions of the patient's history were reviewed and updated as appropriate: allergies, current medications, past family history, past medical history, past social history, past surgical history and problem list.    Past Medical History:   Diagnosis Date   • Arrhythmia    • Colitis    • Colon cancer (HCC)     s/p partial colectomy in 2011   • Diabetes mellitus (HCC)    • Gallbladder disease    • Hard to intubate    • Hyperlipidemia    • Hypertension    • Left arm pain    • SSS (sick sinus syndrome) (HCC)    • Syncope     Secondary to medications (clonidine and metoprolol) in 3/15.  Had severe bradycardia (sinus arrest with junctional escape beats).  Resolved after medications discontinued.       Past Surgical History:   Procedure Laterality Date   • CARDIAC ELECTROPHYSIOLOGY PROCEDURE N/A 9/17/2021    Procedure: Pacemaker DC new;  Surgeon: Richard Alaniz MD;  Location: [facility] INVASIVE LOCATION;  Service: Cardiology;  Laterality: N/A;   • CHOLECYSTECTOMY  10/2019   • COLECTOMY PARTIAL / TOTAL      2011 for colon cancer   • COLONOSCOPY  01/20/2020   • UTERINE FIBROID SURGERY      s/p uterine fibroma resection       Family History   Problem Relation Age of Onset   • Heart disease Mother         pacemaker placement   • Stroke Mother    • Colon cancer Brother    • Stroke Father    • Diabetes Other    • Kidney disease Other    • Hyperlipidemia  Other    • Arthritis Other        Social History     Socioeconomic History   • Marital status:    Tobacco Use   • Smoking status: Never Smoker   • Smokeless tobacco: Never Used   Substance and Sexual Activity   • Alcohol use: No   • Drug use: No   • Sexual activity: Defer       Current Outpatient Medications on File Prior to Visit   Medication Sig Dispense Refill   • Accu-Chek FastClix Lancets misc To test blood sugar 3 times daily. 100 each 8   • amLODIPine (NORVASC) 5 MG tablet Take 1 tablet by mouth Daily. 90 tablet 3   • aspirin 81 MG tablet Take 81 mg by mouth Daily.     • atorvastatin (LIPITOR) 20 MG tablet Take 1 tablet by mouth Daily. 90 tablet 3   • Cyanocobalamin (B-12) 2500 MCG sublingual tablet Place 1 tablet under the tongue Daily. 30 tablet 3   • diclofenac (VOLTAREN) 1 % gel gel Apply 4 g topically to the appropriate area as directed 3 (Three) Times a Day. 5 tube 11   • glucose blood (ONE TOUCH ULTRA TEST) test strip USE TO TEST BLOOD SUGAR EVERY DAY. (E11.9) 100 each 2   • hydroxyurea (HYDREA) 500 MG capsule Take 500 mg by mouth 3 (Three) Times a Week. Monday Wednesday Friday     • isosorbide mononitrate (IMDUR) 60 MG 24 hr tablet Take 1 tablet by mouth Daily. 30 tablet 11   • ketoconazole (NIZORAL) 2 % cream Apply  topically to the appropriate area as directed Daily. 30 g 11   • mupirocin (Bactroban) 2 % ointment Apply  topically to the appropriate area as directed 3 (Three) Times a Day. 30 g 11   • ofloxacin (OCUFLOX) 0.3 % ophthalmic solution Administer 2 drops to both eyes 4 (Four) Times a Day. 10 mL 11   • oxybutynin (DITROPAN) 5 MG tablet Take 1 tablet by mouth 2 (Two) Times a Day. 60 tablet 5   • oxyCODONE-acetaminophen (PERCOCET) 5-325 MG per tablet Take 1-2 tablets by mouth Every 4 (Four) Hours As Needed (Pain). 10 tablet 0   • valsartan-hydrochlorothiazide (DIOVAN-HCT) 320-25 MG per tablet Take 1 tablet by mouth Daily. 90 tablet 3     No current facility-administered medications on  file prior to visit.       Review of Systems   Constitutional: Negative.        Recent Results (from the past 4704 hour(s))   Hemoglobin A1c    Collection Time: 07/22/21  8:31 AM    Specimen: Blood   Result Value Ref Range    Hemoglobin A1C 5.10 4.80 - 5.60 %   Comprehensive Metabolic Panel    Collection Time: 07/22/21  8:31 AM    Specimen: Blood   Result Value Ref Range    Glucose 113 (H) 65 - 99 mg/dL    BUN 27 (H) 8 - 23 mg/dL    Creatinine 0.96 0.57 - 1.00 mg/dL    eGFR Non African Am 56 (L) >60 mL/min/1.73    eGFR African Am 68 >60 mL/min/1.73    BUN/Creatinine Ratio 28.1 (H) 7.0 - 25.0    Sodium 148 (H) 136 - 145 mmol/L    Potassium 4.8 3.5 - 5.2 mmol/L    Chloride 109 (H) 98 - 107 mmol/L    Total CO2 26.3 22.0 - 29.0 mmol/L    Calcium 11.0 (H) 8.6 - 10.5 mg/dL    Total Protein 6.8 6.0 - 8.5 g/dL    Albumin 4.60 3.50 - 5.20 g/dL    Globulin 2.2 gm/dL    A/G Ratio 2.1 g/dL    Total Bilirubin 0.7 0.0 - 1.2 mg/dL    Alkaline Phosphatase 59 39 - 117 U/L    AST (SGOT) 19 1 - 32 U/L    ALT (SGPT) 21 1 - 33 U/L   Lipid Panel With LDL / HDL Ratio    Collection Time: 07/22/21  8:31 AM    Specimen: Blood   Result Value Ref Range    Total Cholesterol 132 0 - 200 mg/dL    Triglycerides 179 (H) 0 - 150 mg/dL    HDL Cholesterol 28 (L) 40 - 60 mg/dL    VLDL Cholesterol Jeff 31 5 - 40 mg/dL    LDL Chol Calc (NIH) 73 0 - 100 mg/dL    LDL/HDL RATIO 2.44    Ferritin    Collection Time: 08/03/21  9:58 AM    Specimen: Blood   Result Value Ref Range    Ferritin 393.10 (H) 13.00 - 150.00 ng/mL   Iron Profile    Collection Time: 08/03/21  9:58 AM    Specimen: Blood   Result Value Ref Range    Iron 83 37 - 145 mcg/dL    Iron Saturation 33 14 - 48 %    Transferrin 177 (L) 200 - 360 mg/dL    TIBC 248 (L) 249 - 505 mcg/dL   CBC Auto Differential    Collection Time: 08/03/21  9:58 AM    Specimen: Blood   Result Value Ref Range    WBC 16.41 (H) 3.40 - 10.80 10*3/mm3    RBC 2.92 (L) 3.77 - 5.28 10*6/mm3    Hemoglobin 10.7 (L) 12.0 - 15.9  g/dL    Hematocrit 31.7 (L) 34.0 - 46.6 %    .6 (H) 79.0 - 97.0 fL    MCH 36.6 (H) 26.6 - 33.0 pg    MCHC 33.8 31.5 - 35.7 g/dL    RDW 15.2 12.3 - 15.4 %    RDW-SD 59.7 (H) 37.0 - 54.0 fl    MPV 10.2 6.0 - 12.0 fL    Platelets 391 140 - 450 10*3/mm3    Neutrophil % 72.6 42.7 - 76.0 %    Lymphocyte % 9.5 (L) 19.6 - 45.3 %    Monocyte % 5.2 5.0 - 12.0 %    Eosinophil % 3.0 0.3 - 6.2 %    Basophil % 0.9 0.0 - 1.5 %    Immature Grans % 8.8 (H) 0.0 - 0.5 %    Neutrophils, Absolute 11.92 (H) 1.70 - 7.00 10*3/mm3    Lymphocytes, Absolute 1.56 0.70 - 3.10 10*3/mm3    Monocytes, Absolute 0.85 0.10 - 0.90 10*3/mm3    Eosinophils, Absolute 0.50 (H) 0.00 - 0.40 10*3/mm3    Basophils, Absolute 0.14 0.00 - 0.20 10*3/mm3    Immature Grans, Absolute 1.44 (H) 0.00 - 0.05 10*3/mm3    nRBC 0.0 0.0 - 0.2 /100 WBC   Holter Monitor - 24 Hour    Collection Time: 08/09/21  8:57 AM   Result Value Ref Range    Target HR (85%) 120 bpm    Max. Pred. HR (100%) 141 bpm   Holter Monitor - 72 Hour Up To 15 Days    Collection Time: 08/16/21 11:09 AM   Result Value Ref Range    Target HR (85%) 120 bpm    Max. Pred. HR (100%) 141 bpm   CBC Auto Differential    Collection Time: 09/03/21  1:02 PM    Specimen: Blood   Result Value Ref Range    WBC 17.68 (H) 3.40 - 10.80 10*3/mm3    RBC 3.11 (L) 3.77 - 5.28 10*6/mm3    Hemoglobin 11.2 (L) 12.0 - 15.9 g/dL    Hematocrit 32.8 (L) 34.0 - 46.6 %    .5 (H) 79.0 - 97.0 fL    MCH 36.0 (H) 26.6 - 33.0 pg    MCHC 34.1 31.5 - 35.7 g/dL    RDW 14.8 12.3 - 15.4 %    RDW-SD 57.3 (H) 37.0 - 54.0 fl    MPV 11.1 6.0 - 12.0 fL    Platelets 429 140 - 450 10*3/mm3    Neutrophil % 70.0 42.7 - 76.0 %    Lymphocyte % 11.1 (L) 19.6 - 45.3 %    Monocyte % 5.1 5.0 - 12.0 %    Eosinophil % 3.6 0.3 - 6.2 %    Basophil % 1.7 (H) 0.0 - 1.5 %    Immature Grans % 8.5 (H) 0.0 - 0.5 %    Neutrophils, Absolute 12.35 (H) 1.70 - 7.00 10*3/mm3    Lymphocytes, Absolute 1.97 0.70 - 3.10 10*3/mm3    Monocytes, Absolute 0.91 (H)  0.10 - 0.90 10*3/mm3    Eosinophils, Absolute 0.64 (H) 0.00 - 0.40 10*3/mm3    Basophils, Absolute 0.30 (H) 0.00 - 0.20 10*3/mm3    Immature Grans, Absolute 1.51 (H) 0.00 - 0.05 10*3/mm3    nRBC 0.2 0.0 - 0.2 /100 WBC   Basic Metabolic Panel    Collection Time: 09/15/21  9:43 AM    Specimen: Blood   Result Value Ref Range    Glucose 159 (H) 65 - 99 mg/dL    BUN 20 8 - 23 mg/dL    Creatinine 0.93 0.57 - 1.00 mg/dL    Sodium 142 136 - 145 mmol/L    Potassium 3.7 3.5 - 5.2 mmol/L    Chloride 104 98 - 107 mmol/L    CO2 24.5 22.0 - 29.0 mmol/L    Calcium 10.2 8.6 - 10.5 mg/dL    eGFR Non African Amer 58 (L) >60 mL/min/1.73    BUN/Creatinine Ratio 21.5 7.0 - 25.0    Anion Gap 13.5 5.0 - 15.0 mmol/L   CBC Auto Differential    Collection Time: 09/15/21  9:43 AM    Specimen: Blood   Result Value Ref Range    WBC 18.03 (H) 3.40 - 10.80 10*3/mm3    RBC 3.04 (L) 3.77 - 5.28 10*6/mm3    Hemoglobin 10.9 (L) 12.0 - 15.9 g/dL    Hematocrit 32.1 (L) 34.0 - 46.6 %    .6 (H) 79.0 - 97.0 fL    MCH 35.9 (H) 26.6 - 33.0 pg    MCHC 34.0 31.5 - 35.7 g/dL    RDW 14.6 12.3 - 15.4 %    RDW-SD 56.0 (H) 37.0 - 54.0 fl    MPV 10.5 6.0 - 12.0 fL    Platelets 407 140 - 450 10*3/mm3   Manual Differential    Collection Time: 09/15/21  9:43 AM    Specimen: Blood   Result Value Ref Range    Neutrophil % 89.9 (H) 42.7 - 76.0 %    Lymphocyte % 6.1 (L) 19.6 - 45.3 %    Monocyte % 1.0 (L) 5.0 - 12.0 %    Eosinophil % 2.0 0.3 - 6.2 %    Basophil % 1.0 0.0 - 1.5 %    Neutrophils Absolute 16.21 (H) 1.70 - 7.00 10*3/mm3    Lymphocytes Absolute 1.10 0.70 - 3.10 10*3/mm3    Monocytes Absolute 0.18 0.10 - 0.90 10*3/mm3    Eosinophils Absolute 0.36 0.00 - 0.40 10*3/mm3    Basophils Absolute 0.18 0.00 - 0.20 10*3/mm3    RBC Morphology Normal Normal    WBC Morphology Normal Normal    Platelet Morphology Normal Normal   COVID-19,APTIMA PANTHER(HILDA), [place], NP/OP SWAB IN UTM/VTM/[facility] TRANSPORT MEDIA,24 HR TAT - Swab, Nasal Cavity    Collection Time: 09/15/21   9:53 AM    Specimen: Nasal Cavity; Swab   Result Value Ref Range    COVID19 Not Detected Not Detected - Ref. Range   POC Glucose Once    Collection Time: 09/17/21  9:41 AM    Specimen: Blood   Result Value Ref Range    Glucose 102 70 - 130 mg/dL   Comprehensive Metabolic Panel    Collection Time: 10/04/21  8:06 AM    Specimen: Blood   Result Value Ref Range    Glucose 114 74 - 124 mg/dL    BUN 26 (H) 6 - 20 mg/dL    Creatinine 0.94 0.60 - 1.10 mg/dL    Sodium 140 134 - 145 mmol/L    Potassium 4.7 3.5 - 4.7 mmol/L    Chloride 105 98 - 107 mmol/L    CO2 25.3 22.0 - 29.0 mmol/L    Calcium 10.5 (H) 8.5 - 10.2 mg/dL    Total Protein 6.5 6.3 - 8.0 g/dL    Albumin 4.50 3.50 - 5.20 g/dL    ALT (SGPT) 22 0 - 33 U/L    AST (SGOT) 27 0 - 32 U/L    Alkaline Phosphatase 70 38 - 116 U/L    Total Bilirubin 0.8 0.2 - 1.2 mg/dL    eGFR Non African Amer 57 (L) >60 mL/min/1.73    Globulin 2.0 1.8 - 3.5 gm/dL    A/G Ratio 2.3 1.1 - 2.4 g/dL    BUN/Creatinine Ratio 27.7 7.3 - 30.0    Anion Gap 9.7 5.0 - 15.0 mmol/L   Ferritin    Collection Time: 10/04/21  8:06 AM    Specimen: Blood   Result Value Ref Range    Ferritin 325.10 (H) 13.00 - 150.00 ng/mL   Iron Profile    Collection Time: 10/04/21  8:06 AM    Specimen: Blood   Result Value Ref Range    Iron 77 37 - 145 mcg/dL    Iron Saturation 29 14 - 48 %    Transferrin 191 (L) 200 - 360 mg/dL    TIBC 267 249 - 505 mcg/dL   CBC Auto Differential    Collection Time: 10/04/21  8:06 AM    Specimen: Blood   Result Value Ref Range    WBC 15.27 (H) 3.40 - 10.80 10*3/mm3    RBC 2.80 (L) 3.77 - 5.28 10*6/mm3    Hemoglobin 10.0 (L) 12.0 - 15.9 g/dL    Hematocrit 30.4 (L) 34.0 - 46.6 %    .6 (H) 79.0 - 97.0 fL    MCH 35.7 (H) 26.6 - 33.0 pg    MCHC 32.9 31.5 - 35.7 g/dL    RDW 15.9 (H) 12.3 - 15.4 %    RDW-SD 62.7 (H) 37.0 - 54.0 fl    MPV 11.0 6.0 - 12.0 fL    Platelets 375 140 - 450 10*3/mm3    Neutrophil % 69.2 42.7 - 76.0 %    Lymphocyte % 11.9 (L) 19.6 - 45.3 %    Monocyte % 5.1 5.0 -  12.0 %    Eosinophil % 4.0 0.3 - 6.2 %    Basophil % 1.0 0.0 - 1.5 %    Immature Grans % 8.8 (H) 0.0 - 0.5 %    Neutrophils, Absolute 10.55 (H) 1.70 - 7.00 10*3/mm3    Lymphocytes, Absolute 1.82 0.70 - 3.10 10*3/mm3    Monocytes, Absolute 0.78 0.10 - 0.90 10*3/mm3    Eosinophils, Absolute 0.61 (H) 0.00 - 0.40 10*3/mm3    Basophils, Absolute 0.16 0.00 - 0.20 10*3/mm3    Immature Grans, Absolute 1.35 (H) 0.00 - 0.05 10*3/mm3    nRBC 0.2 0.0 - 0.2 /100 WBC   PTH, Intact & Calcium    Collection Time: 10/14/21 10:00 AM    Specimen: Blood    BLOOD   Result Value Ref Range    PTH, Intact 44 15 - 65 pg/mL    PTH, Intact Comment    Calcium, Ionized    Collection Time: 10/14/21 10:00 AM    Specimen: Blood    BLOOD   Result Value Ref Range    Ionized Calcium 5.9 (H) 4.5 - 5.6 mg/dL   Hemoglobin A1c    Collection Time: 10/14/21 10:00 AM    Specimen: Blood    BLOOD   Result Value Ref Range    Hemoglobin A1C 4.60 (L) 4.80 - 5.60 %   Comprehensive Metabolic Panel    Collection Time: 10/14/21 10:00 AM    Specimen: Blood    BLOOD   Result Value Ref Range    Glucose 98 65 - 99 mg/dL    BUN 30 (H) 8 - 23 mg/dL    Creatinine 0.92 0.57 - 1.00 mg/dL    eGFR Non African Am 59 (L) >60 mL/min/1.73    eGFR African Am 71 >60 mL/min/1.73    BUN/Creatinine Ratio 32.6 (H) 7.0 - 25.0    Sodium 143 136 - 145 mmol/L    Potassium 4.5 3.5 - 5.2 mmol/L    Chloride 107 98 - 107 mmol/L    Total CO2 23.6 22.0 - 29.0 mmol/L    Calcium 11.1 (H) 8.6 - 10.5 mg/dL    Total Protein 6.6 6.0 - 8.5 g/dL    Albumin 4.60 3.50 - 5.20 g/dL    Globulin 2.0 gm/dL    A/G Ratio 2.3 g/dL    Total Bilirubin 0.6 0.0 - 1.2 mg/dL    Alkaline Phosphatase 59 39 - 117 U/L    AST (SGOT) 23 1 - 32 U/L    ALT (SGPT) 21 1 - 33 U/L   Lipid Panel With LDL / HDL Ratio    Collection Time: 10/14/21 10:00 AM    Specimen: Blood    BLOOD   Result Value Ref Range    Total Cholesterol 150 0 - 200 mg/dL    Triglycerides 255 (H) 0 - 150 mg/dL    HDL Cholesterol 27 (L) 40 - 60 mg/dL    VLDL  Cholesterol Jeff 42 (H) 5 - 40 mg/dL    LDL Chol Calc (NIH) 81 0 - 100 mg/dL    LDL/HDL RATIO 2.67    Ferritin    Collection Time: 11/15/21  9:22 AM    Specimen: Blood   Result Value Ref Range    Ferritin 302.20 (H) 13.00 - 150.00 ng/mL   Iron Profile    Collection Time: 11/15/21  9:22 AM    Specimen: Blood   Result Value Ref Range    Iron 77 37 - 145 mcg/dL    Iron Saturation 29 14 - 48 %    Transferrin 192 (L) 200 - 360 mg/dL    TIBC 269 249 - 505 mcg/dL   CBC Auto Differential    Collection Time: 11/15/21  9:22 AM    Specimen: Blood   Result Value Ref Range    WBC 16.33 (H) 3.40 - 10.80 10*3/mm3    RBC 2.92 (L) 3.77 - 5.28 10*6/mm3    Hemoglobin 10.3 (L) 12.0 - 15.9 g/dL    Hematocrit 30.7 (L) 34.0 - 46.6 %    .1 (H) 79.0 - 97.0 fL    MCH 35.3 (H) 26.6 - 33.0 pg    MCHC 33.6 31.5 - 35.7 g/dL    RDW 14.9 12.3 - 15.4 %    RDW-SD 56.3 (H) 37.0 - 54.0 fl    MPV 10.2 6.0 - 12.0 fL    Platelets 391 140 - 450 10*3/mm3    Neutrophil % 71.4 42.7 - 76.0 %    Lymphocyte % 9.6 (L) 19.6 - 45.3 %    Monocyte % 5.4 5.0 - 12.0 %    Eosinophil % 3.4 0.3 - 6.2 %    Basophil % 1.1 0.0 - 1.5 %    Immature Grans % 9.1 (H) 0.0 - 0.5 %    Neutrophils, Absolute 11.66 (H) 1.70 - 7.00 10*3/mm3    Lymphocytes, Absolute 1.57 0.70 - 3.10 10*3/mm3    Monocytes, Absolute 0.88 0.10 - 0.90 10*3/mm3    Eosinophils, Absolute 0.55 (H) 0.00 - 0.40 10*3/mm3    Basophils, Absolute 0.18 0.00 - 0.20 10*3/mm3    Immature Grans, Absolute 1.49 (H) 0.00 - 0.05 10*3/mm3    nRBC 0.0 0.0 - 0.2 /100 WBC   Ferritin    Collection Time: 01/06/22 12:00 PM    Specimen: Blood   Result Value Ref Range    Ferritin 224.00 (H) 13.00 - 150.00 ng/mL   Iron Profile    Collection Time: 01/06/22 12:00 PM    Specimen: Blood   Result Value Ref Range    Iron 54 37 - 145 mcg/dL    Iron Saturation 21 14 - 48 %    Transferrin 186 (L) 200 - 360 mg/dL    TIBC 260 249 - 505 mcg/dL   Comprehensive Metabolic Panel    Collection Time: 01/06/22 12:00 PM    Specimen: Blood   Result  "Value Ref Range    Glucose 120 74 - 124 mg/dL    BUN 19 6 - 20 mg/dL    Creatinine 0.90 0.60 - 1.10 mg/dL    Sodium 139 134 - 145 mmol/L    Potassium 4.2 3.5 - 4.7 mmol/L    Chloride 104 98 - 107 mmol/L    CO2 26.4 22.0 - 29.0 mmol/L    Calcium 10.7 (C) 8.5 - 10.2 mg/dL    Total Protein 6.6 6.3 - 8.0 g/dL    Albumin 4.30 3.50 - 5.20 g/dL    ALT (SGPT) 16 0 - 33 U/L    AST (SGOT) 17 0 - 32 U/L    Alkaline Phosphatase 79 38 - 116 U/L    Total Bilirubin 0.5 0.2 - 1.2 mg/dL    eGFR Non African Amer 60 (L) >60 mL/min/1.73    Globulin 2.3 1.8 - 3.5 gm/dL    A/G Ratio 1.9 1.1 - 2.4 g/dL    BUN/Creatinine Ratio 21.1 7.3 - 30.0    Anion Gap 8.6 5.0 - 15.0 mmol/L   CBC Auto Differential    Collection Time: 01/06/22 12:00 PM    Specimen: Blood   Result Value Ref Range    WBC 16.66 (H) 3.40 - 10.80 10*3/mm3    RBC 2.90 (L) 3.77 - 5.28 10*6/mm3    Hemoglobin 10.2 (L) 12.0 - 15.9 g/dL    Hematocrit 30.6 (L) 34.0 - 46.6 %    .5 (H) 79.0 - 97.0 fL    MCH 35.2 (H) 26.6 - 33.0 pg    MCHC 33.3 31.5 - 35.7 g/dL    RDW 15.4 12.3 - 15.4 %    RDW-SD 59.5 (H) 37.0 - 54.0 fl    MPV 11.6 6.0 - 12.0 fL    Platelets 352 140 - 450 10*3/mm3    Neutrophil % 72.0 42.7 - 76.0 %    Lymphocyte % 9.8 (L) 19.6 - 45.3 %    Monocyte % 5.1 5.0 - 12.0 %    Eosinophil % 4.0 0.3 - 6.2 %    Basophil % 1.0 0.0 - 1.5 %    Immature Grans % 8.1 (H) 0.0 - 0.5 %    Neutrophils, Absolute 12.00 (H) 1.70 - 7.00 10*3/mm3    Lymphocytes, Absolute 1.63 0.70 - 3.10 10*3/mm3    Monocytes, Absolute 0.85 0.10 - 0.90 10*3/mm3    Eosinophils, Absolute 0.66 (H) 0.00 - 0.40 10*3/mm3    Basophils, Absolute 0.17 0.00 - 0.20 10*3/mm3    Immature Grans, Absolute 1.35 (H) 0.00 - 0.05 10*3/mm3    nRBC 0.0 0.0 - 0.2 /100 WBC     Objective   Vitals:    02/01/22 0732   BP: 142/80   BP Location: Left arm   Patient Position: Sitting   Pulse: 97   Temp: 97.8 °F (36.6 °C)   SpO2: 96%   Weight: 69.7 kg (153 lb 9.6 oz)   Height: 147.3 cm (57.99\")     Body mass index is 32.11 " kg/m².  Physical Exam  Vitals and nursing note reviewed.   Constitutional:       General: She is not in acute distress.     Appearance: She is well-developed. She is not diaphoretic.   Cardiovascular:      Rate and Rhythm: Normal rate and regular rhythm.   Pulmonary:      Effort: Pulmonary effort is normal. No respiratory distress.      Breath sounds: Normal breath sounds. No wheezing.           Diagnoses and all orders for this visit:    1. Primary hypertension (Primary)  -     Comprehensive Metabolic Panel  -     Lipid Panel With LDL / HDL Ratio    2. Mixed hyperlipidemia  -     Comprehensive Metabolic Panel  -     Lipid Panel With LDL / HDL Ratio    3. Type 2 diabetes mellitus without complication, without long-term current use of insulin (HCC)  -     Hemoglobin A1c  -     Comprehensive Metabolic Panel  -     Lipid Panel With LDL / HDL Ratio  -     Microalbumin / Creatinine Urine Ratio - Urine, Clean Catch      Return in about 4 months (around 6/1/2022) for HYPERTENSION, DIABETES.

## 2022-02-02 ENCOUNTER — TELEPHONE (OUTPATIENT)
Dept: FAMILY MEDICINE CLINIC | Facility: CLINIC | Age: 82
End: 2022-02-02

## 2022-02-02 LAB
ALBUMIN SERPL-MCNC: 4.3 G/DL (ref 3.7–4.7)
ALBUMIN/CREAT UR: 20 MG/G CREAT (ref 0–29)
ALBUMIN/GLOB SERPL: 2.2 {RATIO} (ref 1.2–2.2)
ALP SERPL-CCNC: 62 IU/L (ref 44–121)
ALT SERPL-CCNC: 14 IU/L (ref 0–32)
AST SERPL-CCNC: 18 IU/L (ref 0–40)
BILIRUB SERPL-MCNC: 0.6 MG/DL (ref 0–1.2)
BUN SERPL-MCNC: 17 MG/DL (ref 8–27)
BUN/CREAT SERPL: 18 (ref 12–28)
CALCIUM SERPL-MCNC: 9.6 MG/DL (ref 8.7–10.3)
CHLORIDE SERPL-SCNC: 107 MMOL/L (ref 96–106)
CHOLEST SERPL-MCNC: 128 MG/DL (ref 100–199)
CO2 SERPL-SCNC: 24 MMOL/L (ref 20–29)
CREAT SERPL-MCNC: 0.95 MG/DL (ref 0.57–1)
CREAT UR-MCNC: 73.2 MG/DL
GLOBULIN SER CALC-MCNC: 2 G/DL (ref 1.5–4.5)
GLUCOSE SERPL-MCNC: 111 MG/DL (ref 65–99)
HBA1C MFR BLD: 5.4 % (ref 4.8–5.6)
HDLC SERPL-MCNC: 27 MG/DL
LDLC SERPL CALC-MCNC: 72 MG/DL (ref 0–99)
LDLC/HDLC SERPL: 2.7 RATIO (ref 0–3.2)
MICROALBUMIN UR-MCNC: 14.7 UG/ML
POTASSIUM SERPL-SCNC: 4.2 MMOL/L (ref 3.5–5.2)
PROT SERPL-MCNC: 6.3 G/DL (ref 6–8.5)
SODIUM SERPL-SCNC: 143 MMOL/L (ref 134–144)
TRIGL SERPL-MCNC: 170 MG/DL (ref 0–149)
VLDLC SERPL CALC-MCNC: 29 MG/DL (ref 5–40)

## 2022-02-02 NOTE — TELEPHONE ENCOUNTER
OK for HUB to read and schedule:    LMTCB-  Your provider recommended a 4 month follow up during your last visit. around 06/01/2022.  Please call our office to schedule.

## 2022-02-28 ENCOUNTER — TELEPHONE (OUTPATIENT)
Dept: FAMILY MEDICINE CLINIC | Facility: CLINIC | Age: 82
End: 2022-02-28

## 2022-03-03 ENCOUNTER — LAB (OUTPATIENT)
Dept: LAB | Facility: HOSPITAL | Age: 82
End: 2022-03-03

## 2022-03-03 ENCOUNTER — CLINICAL SUPPORT (OUTPATIENT)
Dept: ONCOLOGY | Facility: HOSPITAL | Age: 82
End: 2022-03-03

## 2022-03-03 DIAGNOSIS — D47.1 MYELOPROLIFERATIVE DISORDER: ICD-10-CM

## 2022-03-03 DIAGNOSIS — D50.9 IRON DEFICIENCY ANEMIA, UNSPECIFIED IRON DEFICIENCY ANEMIA TYPE: ICD-10-CM

## 2022-03-03 LAB
BASOPHILS # BLD AUTO: 0.16 10*3/MM3 (ref 0–0.2)
BASOPHILS NFR BLD AUTO: 1 % (ref 0–1.5)
DEPRECATED RDW RBC AUTO: 59 FL (ref 37–54)
EOSINOPHIL # BLD AUTO: 0.62 10*3/MM3 (ref 0–0.4)
EOSINOPHIL NFR BLD AUTO: 4.1 % (ref 0.3–6.2)
ERYTHROCYTE [DISTWIDTH] IN BLOOD BY AUTOMATED COUNT: 15.7 % (ref 12.3–15.4)
FERRITIN SERPL-MCNC: 191.5 NG/ML (ref 13–150)
HCT VFR BLD AUTO: 31.8 % (ref 34–46.6)
HGB BLD-MCNC: 10.3 G/DL (ref 12–15.9)
IMM GRANULOCYTES # BLD AUTO: 1.03 10*3/MM3 (ref 0–0.05)
IMM GRANULOCYTES NFR BLD AUTO: 6.8 % (ref 0–0.5)
IRON 24H UR-MRATE: 66 MCG/DL (ref 37–145)
IRON SATN MFR SERPL: 25 % (ref 14–48)
LYMPHOCYTES # BLD AUTO: 1.67 10*3/MM3 (ref 0.7–3.1)
LYMPHOCYTES NFR BLD AUTO: 11 % (ref 19.6–45.3)
MCH RBC QN AUTO: 33.4 PG (ref 26.6–33)
MCHC RBC AUTO-ENTMCNC: 32.4 G/DL (ref 31.5–35.7)
MCV RBC AUTO: 103.2 FL (ref 79–97)
MONOCYTES # BLD AUTO: 0.75 10*3/MM3 (ref 0.1–0.9)
MONOCYTES NFR BLD AUTO: 4.9 % (ref 5–12)
NEUTROPHILS NFR BLD AUTO: 11.01 10*3/MM3 (ref 1.7–7)
NEUTROPHILS NFR BLD AUTO: 72.2 % (ref 42.7–76)
NRBC BLD AUTO-RTO: 0 /100 WBC (ref 0–0.2)
PLATELET # BLD AUTO: 372 10*3/MM3 (ref 140–450)
PMV BLD AUTO: 10.6 FL (ref 6–12)
RBC # BLD AUTO: 3.08 10*6/MM3 (ref 3.77–5.28)
TIBC SERPL-MCNC: 267 MCG/DL (ref 249–505)
TRANSFERRIN SERPL-MCNC: 191 MG/DL (ref 200–360)
WBC NRBC COR # BLD: 15.24 10*3/MM3 (ref 3.4–10.8)

## 2022-03-03 PROCEDURE — 36415 COLL VENOUS BLD VENIPUNCTURE: CPT

## 2022-03-03 PROCEDURE — 83540 ASSAY OF IRON: CPT

## 2022-03-03 PROCEDURE — 85025 COMPLETE CBC W/AUTO DIFF WBC: CPT

## 2022-03-03 PROCEDURE — 84466 ASSAY OF TRANSFERRIN: CPT

## 2022-03-03 PROCEDURE — 82728 ASSAY OF FERRITIN: CPT

## 2022-03-03 PROCEDURE — G0463 HOSPITAL OUTPT CLINIC VISIT: HCPCS

## 2022-03-03 NOTE — PROGRESS NOTES
Pt here for RN review accompanied with grandson. The pt reports she is feeling well and voices no concerns. No significant changes in counts. The pt confirms taking hydrea as prescribed. Iron studies pending. Will notify pt if iron studies are abnormal. Copy of labs and schedule provided. Pt v/u.    Lab Results   Component Value Date    WBC 15.24 (H) 03/03/2022    HGB 10.3 (L) 03/03/2022    HCT 31.8 (L) 03/03/2022    .2 (H) 03/03/2022     03/03/2022     Iron studies are WNL. Iron sat is 25 and ferritin is 191.

## 2022-03-08 ENCOUNTER — TELEPHONE (OUTPATIENT)
Dept: FAMILY MEDICINE CLINIC | Facility: CLINIC | Age: 82
End: 2022-03-08

## 2022-03-08 NOTE — TELEPHONE ENCOUNTER
Vance was needing to know the status of form(s)  that were sent over to be completed & sent back, they are for his Mom to send to medicaid so that she can be approved to take care of her parents full time. There was a form for Ayla Pickett (grandfather) a note is in his chart as well.

## 2022-03-08 NOTE — TELEPHONE ENCOUNTER
Spoke to kirsty I informed him that we haven't received the fax just yet but I will keep a look out for the paperwork

## 2022-03-21 ENCOUNTER — OFFICE VISIT (OUTPATIENT)
Dept: CARDIOLOGY | Facility: CLINIC | Age: 82
End: 2022-03-21

## 2022-03-21 VITALS
DIASTOLIC BLOOD PRESSURE: 82 MMHG | BODY MASS INDEX: 33.01 KG/M2 | HEIGHT: 57 IN | OXYGEN SATURATION: 98 % | RESPIRATION RATE: 16 BRPM | HEART RATE: 76 BPM | WEIGHT: 153 LBS | SYSTOLIC BLOOD PRESSURE: 126 MMHG

## 2022-03-21 DIAGNOSIS — I49.5 SSS (SICK SINUS SYNDROME): Primary | ICD-10-CM

## 2022-03-21 DIAGNOSIS — I10 PRIMARY HYPERTENSION: ICD-10-CM

## 2022-03-21 DIAGNOSIS — Z95.0 PRESENCE OF CARDIAC PACEMAKER: ICD-10-CM

## 2022-03-21 PROCEDURE — 93000 ELECTROCARDIOGRAM COMPLETE: CPT | Performed by: INTERNAL MEDICINE

## 2022-03-21 PROCEDURE — 99213 OFFICE O/P EST LOW 20 MIN: CPT | Performed by: INTERNAL MEDICINE

## 2022-03-30 PROCEDURE — 93296 REM INTERROG EVL PM/IDS: CPT | Performed by: INTERNAL MEDICINE

## 2022-03-30 PROCEDURE — 93294 REM INTERROG EVL PM/LDLS PM: CPT | Performed by: INTERNAL MEDICINE

## 2022-05-03 ENCOUNTER — TELEPHONE (OUTPATIENT)
Dept: ONCOLOGY | Facility: CLINIC | Age: 82
End: 2022-05-03

## 2022-05-03 NOTE — TELEPHONE ENCOUNTER
Caller: Daily Castellano    Relationship to patient: Emergency Contact    Best call back number: [phone]    Chief complaint: PATIENT NEEDS TO RESCHEDULE DUE TO TRANSPORTATION. HUB UNABLE TO RESCHEDULE  DUE TO PATIENT IN ACTIVE TREATMENT     Type of visit: LAB AND FOLLOW UP    Requested date: 5-5-22 AROUND 2:00     If rescheduling, when is the original appointment: 5-5-22     Additional notes:PLEASE CALL TO ADVISE

## 2022-05-05 ENCOUNTER — LAB (OUTPATIENT)
Dept: LAB | Facility: HOSPITAL | Age: 82
End: 2022-05-05

## 2022-05-05 ENCOUNTER — OFFICE VISIT (OUTPATIENT)
Dept: ONCOLOGY | Facility: CLINIC | Age: 82
End: 2022-05-05

## 2022-05-05 ENCOUNTER — APPOINTMENT (OUTPATIENT)
Dept: LAB | Facility: HOSPITAL | Age: 82
End: 2022-05-05

## 2022-05-05 VITALS
TEMPERATURE: 97.8 F | DIASTOLIC BLOOD PRESSURE: 67 MMHG | RESPIRATION RATE: 18 BRPM | HEIGHT: 58 IN | HEART RATE: 75 BPM | OXYGEN SATURATION: 96 % | BODY MASS INDEX: 32.5 KG/M2 | WEIGHT: 154.8 LBS | SYSTOLIC BLOOD PRESSURE: 109 MMHG

## 2022-05-05 DIAGNOSIS — R10.11 ABDOMINAL PAIN, RUQ: ICD-10-CM

## 2022-05-05 DIAGNOSIS — Z79.899 ENCOUNTER FOR LONG-TERM CURRENT USE OF HIGH RISK MEDICATION: ICD-10-CM

## 2022-05-05 DIAGNOSIS — D50.9 IRON DEFICIENCY ANEMIA, UNSPECIFIED IRON DEFICIENCY ANEMIA TYPE: ICD-10-CM

## 2022-05-05 DIAGNOSIS — D47.1 MYELOPROLIFERATIVE DISORDER: ICD-10-CM

## 2022-05-05 DIAGNOSIS — N28.9 KIDNEY LESION: ICD-10-CM

## 2022-05-05 DIAGNOSIS — D63.1 ANEMIA DUE TO STAGE 3A CHRONIC KIDNEY DISEASE: ICD-10-CM

## 2022-05-05 DIAGNOSIS — D47.1 MYELOPROLIFERATIVE DISORDER: Primary | ICD-10-CM

## 2022-05-05 DIAGNOSIS — N18.31 ANEMIA DUE TO STAGE 3A CHRONIC KIDNEY DISEASE: ICD-10-CM

## 2022-05-05 LAB
ALBUMIN SERPL-MCNC: 4.3 G/DL (ref 3.5–5.2)
ALBUMIN/GLOB SERPL: 1.9 G/DL (ref 1.1–2.4)
ALP SERPL-CCNC: 75 U/L (ref 38–116)
ALT SERPL W P-5'-P-CCNC: 18 U/L (ref 0–33)
ANION GAP SERPL CALCULATED.3IONS-SCNC: 10.7 MMOL/L (ref 5–15)
AST SERPL-CCNC: 16 U/L (ref 0–32)
BASOPHILS # BLD AUTO: 0.19 10*3/MM3 (ref 0–0.2)
BASOPHILS NFR BLD AUTO: 1.3 % (ref 0–1.5)
BILIRUB SERPL-MCNC: 0.6 MG/DL (ref 0.2–1.2)
BUN SERPL-MCNC: 28 MG/DL (ref 6–20)
BUN/CREAT SERPL: 24.1 (ref 7.3–30)
CALCIUM SPEC-SCNC: 10.3 MG/DL (ref 8.5–10.2)
CHLORIDE SERPL-SCNC: 105 MMOL/L (ref 98–107)
CO2 SERPL-SCNC: 25.3 MMOL/L (ref 22–29)
CREAT SERPL-MCNC: 1.16 MG/DL (ref 0.6–1.1)
DEPRECATED RDW RBC AUTO: 61 FL (ref 37–54)
EGFRCR SERPLBLD CKD-EPI 2021: 47.8 ML/MIN/1.73
EOSINOPHIL # BLD AUTO: 0.48 10*3/MM3 (ref 0–0.4)
EOSINOPHIL NFR BLD AUTO: 3.2 % (ref 0.3–6.2)
ERYTHROCYTE [DISTWIDTH] IN BLOOD BY AUTOMATED COUNT: 16.2 % (ref 12.3–15.4)
FERRITIN SERPL-MCNC: 150.9 NG/ML (ref 13–150)
GLOBULIN UR ELPH-MCNC: 2.3 GM/DL (ref 1.8–3.5)
GLUCOSE SERPL-MCNC: 150 MG/DL (ref 74–124)
HCT VFR BLD AUTO: 30.7 % (ref 34–46.6)
HGB BLD-MCNC: 9.9 G/DL (ref 12–15.9)
IMM GRANULOCYTES # BLD AUTO: 1.23 10*3/MM3 (ref 0–0.05)
IMM GRANULOCYTES NFR BLD AUTO: 8.2 % (ref 0–0.5)
IRON 24H UR-MRATE: 66 MCG/DL (ref 37–145)
IRON SATN MFR SERPL: 24 % (ref 14–48)
LYMPHOCYTES # BLD AUTO: 1.46 10*3/MM3 (ref 0.7–3.1)
LYMPHOCYTES NFR BLD AUTO: 9.7 % (ref 19.6–45.3)
MCH RBC QN AUTO: 33.4 PG (ref 26.6–33)
MCHC RBC AUTO-ENTMCNC: 32.2 G/DL (ref 31.5–35.7)
MCV RBC AUTO: 103.7 FL (ref 79–97)
MONOCYTES # BLD AUTO: 0.83 10*3/MM3 (ref 0.1–0.9)
MONOCYTES NFR BLD AUTO: 5.5 % (ref 5–12)
NEUTROPHILS NFR BLD AUTO: 10.79 10*3/MM3 (ref 1.7–7)
NEUTROPHILS NFR BLD AUTO: 72.1 % (ref 42.7–76)
NRBC BLD AUTO-RTO: 0 /100 WBC (ref 0–0.2)
PLATELET # BLD AUTO: 357 10*3/MM3 (ref 140–450)
PMV BLD AUTO: 10.7 FL (ref 6–12)
POTASSIUM SERPL-SCNC: 3.9 MMOL/L (ref 3.5–4.7)
PROT SERPL-MCNC: 6.6 G/DL (ref 6.3–8)
RBC # BLD AUTO: 2.96 10*6/MM3 (ref 3.77–5.28)
SODIUM SERPL-SCNC: 141 MMOL/L (ref 134–145)
TIBC SERPL-MCNC: 272 MCG/DL (ref 249–505)
TRANSFERRIN SERPL-MCNC: 194 MG/DL (ref 200–360)
WBC NRBC COR # BLD: 14.98 10*3/MM3 (ref 3.4–10.8)

## 2022-05-05 PROCEDURE — 99215 OFFICE O/P EST HI 40 MIN: CPT | Performed by: INTERNAL MEDICINE

## 2022-05-05 PROCEDURE — 84466 ASSAY OF TRANSFERRIN: CPT

## 2022-05-05 PROCEDURE — 36415 COLL VENOUS BLD VENIPUNCTURE: CPT

## 2022-05-05 PROCEDURE — 80053 COMPREHEN METABOLIC PANEL: CPT

## 2022-05-05 PROCEDURE — 85025 COMPLETE CBC W/AUTO DIFF WBC: CPT

## 2022-05-05 PROCEDURE — 82728 ASSAY OF FERRITIN: CPT

## 2022-05-05 PROCEDURE — 83540 ASSAY OF IRON: CPT

## 2022-05-05 NOTE — PROGRESS NOTES
Subjective     CHIEF COMPLAINT:      Chief Complaint   Patient presents with   • Follow-up     No concerns       HISTORY OF PRESENT ILLNESS:     Ayla Castellano is a 82 y.o. female patient who returns today for follow up on her myeloproliferative disorder.  She is accompanied by her grandson.  She is on Hydrea 500 mg on Mondays Wednesdays and Fridays.  She is taking it regularly.  No nausea or vomiting.  She is taking vitamin B12 regularly.  She states that her diet is low in red meat.    Patient reports swelling of the legs especially at the ankles.  She states that she does not drink good amount of fluids on a regular basis.    ROS:  Pertinent ROS is in the HPI.     Past medical, surgical, social and family history were reviewed.     MEDICATIONS:    Current Outpatient Medications:   •  Accu-Chek FastClix Lancets misc, To test blood sugar 3 times daily., Disp: 100 each, Rfl: 8  •  amLODIPine (NORVASC) 5 MG tablet, Take 1 tablet by mouth Daily., Disp: 90 tablet, Rfl: 3  •  aspirin 81 MG tablet, Take 81 mg by mouth Daily., Disp: , Rfl:   •  atorvastatin (LIPITOR) 20 MG tablet, Take 1 tablet by mouth Daily., Disp: 90 tablet, Rfl: 3  •  Cyanocobalamin (B-12) 2500 MCG sublingual tablet, Place 1 tablet under the tongue Daily., Disp: 30 tablet, Rfl: 3  •  glucose blood (ONE TOUCH ULTRA TEST) test strip, USE TO TEST BLOOD SUGAR EVERY DAY. (E11.9), Disp: 100 each, Rfl: 2  •  hydroxyurea (HYDREA) 500 MG capsule, Take 500 mg by mouth 3 (Three) Times a Week. Monday Wednesday Friday, Disp: , Rfl:   •  isosorbide mononitrate (IMDUR) 60 MG 24 hr tablet, Take 1 tablet by mouth Daily., Disp: 30 tablet, Rfl: 11  •  ketoconazole (NIZORAL) 2 % cream, Apply  topically to the appropriate area as directed Daily., Disp: 30 g, Rfl: 11  •  metFORMIN (GLUCOPHAGE) 500 MG tablet, Take 1 tablet by mouth 2 (Two) Times a Day With Meals. (Patient taking differently: Take 500 mg by mouth Daily With Breakfast.), Disp: 60 tablet, Rfl: 11  •   "mupirocin (Bactroban) 2 % ointment, Apply  topically to the appropriate area as directed 3 (Three) Times a Day., Disp: 30 g, Rfl: 11  •  ofloxacin (OCUFLOX) 0.3 % ophthalmic solution, Administer 2 drops to both eyes 4 (Four) Times a Day., Disp: 10 mL, Rfl: 11  •  oxybutynin (DITROPAN) 5 MG tablet, Take 1 tablet by mouth 2 (Two) Times a Day., Disp: 60 tablet, Rfl: 5  •  diclofenac (VOLTAREN) 1 % gel gel, Apply 4 g topically to the appropriate area as directed 3 (Three) Times a Day., Disp: 5 tube, Rfl: 11  •  oxyCODONE-acetaminophen (PERCOCET) 5-325 MG per tablet, Take 1-2 tablets by mouth Every 4 (Four) Hours As Needed (Pain)., Disp: 10 tablet, Rfl: 0  •  valsartan-hydrochlorothiazide (DIOVAN-HCT) 320-25 MG per tablet, Take 1 tablet by mouth Daily., Disp: 90 tablet, Rfl: 3    Objective   VITAL SIGNS:     Vitals:    05/05/22 1429   BP: 109/67   Pulse: 75   Resp: 18   Temp: 97.8 °F (36.6 °C)   TempSrc: Temporal   SpO2: 96%   Weight: 70.2 kg (154 lb 12.8 oz)   Height: 147.3 cm (57.99\")   PainSc: 0-No pain     Body mass index is 32.36 kg/m².     Wt Readings from Last 5 Encounters:   05/05/22 70.2 kg (154 lb 12.8 oz)   03/21/22 69.4 kg (153 lb)   02/01/22 69.7 kg (153 lb 9.6 oz)   01/06/22 68.9 kg (151 lb 12.8 oz)   10/14/21 68 kg (150 lb)     PHYSICAL EXAMINATION:  GENERAL: The patient appears in fair general condition, not in acute distress.   SKIN: No ecchymosis.  EYES: No jaundice. No pallor.  LYMPHATICS: No cervical lymphadenopathy.  CHEST: Normal respiratory effort.  Lungs clear bilaterally.  No added sounds.  CVS: Normal S1-S2.  No murmurs.  ABDOMEN: Soft. No tenderness. No Hepatomegaly. No Splenomegaly. No masses.  EXTREMITIES: Trace edema at the ankles.  No skin changes at the ankles.    DIAGNOSTIC DATA:     Results from last 7 days   Lab Units 05/05/22  1416   WBC 10*3/mm3 14.98*   NEUTROS ABS 10*3/mm3 10.79*   HEMOGLOBIN g/dL 9.9*   HEMATOCRIT % 30.7*   PLATELETS 10*3/mm3 357     Results from last 7 days   Lab " Units 05/05/22  1416   SODIUM mmol/L 141   POTASSIUM mmol/L 3.9   CHLORIDE mmol/L 105   CO2 mmol/L 25.3   BUN mg/dL 28*   CREATININE mg/dL 1.16*   CALCIUM mg/dL 10.3*   ALBUMIN g/dL 4.30   BILIRUBIN mg/dL 0.6   ALK PHOS U/L 75   ALT (SGPT) U/L 18   AST (SGOT) U/L 16   GLUCOSE mg/dL 150*         Lab 05/05/22  1416   IRON 66   IRON SATURATION 24   TIBC 272   TRANSFERRIN 194*   FERRITIN 150.90*        CT abdomen pelvis on 1/14/2022:  1. Right interpolar 1.7 cm rounded structure is isoechoic to renal  parenchyma and favored to represent prominent renal lobule.  However,  this measures slightly increased when compared to the previous CT in  2015 and could represent a very slow growing lesion. Attempt at  characterization could be performed with multiphasic CT abdomen to  confirm what is likely benign etiology. There are otherwise several  bilateral renal cysts.  2. Splenic size is mildly enlarged and mildly increased when compared to  the prior exam 03/23/2015.  3. Previous cholecystectomy.  4. Diffuse atherosclerotic disease.  5. Previous partial sigmoid colon resection.      Assessment/Plan   *Myeloproliferative disorder, OBINNA-2 mutation positive.   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   · She was under the care of an outside Hematologist before transferring care to our practice. Records indicate that she was previously diagnosed with Essential thrombocythemia.   · She has elevated WBC and basophil count.   · Therefore, a diagnosis of myeloproliferative disorder or polycythemia vera was favored over ET.  · BCR ABL was negative by FISH on 2/2/2021.  · In February 2021, WBC count was gradually increasing indicating that her disease was not under a good control likely due to the dose of Hydroxyurea not being effective (500 mg every 4 days).   · The dose was increased to every 3 days on 2/2/2021.  · Due to the persistence of the leukocytosis, the dose of Hydrea was increased to 4 days a  week.  · Hemoglobin decreased to 10.0 on 10/4/2021.  WBC count was 15,270.  · Due to the worsening anemia, we reduced the dose of Hydrea back to 3 days a week.  · Labs on 1/6/2022 revealed a hemoglobin of 10.2.  WBC was 16,600.  Platelets were 352,000.  · Spleen measured 14.7 cm on CT scan on 1/14/2022.  · Labs today revealed a hemoglobin of 9.9.  WBC decreased to 14,980.  Platelets are 357,000.  · The improvement in the WBC and stable platelet count are indicative of her disease being relatively stable with Hydrea.  · I recommended continuing the same dose of Hydrea with close monitoring.  If WBC and platelets increase in the future and her anemia worsens, may need to switch to Jakafi.    *Iron deficiency anemia.   · This is attributed to iron deficiency with a transferrin saturation of 17% and a ferritin of 26.   · The patient did not tolerate oral in the past with development of upset stomach, abdominal pain, headaches and dizziness when she took the oral iron in the past.  · Patient received IV iron the 2020 and tolerated that well.   · Patient was given IV Injectafer on 2/5/2021 and 2/12/2021.  · Hemoglobin was 10.0 on 10/4/2021.  · Hemoglobin is 9.9 today.   · Iron studies today showed adequate iron stores.  · The anemia is therefore considered to be anemia secondary to chronic kidney disease, stage IIIa.    *History of colon cancer, stage I.   · She is s/p partial colectomy by Dr. Ku.   · She did not require adjuvant chemotherapy or radiation therapy.    · Patient is complaining of abdominal pain in the right upper quadrant.  There is tenderness on exam.  She had her gallbladder removed in 2019.  · CT scan on 1/14/2022 revealed no evidence of recurrence.    *Right renal lesion.    · CT on 1/14/2022 revealed an incidental finding of a 1.7 cm rounded structure in the right kidney.    · It was considered to be most likely representing a prominent renal lobule.    · It slightly increased in size compared to  2015.   · I explained the findings to the patient and her grandson.    · I recommended repeating CT scan 6 months from the initial study.      PLAN:    1.  Continue Hydrea 500 mg on Mondays Wednesdays and Fridays.  2.  Patient does not need IV iron at present.  3.  Increase fluid intake.  4.  Obtain follow-up CT scan of the abdomen pelvis with renal mass protocol in mid July 2022.   5.  I will see her in follow-up in late July 2022 with CBC CMP ferritin iron panel.      I spent 45 minutes caring for Ayla on this date of service. This time includes time spent by me in the following activities: preparing for the visit, reviewing tests, obtaining and/or reviewing a separately obtained history, performing a medically appropriate examination and/or evaluation, counseling and educating the patient/family/caregiver, ordering medications, tests, or procedures, documenting information in the medical record, independently interpreting results and communicating that information with the patient/family/caregiver and care coordination     Izzy Martinez MD  05/05/22

## 2022-05-31 RX ORDER — ISOSORBIDE MONONITRATE 60 MG/1
60 TABLET, EXTENDED RELEASE ORAL DAILY
Qty: 30 TABLET | Refills: 11 | Status: SHIPPED | OUTPATIENT
Start: 2022-05-31 | End: 2022-11-03 | Stop reason: SDUPTHER

## 2022-06-03 ENCOUNTER — OFFICE VISIT (OUTPATIENT)
Dept: FAMILY MEDICINE CLINIC | Facility: CLINIC | Age: 82
End: 2022-06-03

## 2022-06-03 VITALS
HEART RATE: 75 BPM | TEMPERATURE: 97.8 F | HEIGHT: 58 IN | BODY MASS INDEX: 32.41 KG/M2 | WEIGHT: 154.4 LBS | DIASTOLIC BLOOD PRESSURE: 76 MMHG | OXYGEN SATURATION: 95 % | SYSTOLIC BLOOD PRESSURE: 122 MMHG

## 2022-06-03 DIAGNOSIS — I10 PRIMARY HYPERTENSION: ICD-10-CM

## 2022-06-03 DIAGNOSIS — E78.2 MIXED HYPERLIPIDEMIA: ICD-10-CM

## 2022-06-03 DIAGNOSIS — Z23 IMMUNIZATION DUE: ICD-10-CM

## 2022-06-03 DIAGNOSIS — I25.118 CORONARY ARTERY DISEASE OF NATIVE HEART WITH STABLE ANGINA PECTORIS, UNSPECIFIED VESSEL OR LESION TYPE: ICD-10-CM

## 2022-06-03 DIAGNOSIS — E11.9 TYPE 2 DIABETES MELLITUS WITHOUT COMPLICATION, WITHOUT LONG-TERM CURRENT USE OF INSULIN: Primary | ICD-10-CM

## 2022-06-03 PROCEDURE — 99214 OFFICE O/P EST MOD 30 MIN: CPT | Performed by: FAMILY MEDICINE

## 2022-06-03 PROCEDURE — 0051A COVID-19 (PFIZER) 12+ YRS: CPT | Performed by: FAMILY MEDICINE

## 2022-06-03 PROCEDURE — 91305 COVID-19 (PFIZER) 12+ YRS: CPT | Performed by: FAMILY MEDICINE

## 2022-06-03 NOTE — PROGRESS NOTES
Juliet Castellano is a 82 y.o. female.     Chief Complaint   Patient presents with   • Hypertension   • Diabetes       History of Present Illness   Hypertension follow-up stable.  Hyperlipidemia follow-up stable.  CAD stable.  Follow-up on diabetes today patient insisting on continuing taking metformin even though her A1c is at absolutely normal range at 1 before last was below 5.  No active complaints today.      The following portions of the patient's history were reviewed and updated as appropriate: allergies, current medications, past family history, past medical history, past social history, past surgical history and problem list.    Past Medical History:   Diagnosis Date   • Arrhythmia    • Colitis    • Colon cancer (HCC)     s/p partial colectomy in 2011   • Diabetes mellitus (HCC)    • Gallbladder disease    • Hard to intubate    • Hyperlipidemia    • Hypertension    • Left arm pain    • SSS (sick sinus syndrome) (HCC)     [employer] dual-chamber pacemaker on 9/17/2021   • Syncope     Secondary to medications (clonidine and metoprolol) in 3/15.  Had severe bradycardia (sinus arrest with junctional escape beats).  Resolved after medications discontinued.       Past Surgical History:   Procedure Laterality Date   • CARDIAC ELECTROPHYSIOLOGY PROCEDURE N/A 9/17/2021    Procedure: Pacemaker DC new;  Surgeon: Richard Alaniz MD;  Location: [facility] INVASIVE LOCATION;  Service: Cardiology;  Laterality: N/A;   • CHOLECYSTECTOMY  10/2019   • COLECTOMY PARTIAL / TOTAL      2011 for colon cancer   • COLONOSCOPY  01/20/2020   • UTERINE FIBROID SURGERY      s/p uterine fibroma resection       Family History   Problem Relation Age of Onset   • Heart disease Mother         pacemaker placement   • Stroke Mother    • Colon cancer Brother    • Stroke Father    • Diabetes Other    • Kidney disease Other    • Hyperlipidemia Other    • Arthritis Other        Social History     Socioeconomic History    • Marital status:    Tobacco Use   • Smoking status: Never Smoker   • Smokeless tobacco: Never Used   Vaping Use   • Vaping Use: Never used   Substance and Sexual Activity   • Alcohol use: No   • Drug use: No   • Sexual activity: Defer       Current Outpatient Medications on File Prior to Visit   Medication Sig Dispense Refill   • Accu-Chek FastClix Lancets misc To test blood sugar 3 times daily. 100 each 8   • amLODIPine (NORVASC) 5 MG tablet Take 1 tablet by mouth Daily. 90 tablet 3   • aspirin 81 MG tablet Take 81 mg by mouth Daily.     • atorvastatin (LIPITOR) 20 MG tablet Take 1 tablet by mouth Daily. 90 tablet 3   • Cyanocobalamin (B-12) 2500 MCG sublingual tablet Place 1 tablet under the tongue Daily. 30 tablet 3   • diclofenac (VOLTAREN) 1 % gel gel Apply 4 g topically to the appropriate area as directed 3 (Three) Times a Day. 5 tube 11   • glucose blood (ONE TOUCH ULTRA TEST) test strip USE TO TEST BLOOD SUGAR EVERY DAY. (E11.9) 100 each 2   • hydroxyurea (HYDREA) 500 MG capsule Take 500 mg by mouth 3 (Three) Times a Week. Monday Wednesday Friday     • isosorbide mononitrate (IMDUR) 60 MG 24 hr tablet TAKE 1 TABLET BY MOUTH DAILY 30 tablet 11   • ketoconazole (NIZORAL) 2 % cream Apply  topically to the appropriate area as directed Daily. 30 g 11   • metFORMIN (GLUCOPHAGE) 500 MG tablet Take 1 tablet by mouth 2 (Two) Times a Day With Meals. (Patient taking differently: Take 500 mg by mouth Daily With Breakfast.) 60 tablet 11   • mupirocin (Bactroban) 2 % ointment Apply  topically to the appropriate area as directed 3 (Three) Times a Day. 30 g 11   • ofloxacin (OCUFLOX) 0.3 % ophthalmic solution Administer 2 drops to both eyes 4 (Four) Times a Day. 10 mL 11   • oxybutynin (DITROPAN) 5 MG tablet Take 1 tablet by mouth 2 (Two) Times a Day. 60 tablet 5   • oxyCODONE-acetaminophen (PERCOCET) 5-325 MG per tablet Take 1-2 tablets by mouth Every 4 (Four) Hours As Needed (Pain). 10 tablet 0   •  valsartan-hydrochlorothiazide (DIOVAN-HCT) 320-25 MG per tablet Take 1 tablet by mouth Daily. 90 tablet 3     No current facility-administered medications on file prior to visit.       Review of Systems   Constitutional: Negative.        Recent Results (from the past 4704 hour(s))   Ferritin    Collection Time: 01/06/22 12:00 PM    Specimen: Blood   Result Value Ref Range    Ferritin 224.00 (H) 13.00 - 150.00 ng/mL   Iron Profile    Collection Time: 01/06/22 12:00 PM    Specimen: Blood   Result Value Ref Range    Iron 54 37 - 145 mcg/dL    Iron Saturation 21 14 - 48 %    Transferrin 186 (L) 200 - 360 mg/dL    TIBC 260 249 - 505 mcg/dL   Comprehensive Metabolic Panel    Collection Time: 01/06/22 12:00 PM    Specimen: Blood   Result Value Ref Range    Glucose 120 74 - 124 mg/dL    BUN 19 6 - 20 mg/dL    Creatinine 0.90 0.60 - 1.10 mg/dL    Sodium 139 134 - 145 mmol/L    Potassium 4.2 3.5 - 4.7 mmol/L    Chloride 104 98 - 107 mmol/L    CO2 26.4 22.0 - 29.0 mmol/L    Calcium 10.7 (C) 8.5 - 10.2 mg/dL    Total Protein 6.6 6.3 - 8.0 g/dL    Albumin 4.30 3.50 - 5.20 g/dL    ALT (SGPT) 16 0 - 33 U/L    AST (SGOT) 17 0 - 32 U/L    Alkaline Phosphatase 79 38 - 116 U/L    Total Bilirubin 0.5 0.2 - 1.2 mg/dL    eGFR Non African Amer 60 (L) >60 mL/min/1.73    Globulin 2.3 1.8 - 3.5 gm/dL    A/G Ratio 1.9 1.1 - 2.4 g/dL    BUN/Creatinine Ratio 21.1 7.3 - 30.0    Anion Gap 8.6 5.0 - 15.0 mmol/L   CBC Auto Differential    Collection Time: 01/06/22 12:00 PM    Specimen: Blood   Result Value Ref Range    WBC 16.66 (H) 3.40 - 10.80 10*3/mm3    RBC 2.90 (L) 3.77 - 5.28 10*6/mm3    Hemoglobin 10.2 (L) 12.0 - 15.9 g/dL    Hematocrit 30.6 (L) 34.0 - 46.6 %    .5 (H) 79.0 - 97.0 fL    MCH 35.2 (H) 26.6 - 33.0 pg    MCHC 33.3 31.5 - 35.7 g/dL    RDW 15.4 12.3 - 15.4 %    RDW-SD 59.5 (H) 37.0 - 54.0 fl    MPV 11.6 6.0 - 12.0 fL    Platelets 352 140 - 450 10*3/mm3    Neutrophil % 72.0 42.7 - 76.0 %    Lymphocyte % 9.8 (L) 19.6 - 45.3 %     Monocyte % 5.1 5.0 - 12.0 %    Eosinophil % 4.0 0.3 - 6.2 %    Basophil % 1.0 0.0 - 1.5 %    Immature Grans % 8.1 (H) 0.0 - 0.5 %    Neutrophils, Absolute 12.00 (H) 1.70 - 7.00 10*3/mm3    Lymphocytes, Absolute 1.63 0.70 - 3.10 10*3/mm3    Monocytes, Absolute 0.85 0.10 - 0.90 10*3/mm3    Eosinophils, Absolute 0.66 (H) 0.00 - 0.40 10*3/mm3    Basophils, Absolute 0.17 0.00 - 0.20 10*3/mm3    Immature Grans, Absolute 1.35 (H) 0.00 - 0.05 10*3/mm3    nRBC 0.0 0.0 - 0.2 /100 WBC   Hemoglobin A1c    Collection Time: 02/01/22  8:14 AM    Specimen: Blood   Result Value Ref Range    Hemoglobin A1C 5.4 4.8 - 5.6 %   Comprehensive Metabolic Panel    Collection Time: 02/01/22  8:14 AM    Specimen: Blood   Result Value Ref Range    Glucose 111 (H) 65 - 99 mg/dL    BUN 17 8 - 27 mg/dL    Creatinine 0.95 0.57 - 1.00 mg/dL    eGFR Non African Am 57 (L) >59 mL/min/1.73    eGFR African Am 65 >59 mL/min/1.73    BUN/Creatinine Ratio 18 12 - 28    Sodium 143 134 - 144 mmol/L    Potassium 4.2 3.5 - 5.2 mmol/L    Chloride 107 (H) 96 - 106 mmol/L    Total CO2 24 20 - 29 mmol/L    Calcium 9.6 8.7 - 10.3 mg/dL    Total Protein 6.3 6.0 - 8.5 g/dL    Albumin 4.3 3.7 - 4.7 g/dL    Globulin 2.0 1.5 - 4.5 g/dL    A/G Ratio 2.2 1.2 - 2.2    Total Bilirubin 0.6 0.0 - 1.2 mg/dL    Alkaline Phosphatase 62 44 - 121 IU/L    AST (SGOT) 18 0 - 40 IU/L    ALT (SGPT) 14 0 - 32 IU/L   Lipid Panel With LDL / HDL Ratio    Collection Time: 02/01/22  8:14 AM    Specimen: Blood   Result Value Ref Range    Total Cholesterol 128 100 - 199 mg/dL    Triglycerides 170 (H) 0 - 149 mg/dL    HDL Cholesterol 27 (L) >39 mg/dL    VLDL Cholesterol Jeff 29 5 - 40 mg/dL    LDL Chol Calc (NIH) 72 0 - 99 mg/dL    LDL/HDL RATIO 2.7 0.0 - 3.2 ratio   Microalbumin / Creatinine Urine Ratio - Urine, Clean Catch    Collection Time: 02/01/22  8:14 AM    Specimen: Urine, Clean Catch   Result Value Ref Range    Creatinine, Urine 73.2 Not Estab. mg/dL    Microalbumin, Urine 14.7 Not  Estab. ug/mL    Microalbumin/Creatinine Ratio 20 0 - 29 mg/g creat   Ferritin    Collection Time: 03/03/22  9:05 AM    Specimen: Blood   Result Value Ref Range    Ferritin 191.50 (H) 13.00 - 150.00 ng/mL   Iron Profile    Collection Time: 03/03/22  9:05 AM    Specimen: Blood   Result Value Ref Range    Iron 66 37 - 145 mcg/dL    Iron Saturation 25 14 - 48 %    Transferrin 191 (L) 200 - 360 mg/dL    TIBC 267 249 - 505 mcg/dL   CBC Auto Differential    Collection Time: 03/03/22  9:05 AM    Specimen: Blood   Result Value Ref Range    WBC 15.24 (H) 3.40 - 10.80 10*3/mm3    RBC 3.08 (L) 3.77 - 5.28 10*6/mm3    Hemoglobin 10.3 (L) 12.0 - 15.9 g/dL    Hematocrit 31.8 (L) 34.0 - 46.6 %    .2 (H) 79.0 - 97.0 fL    MCH 33.4 (H) 26.6 - 33.0 pg    MCHC 32.4 31.5 - 35.7 g/dL    RDW 15.7 (H) 12.3 - 15.4 %    RDW-SD 59.0 (H) 37.0 - 54.0 fl    MPV 10.6 6.0 - 12.0 fL    Platelets 372 140 - 450 10*3/mm3    Neutrophil % 72.2 42.7 - 76.0 %    Lymphocyte % 11.0 (L) 19.6 - 45.3 %    Monocyte % 4.9 (L) 5.0 - 12.0 %    Eosinophil % 4.1 0.3 - 6.2 %    Basophil % 1.0 0.0 - 1.5 %    Immature Grans % 6.8 (H) 0.0 - 0.5 %    Neutrophils, Absolute 11.01 (H) 1.70 - 7.00 10*3/mm3    Lymphocytes, Absolute 1.67 0.70 - 3.10 10*3/mm3    Monocytes, Absolute 0.75 0.10 - 0.90 10*3/mm3    Eosinophils, Absolute 0.62 (H) 0.00 - 0.40 10*3/mm3    Basophils, Absolute 0.16 0.00 - 0.20 10*3/mm3    Immature Grans, Absolute 1.03 (H) 0.00 - 0.05 10*3/mm3    nRBC 0.0 0.0 - 0.2 /100 WBC   Comprehensive Metabolic Panel    Collection Time: 05/05/22  2:16 PM    Specimen: Blood   Result Value Ref Range    Glucose 150 (H) 74 - 124 mg/dL    BUN 28 (H) 6 - 20 mg/dL    Creatinine 1.16 (H) 0.60 - 1.10 mg/dL    Sodium 141 134 - 145 mmol/L    Potassium 3.9 3.5 - 4.7 mmol/L    Chloride 105 98 - 107 mmol/L    CO2 25.3 22.0 - 29.0 mmol/L    Calcium 10.3 (H) 8.5 - 10.2 mg/dL    Total Protein 6.6 6.3 - 8.0 g/dL    Albumin 4.30 3.50 - 5.20 g/dL    ALT (SGPT) 18 0 - 33 U/L     "AST (SGOT) 16 0 - 32 U/L    Alkaline Phosphatase 75 38 - 116 U/L    Total Bilirubin 0.6 0.2 - 1.2 mg/dL    Globulin 2.3 1.8 - 3.5 gm/dL    A/G Ratio 1.9 1.1 - 2.4 g/dL    BUN/Creatinine Ratio 24.1 7.3 - 30.0    Anion Gap 10.7 5.0 - 15.0 mmol/L    eGFR 47.8 (L) >60.0 mL/min/1.73   Ferritin    Collection Time: 05/05/22  2:16 PM    Specimen: Blood   Result Value Ref Range    Ferritin 150.90 (H) 13.00 - 150.00 ng/mL   Iron Profile    Collection Time: 05/05/22  2:16 PM    Specimen: Blood   Result Value Ref Range    Iron 66 37 - 145 mcg/dL    Iron Saturation 24 14 - 48 %    Transferrin 194 (L) 200 - 360 mg/dL    TIBC 272 249 - 505 mcg/dL   CBC Auto Differential    Collection Time: 05/05/22  2:16 PM    Specimen: Blood   Result Value Ref Range    WBC 14.98 (H) 3.40 - 10.80 10*3/mm3    RBC 2.96 (L) 3.77 - 5.28 10*6/mm3    Hemoglobin 9.9 (L) 12.0 - 15.9 g/dL    Hematocrit 30.7 (L) 34.0 - 46.6 %    .7 (H) 79.0 - 97.0 fL    MCH 33.4 (H) 26.6 - 33.0 pg    MCHC 32.2 31.5 - 35.7 g/dL    RDW 16.2 (H) 12.3 - 15.4 %    RDW-SD 61.0 (H) 37.0 - 54.0 fl    MPV 10.7 6.0 - 12.0 fL    Platelets 357 140 - 450 10*3/mm3    Neutrophil % 72.1 42.7 - 76.0 %    Lymphocyte % 9.7 (L) 19.6 - 45.3 %    Monocyte % 5.5 5.0 - 12.0 %    Eosinophil % 3.2 0.3 - 6.2 %    Basophil % 1.3 0.0 - 1.5 %    Immature Grans % 8.2 (H) 0.0 - 0.5 %    Neutrophils, Absolute 10.79 (H) 1.70 - 7.00 10*3/mm3    Lymphocytes, Absolute 1.46 0.70 - 3.10 10*3/mm3    Monocytes, Absolute 0.83 0.10 - 0.90 10*3/mm3    Eosinophils, Absolute 0.48 (H) 0.00 - 0.40 10*3/mm3    Basophils, Absolute 0.19 0.00 - 0.20 10*3/mm3    Immature Grans, Absolute 1.23 (H) 0.00 - 0.05 10*3/mm3    nRBC 0.0 0.0 - 0.2 /100 WBC     Objective   Vitals:    06/03/22 0733   BP: 122/76   BP Location: Left arm   Patient Position: Sitting   Pulse: 75   Temp: 97.8 °F (36.6 °C)   SpO2: 95%   Weight: 70 kg (154 lb 6.4 oz)   Height: 147.3 cm (57.99\")     Body mass index is 32.28 kg/m².  Physical Exam  Vitals " and nursing note reviewed.   Constitutional:       General: She is not in acute distress.     Appearance: She is well-developed. She is not diaphoretic.   Cardiovascular:      Rate and Rhythm: Normal rate and regular rhythm.   Pulmonary:      Effort: Pulmonary effort is normal. No respiratory distress.      Breath sounds: Normal breath sounds. No wheezing.           Diagnoses and all orders for this visit:    1. Type 2 diabetes mellitus without complication, without long-term current use of insulin ([facility]) (Primary)    2. Coronary artery disease of native heart with stable angina pectoris, unspecified vessel or lesion type ([facility])    3. Primary hypertension    4. Mixed hyperlipidemia    5. Immunization due  -     COVID-19 Vaccine (Pfizer) Gray Cap      Not due for labs today.  Will draw labs next visit      Return in about 7 weeks (around 7/25/2022) for Medicare Wellness.

## 2022-06-30 DIAGNOSIS — I10 ESSENTIAL HYPERTENSION: ICD-10-CM

## 2022-06-30 RX ORDER — AMLODIPINE BESYLATE 5 MG/1
5 TABLET ORAL DAILY
Qty: 90 TABLET | Refills: 3 | Status: SHIPPED | OUTPATIENT
Start: 2022-06-30 | End: 2022-11-03 | Stop reason: SDUPTHER

## 2022-06-30 RX ORDER — VALSARTAN AND HYDROCHLOROTHIAZIDE 320; 25 MG/1; MG/1
1 TABLET, FILM COATED ORAL DAILY
Qty: 90 TABLET | Refills: 3 | Status: SHIPPED | OUTPATIENT
Start: 2022-06-30 | End: 2022-08-12

## 2022-06-30 NOTE — TELEPHONE ENCOUNTER
Rx Refill Note  Requested Prescriptions     Pending Prescriptions Disp Refills   • valsartan-hydrochlorothiazide (DIOVAN-HCT) 320-25 MG per tablet [Pharmacy Med Name: VALSARTAN/HCTZ 320MG/25MG TABLETS] 90 tablet 3     Sig: TAKE 1 TABLET BY MOUTH DAILY   • amLODIPine (NORVASC) 5 MG tablet [Pharmacy Med Name: AMLODIPINE BESYLATE 5MG TABLETS] 90 tablet 3     Sig: TAKE 1 TABLET BY MOUTH DAILY      Last office visit with prescribing clinician: 6/3/2022      Next office visit with prescribing clinician: 7/21/2022            Annia Redman MA  06/30/22, 07:03 EDT

## 2022-07-08 ENCOUNTER — SPECIALTY PHARMACY (OUTPATIENT)
Dept: PHARMACY | Facility: HOSPITAL | Age: 82
End: 2022-07-08

## 2022-07-08 RX ORDER — HYDROXYUREA 500 MG/1
500 CAPSULE ORAL 3 TIMES WEEKLY
Qty: 36 CAPSULE | Refills: 3 | Status: SHIPPED | OUTPATIENT
Start: 2022-07-08 | End: 2022-07-28 | Stop reason: SDUPTHER

## 2022-07-08 NOTE — TELEPHONE ENCOUNTER
Caller: [email or web address]    Relationship: DAUGHTER    Best call back number: [phone]    Requested Prescriptions:   Requested Prescriptions     Pending Prescriptions Disp Refills   • hydroxyurea (HYDREA) 500 MG capsule       Sig: Take 1 capsule by mouth 3 (Three) Times a Week. Monday Wednesday Friday        Pharmacy where request should be sent: [id number] [place], [place] - [address] [facility] [phone] [facility] [phone] FX     Does the patient have less than a 3 day supply:  [x] Yes  [] No    Claire Cavanaugh Rep   07/08/22 11:32 EDT

## 2022-07-11 ENCOUNTER — SPECIALTY PHARMACY (OUTPATIENT)
Dept: PHARMACY | Facility: HOSPITAL | Age: 82
End: 2022-07-11

## 2022-07-21 ENCOUNTER — OFFICE VISIT (OUTPATIENT)
Dept: FAMILY MEDICINE CLINIC | Facility: CLINIC | Age: 82
End: 2022-07-21

## 2022-07-21 VITALS
HEART RATE: 72 BPM | TEMPERATURE: 98.3 F | SYSTOLIC BLOOD PRESSURE: 116 MMHG | OXYGEN SATURATION: 95 % | BODY MASS INDEX: 32.62 KG/M2 | DIASTOLIC BLOOD PRESSURE: 71 MMHG | WEIGHT: 155.4 LBS | HEIGHT: 58 IN

## 2022-07-21 DIAGNOSIS — E11.9 TYPE 2 DIABETES MELLITUS WITHOUT COMPLICATION, WITHOUT LONG-TERM CURRENT USE OF INSULIN: Primary | ICD-10-CM

## 2022-07-21 DIAGNOSIS — I10 ESSENTIAL HYPERTENSION: ICD-10-CM

## 2022-07-21 DIAGNOSIS — I25.118 CORONARY ARTERY DISEASE OF NATIVE HEART WITH STABLE ANGINA PECTORIS, UNSPECIFIED VESSEL OR LESION TYPE: ICD-10-CM

## 2022-07-21 DIAGNOSIS — I10 PRIMARY HYPERTENSION: ICD-10-CM

## 2022-07-21 DIAGNOSIS — E78.2 MIXED HYPERLIPIDEMIA: ICD-10-CM

## 2022-07-21 PROCEDURE — 99214 OFFICE O/P EST MOD 30 MIN: CPT | Performed by: FAMILY MEDICINE

## 2022-07-21 NOTE — PROGRESS NOTES
Subjective   Ayla Castellano is a 82 y.o. female.     Chief Complaint   Patient presents with   • Hypertension   • Hyperlipidemia   • Diabetes       History of Present Illness     Diabetes follow-up.  Patient states that she has been having blood sugars in the morning at 170s.  Instead of taking metformin 500 mg 3 a day.  Hypertension stable.  Hyperlipidemia stable.  CAD stable.  Due for labs today  The following portions of the patient's history were reviewed and updated as appropriate: allergies, current medications, past family history, past medical history, past social history, past surgical history and problem list.    Past Medical History:   Diagnosis Date   • Arrhythmia    • Colitis    • Colon cancer (HCC)     s/p partial colectomy in 2011   • Diabetes mellitus (HCC)    • Gallbladder disease    • Hard to intubate    • Hyperlipidemia    • Hypertension    • Left arm pain    • SSS (sick sinus syndrome) (HCC)     [employer] dual-chamber pacemaker on 9/17/2021   • Syncope     Secondary to medications (clonidine and metoprolol) in 3/15.  Had severe bradycardia (sinus arrest with junctional escape beats).  Resolved after medications discontinued.       Past Surgical History:   Procedure Laterality Date   • CARDIAC ELECTROPHYSIOLOGY PROCEDURE N/A 9/17/2021    Procedure: Pacemaker DC new;  Surgeon: Richard Alaniz MD;  Location: [facility] INVASIVE LOCATION;  Service: Cardiology;  Laterality: N/A;   • CHOLECYSTECTOMY  10/2019   • COLECTOMY PARTIAL / TOTAL      2011 for colon cancer   • COLONOSCOPY  01/20/2020   • UTERINE FIBROID SURGERY      s/p uterine fibroma resection       Family History   Problem Relation Age of Onset   • Heart disease Mother         pacemaker placement   • Stroke Mother    • Colon cancer Brother    • Stroke Father    • Diabetes Other    • Kidney disease Other    • Hyperlipidemia Other    • Arthritis Other        Social History     Socioeconomic History   • Marital status:     Tobacco Use   • Smoking status: Never Smoker   • Smokeless tobacco: Never Used   Vaping Use   • Vaping Use: Never used   Substance and Sexual Activity   • Alcohol use: No   • Drug use: No   • Sexual activity: Defer       Current Outpatient Medications on File Prior to Visit   Medication Sig Dispense Refill   • Accu-Chek FastClix Lancets misc To test blood sugar 3 times daily. 100 each 8   • amLODIPine (NORVASC) 5 MG tablet TAKE 1 TABLET BY MOUTH DAILY 90 tablet 3   • aspirin 81 MG tablet Take 81 mg by mouth Daily.     • atorvastatin (LIPITOR) 20 MG tablet Take 1 tablet by mouth Daily. 90 tablet 3   • Cyanocobalamin (B-12) 2500 MCG sublingual tablet Place 1 tablet under the tongue Daily. 30 tablet 3   • diclofenac (VOLTAREN) 1 % gel gel Apply 4 g topically to the appropriate area as directed 3 (Three) Times a Day. 5 tube 11   • glucose blood (ONE TOUCH ULTRA TEST) test strip USE TO TEST BLOOD SUGAR EVERY DAY. (E11.9) 100 each 2   • hydroxyurea (HYDREA) 500 MG capsule Take 1 capsule by mouth 3 (Three) Times a Week. Monday Wednesday Friday 36 capsule 3   • isosorbide mononitrate (IMDUR) 60 MG 24 hr tablet TAKE 1 TABLET BY MOUTH DAILY 30 tablet 11   • ketoconazole (NIZORAL) 2 % cream Apply  topically to the appropriate area as directed Daily. 30 g 11   • mupirocin (Bactroban) 2 % ointment Apply  topically to the appropriate area as directed 3 (Three) Times a Day. 30 g 11   • ofloxacin (OCUFLOX) 0.3 % ophthalmic solution Administer 2 drops to both eyes 4 (Four) Times a Day. 10 mL 11   • oxybutynin (DITROPAN) 5 MG tablet Take 1 tablet by mouth 2 (Two) Times a Day. 60 tablet 5   • oxyCODONE-acetaminophen (PERCOCET) 5-325 MG per tablet Take 1-2 tablets by mouth Every 4 (Four) Hours As Needed (Pain). 10 tablet 0   • valsartan-hydrochlorothiazide (DIOVAN-HCT) 320-25 MG per tablet TAKE 1 TABLET BY MOUTH DAILY 90 tablet 3   • [DISCONTINUED] metFORMIN (GLUCOPHAGE) 500 MG tablet Take 1 tablet by mouth 2 (Two) Times a  Day With Meals. (Patient taking differently: Take 500 mg by mouth Daily With Breakfast.) 60 tablet 11     No current facility-administered medications on file prior to visit.       Review of Systems   Constitutional: Negative.        Recent Results (from the past 4704 hour(s))   Ferritin    Collection Time: 01/06/22 12:00 PM    Specimen: Blood   Result Value Ref Range    Ferritin 224.00 (H) 13.00 - 150.00 ng/mL   Iron Profile    Collection Time: 01/06/22 12:00 PM    Specimen: Blood   Result Value Ref Range    Iron 54 37 - 145 mcg/dL    Iron Saturation 21 14 - 48 %    Transferrin 186 (L) 200 - 360 mg/dL    TIBC 260 249 - 505 mcg/dL   Comprehensive Metabolic Panel    Collection Time: 01/06/22 12:00 PM    Specimen: Blood   Result Value Ref Range    Glucose 120 74 - 124 mg/dL    BUN 19 6 - 20 mg/dL    Creatinine 0.90 0.60 - 1.10 mg/dL    Sodium 139 134 - 145 mmol/L    Potassium 4.2 3.5 - 4.7 mmol/L    Chloride 104 98 - 107 mmol/L    CO2 26.4 22.0 - 29.0 mmol/L    Calcium 10.7 (C) 8.5 - 10.2 mg/dL    Total Protein 6.6 6.3 - 8.0 g/dL    Albumin 4.30 3.50 - 5.20 g/dL    ALT (SGPT) 16 0 - 33 U/L    AST (SGOT) 17 0 - 32 U/L    Alkaline Phosphatase 79 38 - 116 U/L    Total Bilirubin 0.5 0.2 - 1.2 mg/dL    eGFR Non African Amer 60 (L) >60 mL/min/1.73    Globulin 2.3 1.8 - 3.5 gm/dL    A/G Ratio 1.9 1.1 - 2.4 g/dL    BUN/Creatinine Ratio 21.1 7.3 - 30.0    Anion Gap 8.6 5.0 - 15.0 mmol/L   CBC Auto Differential    Collection Time: 01/06/22 12:00 PM    Specimen: Blood   Result Value Ref Range    WBC 16.66 (H) 3.40 - 10.80 10*3/mm3    RBC 2.90 (L) 3.77 - 5.28 10*6/mm3    Hemoglobin 10.2 (L) 12.0 - 15.9 g/dL    Hematocrit 30.6 (L) 34.0 - 46.6 %    .5 (H) 79.0 - 97.0 fL    MCH 35.2 (H) 26.6 - 33.0 pg    MCHC 33.3 31.5 - 35.7 g/dL    RDW 15.4 12.3 - 15.4 %    RDW-SD 59.5 (H) 37.0 - 54.0 fl    MPV 11.6 6.0 - 12.0 fL    Platelets 352 140 - 450 10*3/mm3    Neutrophil % 72.0 42.7 - 76.0 %    Lymphocyte % 9.8 (L) 19.6 - 45.3 %     Monocyte % 5.1 5.0 - 12.0 %    Eosinophil % 4.0 0.3 - 6.2 %    Basophil % 1.0 0.0 - 1.5 %    Immature Grans % 8.1 (H) 0.0 - 0.5 %    Neutrophils, Absolute 12.00 (H) 1.70 - 7.00 10*3/mm3    Lymphocytes, Absolute 1.63 0.70 - 3.10 10*3/mm3    Monocytes, Absolute 0.85 0.10 - 0.90 10*3/mm3    Eosinophils, Absolute 0.66 (H) 0.00 - 0.40 10*3/mm3    Basophils, Absolute 0.17 0.00 - 0.20 10*3/mm3    Immature Grans, Absolute 1.35 (H) 0.00 - 0.05 10*3/mm3    nRBC 0.0 0.0 - 0.2 /100 WBC   Hemoglobin A1c    Collection Time: 02/01/22  8:14 AM    Specimen: Blood   Result Value Ref Range    Hemoglobin A1C 5.4 4.8 - 5.6 %   Comprehensive Metabolic Panel    Collection Time: 02/01/22  8:14 AM    Specimen: Blood   Result Value Ref Range    Glucose 111 (H) 65 - 99 mg/dL    BUN 17 8 - 27 mg/dL    Creatinine 0.95 0.57 - 1.00 mg/dL    eGFR Non African Am 57 (L) >59 mL/min/1.73    eGFR African Am 65 >59 mL/min/1.73    BUN/Creatinine Ratio 18 12 - 28    Sodium 143 134 - 144 mmol/L    Potassium 4.2 3.5 - 5.2 mmol/L    Chloride 107 (H) 96 - 106 mmol/L    Total CO2 24 20 - 29 mmol/L    Calcium 9.6 8.7 - 10.3 mg/dL    Total Protein 6.3 6.0 - 8.5 g/dL    Albumin 4.3 3.7 - 4.7 g/dL    Globulin 2.0 1.5 - 4.5 g/dL    A/G Ratio 2.2 1.2 - 2.2    Total Bilirubin 0.6 0.0 - 1.2 mg/dL    Alkaline Phosphatase 62 44 - 121 IU/L    AST (SGOT) 18 0 - 40 IU/L    ALT (SGPT) 14 0 - 32 IU/L   Lipid Panel With LDL / HDL Ratio    Collection Time: 02/01/22  8:14 AM    Specimen: Blood   Result Value Ref Range    Total Cholesterol 128 100 - 199 mg/dL    Triglycerides 170 (H) 0 - 149 mg/dL    HDL Cholesterol 27 (L) >39 mg/dL    VLDL Cholesterol Jeff 29 5 - 40 mg/dL    LDL Chol Calc (NIH) 72 0 - 99 mg/dL    LDL/HDL RATIO 2.7 0.0 - 3.2 ratio   Microalbumin / Creatinine Urine Ratio - Urine, Clean Catch    Collection Time: 02/01/22  8:14 AM    Specimen: Urine, Clean Catch   Result Value Ref Range    Creatinine, Urine 73.2 Not Estab. mg/dL    Microalbumin, Urine 14.7 Not  Estab. ug/mL    Microalbumin/Creatinine Ratio 20 0 - 29 mg/g creat   Ferritin    Collection Time: 03/03/22  9:05 AM    Specimen: Blood   Result Value Ref Range    Ferritin 191.50 (H) 13.00 - 150.00 ng/mL   Iron Profile    Collection Time: 03/03/22  9:05 AM    Specimen: Blood   Result Value Ref Range    Iron 66 37 - 145 mcg/dL    Iron Saturation 25 14 - 48 %    Transferrin 191 (L) 200 - 360 mg/dL    TIBC 267 249 - 505 mcg/dL   CBC Auto Differential    Collection Time: 03/03/22  9:05 AM    Specimen: Blood   Result Value Ref Range    WBC 15.24 (H) 3.40 - 10.80 10*3/mm3    RBC 3.08 (L) 3.77 - 5.28 10*6/mm3    Hemoglobin 10.3 (L) 12.0 - 15.9 g/dL    Hematocrit 31.8 (L) 34.0 - 46.6 %    .2 (H) 79.0 - 97.0 fL    MCH 33.4 (H) 26.6 - 33.0 pg    MCHC 32.4 31.5 - 35.7 g/dL    RDW 15.7 (H) 12.3 - 15.4 %    RDW-SD 59.0 (H) 37.0 - 54.0 fl    MPV 10.6 6.0 - 12.0 fL    Platelets 372 140 - 450 10*3/mm3    Neutrophil % 72.2 42.7 - 76.0 %    Lymphocyte % 11.0 (L) 19.6 - 45.3 %    Monocyte % 4.9 (L) 5.0 - 12.0 %    Eosinophil % 4.1 0.3 - 6.2 %    Basophil % 1.0 0.0 - 1.5 %    Immature Grans % 6.8 (H) 0.0 - 0.5 %    Neutrophils, Absolute 11.01 (H) 1.70 - 7.00 10*3/mm3    Lymphocytes, Absolute 1.67 0.70 - 3.10 10*3/mm3    Monocytes, Absolute 0.75 0.10 - 0.90 10*3/mm3    Eosinophils, Absolute 0.62 (H) 0.00 - 0.40 10*3/mm3    Basophils, Absolute 0.16 0.00 - 0.20 10*3/mm3    Immature Grans, Absolute 1.03 (H) 0.00 - 0.05 10*3/mm3    nRBC 0.0 0.0 - 0.2 /100 WBC   Comprehensive Metabolic Panel    Collection Time: 05/05/22  2:16 PM    Specimen: Blood   Result Value Ref Range    Glucose 150 (H) 74 - 124 mg/dL    BUN 28 (H) 6 - 20 mg/dL    Creatinine 1.16 (H) 0.60 - 1.10 mg/dL    Sodium 141 134 - 145 mmol/L    Potassium 3.9 3.5 - 4.7 mmol/L    Chloride 105 98 - 107 mmol/L    CO2 25.3 22.0 - 29.0 mmol/L    Calcium 10.3 (H) 8.5 - 10.2 mg/dL    Total Protein 6.6 6.3 - 8.0 g/dL    Albumin 4.30 3.50 - 5.20 g/dL    ALT (SGPT) 18 0 - 33 U/L     "AST (SGOT) 16 0 - 32 U/L    Alkaline Phosphatase 75 38 - 116 U/L    Total Bilirubin 0.6 0.2 - 1.2 mg/dL    Globulin 2.3 1.8 - 3.5 gm/dL    A/G Ratio 1.9 1.1 - 2.4 g/dL    BUN/Creatinine Ratio 24.1 7.3 - 30.0    Anion Gap 10.7 5.0 - 15.0 mmol/L    eGFR 47.8 (L) >60.0 mL/min/1.73   Ferritin    Collection Time: 05/05/22  2:16 PM    Specimen: Blood   Result Value Ref Range    Ferritin 150.90 (H) 13.00 - 150.00 ng/mL   Iron Profile    Collection Time: 05/05/22  2:16 PM    Specimen: Blood   Result Value Ref Range    Iron 66 37 - 145 mcg/dL    Iron Saturation 24 14 - 48 %    Transferrin 194 (L) 200 - 360 mg/dL    TIBC 272 249 - 505 mcg/dL   CBC Auto Differential    Collection Time: 05/05/22  2:16 PM    Specimen: Blood   Result Value Ref Range    WBC 14.98 (H) 3.40 - 10.80 10*3/mm3    RBC 2.96 (L) 3.77 - 5.28 10*6/mm3    Hemoglobin 9.9 (L) 12.0 - 15.9 g/dL    Hematocrit 30.7 (L) 34.0 - 46.6 %    .7 (H) 79.0 - 97.0 fL    MCH 33.4 (H) 26.6 - 33.0 pg    MCHC 32.2 31.5 - 35.7 g/dL    RDW 16.2 (H) 12.3 - 15.4 %    RDW-SD 61.0 (H) 37.0 - 54.0 fl    MPV 10.7 6.0 - 12.0 fL    Platelets 357 140 - 450 10*3/mm3    Neutrophil % 72.1 42.7 - 76.0 %    Lymphocyte % 9.7 (L) 19.6 - 45.3 %    Monocyte % 5.5 5.0 - 12.0 %    Eosinophil % 3.2 0.3 - 6.2 %    Basophil % 1.3 0.0 - 1.5 %    Immature Grans % 8.2 (H) 0.0 - 0.5 %    Neutrophils, Absolute 10.79 (H) 1.70 - 7.00 10*3/mm3    Lymphocytes, Absolute 1.46 0.70 - 3.10 10*3/mm3    Monocytes, Absolute 0.83 0.10 - 0.90 10*3/mm3    Eosinophils, Absolute 0.48 (H) 0.00 - 0.40 10*3/mm3    Basophils, Absolute 0.19 0.00 - 0.20 10*3/mm3    Immature Grans, Absolute 1.23 (H) 0.00 - 0.05 10*3/mm3    nRBC 0.0 0.0 - 0.2 /100 WBC     Objective   Vitals:    07/21/22 0823   BP: 116/71   BP Location: Right arm   Patient Position: Sitting   Pulse: 72   Temp: 98.3 °F (36.8 °C)   SpO2: 95%   Weight: 70.5 kg (155 lb 6.4 oz)   Height: 147.3 cm (57.99\")     Body mass index is 32.49 kg/m².  Physical Exam  Vitals " and nursing note reviewed.   Constitutional:       General: She is not in acute distress.     Appearance: She is well-developed. She is not diaphoretic.   Cardiovascular:      Rate and Rhythm: Normal rate and regular rhythm.   Pulmonary:      Effort: Pulmonary effort is normal. No respiratory distress.      Breath sounds: Normal breath sounds. No wheezing.           Diagnoses and all orders for this visit:    1. Type 2 diabetes mellitus without complication, without long-term current use of insulin (HCC) (Primary)  -     Hemoglobin A1c  -     Comprehensive Metabolic Panel  -     Lipid Panel With LDL / HDL Ratio  -     metFORMIN (GLUCOPHAGE) 500 MG tablet; Take 1 tablet by mouth 3 (Three) Times a Day.  Dispense: 90 tablet; Refill: 11    2. Essential hypertension  -     Comprehensive Metabolic Panel  -     Lipid Panel With LDL / HDL Ratio    3. Mixed hyperlipidemia  -     Comprehensive Metabolic Panel  -     Lipid Panel With LDL / HDL Ratio    4. Primary hypertension    5. Coronary artery disease of native heart with stable angina pectoris, unspecified vessel or lesion type (HCC)      Return in about 2 weeks (around 8/4/2022) for Medicare Wellness.

## 2022-07-22 ENCOUNTER — HOSPITAL ENCOUNTER (OUTPATIENT)
Dept: PET IMAGING | Facility: HOSPITAL | Age: 82
Discharge: HOME | End: 2022-07-22
Admitting: INTERNAL MEDICINE

## 2022-07-22 DIAGNOSIS — D47.1 MYELOPROLIFERATIVE DISORDER: ICD-10-CM

## 2022-07-22 DIAGNOSIS — N28.9 KIDNEY LESION: ICD-10-CM

## 2022-07-22 LAB
ALBUMIN SERPL-MCNC: 4.5 G/DL (ref 3.7–4.7)
ALBUMIN/GLOB SERPL: 2 {RATIO} (ref 1.2–2.2)
ALP SERPL-CCNC: 73 IU/L (ref 44–121)
ALT SERPL-CCNC: 30 IU/L (ref 0–32)
AST SERPL-CCNC: 25 IU/L (ref 0–40)
BILIRUB SERPL-MCNC: 0.7 MG/DL (ref 0–1.2)
BUN SERPL-MCNC: 32 MG/DL (ref 8–27)
BUN/CREAT SERPL: 30 (ref 12–28)
CALCIUM SERPL-MCNC: 11.5 MG/DL (ref 8.7–10.3)
CHLORIDE SERPL-SCNC: 103 MMOL/L (ref 96–106)
CHOLEST SERPL-MCNC: 143 MG/DL (ref 100–199)
CO2 SERPL-SCNC: 23 MMOL/L (ref 20–29)
CREAT SERPL-MCNC: 1.08 MG/DL (ref 0.57–1)
EGFRCR SERPLBLD CKD-EPI 2021: 52 ML/MIN/1.73
GLOBULIN SER CALC-MCNC: 2.3 G/DL (ref 1.5–4.5)
GLUCOSE SERPL-MCNC: 114 MG/DL (ref 65–99)
HBA1C MFR BLD: 5.6 % (ref 4.8–5.6)
HDLC SERPL-MCNC: 30 MG/DL
LDLC SERPL CALC-MCNC: 78 MG/DL (ref 0–99)
LDLC/HDLC SERPL: 2.6 RATIO (ref 0–3.2)
POTASSIUM SERPL-SCNC: 3.9 MMOL/L (ref 3.5–5.2)
PROT SERPL-MCNC: 6.8 G/DL (ref 6–8.5)
SODIUM SERPL-SCNC: 142 MMOL/L (ref 134–144)
TRIGL SERPL-MCNC: 205 MG/DL (ref 0–149)
VLDLC SERPL CALC-MCNC: 35 MG/DL (ref 5–40)

## 2022-07-22 PROCEDURE — 25010000002 IOPAMIDOL 61 % SOLUTION: Performed by: INTERNAL MEDICINE

## 2022-07-22 PROCEDURE — 74178 CT ABD&PLV WO CNTR FLWD CNTR: CPT

## 2022-07-22 RX ADMIN — IOPAMIDOL 85 ML: 612 INJECTION, SOLUTION INTRAVENOUS at 11:00

## 2022-07-28 ENCOUNTER — LAB (OUTPATIENT)
Dept: LAB | Facility: HOSPITAL | Age: 82
End: 2022-07-28

## 2022-07-28 ENCOUNTER — OFFICE VISIT (OUTPATIENT)
Dept: ONCOLOGY | Facility: CLINIC | Age: 82
End: 2022-07-28

## 2022-07-28 ENCOUNTER — SPECIALTY PHARMACY (OUTPATIENT)
Dept: ONCOLOGY | Facility: HOSPITAL | Age: 82
End: 2022-07-28

## 2022-07-28 VITALS
BODY MASS INDEX: 32.75 KG/M2 | OXYGEN SATURATION: 96 % | DIASTOLIC BLOOD PRESSURE: 68 MMHG | HEART RATE: 80 BPM | RESPIRATION RATE: 18 BRPM | TEMPERATURE: 97.1 F | HEIGHT: 58 IN | SYSTOLIC BLOOD PRESSURE: 114 MMHG | WEIGHT: 156 LBS

## 2022-07-28 DIAGNOSIS — D47.1 MYELOPROLIFERATIVE DISORDER: ICD-10-CM

## 2022-07-28 DIAGNOSIS — E83.52 HYPERCALCEMIA: ICD-10-CM

## 2022-07-28 DIAGNOSIS — D50.9 IRON DEFICIENCY ANEMIA, UNSPECIFIED IRON DEFICIENCY ANEMIA TYPE: ICD-10-CM

## 2022-07-28 DIAGNOSIS — D47.1 MYELOPROLIFERATIVE DISORDER: Primary | ICD-10-CM

## 2022-07-28 DIAGNOSIS — N28.9 KIDNEY LESION: ICD-10-CM

## 2022-07-28 DIAGNOSIS — Z85.038 HISTORY OF COLON CANCER: ICD-10-CM

## 2022-07-28 DIAGNOSIS — Z79.899 ENCOUNTER FOR LONG-TERM CURRENT USE OF HIGH RISK MEDICATION: ICD-10-CM

## 2022-07-28 LAB
ALBUMIN SERPL-MCNC: 4.6 G/DL (ref 3.5–5.2)
ALBUMIN/GLOB SERPL: 1.8 G/DL (ref 1.1–2.4)
ALP SERPL-CCNC: 74 U/L (ref 38–116)
ALT SERPL W P-5'-P-CCNC: 29 U/L (ref 0–33)
ANION GAP SERPL CALCULATED.3IONS-SCNC: 13 MMOL/L (ref 5–15)
AST SERPL-CCNC: 27 U/L (ref 0–32)
BASOPHILS # BLD AUTO: 0.2 10*3/MM3 (ref 0–0.2)
BASOPHILS NFR BLD AUTO: 1 % (ref 0–1.5)
BILIRUB SERPL-MCNC: 0.7 MG/DL (ref 0.2–1.2)
BUN SERPL-MCNC: 25 MG/DL (ref 6–20)
BUN/CREAT SERPL: 24.3 (ref 7.3–30)
CALCIUM SPEC-SCNC: 11.2 MG/DL (ref 8.5–10.2)
CHLORIDE SERPL-SCNC: 102 MMOL/L (ref 98–107)
CO2 SERPL-SCNC: 25 MMOL/L (ref 22–29)
CREAT SERPL-MCNC: 1.03 MG/DL (ref 0.6–1.1)
DEPRECATED RDW RBC AUTO: 58.8 FL (ref 37–54)
EGFRCR SERPLBLD CKD-EPI 2021: 55.1 ML/MIN/1.73
EOSINOPHIL # BLD AUTO: 0.64 10*3/MM3 (ref 0–0.4)
EOSINOPHIL NFR BLD AUTO: 3.3 % (ref 0.3–6.2)
ERYTHROCYTE [DISTWIDTH] IN BLOOD BY AUTOMATED COUNT: 16 % (ref 12.3–15.4)
FERRITIN SERPL-MCNC: 143.4 NG/ML (ref 13–150)
GLOBULIN UR ELPH-MCNC: 2.5 GM/DL (ref 1.8–3.5)
GLUCOSE SERPL-MCNC: 111 MG/DL (ref 74–124)
HCT VFR BLD AUTO: 31.9 % (ref 34–46.6)
HGB BLD-MCNC: 10.7 G/DL (ref 12–15.9)
IMM GRANULOCYTES # BLD AUTO: 1.9 10*3/MM3 (ref 0–0.05)
IMM GRANULOCYTES NFR BLD AUTO: 9.9 % (ref 0–0.5)
IRON 24H UR-MRATE: 65 MCG/DL (ref 37–145)
IRON SATN MFR SERPL: 20 % (ref 14–48)
LYMPHOCYTES # BLD AUTO: 1.94 10*3/MM3 (ref 0.7–3.1)
LYMPHOCYTES NFR BLD AUTO: 10.1 % (ref 19.6–45.3)
MCH RBC QN AUTO: 34 PG (ref 26.6–33)
MCHC RBC AUTO-ENTMCNC: 33.5 G/DL (ref 31.5–35.7)
MCV RBC AUTO: 101.3 FL (ref 79–97)
MONOCYTES # BLD AUTO: 0.96 10*3/MM3 (ref 0.1–0.9)
MONOCYTES NFR BLD AUTO: 5 % (ref 5–12)
NEUTROPHILS NFR BLD AUTO: 13.56 10*3/MM3 (ref 1.7–7)
NEUTROPHILS NFR BLD AUTO: 70.7 % (ref 42.7–76)
NRBC BLD AUTO-RTO: 0.2 /100 WBC (ref 0–0.2)
PLATELET # BLD AUTO: 437 10*3/MM3 (ref 140–450)
PMV BLD AUTO: 10.5 FL (ref 6–12)
POTASSIUM SERPL-SCNC: 4 MMOL/L (ref 3.5–4.7)
PROT SERPL-MCNC: 7.1 G/DL (ref 6.3–8)
RBC # BLD AUTO: 3.15 10*6/MM3 (ref 3.77–5.28)
SODIUM SERPL-SCNC: 140 MMOL/L (ref 134–145)
TIBC SERPL-MCNC: 325 MCG/DL (ref 249–505)
TRANSFERRIN SERPL-MCNC: 232 MG/DL (ref 200–360)
WBC NRBC COR # BLD: 19.2 10*3/MM3 (ref 3.4–10.8)

## 2022-07-28 PROCEDURE — 99214 OFFICE O/P EST MOD 30 MIN: CPT | Performed by: INTERNAL MEDICINE

## 2022-07-28 PROCEDURE — 85025 COMPLETE CBC W/AUTO DIFF WBC: CPT

## 2022-07-28 PROCEDURE — 84466 ASSAY OF TRANSFERRIN: CPT

## 2022-07-28 PROCEDURE — 82728 ASSAY OF FERRITIN: CPT

## 2022-07-28 PROCEDURE — 80053 COMPREHEN METABOLIC PANEL: CPT

## 2022-07-28 PROCEDURE — 83540 ASSAY OF IRON: CPT

## 2022-07-28 PROCEDURE — 36415 COLL VENOUS BLD VENIPUNCTURE: CPT

## 2022-07-28 RX ORDER — HYDROXYUREA 500 MG/1
500 CAPSULE ORAL TAKE AS DIRECTED
Qty: 48 CAPSULE | Refills: 3 | Status: SHIPPED | OUTPATIENT
Start: 2022-07-28

## 2022-07-28 NOTE — PROGRESS NOTES
MTM Encounter-Re: Adherence and side effects (Hydrea)    Today's encounter was conducted in person, face-to-face.     Medication:  Hydrea 500 mg three times weekly ( Monday, Wednesday and Friday)  - Reason for outreach: Routine medication check-in .  - Administration: Patient is taking as prescribed .  - Missed doses: Patient reports missing 0 doses in the last 30 days.  - Self-administration: Patient demonstrates ability to self-administer medication. No barriers to adherence identified.   - Diagnosis/Indication: Nick + myelopro. Progress toward achieving therapeutic goals reviewed.   - Patient denies side effects.    - Medication availability/affordability: Patient has had no issues obtaining medication from pharmacy.   - Questions/concerns about medications: none       Completed medication reconciliation today to assess for drug interactions.   Reviewed allergies, medical history, labs, quality of life( no issues to report), and medication history with patient.   Patient denies starting or stopping any medications.  Assessed medication list for interactions, no significant drug interactions noted.   Advised pt to call the clinic if any new medications are started so we can assess for drug-drug interactions.     All questions addressed. Patient had no additional concerns for MTM office.     The MTM program was introduced to the patient, and her friend who served as .  Her daughter listened on the phone.     Pily Stern RPH  7/28/2022  15:23 EDT

## 2022-07-28 NOTE — PROGRESS NOTES
Subjective     CHIEF COMPLAINT:      Chief Complaint   Patient presents with   • Follow-up     Discuss CT Scan/fatigue       HISTORY OF PRESENT ILLNESS:     Ayla Castellano is a 82 y.o. female patient who returns today for follow up on her myeloproliferative disorder.  She returns today for follow-up reporting that she is feeling good.  She is taking Hydrea 3 days a week as instructed.  She drinks plenty of fluids.  She avoids fatty food.  She does not drink milk and does not eat a lot of dairy products regularly because it caused her to have diarrhea.    ROS:  Pertinent ROS is in the HPI.     Past medical, surgical, social and family history were reviewed.     MEDICATIONS:    Current Outpatient Medications:   •  Accu-Chek FastClix Lancets misc, To test blood sugar 3 times daily., Disp: 100 each, Rfl: 8  •  amLODIPine (NORVASC) 5 MG tablet, TAKE 1 TABLET BY MOUTH DAILY, Disp: 90 tablet, Rfl: 3  •  aspirin 81 MG tablet, Take 81 mg by mouth Daily., Disp: , Rfl:   •  atorvastatin (LIPITOR) 20 MG tablet, Take 1 tablet by mouth Daily., Disp: 90 tablet, Rfl: 3  •  Cyanocobalamin (B-12) 2500 MCG sublingual tablet, Place 1 tablet under the tongue Daily., Disp: 30 tablet, Rfl: 3  •  diclofenac (VOLTAREN) 1 % gel gel, Apply 4 g topically to the appropriate area as directed 3 (Three) Times a Day., Disp: 5 tube, Rfl: 11  •  glucose blood (ONE TOUCH ULTRA TEST) test strip, USE TO TEST BLOOD SUGAR EVERY DAY. (E11.9), Disp: 100 each, Rfl: 2  •  hydroxyurea (HYDREA) 500 MG capsule, Take 1 capsule by mouth 3 (Three) Times a Week. Monday Wednesday Friday, Disp: 36 capsule, Rfl: 3  •  isosorbide mononitrate (IMDUR) 60 MG 24 hr tablet, TAKE 1 TABLET BY MOUTH DAILY, Disp: 30 tablet, Rfl: 11  •  ketoconazole (NIZORAL) 2 % cream, Apply  topically to the appropriate area as directed Daily., Disp: 30 g, Rfl: 11  •  metFORMIN (GLUCOPHAGE) 500 MG tablet, Take 1 tablet by mouth 3 (Three) Times a Day., Disp: 90 tablet, Rfl: 11  •   "mupirocin (Bactroban) 2 % ointment, Apply  topically to the appropriate area as directed 3 (Three) Times a Day., Disp: 30 g, Rfl: 11  •  ofloxacin (OCUFLOX) 0.3 % ophthalmic solution, Administer 2 drops to both eyes 4 (Four) Times a Day., Disp: 10 mL, Rfl: 11  •  oxybutynin (DITROPAN) 5 MG tablet, Take 1 tablet by mouth 2 (Two) Times a Day., Disp: 60 tablet, Rfl: 5  •  oxyCODONE-acetaminophen (PERCOCET) 5-325 MG per tablet, Take 1-2 tablets by mouth Every 4 (Four) Hours As Needed (Pain)., Disp: 10 tablet, Rfl: 0  •  valsartan-hydrochlorothiazide (DIOVAN-HCT) 320-25 MG per tablet, TAKE 1 TABLET BY MOUTH DAILY, Disp: 90 tablet, Rfl: 3  Objective     VITAL SIGNS:     Vitals:    07/28/22 1518   BP: 114/68   Pulse: 80   Resp: 18   Temp: 97.1 °F (36.2 °C)   TempSrc: Temporal   SpO2: 96%   Weight: 70.8 kg (156 lb)   Height: 147.3 cm (57.99\")   PainSc:   5   PainLoc: Abdomen  Comment: rt side     Body mass index is 32.61 kg/m².     Wt Readings from Last 5 Encounters:   07/28/22 70.8 kg (156 lb)   07/21/22 70.5 kg (155 lb 6.4 oz)   06/03/22 70 kg (154 lb 6.4 oz)   05/05/22 70.2 kg (154 lb 12.8 oz)   03/21/22 69.4 kg (153 lb)     PHYSICAL EXAMINATION:   GENERAL: The patient appears in good general condition, not in acute distress.   SKIN: No ecchymosis.  EYES: No jaundice. Pallor.  CHEST: Normal respiratory effort.  Lungs clear bilaterally.  No added sounds.  CVS: Normal S1-S2.  No murmurs..  ABDOMEN: Soft. No tenderness. No Hepatomegaly. No Splenomegaly. No masses.  EXTREMITIES: No edema.      DIAGNOSTIC DATA:     Results from last 7 days   Lab Units 07/28/22  1443   WBC 10*3/mm3 19.20*   NEUTROS ABS 10*3/mm3 13.56*   HEMOGLOBIN g/dL 10.7*   HEMATOCRIT % 31.9*   PLATELETS 10*3/mm3 437     Results from last 7 days   Lab Units 07/28/22  1443   SODIUM mmol/L 140   POTASSIUM mmol/L 4.0   CHLORIDE mmol/L 102   CO2 mmol/L 25.0   BUN mg/dL 25*   CREATININE mg/dL 1.03   CALCIUM mg/dL 11.2*   ALBUMIN g/dL 4.60   BILIRUBIN mg/dL 0.7 "   ALK PHOS U/L 74   ALT (SGPT) U/L 29   AST (SGOT) U/L 27   GLUCOSE mg/dL 111     Component      Latest Ref Rng & Units 3/3/2022 5/5/2022 7/28/2022   Iron      37 - 145 mcg/dL 66 66 65   Iron Saturation      14 - 48 % 25 24 20   Transferrin      200 - 360 mg/dL 191 (L) 194 (L) 232   TIBC      249 - 505 mcg/dL 267 272 325   Ferritin      13.00 - 150.00 ng/mL 191.50 (H) 150.90 (H) 143.40      CT abdomen pelvis on 7/22/2022:  1. There is no suspicious renal lesion. The described focus at the  midportion of the right kidney likely represents cortical tissue as in  the column of [place].  2. Significant decrease in hepatic steatosis and increase in splenic  size since 2015.    Assessment & Plan    *Myeloproliferative disorder, OBINNA-2 mutation positive.   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   · She was under the care of an outside Hematologist before transferring care to our practice. Records indicate that she was previously diagnosed with Essential thrombocythemia.   · She has elevated WBC and basophil count.   · Therefore, a diagnosis of myeloproliferative disorder or polycythemia vera was favored over ET.  · BCR ABL was negative by FISH on 2/2/2021.  · In February 2021, WBC count was gradually increasing indicating that her disease was not under a good control likely due to the dose of Hydroxyurea not being effective (500 mg every 4 days).   · The dose was increased to every 3 days on 2/2/2021.  · Due to the persistence of the leukocytosis, the dose of Hydrea was increased to 4 days a week.  · Hemoglobin decreased to 10.0 on 10/4/2021.  WBC count was 15,270.  · Due to the worsening anemia, we reduced the dose of Hydrea back to 3 days a week.  · Labs on 1/6/2022 revealed a hemoglobin of 10.2.  WBC was 16,600.  Platelets were 352,000.  · Spleen measured 14.7 cm on CT scan on 1/14/2022.  · Labs on 5/5/2022 revealed a hemoglobin of 9.9.  WBC decreased to 14,980.  Platelets were 357,000.  · WBC  increased to 19,200 today.  · She did not have any recent infections.  · Due to the increase in the WBC count, I recommended increasing the dose of Hydrea to 500 mg 4 days a week.    *Iron deficiency anemia.   · This is attributed to iron deficiency with a transferrin saturation of 17% and a ferritin of 26.   · The patient did not tolerate oral in the past with development of upset stomach, abdominal pain, headaches and dizziness when she took the oral iron in the past.  · Patient received IV iron the 2020 and tolerated that well.   · Patient was given IV Injectafer on 2/5/2021 and 2/12/2021.  · Hemoglobin was 10.0 on 10/4/2021.  · Hemoglobin decreased to 9.9 on 5/5/2022.  Iron stores were adequate.  · Hemoglobin improved to 10.7 today.  Iron stores slightly decreased but remain adequate.    *History of colon cancer, stage I.   · She is s/p partial colectomy by Dr. Ku.   · She did not require adjuvant chemotherapy or radiation therapy.    · Patient is complaining of abdominal pain in the right upper quadrant.  There is tenderness on exam.  She had her gallbladder removed in 2019.  · CT scan on 1/14/2022 revealed no evidence of recurrence.   · CT scan on 7/22/2022 showed no evidence of recurrence.    *Right renal lesion.    · CT on 1/14/2022 revealed an incidental finding of a 1.7 cm rounded structure in the right kidney.    · It was considered to be most likely representing a prominent renal lobule.    · It slightly increased in size compared to 2015.   · CT abdomen pelvis with renal protocol on 7/22/2022 showed no suspicious renal lesion.  · The previously reported lesion represented cortical tissue.  · I reassured the patient about the results.  Based on the findings, she does not need additional follow-up scans in the future.    *Hypercalcemia.  · This is chronic.  · Calcium is 11.2 today.  · This was previously evaluated by her PCP.    PLAN:    1.  Increase Hydrea to 500 mg 4 days a week (Mondays Wednesdays  Fridays and Sundays).   2.  Patient does not need IV iron at this point.  3.  Reduce intake of calcium rich food.  4.  Repeat CBC in 2 months with RN review.  5.  I will see her in follow-up in 4 months with CBC CMP ferritin iron panel.        Izzy Martinez MD  07/28/22

## 2022-07-29 ENCOUNTER — SPECIALTY PHARMACY (OUTPATIENT)
Dept: PHARMACY | Facility: HOSPITAL | Age: 82
End: 2022-07-29

## 2022-07-29 NOTE — PROGRESS NOTES
Specialty Pharmacy Note     Labs reviewed        7/28/2022   WBC 3.40 - 10.80 10*3/mm3 19.20 (A)   Neutrophils Absolute 1.70 - 7.00 10*3/mm3 13.56 (A)   Hemoglobin 12.0 - 15.9 g/dL 10.7 (A)   Hematocrit 34.0 - 46.6 % 31.9 (A)   Platelets 140 - 450 10*3/mm3 437   Creatinine 0.60 - 1.10 mg/dL 1.03   BUN 6 - 20 mg/dL 25 (A)   Sodium 134 - 145 mmol/L 140   Potassium 3.5 - 4.7 mmol/L 4.0   Glucose 74 - 124 mg/dL 111   Calcium 8.5 - 10.2 mg/dL 11.2 (A)   Albumin 3.50 - 5.20 g/dL 4.60   Total Protein 6.3 - 8.0 g/dL 7.1   AST (SGOT) 0 - 32 U/L 27   ALT (SGPT) 0 - 33 U/L 29   Alkaline Phosphatase 38 - 116 U/L 74   Total Bilirubin 0.2 - 1.2 mg/dL 0.7   Iron 37 - 145 mcg/dL 65   TIBC 249 - 505 mcg/dL 325   Ferritin 13.00 - 150.00 ng/mL 143.40   Iron Saturation 14 - 48 % 20   eGFR >60.0 mL/min/1.73 55.1 (A)  National Kidney Foundation and American Society of Nephrology (ASN) Task Force recommended calculation based on the Chronic Kidney Disease Epidemiology Collaboration (CKD-EPI) equation refit without adjustment for race.     Patient on Hydrea Dr Martinez increased dose due to WBC.  Per MD note: Increase Hydrea to 500 mg 4 days a week (Mondays Wednesdays Fridays and Sundays).

## 2022-08-10 ENCOUNTER — TELEPHONE (OUTPATIENT)
Dept: ONCOLOGY | Facility: CLINIC | Age: 82
End: 2022-08-10

## 2022-08-10 NOTE — TELEPHONE ENCOUNTER
Caller: Daily Castellano    Relationship to patient: Emergency Contact    Best call back number: [phone]    Chief complaint: R/S    Type of visit: LAB AND RN REVIEW    Requested date: 9-30     If rescheduling, when is the original appointment: 9-29     Additional notes:PLEASE ADVISE

## 2022-08-12 ENCOUNTER — OFFICE VISIT (OUTPATIENT)
Dept: FAMILY MEDICINE CLINIC | Facility: CLINIC | Age: 82
End: 2022-08-12

## 2022-08-12 VITALS
HEART RATE: 69 BPM | DIASTOLIC BLOOD PRESSURE: 65 MMHG | SYSTOLIC BLOOD PRESSURE: 109 MMHG | TEMPERATURE: 98.5 F | BODY MASS INDEX: 32.45 KG/M2 | WEIGHT: 154.6 LBS | OXYGEN SATURATION: 95 % | HEIGHT: 58 IN

## 2022-08-12 DIAGNOSIS — E83.52 HYPERCALCEMIA: ICD-10-CM

## 2022-08-12 DIAGNOSIS — Z00.00 MEDICARE ANNUAL WELLNESS VISIT, SUBSEQUENT: Primary | ICD-10-CM

## 2022-08-12 DIAGNOSIS — I10 ESSENTIAL HYPERTENSION: ICD-10-CM

## 2022-08-12 PROCEDURE — G0439 PPPS, SUBSEQ VISIT: HCPCS | Performed by: FAMILY MEDICINE

## 2022-08-12 PROCEDURE — 1170F FXNL STATUS ASSESSED: CPT | Performed by: FAMILY MEDICINE

## 2022-08-12 PROCEDURE — 1160F RVW MEDS BY RX/DR IN RCRD: CPT | Performed by: FAMILY MEDICINE

## 2022-08-12 RX ORDER — VALSARTAN 320 MG/1
320 TABLET ORAL DAILY
Qty: 90 TABLET | Refills: 3 | Status: SHIPPED | OUTPATIENT
Start: 2022-08-12 | End: 2022-11-03 | Stop reason: ALTCHOICE

## 2022-08-12 NOTE — PROGRESS NOTES
The ABCs of the Annual Wellness Visit  Subsequent Medicare Wellness Visit    Chief Complaint   Patient presents with   • Medicare Wellness-subsequent      Subjective    History of Present Illness:  Ayla Castellano is a 82 y.o. female who presents for a Subsequent Medicare Wellness Visit.    The following portions of the patient's history were reviewed and   updated as appropriate: allergies, current medications, past family history, past medical history, past social history, past surgical history and problem list.    Compared to one year ago, the patient feels her physical   health is the same.    Compared to one year ago, the patient feels her mental   health is the same.    Recent Hospitalizations:  She was not admitted to the hospital during the last year.       Current Medical Providers:  Patient Care Team:  Manisha Morales MD as PCP - General  Manisha Morales MD as PCP - Family Medicine  Manisha Morales MD as Referring Physician (Family Medicine)  Izzy Martinez MD as Consulting Physician (Hematology and Oncology)    Outpatient Medications Prior to Visit   Medication Sig Dispense Refill   • Accu-Chek FastClix Lancets misc To test blood sugar 3 times daily. 100 each 8   • amLODIPine (NORVASC) 5 MG tablet TAKE 1 TABLET BY MOUTH DAILY 90 tablet 3   • aspirin 81 MG tablet Take 81 mg by mouth Daily.     • atorvastatin (LIPITOR) 20 MG tablet Take 1 tablet by mouth Daily. 90 tablet 3   • Cyanocobalamin (B-12) 2500 MCG sublingual tablet Place 1 tablet under the tongue Daily. 30 tablet 3   • diclofenac (VOLTAREN) 1 % gel gel Apply 4 g topically to the appropriate area as directed 3 (Three) Times a Day. 5 tube 11   • glucose blood (ONE TOUCH ULTRA TEST) test strip USE TO TEST BLOOD SUGAR EVERY DAY. (E11.9) 100 each 2   • hydroxyurea (HYDREA) 500 MG capsule Take 1 capsule by mouth Take As Directed. On Mondays, Wednesdays Fridays and Sundays 48 capsule 3   • isosorbide mononitrate (IMDUR) 60 MG 24 hr  tablet TAKE 1 TABLET BY MOUTH DAILY 30 tablet 11   • ketoconazole (NIZORAL) 2 % cream Apply  topically to the appropriate area as directed Daily. 30 g 11   • metFORMIN (GLUCOPHAGE) 500 MG tablet Take 1 tablet by mouth 3 (Three) Times a Day. 90 tablet 11   • mupirocin (Bactroban) 2 % ointment Apply  topically to the appropriate area as directed 3 (Three) Times a Day. 30 g 11   • ofloxacin (OCUFLOX) 0.3 % ophthalmic solution Administer 2 drops to both eyes 4 (Four) Times a Day. 10 mL 11   • oxybutynin (DITROPAN) 5 MG tablet Take 1 tablet by mouth 2 (Two) Times a Day. 60 tablet 5   • oxyCODONE-acetaminophen (PERCOCET) 5-325 MG per tablet Take 1-2 tablets by mouth Every 4 (Four) Hours As Needed (Pain). 10 tablet 0   • valsartan-hydrochlorothiazide (DIOVAN-HCT) 320-25 MG per tablet TAKE 1 TABLET BY MOUTH DAILY 90 tablet 3     No facility-administered medications prior to visit.       Opioid medication/s are on active medication list.  and I have evaluated her active treatment plan and pain score trends (see table).  There were no vitals filed for this visit.  I have reviewed the chart for potential of high risk medication and harmful drug interactions in the elderly.            Aspirin is on active medication list. Aspirin use is indicated based on review of current medical condition/s. Pros and cons of this therapy have been discussed today. Benefits of this medication outweigh potential harm.  Patient has been encouraged to continue taking this medication.  .      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  ACP discussion was held with the patient during this visit. Patient has an advance directive (not in EMR), copy requested.    Review of Systems   Constitutional: Negative.         Objective    Vitals:    08/12/22 0757   BP: 109/65   BP Location: Right arm   Patient Position: Sitting   Pulse: 69   Temp: 98.5 °F (36.9 °C)   SpO2: 95%   Weight: 70.1 kg (154 lb 9.6 oz)   Height: 147.3 cm (57.99\")     Estimated body mass index is 32.32 kg/m² as calculated from the following:    Height as of this encounter: 147.3 cm (57.99\").    Weight as of this encounter: 70.1 kg (154 lb 9.6 oz).    BMI is >= 30 and <35. (Class 1 Obesity). The following options were offered after discussion;: nutrition counseling/recommendations      Does the patient have evidence of cognitive impairment? No    Physical Exam  Vitals and nursing note reviewed.   Constitutional:       Appearance: Normal appearance.   Neurological:      Mental Status: She is alert.       Lab Results   Component Value Date    CHLPL 143 07/21/2022    TRIG 205 (H) 07/21/2022    HDL 30 (L) 07/21/2022    LDL 78 07/21/2022    VLDL 35 07/21/2022    HGBA1C 5.6 07/21/2022            HEALTH RISK ASSESSMENT    Smoking Status:  Social History     Tobacco Use   Smoking Status Never Smoker   Smokeless Tobacco Never Used     Alcohol Consumption:  Social History     Substance and Sexual Activity   Alcohol Use No     Fall Risk Screen:    STEADI Fall Risk Assessment was completed, and patient is at LOW risk for falls.Assessment completed on:8/12/2022    Depression Screening:  PHQ-2/PHQ-9 Depression Screening 8/12/2022   Retired PHQ-9 Total Score -   Retired Total Score -   Little Interest or Pleasure in Doing Things 0-->not at all   Feeling Down, Depressed or Hopeless 0-->not at all "   PHQ-9: Brief Depression Severity Measure Score 0       Health Habits and Functional and Cognitive Screening:  Functional & Cognitive Status 8/12/2022   Do you have difficulty preparing food and eating? No   Do you have difficulty bathing yourself, getting dressed or grooming yourself? No   Do you have difficulty using the toilet? No   Do you have difficulty moving around from place to place? No   Do you have trouble with steps or getting out of a bed or a chair? No   Current Diet Well Balanced Diet   Dental Exam Up to date   Eye Exam Up to date   Exercise (times per week) 3 times per week   Current Exercises Include Walking   Do you need help using the phone?  No   Are you deaf or do you have serious difficulty hearing?  No   Do you need help with transportation? Yes   Do you need help shopping? No   Do you need help preparing meals?  No   Do you need help with housework?  No   Do you need help with laundry? No   Do you need help taking your medications? No   Do you need help managing money? No   Do you ever drive or ride in a car without wearing a seat belt? No   Have you felt unusual stress, anger or loneliness in the last month? No   Who do you live with? Spouse   If you need help, do you have trouble finding someone available to you? No   Have you been bothered in the last four weeks by sexual problems? No   Do you have difficulty concentrating, remembering or making decisions? No       Age-appropriate Screening Schedule:  Refer to the list below for future screening recommendations based on patient's age, sex and/or medical conditions. Orders for these recommended tests are listed in the plan section. The patient has been provided with a written plan.    Health Maintenance   Topic Date Due   • ZOSTER VACCINE (1 of 2) Never done   • TDAP/TD VACCINES (1 - Tdap) 10/14/2022 (Originally 1/1/1959)   • INFLUENZA VACCINE  10/01/2022   • DIABETIC EYE EXAM  11/02/2022   • HEMOGLOBIN A1C  01/21/2023   • URINE  MICROALBUMIN  02/01/2023   • DXA SCAN  04/12/2023   • LIPID PANEL  07/21/2023   • COLONOSCOPY  12/17/2029   • MAMMOGRAM  Discontinued              Assessment & Plan   CMS Preventative Services Quick Reference  Risk Factors Identified During Encounter  Fall Risk-High or Moderate  The above risks/problems have been discussed with the patient.  Follow up actions/plans if indicated are seen below in the Assessment/Plan Section.  Pertinent information has been shared with the patient in the After Visit Summary.    Diagnoses and all orders for this visit:    1. Medicare annual wellness visit, subsequent (Primary)    2. Essential hypertension  -     valsartan (DIOVAN) 320 MG tablet; Take 1 tablet by mouth Daily.  Dispense: 90 tablet; Refill: 3    3. Hypercalcemia  -     PTH, Intact & Calcium        Follow Up:   No follow-ups on file.     An After Visit Summary and PPPS were made available to the patient.

## 2022-08-13 LAB
CALCIUM SERPL-MCNC: 11.4 MG/DL (ref 8.7–10.3)
INTACT PTH: ABNORMAL
PTH-INTACT SERPL-MCNC: 62 PG/ML (ref 15–65)

## 2022-08-14 DIAGNOSIS — E78.5 HYPERLIPIDEMIA, UNSPECIFIED HYPERLIPIDEMIA TYPE: ICD-10-CM

## 2022-08-15 RX ORDER — ATORVASTATIN CALCIUM 20 MG/1
20 TABLET, FILM COATED ORAL DAILY
Qty: 90 TABLET | Refills: 3 | Status: SHIPPED | OUTPATIENT
Start: 2022-08-15

## 2022-08-15 NOTE — TELEPHONE ENCOUNTER
Rx Refill Note  Requested Prescriptions     Pending Prescriptions Disp Refills   • atorvastatin (LIPITOR) 20 MG tablet [Pharmacy Med Name: ATORVASTATIN 20MG TABLETS] 90 tablet 3     Sig: TAKE 1 TABLET BY MOUTH DAILY      Last office visit with prescribing clinician: Visit date not found      Next office visit with prescribing clinician: Visit date not found

## 2022-09-23 ENCOUNTER — SPECIALTY PHARMACY (OUTPATIENT)
Dept: PHARMACY | Facility: HOSPITAL | Age: 82
End: 2022-09-23

## 2022-09-23 ENCOUNTER — TELEPHONE (OUTPATIENT)
Dept: ONCOLOGY | Facility: CLINIC | Age: 82
End: 2022-09-23

## 2022-09-23 NOTE — TELEPHONE ENCOUNTER
Caller: Daily Castellano    Relationship: Emergency Contact    Best call back number: [phone]    What is the best time to reach you: ANYTIME    Who are you requesting to speak with (clinical staff, provider,  specific staff member): DOCTOR, NURSE    What was the call regarding: PT DAUGHTER CALLING NEEDS SOMEONE TO CLARIFY WHEN BRITTANY SHOULD TAKE THE HYDREA MEDICATION.    CALL TO DISCUSS        Do you require a callback: YES

## 2022-09-23 NOTE — PROGRESS NOTES
Specialty Note ( hydrea)    Daughter called the office to ask re Hydrea directions. We discussed that her mom should take 1 cap 4 times weekly- one cap on Monday, Wednesday, Friday, and Sunday.  She verbalized understanding.

## 2022-09-30 ENCOUNTER — CLINICAL SUPPORT (OUTPATIENT)
Dept: ONCOLOGY | Facility: HOSPITAL | Age: 82
End: 2022-09-30

## 2022-09-30 ENCOUNTER — LAB (OUTPATIENT)
Dept: LAB | Facility: HOSPITAL | Age: 82
End: 2022-09-30

## 2022-09-30 DIAGNOSIS — E83.52 HYPERCALCEMIA: ICD-10-CM

## 2022-09-30 DIAGNOSIS — N28.9 KIDNEY LESION: ICD-10-CM

## 2022-09-30 DIAGNOSIS — Z85.038 HISTORY OF COLON CANCER: ICD-10-CM

## 2022-09-30 DIAGNOSIS — Z79.899 ENCOUNTER FOR LONG-TERM CURRENT USE OF HIGH RISK MEDICATION: ICD-10-CM

## 2022-09-30 DIAGNOSIS — D47.1 MYELOPROLIFERATIVE DISORDER: ICD-10-CM

## 2022-09-30 DIAGNOSIS — D50.9 IRON DEFICIENCY ANEMIA, UNSPECIFIED IRON DEFICIENCY ANEMIA TYPE: ICD-10-CM

## 2022-09-30 LAB
BASOPHILS # BLD AUTO: 0.21 10*3/MM3 (ref 0–0.2)
BASOPHILS NFR BLD AUTO: 1.2 % (ref 0–1.5)
DEPRECATED RDW RBC AUTO: 54.8 FL (ref 37–54)
EOSINOPHIL # BLD AUTO: 0.54 10*3/MM3 (ref 0–0.4)
EOSINOPHIL NFR BLD AUTO: 3 % (ref 0.3–6.2)
ERYTHROCYTE [DISTWIDTH] IN BLOOD BY AUTOMATED COUNT: 15.4 % (ref 12.3–15.4)
HCT VFR BLD AUTO: 35.4 % (ref 34–46.6)
HGB BLD-MCNC: 12 G/DL (ref 12–15.9)
IMM GRANULOCYTES # BLD AUTO: 1.61 10*3/MM3 (ref 0–0.05)
IMM GRANULOCYTES NFR BLD AUTO: 9 % (ref 0–0.5)
LYMPHOCYTES # BLD AUTO: 1.65 10*3/MM3 (ref 0.7–3.1)
LYMPHOCYTES NFR BLD AUTO: 9.3 % (ref 19.6–45.3)
MCH RBC QN AUTO: 33.1 PG (ref 26.6–33)
MCHC RBC AUTO-ENTMCNC: 33.9 G/DL (ref 31.5–35.7)
MCV RBC AUTO: 97.8 FL (ref 79–97)
MONOCYTES # BLD AUTO: 1.19 10*3/MM3 (ref 0.1–0.9)
MONOCYTES NFR BLD AUTO: 6.7 % (ref 5–12)
NEUTROPHILS NFR BLD AUTO: 12.61 10*3/MM3 (ref 1.7–7)
NEUTROPHILS NFR BLD AUTO: 70.8 % (ref 42.7–76)
NRBC BLD AUTO-RTO: 0 /100 WBC (ref 0–0.2)
PLATELET # BLD AUTO: 334 10*3/MM3 (ref 140–450)
PMV BLD AUTO: 11.1 FL (ref 6–12)
RBC # BLD AUTO: 3.62 10*6/MM3 (ref 3.77–5.28)
WBC NRBC COR # BLD: 17.81 10*3/MM3 (ref 3.4–10.8)

## 2022-09-30 PROCEDURE — 36415 COLL VENOUS BLD VENIPUNCTURE: CPT

## 2022-09-30 PROCEDURE — 85025 COMPLETE CBC W/AUTO DIFF WBC: CPT

## 2022-09-30 NOTE — PROGRESS NOTES
Pt present for visit with daughter. Voices no new concerns. Confirms taking hydrea 500 mg 4 days a week. No significant changes in counts. Copy of labs and schedule provided. Pt and her daughter v/u.    Lab Results   Component Value Date    WBC 17.81 (H) 09/30/2022    HGB 12.0 09/30/2022    HCT 35.4 09/30/2022    MCV 97.8 (H) 09/30/2022     09/30/2022

## 2022-11-03 ENCOUNTER — CLINICAL SUPPORT NO REQUIREMENTS (OUTPATIENT)
Dept: CARDIOLOGY | Facility: CLINIC | Age: 82
End: 2022-11-03

## 2022-11-03 ENCOUNTER — OFFICE VISIT (OUTPATIENT)
Dept: CARDIOLOGY | Facility: CLINIC | Age: 82
End: 2022-11-03

## 2022-11-03 VITALS
OXYGEN SATURATION: 96 % | HEART RATE: 70 BPM | BODY MASS INDEX: 27.23 KG/M2 | WEIGHT: 148 LBS | DIASTOLIC BLOOD PRESSURE: 70 MMHG | HEIGHT: 62 IN | SYSTOLIC BLOOD PRESSURE: 126 MMHG

## 2022-11-03 DIAGNOSIS — I49.5 SSS (SICK SINUS SYNDROME): Primary | ICD-10-CM

## 2022-11-03 DIAGNOSIS — Z95.0 PRESENCE OF CARDIAC PACEMAKER: ICD-10-CM

## 2022-11-03 DIAGNOSIS — I10 ESSENTIAL HYPERTENSION: ICD-10-CM

## 2022-11-03 PROCEDURE — 93280 PM DEVICE PROGR EVAL DUAL: CPT | Performed by: INTERNAL MEDICINE

## 2022-11-03 PROCEDURE — 99214 OFFICE O/P EST MOD 30 MIN: CPT | Performed by: NURSE PRACTITIONER

## 2022-11-03 RX ORDER — AMLODIPINE BESYLATE 5 MG/1
5 TABLET ORAL DAILY
Qty: 90 TABLET | Refills: 3 | Status: SHIPPED | OUTPATIENT
Start: 2022-11-03

## 2022-11-03 RX ORDER — ISOSORBIDE MONONITRATE 60 MG/1
60 TABLET, EXTENDED RELEASE ORAL DAILY
Qty: 90 TABLET | Refills: 3 | Status: SHIPPED | OUTPATIENT
Start: 2022-11-03

## 2022-11-03 RX ORDER — VALSARTAN AND HYDROCHLOROTHIAZIDE 320; 25 MG/1; MG/1
1 TABLET, FILM COATED ORAL DAILY
Qty: 90 TABLET | Refills: 3 | Status: SHIPPED | OUTPATIENT
Start: 2022-11-03

## 2022-11-03 RX ORDER — VALSARTAN AND HYDROCHLOROTHIAZIDE 320; 25 MG/1; MG/1
1 TABLET, FILM COATED ORAL DAILY
COMMUNITY
Start: 2022-09-27 | End: 2022-11-03 | Stop reason: SDUPTHER

## 2022-11-03 NOTE — PROGRESS NOTES
"    CARDIOLOGY        Patient Name: Ayla Castellano  :1940  Age: 82 y.o.  Primary Cardiologist: Enrrique Lanza MD  Encounter Provider:  LENA Jackman    Date of Service: 22            CHIEF COMPLAINT / REASON FOR OFFICE VISIT     SSS (sick sinus syndrome) (6 month f/u)      HISTORY OF PRESENT ILLNESS       HPI  Ayla Castellano is a 82 y.o. female who presents today for semiannual evaluation.     Pt has a  history significant for CAD, hyperlipidemia, type 2 diabetes, hypertension.    Patient presents today with her daughter that acts as [place] .  She reports that she is doing well.  She had her pacemaker interrogated today and there were no episodes.  She has occasional episodes of lightheadedness, but these are brief and infrequent.  She is asymptomatic and denies any episodes of chest pain, shortness of breath, palpitations, lightheadedness, swelling or fatigue.        The following portions of the patient's history were reviewed and updated as appropriate: allergies, current medications, past family history, past medical history, past social history, past surgical history and problem list.      VITAL SIGNS     Visit Vitals  /70 (BP Location: Left arm, Patient Position: Sitting, Cuff Size: Large Adult)   Pulse 70   Ht 157.5 cm (62\")   Wt 67.1 kg (148 lb)   SpO2 96%   BMI 27.07 kg/m²         Wt Readings from Last 3 Encounters:   22 67.1 kg (148 lb)   22 70.1 kg (154 lb 9.6 oz)   22 70.8 kg (156 lb)     Body mass index is 27.07 kg/m².      REVIEW OF SYSTEMS   Review of Systems   Constitutional: Negative for chills, fever, weight gain and weight loss.   Cardiovascular: Negative for leg swelling.   Respiratory: Negative for cough, snoring and wheezing.    Hematologic/Lymphatic: Negative for bleeding problem. Does not bruise/bleed easily.   Skin: Negative for color change.   Musculoskeletal: Negative for falls, joint pain and myalgias. "   Gastrointestinal: Negative for melena.   Genitourinary: Negative for hematuria.   Neurological: Positive for light-headedness. Negative for excessive daytime sleepiness.   Psychiatric/Behavioral: Negative for depression. The patient is not nervous/anxious.            PHYSICAL EXAMINATION     Vitals and nursing note reviewed.   Constitutional:       Appearance: Normal appearance. Well-developed.   Eyes:      Conjunctiva/sclera: Conjunctivae normal.   Neck:      Vascular: No carotid bruit.   Pulmonary:      Breath sounds: Normal breath sounds.   Cardiovascular:      Normal rate. Regular rhythm. Normal S1 with normal intensity. Normal S2 with normal intensity.      Murmurs: There is no murmur.      No gallop. No click. No rub.   Musculoskeletal: Normal range of motion. Skin:     General: Skin is warm and dry.   Neurological:      Mental Status: Alert and oriented to person, place, and time.      GCS: GCS eye subscore is 4. GCS verbal subscore is 5. GCS motor subscore is 6.   Psychiatric:         Speech: Speech normal.         Behavior: Behavior normal.         Thought Content: Thought content normal.         Judgment: Judgment normal.           REVIEWED DATA     Procedures    Cardiac Procedures:  1. Echo 3/23/15.  EF 60-65%.  No significant valvular disease noted.  2. Lexiscan stress test 10/5/15.  Normal without evidence of ischemia or infarction.  3. 24-hour Holter 3/16/2017.  Normal monitor study.  4. ZIO monitor 9/9/2021.  208 sinus pauses of greater than 3 seconds.  Longest pause was 4.1 seconds.  5. Placement of PPM 9/17/2021.       Lipid Panel    Lipid Panel 2/1/22 7/21/22   Total Cholesterol 128 143   Triglycerides 170 (A) 205 (A)   HDL Cholesterol 27 (A) 30 (A)   VLDL Cholesterol 29 35   LDL Cholesterol  72 78   LDL/HDL Ratio 2.7 2.6   (A) Abnormal value       Comments are available for some flowsheets but are not being displayed.           BUN   Date Value Ref Range Status   07/28/2022 25 (H) 6 - 20 mg/dL  Final     Creatinine   Date Value Ref Range Status   07/28/2022 1.03 0.60 - 1.10 mg/dL Final     Potassium   Date Value Ref Range Status   07/28/2022 4.0 3.5 - 4.7 mmol/L Final     ALT (SGPT)   Date Value Ref Range Status   07/28/2022 29 0 - 33 U/L Final     AST (SGOT)   Date Value Ref Range Status   07/28/2022 27 0 - 32 U/L Final           ASSESSMENT & PLAN     Diagnoses and all orders for this visit:    1. SSS (sick sinus syndrome) (HCC) (Primary)  2. Presence of cardiac pacemaker  · Device interrogated in clinic today.  No episodes  · Patient has very rare and occasional episodes of lightheadedness that are infrequent    3. Essential hypertension  · Blood pressure controlled in clinic today at 126/70  · Continue valsartan HCTZ 320/25 mg/day, amlodipine 5 mg/day  -     amLODIPine (NORVASC) 5 MG tablet; Take 1 tablet by mouth Daily.  Dispense: 90 tablet; Refill: 3    Other orders  -     isosorbide mononitrate (IMDUR) 60 MG 24 hr tablet; Take 1 tablet by mouth Daily.  Dispense: 90 tablet; Refill: 3  -     valsartan-hydrochlorothiazide (DIOVAN-HCT) 320-25 MG per tablet; Take 1 tablet by mouth Daily.  Dispense: 90 tablet; Refill: 3          Return in about 6 months (around 5/3/2023) for - transition from Dr. Lanza.    Future Appointments       Provider Department Center    11/3/2022 10:20 AM Soraya Juan APRN [facility] CARDIOLOGY [place]    11/18/2022 8:15 AM Manisha Morales MD [facility] PRIMARY CARE [place]    12/1/2022 8:20 AM LAB CHAIR 1 [facility] ONCOLOGY [facility] [place]    12/1/2022 8:40 AM Izzy Martinez MD [facility] HEMATOLOGY & ONCOLOGY [facility]                MEDICATIONS         Discharge Medications          Accurate as of November 3, 2022  9:29 AM. If you have any questions, ask your nurse or doctor.            Continue These Medications      Instructions Start Date   Accu-Chek FastClix Lancets misc   To  test blood sugar 3 times daily.      amLODIPine 5 MG tablet  Commonly known as: NORVASC   5 mg, Oral, Daily      aspirin 81 MG tablet   81 mg, Oral, Daily      atorvastatin 20 MG tablet  Commonly known as: LIPITOR   20 mg, Oral, Daily      B-12 2500 MCG sublingual tablet   1 tablet, Sublingual, Daily      diclofenac 1 % gel gel  Commonly known as: VOLTAREN   4 g, Topical, 3 Times Daily      glucose blood test strip  Commonly known as: ONE TOUCH ULTRA TEST   USE TO TEST BLOOD SUGAR EVERY DAY. (E11.9)      hydroxyurea 500 MG capsule  Commonly known as: HYDREA   500 mg, Oral, Take As Directed, On Mondays, Wednesdays Fridays and Sundays      isosorbide mononitrate 60 MG 24 hr tablet  Commonly known as: IMDUR   60 mg, Oral, Daily      ketoconazole 2 % cream  Commonly known as: NIZORAL   Topical, Daily      metFORMIN 500 MG tablet  Commonly known as: GLUCOPHAGE   500 mg, Oral, 3 Times Daily      mupirocin 2 % ointment  Commonly known as: Bactroban   Topical, 3 Times Daily      ofloxacin 0.3 % ophthalmic solution  Commonly known as: OCUFLOX   2 drops, Both Eyes, 4 Times Daily      oxybutynin 5 MG tablet  Commonly known as: DITROPAN   5 mg, Oral, 2 Times Daily      oxyCODONE-acetaminophen 5-325 MG per tablet  Commonly known as: PERCOCET   1-2 tablets, Oral, Every 4 Hours PRN      valsartan-hydrochlorothiazide 320-25 MG per tablet  Commonly known as: DIOVAN-HCT   1 tablet, Oral, Daily                 **Dragon Disclaimer:   Much of this encounter note is an electronic transcription/translation of spoken language to printed text. The electronic translation of spoken language may permit erroneous, or at times, nonsensical words or phrases to be inadvertently transcribed. Although I have reviewed the note for such errors, some may still exist.

## 2022-11-18 ENCOUNTER — OFFICE VISIT (OUTPATIENT)
Dept: FAMILY MEDICINE CLINIC | Facility: CLINIC | Age: 82
End: 2022-11-18

## 2022-11-18 ENCOUNTER — TELEPHONE (OUTPATIENT)
Dept: FAMILY MEDICINE CLINIC | Facility: CLINIC | Age: 82
End: 2022-11-18

## 2022-11-18 ENCOUNTER — APPOINTMENT (OUTPATIENT)
Dept: CT IMAGING | Facility: HOSPITAL | Age: 82
End: 2022-11-18

## 2022-11-18 ENCOUNTER — HOSPITAL ENCOUNTER (EMERGENCY)
Facility: HOSPITAL | Age: 82
Discharge: HOME | End: 2022-11-18
Attending: EMERGENCY MEDICINE | Admitting: EMERGENCY MEDICINE

## 2022-11-18 VITALS
WEIGHT: 150 LBS | OXYGEN SATURATION: 94 % | BODY MASS INDEX: 27.6 KG/M2 | DIASTOLIC BLOOD PRESSURE: 72 MMHG | HEART RATE: 68 BPM | SYSTOLIC BLOOD PRESSURE: 129 MMHG | HEIGHT: 62 IN

## 2022-11-18 VITALS
TEMPERATURE: 98.3 F | DIASTOLIC BLOOD PRESSURE: 70 MMHG | HEART RATE: 93 BPM | SYSTOLIC BLOOD PRESSURE: 146 MMHG | RESPIRATION RATE: 16 BRPM | OXYGEN SATURATION: 97 %

## 2022-11-18 DIAGNOSIS — S09.93XA FACIAL INJURY, INITIAL ENCOUNTER: Primary | ICD-10-CM
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
## 2022-11-18 DIAGNOSIS — S00.83XA FACIAL HEMATOMA, INITIAL ENCOUNTER: Primary | ICD-10-CM

## 2022-11-18 DIAGNOSIS — E78.2 MIXED HYPERLIPIDEMIA: ICD-10-CM

## 2022-11-18 DIAGNOSIS — E11.9 TYPE 2 DIABETES MELLITUS WITHOUT COMPLICATION, WITHOUT LONG-TERM CURRENT USE OF INSULIN: ICD-10-CM

## 2022-11-18 DIAGNOSIS — Z23 IMMUNIZATION DUE: ICD-10-CM

## 2022-11-18 DIAGNOSIS — I10 PRIMARY HYPERTENSION: ICD-10-CM

## 2022-11-18 DIAGNOSIS — R10.30 LOWER ABDOMINAL PAIN: ICD-10-CM

## 2022-11-18 DIAGNOSIS — M19.90 ARTHRITIS: ICD-10-CM

## 2022-11-18 PROCEDURE — 0124A COVID-19 (PFIZER) BIVALENT BOOSTER 12+YRS: CPT | Performed by: FAMILY MEDICINE

## 2022-11-18 PROCEDURE — 70486 CT MAXILLOFACIAL W/O DYE: CPT

## 2022-11-18 PROCEDURE — 99214 OFFICE O/P EST MOD 30 MIN: CPT | Performed by: FAMILY MEDICINE

## 2022-11-18 PROCEDURE — 91312 COVID-19 (PFIZER) BIVALENT BOOSTER 12+YRS: CPT | Performed by: FAMILY MEDICINE

## 2022-11-18 PROCEDURE — 99282 EMERGENCY DEPT VISIT SF MDM: CPT

## 2022-11-18 NOTE — ED TRIAGE NOTES
Pt reports she fell last night and hit her face. Pt denies blood thinners.     Pt was wearing a mask during assessment.  This RN wore appropriate PPE

## 2022-11-18 NOTE — ED PROVIDER NOTES
EMERGENCY DEPARTMENT ENCOUNTER    Room Number:  12/12  Date of encounter:  11/19/2022  PCP: Manisha Morales MD  Historian: Patient, family at bedside  Family is used as .  Patient speaks little English.    I used full protective equipment while examining this patient.  This includes face mask, gloves and protective eyewear.  I washed my hands before entering the room and immediately upon leaving the room      HPI:  Chief Complaint: Fall  A complete HPI/ROS/PMH/PSH/SH/FH are unobtainable due to: None    Context: Ayla Castellano is a 82 y.o. female who presents to the ED c/o fall.  Patient tripped and fell at her home last night.  She hit the left side of her face against some furniture.  She did not have loss of consciousness and denies headache or neck or back pain.  She is complaining of moderate pain about the left face and is noted to have swelling and bruising.  Pain is currently mild to moderate.      MEDICAL RECORD REVIEW  I reviewed prior medical records through the computer and note the patient has a history of hypertension, hyperlipidemia and coronary artery disease.  She is not on strong anticoagulants.    PAST MEDICAL HISTORY  Active Ambulatory Problems     Diagnosis Date Noted   • Hypertension 04/22/2019   • Coronary artery disease of native heart with stable angina pectoris (Formerly [facility]) 05/28/2020   • Type 2 diabetes mellitus without complication, without long-term current use of insulin (Formerly [facility]) 05/28/2020   • Hyperlipidemia 05/28/2020   • Other osteoporosis without current pathological fracture 06/04/2020   • Medicare annual wellness visit, subsequent 07/09/2020   • Iron deficiency anemia 02/02/2021   • History of colon cancer 02/02/2021   • Myeloproliferative disorder (Formerly [facility]) 02/02/2021   • Adverse reaction to compound iron preparation 02/02/2021   • Arm pain 04/23/2021   • Chest pain 04/23/2021   • SSS (sick sinus syndrome) (Formerly [facility]) 09/14/2021   • Sinus pause 09/14/2021   • Hypercalcemia  10/14/2021   • Arthritis 11/18/2022   • Immunization due 11/18/2022   • Fall 11/18/2022   • Lower abdominal pain 11/18/2022     Resolved Ambulatory Problems     Diagnosis Date Noted   • No Resolved Ambulatory Problems     Past Medical History:   Diagnosis Date   • Arrhythmia    • Colitis    • Colon cancer (HCC)    • Diabetes mellitus (HCC)    • Gallbladder disease    • Hard to intubate    • Left arm pain    • Syncope          PAST SURGICAL HISTORY  Past Surgical History:   Procedure Laterality Date   • CARDIAC ELECTROPHYSIOLOGY PROCEDURE N/A 9/17/2021    Procedure: Pacemaker DC new;  Surgeon: Richard Alaniz MD;  Location: [facility] INVASIVE LOCATION;  Service: Cardiology;  Laterality: N/A;   • CHOLECYSTECTOMY  10/2019   • COLECTOMY PARTIAL / TOTAL      2011 for colon cancer   • COLONOSCOPY  01/20/2020   • UTERINE FIBROID SURGERY      s/p uterine fibroma resection         FAMILY HISTORY  Family History   Problem Relation Age of Onset   • Heart disease Mother         pacemaker placement   • Stroke Mother    • Colon cancer Brother    • Stroke Father    • Diabetes Other    • Kidney disease Other    • Hyperlipidemia Other    • Arthritis Other          SOCIAL HISTORY  Social History     Socioeconomic History   • Marital status:    Tobacco Use   • Smoking status: Never   • Smokeless tobacco: Never   Vaping Use   • Vaping Use: Never used   Substance and Sexual Activity   • Alcohol use: No   • Drug use: No   • Sexual activity: Defer         ALLERGIES  Latex       REVIEW OF SYSTEMS  Review of Systems   Constitutional: Negative.  Negative for fever.   HENT: Negative.  Negative for sore throat.    Eyes: Negative.    Respiratory: Negative.  Negative for cough.    Cardiovascular: Negative.  Negative for chest pain.   Gastrointestinal: Negative.    Genitourinary: Negative.  Negative for dysuria.   Musculoskeletal: Negative.  Negative for back pain.        Facial pain as per HPI   Skin: Negative.  Negative for rash.    Neurological: Negative.  Negative for headaches.   All other systems reviewed and are negative.          PHYSICAL EXAM    I have reviewed the triage vital signs and nursing notes.    ED Triage Vitals [11/18/22 1234]   Temp Heart Rate Resp BP SpO2   98.3 °F (36.8 °C) 93 16 -- 97 %      Temp src Heart Rate Source Patient Position BP Location FiO2 (%)   Tympanic Monitor -- -- --       Physical Exam  GENERAL: Alert and pleasant female no obvious distress.  Triage vitals reviewed and are fairly unremarkable.  HENT: nares patent no significant scalp swelling or tenderness.  There is marked ecchymosis and swelling to the left side of the face.  No obvious bony deformity.    EYES: no scleral icterus.  Examination of the left eye is grossly intact.  Vision is grossly normal.  Extraocular motions are intact without significant diplopia.  CV: regular rhythm, regular rate  RESPIRATORY: normal effort  ABDOMEN: soft  MUSCULOSKELETAL: no deformity  NEURO: Strength sensation and coordination are grossly intact.  Speech and mentation are unremarkable  SKIN: warm, dry noted bruising over the left face.      LAB RESULTS  No results found for this or any previous visit (from the past 24 hour(s)).    Ordered the above labs and independently reviewed the results.      RADIOLOGY  No Radiology Exams Resulted Within Past 24 Hours    I ordered the above noted radiological studies. Reviewed by me and discussed with radiologist.  See dictation for official radiology interpretation.      PROCEDURES  Procedures      MEDICATIONS GIVEN IN ER    Medications - No data to display      PROGRESS, DATA ANALYSIS, CONSULTS, AND MEDICAL DECISION MAKING    All labs have been independently reviewed by me.  All radiology studies have been reviewed by me and discussed with radiologist dictating the report.   EKG's independently viewed and interpreted by me.  Discussion below represents my analysis of pertinent findings related to patient's condition,  differential diagnosis, treatment plan and final disposition.      ED Course as of 11/19/22 1939 Fri Nov 18, 2022   0815 [id number] [place]i-speaking patient presents after trip and fall last night at home.  She hit the left side of her face against some furniture.  She has pretty significant facial bruising and swelling.  I do not see evidence to suggest ocular injury, head injury or cervical spine injury.  We will get CT imaging of the facial bones to look for fracture or other injury. [DB]   4631 I discussed results of facial CT scan with Dr. Sorin novoa.  He reports that there is no bony fracture.  There is soft tissue swelling consistent with hematoma.  Results shared with patient family at bedside.  We will treat conservatively with over-the-counter pain medications [DB]      ED Course User Index  [DB] Mikel Stone MD       AS OF 19:39 EST VITALS:    BP - 146/70  HR - 93  TEMP - 98.3 °F (36.8 °C) (Tympanic)  O2 SATS - 97%      DIAGNOSIS  Final diagnoses:   Facial hematoma, initial encounter         DISPOSITION  DISCHARGE    Patient discharged in stable condition.    Reviewed implications of results, diagnosis, meds, responsibility to follow up, warning signs and symptoms of possible worsening, potential complications and reasons to return to ER, including increased pain or as needed.    Patient/Family voiced understanding of above instructions.    Discussed plan for discharge, as there is no emergent indication for admission. Patient referred to primary care provider for BP management due to today's BP. Pt/family is agreeable and understands need for follow up and repeat testing.  Pt is aware that discharge does not mean that nothing is wrong but it indicates no emergency is present that requires admission and they must continue care with follow-up as given below or physician of their choice.     FOLLOW-UP  Manisha Morales MD  [id number] [facility]  [address]  [facility]  [address]  [phone]      As needed         Medication List      No changes were made to your prescriptions during this visit.              Mikel Stone MD  11/19/22 0876

## 2022-11-18 NOTE — TELEPHONE ENCOUNTER
Caller: Daily Castellano    Relationship: Emergency Contact    Best call back number: [phone]    What orders are you requesting (i.e. lab or imaging): CT SCAN OF HEAD    In what timeframe would the patient need to come in: ASAP    Where will you receive your lab/imaging services: WHEREVER    Additional notes: PATIENTS DAUGHTER STATED DR CEDENO IS AWARE THAT PATIENT HAD A FALL YESTERDAY 11/17/22.    SHE STATED SHE WOULD LIKE TO REQUEST A CT SCAN OF PATIENTS HEAD.    PLEASE CALL.

## 2022-11-18 NOTE — PROGRESS NOTES
Subjective   Ayla Castellano is a 82 y.o. female.     Chief Complaint   Patient presents with   • Fall       History of Present Illness     Patient is here today reporting that she fell at home last night, tripped at the carpet and fell onto the couch handle with her left side of the face bruised her left orbital area as well as her left cheek.  She did not go to the emergency room or seek any type of medical attention.  She did not pass out lost consciousness or have any other neurological presentation.  At this time she states that she does not feel bad besides bruising and swelling on her face she does not have any major pain or any dizzy nests of blurriness of vision.  Hypertension is stable.  Diabetes is stable.    The following portions of the patient's history were reviewed and updated as appropriate: allergies, current medications, past family history, past medical history, past social history, past surgical history and problem list.    Past Medical History:   Diagnosis Date   • Arrhythmia    • Colitis    • Colon cancer (HCC)     s/p partial colectomy in 2011   • Diabetes mellitus (HCC)    • Gallbladder disease    • Hard to intubate    • Hyperlipidemia    • Hypertension    • Left arm pain    • SSS (sick sinus syndrome) (HCC)     [employer] dual-chamber pacemaker on 9/17/2021   • Syncope     Secondary to medications (clonidine and metoprolol) in 3/15.  Had severe bradycardia (sinus arrest with junctional escape beats).  Resolved after medications discontinued.       Past Surgical History:   Procedure Laterality Date   • CARDIAC ELECTROPHYSIOLOGY PROCEDURE N/A 9/17/2021    Procedure: Pacemaker DC new;  Surgeon: Richard Alaniz MD;  Location: [facility] INVASIVE LOCATION;  Service: Cardiology;  Laterality: N/A;   • CHOLECYSTECTOMY  10/2019   • COLECTOMY PARTIAL / TOTAL      2011 for colon cancer   • COLONOSCOPY  01/20/2020   • UTERINE FIBROID SURGERY      s/p uterine fibroma resection        Family History   Problem Relation Age of Onset   • Heart disease Mother         pacemaker placement   • Stroke Mother    • Colon cancer Brother    • Stroke Father    • Diabetes Other    • Kidney disease Other    • Hyperlipidemia Other    • Arthritis Other        Social History     Socioeconomic History   • Marital status:    Tobacco Use   • Smoking status: Never   • Smokeless tobacco: Never   Vaping Use   • Vaping Use: Never used   Substance and Sexual Activity   • Alcohol use: No   • Drug use: No   • Sexual activity: Defer       Current Outpatient Medications on File Prior to Visit   Medication Sig Dispense Refill   • Accu-Chek FastClix Lancets misc To test blood sugar 3 times daily. 100 each 8   • amLODIPine (NORVASC) 5 MG tablet Take 1 tablet by mouth Daily. 90 tablet 3   • aspirin 81 MG tablet Take 81 mg by mouth Daily.     • atorvastatin (LIPITOR) 20 MG tablet TAKE 1 TABLET BY MOUTH DAILY 90 tablet 3   • Cyanocobalamin (B-12) 2500 MCG sublingual tablet Place 1 tablet under the tongue Daily. 30 tablet 3   • diclofenac (VOLTAREN) 1 % gel gel Apply 4 g topically to the appropriate area as directed 3 (Three) Times a Day. 5 tube 11   • glucose blood (ONE TOUCH ULTRA TEST) test strip USE TO TEST BLOOD SUGAR EVERY DAY. (E11.9) 100 each 2   • hydroxyurea (HYDREA) 500 MG capsule Take 1 capsule by mouth Take As Directed. On Mondays, Wednesdays Fridays and Sundays 48 capsule 3   • isosorbide mononitrate (IMDUR) 60 MG 24 hr tablet Take 1 tablet by mouth Daily. 90 tablet 3   • ketoconazole (NIZORAL) 2 % cream Apply  topically to the appropriate area as directed Daily. 30 g 11   • metFORMIN (GLUCOPHAGE) 500 MG tablet Take 1 tablet by mouth 3 (Three) Times a Day. 90 tablet 11   • mupirocin (Bactroban) 2 % ointment Apply  topically to the appropriate area as directed 3 (Three) Times a Day. 30 g 11   • ofloxacin (OCUFLOX) 0.3 % ophthalmic solution Administer 2 drops to both eyes 4 (Four) Times a Day. 10 mL 11   •  oxybutynin (DITROPAN) 5 MG tablet Take 1 tablet by mouth 2 (Two) Times a Day. 60 tablet 5   • oxyCODONE-acetaminophen (PERCOCET) 5-325 MG per tablet Take 1-2 tablets by mouth Every 4 (Four) Hours As Needed (Pain). 10 tablet 0   • valsartan-hydrochlorothiazide (DIOVAN-HCT) 320-25 MG per tablet Take 1 tablet by mouth Daily. 90 tablet 3     No current facility-administered medications on file prior to visit.       Review of Systems   Constitutional: Negative.         Recent fall last night   Gastrointestinal: Positive for abdominal pain.   Musculoskeletal: Positive for arthralgias.       Recent Results (from the past 4704 hour(s))   Hemoglobin A1c    Collection Time: 07/21/22  8:54 AM    Specimen: Blood   Result Value Ref Range    Hemoglobin A1C 5.6 4.8 - 5.6 %   Comprehensive Metabolic Panel    Collection Time: 07/21/22  8:54 AM    Specimen: Blood   Result Value Ref Range    Glucose 114 (H) 65 - 99 mg/dL    BUN 32 (H) 8 - 27 mg/dL    Creatinine 1.08 (H) 0.57 - 1.00 mg/dL    EGFR Result 52 (L) >59 mL/min/1.73    BUN/Creatinine Ratio 30 (H) 12 - 28    Sodium 142 134 - 144 mmol/L    Potassium 3.9 3.5 - 5.2 mmol/L    Chloride 103 96 - 106 mmol/L    Total CO2 23 20 - 29 mmol/L    Calcium 11.5 (H) 8.7 - 10.3 mg/dL    Total Protein 6.8 6.0 - 8.5 g/dL    Albumin 4.5 3.7 - 4.7 g/dL    Globulin 2.3 1.5 - 4.5 g/dL    A/G Ratio 2.0 1.2 - 2.2    Total Bilirubin 0.7 0.0 - 1.2 mg/dL    Alkaline Phosphatase 73 44 - 121 IU/L    AST (SGOT) 25 0 - 40 IU/L    ALT (SGPT) 30 0 - 32 IU/L   Lipid Panel With LDL / HDL Ratio    Collection Time: 07/21/22  8:54 AM    Specimen: Blood   Result Value Ref Range    Total Cholesterol 143 100 - 199 mg/dL    Triglycerides 205 (H) 0 - 149 mg/dL    HDL Cholesterol 30 (L) >39 mg/dL    VLDL Cholesterol Jeff 35 5 - 40 mg/dL    LDL Chol Calc (NIH) 78 0 - 99 mg/dL    LDL/HDL RATIO 2.6 0.0 - 3.2 ratio   Comprehensive Metabolic Panel    Collection Time: 07/28/22  2:43 PM    Specimen: Blood   Result Value Ref  Range    Glucose 111 74 - 124 mg/dL    BUN 25 (H) 6 - 20 mg/dL    Creatinine 1.03 0.60 - 1.10 mg/dL    Sodium 140 134 - 145 mmol/L    Potassium 4.0 3.5 - 4.7 mmol/L    Chloride 102 98 - 107 mmol/L    CO2 25.0 22.0 - 29.0 mmol/L    Calcium 11.2 (C) 8.5 - 10.2 mg/dL    Total Protein 7.1 6.3 - 8.0 g/dL    Albumin 4.60 3.50 - 5.20 g/dL    ALT (SGPT) 29 0 - 33 U/L    AST (SGOT) 27 0 - 32 U/L    Alkaline Phosphatase 74 38 - 116 U/L    Total Bilirubin 0.7 0.2 - 1.2 mg/dL    Globulin 2.5 1.8 - 3.5 gm/dL    A/G Ratio 1.8 1.1 - 2.4 g/dL    BUN/Creatinine Ratio 24.3 7.3 - 30.0    Anion Gap 13.0 5.0 - 15.0 mmol/L    eGFR 55.1 (L) >60.0 mL/min/1.73   Ferritin    Collection Time: 07/28/22  2:43 PM    Specimen: Blood   Result Value Ref Range    Ferritin 143.40 13.00 - 150.00 ng/mL   Iron Profile    Collection Time: 07/28/22  2:43 PM    Specimen: Blood   Result Value Ref Range    Iron 65 37 - 145 mcg/dL    Iron Saturation 20 14 - 48 %    Transferrin 232 200 - 360 mg/dL    TIBC 325 249 - 505 mcg/dL   CBC Auto Differential    Collection Time: 07/28/22  2:43 PM    Specimen: Blood   Result Value Ref Range    WBC 19.20 (H) 3.40 - 10.80 10*3/mm3    RBC 3.15 (L) 3.77 - 5.28 10*6/mm3    Hemoglobin 10.7 (L) 12.0 - 15.9 g/dL    Hematocrit 31.9 (L) 34.0 - 46.6 %    .3 (H) 79.0 - 97.0 fL    MCH 34.0 (H) 26.6 - 33.0 pg    MCHC 33.5 31.5 - 35.7 g/dL    RDW 16.0 (H) 12.3 - 15.4 %    RDW-SD 58.8 (H) 37.0 - 54.0 fl    MPV 10.5 6.0 - 12.0 fL    Platelets 437 140 - 450 10*3/mm3    Neutrophil % 70.7 42.7 - 76.0 %    Lymphocyte % 10.1 (L) 19.6 - 45.3 %    Monocyte % 5.0 5.0 - 12.0 %    Eosinophil % 3.3 0.3 - 6.2 %    Basophil % 1.0 0.0 - 1.5 %    Immature Grans % 9.9 (H) 0.0 - 0.5 %    Neutrophils, Absolute 13.56 (H) 1.70 - 7.00 10*3/mm3    Lymphocytes, Absolute 1.94 0.70 - 3.10 10*3/mm3    Monocytes, Absolute 0.96 (H) 0.10 - 0.90 10*3/mm3    Eosinophils, Absolute 0.64 (H) 0.00 - 0.40 10*3/mm3    Basophils, Absolute 0.20 0.00 - 0.20 10*3/mm3     "Immature Grans, Absolute 1.90 (H) 0.00 - 0.05 10*3/mm3    nRBC 0.2 0.0 - 0.2 /100 WBC   PTH, Intact & Calcium    Collection Time: 08/12/22  8:21 AM    Specimen: Blood   Result Value Ref Range    Calcium 11.4 (H) 8.7 - 10.3 mg/dL    PTH, Intact 62 15 - 65 pg/mL    PTH, Intact Comment    CBC Auto Differential    Collection Time: 09/30/22  9:04 AM    Specimen: Blood   Result Value Ref Range    WBC 17.81 (H) 3.40 - 10.80 10*3/mm3    RBC 3.62 (L) 3.77 - 5.28 10*6/mm3    Hemoglobin 12.0 12.0 - 15.9 g/dL    Hematocrit 35.4 34.0 - 46.6 %    MCV 97.8 (H) 79.0 - 97.0 fL    MCH 33.1 (H) 26.6 - 33.0 pg    MCHC 33.9 31.5 - 35.7 g/dL    RDW 15.4 12.3 - 15.4 %    RDW-SD 54.8 (H) 37.0 - 54.0 fl    MPV 11.1 6.0 - 12.0 fL    Platelets 334 140 - 450 10*3/mm3    Neutrophil % 70.8 42.7 - 76.0 %    Lymphocyte % 9.3 (L) 19.6 - 45.3 %    Monocyte % 6.7 5.0 - 12.0 %    Eosinophil % 3.0 0.3 - 6.2 %    Basophil % 1.2 0.0 - 1.5 %    Immature Grans % 9.0 (H) 0.0 - 0.5 %    Neutrophils, Absolute 12.61 (H) 1.70 - 7.00 10*3/mm3    Lymphocytes, Absolute 1.65 0.70 - 3.10 10*3/mm3    Monocytes, Absolute 1.19 (H) 0.10 - 0.90 10*3/mm3    Eosinophils, Absolute 0.54 (H) 0.00 - 0.40 10*3/mm3    Basophils, Absolute 0.21 (H) 0.00 - 0.20 10*3/mm3    Immature Grans, Absolute 1.61 (H) 0.00 - 0.05 10*3/mm3    nRBC 0.0 0.0 - 0.2 /100 WBC     Objective   Vitals:    11/18/22 0816   BP: 129/72   Pulse: 68   SpO2: 94%   Weight: 68 kg (150 lb)   Height: 157.5 cm (62\")     Body mass index is 27.44 kg/m².  Physical Exam  Vitals and nursing note reviewed.   Constitutional:       General: She is not in acute distress.     Appearance: She is well-developed. She is not diaphoretic.   HENT:      Head:      Comments: Quite significant periorbital swelling and bruising on the left thigh as well as the left cheek and cheekbone  Cardiovascular:      Rate and Rhythm: Normal rate and regular rhythm.   Pulmonary:      Effort: Pulmonary effort is normal. No respiratory distress.    "   Breath sounds: Normal breath sounds. No wheezing.           Diagnoses and all orders for this visit:    1. Facial injury, initial encounter (Primary)  Comments:  Patient was strongly recommended to go to optometrist today to check her left eye for any trauma  Orders:  -     Cancel: XR Facial Bones 3+ View; Future  -     CT Facial Bones With & Without Contrast; Future    2. Type 2 diabetes mellitus without complication, without long-term current use of insulin (HCC)    3. Primary hypertension    4. Mixed hyperlipidemia    5. Arthritis  Comments:  Judicial use of Voltaren and taking it only with food and only occasionally was discussed in detail with patient  Orders:  -     diclofenac (VOLTAREN) 50 MG EC tablet; Take 1 tablet by mouth 2 (Two) Times a Day.  Dispense: 60 tablet; Refill: 0    6. Immunization due  -     COVID-19 Bivalent Booster (Pfizer) 12+yrs    7. Fall, initial encounter  -     CT Facial Bones With & Without Contrast; Future    8. Lower abdominal pain  -     US Urinary Bladder; Future  -     Urinalysis without microscopic (no culture) - Urine, Clean Catch    Return in about 3 months (around 2/18/2023) for HYPERTENSION, DIABETES.

## 2022-12-01 ENCOUNTER — LAB (OUTPATIENT)
Dept: LAB | Facility: HOSPITAL | Age: 82
End: 2022-12-01

## 2022-12-01 ENCOUNTER — OFFICE VISIT (OUTPATIENT)
Dept: ONCOLOGY | Facility: CLINIC | Age: 82
End: 2022-12-01

## 2022-12-01 VITALS
HEIGHT: 62 IN | DIASTOLIC BLOOD PRESSURE: 79 MMHG | OXYGEN SATURATION: 98 % | RESPIRATION RATE: 18 BRPM | WEIGHT: 153.5 LBS | BODY MASS INDEX: 28.25 KG/M2 | TEMPERATURE: 96.9 F | SYSTOLIC BLOOD PRESSURE: 136 MMHG | HEART RATE: 66 BPM

## 2022-12-01 DIAGNOSIS — N28.9 KIDNEY LESION: ICD-10-CM

## 2022-12-01 DIAGNOSIS — Z79.899 ENCOUNTER FOR LONG-TERM CURRENT USE OF HIGH RISK MEDICATION: ICD-10-CM

## 2022-12-01 DIAGNOSIS — E83.52 HYPERCALCEMIA: ICD-10-CM

## 2022-12-01 DIAGNOSIS — D50.9 IRON DEFICIENCY ANEMIA, UNSPECIFIED IRON DEFICIENCY ANEMIA TYPE: ICD-10-CM

## 2022-12-01 DIAGNOSIS — Z85.038 HISTORY OF COLON CANCER: ICD-10-CM

## 2022-12-01 DIAGNOSIS — D47.1 MYELOPROLIFERATIVE DISORDER: Primary | ICD-10-CM

## 2022-12-01 DIAGNOSIS — D47.1 MYELOPROLIFERATIVE DISORDER: ICD-10-CM

## 2022-12-01 LAB
ALBUMIN SERPL-MCNC: 4.3 G/DL (ref 3.5–5.2)
ALBUMIN/GLOB SERPL: 1.7 G/DL (ref 1.1–2.4)
ALP SERPL-CCNC: 84 U/L (ref 38–116)
ALT SERPL W P-5'-P-CCNC: 21 U/L (ref 0–33)
ANION GAP SERPL CALCULATED.3IONS-SCNC: 9.5 MMOL/L (ref 5–15)
AST SERPL-CCNC: 26 U/L (ref 0–32)
BASOPHILS # BLD AUTO: 0.19 10*3/MM3 (ref 0–0.2)
BASOPHILS NFR BLD AUTO: 1.2 % (ref 0–1.5)
BILIRUB SERPL-MCNC: 0.7 MG/DL (ref 0.2–1.2)
BUN SERPL-MCNC: 18 MG/DL (ref 6–20)
BUN/CREAT SERPL: 19.4 (ref 7.3–30)
CALCIUM SPEC-SCNC: 11.3 MG/DL (ref 8.5–10.2)
CHLORIDE SERPL-SCNC: 105 MMOL/L (ref 98–107)
CO2 SERPL-SCNC: 27.5 MMOL/L (ref 22–29)
CREAT SERPL-MCNC: 0.93 MG/DL (ref 0.6–1.1)
DEPRECATED RDW RBC AUTO: 62 FL (ref 37–54)
EGFRCR SERPLBLD CKD-EPI 2021: 62.3 ML/MIN/1.73
EOSINOPHIL # BLD AUTO: 0.58 10*3/MM3 (ref 0–0.4)
EOSINOPHIL NFR BLD AUTO: 3.6 % (ref 0.3–6.2)
ERYTHROCYTE [DISTWIDTH] IN BLOOD BY AUTOMATED COUNT: 16.9 % (ref 12.3–15.4)
FERRITIN SERPL-MCNC: 138.3 NG/ML (ref 13–150)
GLOBULIN UR ELPH-MCNC: 2.5 GM/DL (ref 1.8–3.5)
GLUCOSE SERPL-MCNC: 111 MG/DL (ref 74–124)
HCT VFR BLD AUTO: 32.8 % (ref 34–46.6)
HGB BLD-MCNC: 10.8 G/DL (ref 12–15.9)
IMM GRANULOCYTES # BLD AUTO: 1.77 10*3/MM3 (ref 0–0.05)
IMM GRANULOCYTES NFR BLD AUTO: 10.9 % (ref 0–0.5)
IRON 24H UR-MRATE: 73 MCG/DL (ref 37–145)
IRON SATN MFR SERPL: 26 % (ref 14–48)
LYMPHOCYTES # BLD AUTO: 1.56 10*3/MM3 (ref 0.7–3.1)
LYMPHOCYTES NFR BLD AUTO: 9.6 % (ref 19.6–45.3)
MCH RBC QN AUTO: 33 PG (ref 26.6–33)
MCHC RBC AUTO-ENTMCNC: 32.9 G/DL (ref 31.5–35.7)
MCV RBC AUTO: 100.3 FL (ref 79–97)
MONOCYTES # BLD AUTO: 0.98 10*3/MM3 (ref 0.1–0.9)
MONOCYTES NFR BLD AUTO: 6 % (ref 5–12)
NEUTROPHILS NFR BLD AUTO: 11.18 10*3/MM3 (ref 1.7–7)
NEUTROPHILS NFR BLD AUTO: 68.7 % (ref 42.7–76)
NRBC BLD AUTO-RTO: 0 /100 WBC (ref 0–0.2)
PLATELET # BLD AUTO: 354 10*3/MM3 (ref 140–450)
PMV BLD AUTO: 11 FL (ref 6–12)
POTASSIUM SERPL-SCNC: 4.5 MMOL/L (ref 3.5–4.7)
PROT SERPL-MCNC: 6.8 G/DL (ref 6.3–8)
RBC # BLD AUTO: 3.27 10*6/MM3 (ref 3.77–5.28)
SODIUM SERPL-SCNC: 142 MMOL/L (ref 134–145)
TIBC SERPL-MCNC: 286 MCG/DL (ref 249–505)
TRANSFERRIN SERPL-MCNC: 204 MG/DL (ref 200–360)
WBC NRBC COR # BLD: 16.26 10*3/MM3 (ref 3.4–10.8)

## 2022-12-01 PROCEDURE — 99214 OFFICE O/P EST MOD 30 MIN: CPT | Performed by: INTERNAL MEDICINE

## 2022-12-01 PROCEDURE — 83540 ASSAY OF IRON: CPT

## 2022-12-01 PROCEDURE — 85025 COMPLETE CBC W/AUTO DIFF WBC: CPT

## 2022-12-01 PROCEDURE — 84466 ASSAY OF TRANSFERRIN: CPT

## 2022-12-01 PROCEDURE — 82728 ASSAY OF FERRITIN: CPT

## 2022-12-01 PROCEDURE — 36415 COLL VENOUS BLD VENIPUNCTURE: CPT

## 2022-12-01 PROCEDURE — 80053 COMPREHEN METABOLIC PANEL: CPT

## 2022-12-01 NOTE — PROGRESS NOTES
Subjective     CHIEF COMPLAINT:      Chief Complaint   Patient presents with   • Follow-up     No concerns     HISTORY OF PRESENT ILLNESS:     Ayla Castellano is a 82 y.o. female patient who returns today for follow up on her myeloproliferative disorder.  She returns today for follow-up accompanied by her daughter.  She had a fall on 11/17/2022 and hit the left side of her face.  She developed significant swelling.  She tripped while she was walking.  She did not lose consciousness.      Patient continues Hydrea 500 mg 4 days a week.  No nausea.  No abdominal pain.  No shortness of breath.      ROS:  Pertinent ROS is in the HPI.     Past medical, surgical, social and family history were reviewed.     MEDICATIONS:    Current Outpatient Medications:   •  Accu-Chek FastClix Lancets misc, To test blood sugar 3 times daily., Disp: 100 each, Rfl: 8  •  amLODIPine (NORVASC) 5 MG tablet, Take 1 tablet by mouth Daily., Disp: 90 tablet, Rfl: 3  •  aspirin 81 MG tablet, Take 81 mg by mouth Daily., Disp: , Rfl:   •  atorvastatin (LIPITOR) 20 MG tablet, TAKE 1 TABLET BY MOUTH DAILY, Disp: 90 tablet, Rfl: 3  •  Cyanocobalamin (B-12) 2500 MCG sublingual tablet, Place 1 tablet under the tongue Daily., Disp: 30 tablet, Rfl: 3  •  diclofenac (VOLTAREN) 1 % gel gel, Apply 4 g topically to the appropriate area as directed 3 (Three) Times a Day., Disp: 5 tube, Rfl: 11  •  diclofenac (VOLTAREN) 50 MG EC tablet, Take 1 tablet by mouth 2 (Two) Times a Day., Disp: 60 tablet, Rfl: 0  •  glucose blood (ONE TOUCH ULTRA TEST) test strip, USE TO TEST BLOOD SUGAR EVERY DAY. (E11.9), Disp: 100 each, Rfl: 2  •  hydroxyurea (HYDREA) 500 MG capsule, Take 1 capsule by mouth Take As Directed. On Mondays, Wednesdays Fridays and Sundays, Disp: 48 capsule, Rfl: 3  •  isosorbide mononitrate (IMDUR) 60 MG 24 hr tablet, Take 1 tablet by mouth Daily., Disp: 90 tablet, Rfl: 3  •  ketoconazole (NIZORAL) 2 % cream, Apply  topically to the appropriate area  "as directed Daily., Disp: 30 g, Rfl: 11  •  metFORMIN (GLUCOPHAGE) 500 MG tablet, Take 1 tablet by mouth 3 (Three) Times a Day., Disp: 90 tablet, Rfl: 11  •  mupirocin (Bactroban) 2 % ointment, Apply  topically to the appropriate area as directed 3 (Three) Times a Day., Disp: 30 g, Rfl: 11  •  ofloxacin (OCUFLOX) 0.3 % ophthalmic solution, Administer 2 drops to both eyes 4 (Four) Times a Day., Disp: 10 mL, Rfl: 11  •  oxybutynin (DITROPAN) 5 MG tablet, Take 1 tablet by mouth 2 (Two) Times a Day., Disp: 60 tablet, Rfl: 5  •  oxyCODONE-acetaminophen (PERCOCET) 5-325 MG per tablet, Take 1-2 tablets by mouth Every 4 (Four) Hours As Needed (Pain)., Disp: 10 tablet, Rfl: 0  •  valsartan-hydrochlorothiazide (DIOVAN-HCT) 320-25 MG per tablet, Take 1 tablet by mouth Daily., Disp: 90 tablet, Rfl: 3  Objective     VITAL SIGNS:     Vitals:    12/01/22 0833   BP: 136/79   Pulse: 66   Resp: 18   Temp: 96.9 °F (36.1 °C)   TempSrc: Temporal   SpO2: 98%   Weight: 69.6 kg (153 lb 8 oz)   Height: 157.5 cm (62.01\")   PainSc: 0-No pain     Body mass index is 28.07 kg/m².     Wt Readings from Last 5 Encounters:   12/01/22 69.6 kg (153 lb 8 oz)   11/18/22 68 kg (150 lb)   11/03/22 67.1 kg (148 lb)   08/12/22 70.1 kg (154 lb 9.6 oz)   07/28/22 70.8 kg (156 lb)     PHYSICAL EXAMINATION:   GENERAL: The patient appears in fair general condition, not in acute distress.   SKIN: Significant ecchymosis over the left side of the face and neck.  Swelling in the left cheek area.  EYES: No jaundice. Pallor.  LYMPHATICS: No cervical lymphadenopathy.  CHEST: Normal respiratory effort.  Lungs clear bilaterally.  No added sounds.  CVS: Normal S1-S2.  No murmurs.  ABDOMEN: Soft. No tenderness. No Hepatomegaly. No Splenomegaly. No masses.  EXTREMITIES: No noted deformity.     DIAGNOSTIC DATA:     Results from last 7 days   Lab Units 12/01/22  0807   WBC 10*3/mm3 16.26*   NEUTROS ABS 10*3/mm3 11.18*   HEMOGLOBIN g/dL 10.8*   HEMATOCRIT % 32.8*   PLATELETS " 10*3/mm3 354     Results from last 7 days   Lab Units 12/01/22  0807   SODIUM mmol/L 142   POTASSIUM mmol/L 4.5   CHLORIDE mmol/L 105   CO2 mmol/L 27.5   BUN mg/dL 18   CREATININE mg/dL 0.93   CALCIUM mg/dL 11.3*   ALBUMIN g/dL 4.30   BILIRUBIN mg/dL 0.7   ALK PHOS U/L 84   ALT (SGPT) U/L 21   AST (SGOT) U/L 26   GLUCOSE mg/dL 111         Lab 12/01/22  0807   IRON 73   IRON SATURATION 26   TIBC 286   TRANSFERRIN 204   FERRITIN 138.30     CT facial bones on 11/18/2022:  1. Soft tissue hematoma in the left cheek.  2. No facial fractures are seen.    Assessment & Plan    *Myeloproliferative disorder, OBINNA-2 mutation positive.   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   · She was under the care of an outside Hematologist before transferring care to our practice. Records indicate that she was previously diagnosed with Essential thrombocythemia.   · She has elevated WBC and basophil count.   · Therefore, a diagnosis of myeloproliferative disorder or polycythemia vera was favored over ET.  · BCR ABL was negative by FISH on 2/2/2021.  · In February 2021, WBC count was gradually increasing indicating that her disease was not under a good control likely due to the dose of Hydroxyurea not being effective (500 mg every 4 days).   · The dose was increased to every 3 days on 2/2/2021.  · Due to the persistence of the leukocytosis, the dose of Hydrea was increased to 4 days a week.  · Hemoglobin decreased to 10.0 on 10/4/2021.  WBC count was 15,270.  · Due to the worsening anemia, we reduced the dose of Hydrea back to 3 days a week.  · Labs on 1/6/2022 revealed a hemoglobin of 10.2.  WBC was 16,600.  Platelets were 352,000.  · Spleen measured 14.7 cm on CT scan on 1/14/2022.  · Labs on 5/5/2022 revealed a hemoglobin of 9.9.  WBC decreased to 14,980.  Platelets were 357,000.  · WBC increased to 19,200 on 7/28/2022.  · Hydrea was increased to 500 mg 4 days a week.  · WBC decreased to 16,260 today.  · Platelets  are normal today.    · Spleen is not palpable.  · She is tolerating Hydrea well.    *Iron deficiency anemia.   · This is attributed to iron deficiency with a transferrin saturation of 17% and a ferritin of 26.   · The patient did not tolerate oral in the past with development of upset stomach, abdominal pain, headaches and dizziness when she took the oral iron in the past.  · Patient received IV iron the 2020 and tolerated that well.   · Patient was given IV Injectafer on 2/5/2021 and 2/12/2021.  · Hemoglobin was 10.0 on 10/4/2021.  · Hemoglobin decreased to 9.9 on 5/5/2022.  Iron stores were adequate.  · Hemoglobin improved to 10.7 on 7/28/2022.  · Hemoglobin was 12.0 on 9/30/2022.  · Hemoglobin is 10.8 today.?  Some blood loss related to recent fall and hematoma.    *History of colon cancer, stage I.   · She is s/p partial colectomy by Dr. Ku.   · She did not require adjuvant chemotherapy or radiation therapy.    · Patient is complaining of abdominal pain in the right upper quadrant.  There is tenderness on exam.  She had her gallbladder removed in 2019.  · CT scan on 1/14/2022 revealed no evidence of recurrence.   · CT scan on 7/22/2022 showed no evidence of recurrence.  · She is not currently having symptoms.    *Right renal lesion.    · CT on 1/14/2022 revealed an incidental finding of a 1.7 cm rounded structure in the right kidney.    · It was considered to be most likely representing a prominent renal lobule.    · It slightly increased in size compared to 2015.   · CT abdomen pelvis with renal protocol on 7/22/2022 showed no suspicious renal lesion.  · The previously reported lesion represented cortical tissue.  · I explained this to the patient and daughter today.  Since it did not represent a mass, no additional follow-up was recommended for this lesion.    *Hypercalcemia.  · This is chronic.  · Calcium was 11.2 on 7/28/2022..  · This was previously evaluated by her PCP.  · PTH was normal at 62 on  8/12/2022.  · She was advised to reduce intake of calcium rich food.  · Calcium is 11.3 today.    PLAN:    1.  Continue Hydrea 500 mg 4 days a week (on Mondays Wednesdays Fridays and Sundays).  2.  Reduce intake of calcium rich food.  3.  She does not need IV iron at present.  4.  We will repeat CBC in 2 months with RN review.  5.  Follow-up in 4 months.  We will obtain CBC CMP ferritin iron panel.        Izzy Martinez MD  12/01/22

## 2022-12-06 ENCOUNTER — SPECIALTY PHARMACY (OUTPATIENT)
Dept: PHARMACY | Facility: HOSPITAL | Age: 82
End: 2022-12-06

## 2022-12-06 NOTE — PROGRESS NOTES
Specialty Note ( hydrea)      Labs reviewed        12/1/2022   WBC 3.40 - 10.80 10*3/mm3 16.26 (A)   Neutrophils Absolute 1.70 - 7.00 10*3/mm3 11.18 (A)   Hemoglobin 12.0 - 15.9 g/dL 10.8 (A)   Hematocrit 34.0 - 46.6 % 32.8 (A)   Platelets 140 - 450 10*3/mm3 354   Creatinine 0.60 - 1.10 mg/dL 0.93   BUN 6 - 20 mg/dL 18   Sodium 134 - 145 mmol/L 142   Potassium 3.5 - 4.7 mmol/L 4.5   Glucose 74 - 124 mg/dL 111   Calcium 8.5 - 10.2 mg/dL 11.3 (A)   Albumin 3.50 - 5.20 g/dL 4.30   Total Protein 6.3 - 8.0 g/dL 6.8   AST (SGOT) 0 - 32 U/L 26   ALT (SGPT) 0 - 33 U/L 21   Alkaline Phosphatase 38 - 116 U/L 84   Total Bilirubin 0.2 - 1.2 mg/dL 0.7   Iron 37 - 145 mcg/dL 73   TIBC 249 - 505 mcg/dL 286   Ferritin 13.00 - 150.00 ng/mL 138.30   Iron Saturation 14 - 48 % 26     Dr Martinez's dictation is noted    Continue Hydrea 500 mg 4 days a week (on Mondays Wednesdays Fridays and Sundays).   Fall with Harm Risk

## 2022-12-12 ENCOUNTER — TELEPHONE (OUTPATIENT)
Dept: ONCOLOGY | Facility: CLINIC | Age: 82
End: 2022-12-12

## 2022-12-12 ENCOUNTER — TELEPHONE (OUTPATIENT)
Dept: FAMILY MEDICINE CLINIC | Facility: CLINIC | Age: 82
End: 2022-12-12

## 2022-12-12 NOTE — TELEPHONE ENCOUNTER
Reviewed patient chart and returned call, her B-12 was ordered by Dr. Morales, no recent b-12 level is in chart.  Advised patient to contact her PCP regarding this medication. She v/u.

## 2022-12-12 NOTE — TELEPHONE ENCOUNTER
Caller: Daily Castellano    Relationship: Emergency Contact    Best call back number: [phone]    What medications are you currently taking:   Current Outpatient Medications on File Prior to Visit   Medication Sig Dispense Refill   • Accu-Chek FastClix Lancets misc To test blood sugar 3 times daily. 100 each 8   • amLODIPine (NORVASC) 5 MG tablet Take 1 tablet by mouth Daily. 90 tablet 3   • aspirin 81 MG tablet Take 81 mg by mouth Daily.     • atorvastatin (LIPITOR) 20 MG tablet TAKE 1 TABLET BY MOUTH DAILY 90 tablet 3   • Cyanocobalamin (B-12) 2500 MCG sublingual tablet Place 1 tablet under the tongue Daily. 30 tablet 3   • diclofenac (VOLTAREN) 1 % gel gel Apply 4 g topically to the appropriate area as directed 3 (Three) Times a Day. 5 tube 11   • diclofenac (VOLTAREN) 50 MG EC tablet Take 1 tablet by mouth 2 (Two) Times a Day. 60 tablet 0   • glucose blood (ONE TOUCH ULTRA TEST) test strip USE TO TEST BLOOD SUGAR EVERY DAY. (E11.9) 100 each 2   • hydroxyurea (HYDREA) 500 MG capsule Take 1 capsule by mouth Take As Directed. On Mondays, Wednesdays Fridays and Sundays 48 capsule 3   • isosorbide mononitrate (IMDUR) 60 MG 24 hr tablet Take 1 tablet by mouth Daily. 90 tablet 3   • ketoconazole (NIZORAL) 2 % cream Apply  topically to the appropriate area as directed Daily. 30 g 11   • metFORMIN (GLUCOPHAGE) 500 MG tablet Take 1 tablet by mouth 3 (Three) Times a Day. 90 tablet 11   • mupirocin (Bactroban) 2 % ointment Apply  topically to the appropriate area as directed 3 (Three) Times a Day. 30 g 11   • ofloxacin (OCUFLOX) 0.3 % ophthalmic solution Administer 2 drops to both eyes 4 (Four) Times a Day. 10 mL 11   • oxybutynin (DITROPAN) 5 MG tablet Take 1 tablet by mouth 2 (Two) Times a Day. 60 tablet 5   • oxyCODONE-acetaminophen (PERCOCET) 5-325 MG per tablet Take 1-2 tablets by mouth Every 4 (Four) Hours As Needed (Pain). 10 tablet 0   • valsartan-hydrochlorothiazide (DIOVAN-HCT) 320-25 MG per tablet Take 1  tablet by mouth Daily. 90 tablet 3     No current facility-administered medications on file prior to visit.            Which medication are you concerned about: Cyanocobalamin (B-12) 2500 MCG sublingual tablet    Who prescribed you this medication: DR CEDENO    What are your concerns: PATIENTS DAUGHTER IS REQUESTING TO KNOW IF THE PATIENT SHOULD STILL BE TAKING THIS MEDICATION, AS THERE ARE NO REFILLS REMAINING ON THIS MEDICATION.    PATIENTS DAUGHTER STATED THE PATIENT IS OUT OF THIS MEDICATION AS OF 12-.    PLEASE CALL TO DISCUSS AND ADVISE.

## 2022-12-12 NOTE — TELEPHONE ENCOUNTER
Caller: Daily Castellano    Relationship: Emergency Contact    Best call back number: [phone]    Who are you requesting to speak with (clinical staff, provider,  specific staff member): /NURSE    What was the call regarding: PT'S DAUGHTER WANTS TO KNOW IF PT SHOULD CONTINUE TAKING B12, SHE IS COMPLETELY OUT NOW.  PLEASE CALL BACK TO ADVISE.    Do you require a callback: YES

## 2022-12-23 ENCOUNTER — HOSPITAL ENCOUNTER (OUTPATIENT)
Dept: ULTRASOUND IMAGING | Facility: HOSPITAL | Age: 82
End: 2022-12-23

## 2022-12-28 PROCEDURE — 93294 REM INTERROG EVL PM/LDLS PM: CPT | Performed by: INTERNAL MEDICINE

## 2022-12-28 PROCEDURE — 93296 REM INTERROG EVL PM/IDS: CPT | Performed by: INTERNAL MEDICINE

## 2023-01-13 ENCOUNTER — HOSPITAL ENCOUNTER (OUTPATIENT)
Dept: ULTRASOUND IMAGING | Facility: HOSPITAL | Age: 83
End: 2023-01-13
Payer: MEDICARE

## 2023-01-13 ENCOUNTER — HOSPITAL ENCOUNTER (OUTPATIENT)
Dept: ULTRASOUND IMAGING | Facility: HOSPITAL | Age: 83
Discharge: HOME | End: 2023-01-13
Admitting: FAMILY MEDICINE
Payer: MEDICARE

## 2023-01-13 DIAGNOSIS — R10.30 LOWER ABDOMINAL PAIN: ICD-10-CM

## 2023-01-13 PROCEDURE — 76857 US EXAM PELVIC LIMITED: CPT

## 2023-01-17 ENCOUNTER — SPECIALTY PHARMACY (OUTPATIENT)
Dept: PHARMACY | Facility: HOSPITAL | Age: 83
End: 2023-01-17
Payer: MEDICARE

## 2023-02-02 ENCOUNTER — LAB (OUTPATIENT)
Dept: LAB | Facility: HOSPITAL | Age: 83
End: 2023-02-02
Payer: MEDICARE

## 2023-02-02 ENCOUNTER — CLINICAL SUPPORT (OUTPATIENT)
Dept: ONCOLOGY | Facility: HOSPITAL | Age: 83
End: 2023-02-02
Payer: MEDICARE

## 2023-02-02 DIAGNOSIS — D47.1 MYELOPROLIFERATIVE DISORDER: Primary | ICD-10-CM

## 2023-02-02 LAB
BASOPHILS # BLD AUTO: 0.2 10*3/MM3 (ref 0–0.2)
BASOPHILS NFR BLD AUTO: 1.1 % (ref 0–1.5)
DEPRECATED RDW RBC AUTO: 58.2 FL (ref 37–54)
EOSINOPHIL # BLD AUTO: 0.53 10*3/MM3 (ref 0–0.4)
EOSINOPHIL NFR BLD AUTO: 3 % (ref 0.3–6.2)
ERYTHROCYTE [DISTWIDTH] IN BLOOD BY AUTOMATED COUNT: 16.2 % (ref 12.3–15.4)
HCT VFR BLD AUTO: 31.6 % (ref 34–46.6)
HGB BLD-MCNC: 11 G/DL (ref 12–15.9)
IMM GRANULOCYTES # BLD AUTO: 1.43 10*3/MM3 (ref 0–0.05)
IMM GRANULOCYTES NFR BLD AUTO: 8.1 % (ref 0–0.5)
LYMPHOCYTES # BLD AUTO: 1.67 10*3/MM3 (ref 0.7–3.1)
LYMPHOCYTES NFR BLD AUTO: 9.4 % (ref 19.6–45.3)
MCH RBC QN AUTO: 34.6 PG (ref 26.6–33)
MCHC RBC AUTO-ENTMCNC: 34.8 G/DL (ref 31.5–35.7)
MCV RBC AUTO: 99.4 FL (ref 79–97)
MONOCYTES # BLD AUTO: 0.9 10*3/MM3 (ref 0.1–0.9)
MONOCYTES NFR BLD AUTO: 5.1 % (ref 5–12)
NEUTROPHILS NFR BLD AUTO: 13 10*3/MM3 (ref 1.7–7)
NEUTROPHILS NFR BLD AUTO: 73.3 % (ref 42.7–76)
NRBC BLD AUTO-RTO: 0.2 /100 WBC (ref 0–0.2)
PLATELET # BLD AUTO: 304 10*3/MM3 (ref 140–450)
PMV BLD AUTO: 11.5 FL (ref 6–12)
RBC # BLD AUTO: 3.18 10*6/MM3 (ref 3.77–5.28)
WBC NRBC COR # BLD: 17.73 10*3/MM3 (ref 3.4–10.8)

## 2023-02-02 PROCEDURE — 36415 COLL VENOUS BLD VENIPUNCTURE: CPT

## 2023-02-02 PROCEDURE — G0463 HOSPITAL OUTPT CLINIC VISIT: HCPCS

## 2023-02-02 PROCEDURE — 85025 COMPLETE CBC W/AUTO DIFF WBC: CPT

## 2023-02-02 NOTE — NURSING NOTE
Pt here for RN review along with her daughter  She continues Hydrea without complaints  Lab Results   Component Value Date    WBC 17.73 (H) 02/02/2023    HGB 11.0 (L) 02/02/2023    HCT 31.6 (L) 02/02/2023    MCV 99.4 (H) 02/02/2023     02/02/2023     Reviewed upcoming appt with Dr Martinez

## 2023-02-03 ENCOUNTER — SPECIALTY PHARMACY (OUTPATIENT)
Dept: PHARMACY | Facility: HOSPITAL | Age: 83
End: 2023-02-03
Payer: MEDICARE

## 2023-02-15 ENCOUNTER — OFFICE VISIT (OUTPATIENT)
Dept: FAMILY MEDICINE CLINIC | Facility: CLINIC | Age: 83
End: 2023-02-15
Payer: MEDICARE

## 2023-02-15 VITALS
DIASTOLIC BLOOD PRESSURE: 72 MMHG | OXYGEN SATURATION: 95 % | BODY MASS INDEX: 28.71 KG/M2 | HEART RATE: 65 BPM | SYSTOLIC BLOOD PRESSURE: 127 MMHG | TEMPERATURE: 97.5 F | WEIGHT: 157 LBS

## 2023-02-15 DIAGNOSIS — R10.2 SUPRAPUBIC PAIN: ICD-10-CM

## 2023-02-15 DIAGNOSIS — I10 PRIMARY HYPERTENSION: Primary | ICD-10-CM

## 2023-02-15 DIAGNOSIS — E78.2 ELEVATED CHOLESTEROL WITH ELEVATED TRIGLYCERIDES: ICD-10-CM

## 2023-02-15 DIAGNOSIS — E11.9 TYPE 2 DIABETES MELLITUS WITHOUT COMPLICATION, WITHOUT LONG-TERM CURRENT USE OF INSULIN: ICD-10-CM

## 2023-02-15 DIAGNOSIS — E78.2 MIXED HYPERLIPIDEMIA: ICD-10-CM

## 2023-02-15 DIAGNOSIS — M19.90 ARTHRITIS: ICD-10-CM

## 2023-02-15 DIAGNOSIS — E11.51 PERIPHERAL VASCULAR DISORDER DUE TO DIABETES MELLITUS: ICD-10-CM

## 2023-02-15 DIAGNOSIS — R82.998 URINE LEUKOCYTES: Primary | ICD-10-CM

## 2023-02-15 DIAGNOSIS — M81.8 OTHER OSTEOPOROSIS WITHOUT CURRENT PATHOLOGICAL FRACTURE: ICD-10-CM

## 2023-02-15 DIAGNOSIS — D47.1 MYELOPROLIFERATIVE DISORDER: ICD-10-CM

## 2023-02-15 PROBLEM — M79.603 ARM PAIN: Status: RESOLVED | Noted: 2021-04-23 | Resolved: 2023-02-15

## 2023-02-15 PROBLEM — R10.30 LOWER ABDOMINAL PAIN: Status: RESOLVED | Noted: 2022-11-18 | Resolved: 2023-02-15

## 2023-02-15 LAB
BILIRUB BLD-MCNC: NEGATIVE MG/DL
CLARITY, POC: CLEAR
COLOR UR: ABNORMAL
EXPIRATION DATE: ABNORMAL
GLUCOSE UR STRIP-MCNC: NEGATIVE MG/DL
KETONES UR QL: NEGATIVE
LEUKOCYTE EST, POC: ABNORMAL
Lab: ABNORMAL
NITRITE UR-MCNC: NEGATIVE MG/ML
PH UR: 5.5 [PH] (ref 5–8)
PROT UR STRIP-MCNC: NEGATIVE MG/DL
RBC # UR STRIP: NEGATIVE /UL
SP GR UR: 1.01 (ref 1–1.03)
UROBILINOGEN UR QL: NORMAL

## 2023-02-15 PROCEDURE — 99214 OFFICE O/P EST MOD 30 MIN: CPT | Performed by: STUDENT IN AN ORGANIZED HEALTH CARE EDUCATION/TRAINING PROGRAM

## 2023-02-16 ENCOUNTER — TELEPHONE (OUTPATIENT)
Dept: CARDIOLOGY | Facility: CLINIC | Age: 83
End: 2023-02-16
Payer: MEDICARE

## 2023-02-17 LAB
BACTERIA UR CULT: NORMAL
BACTERIA UR CULT: NORMAL

## 2023-02-20 NOTE — TELEPHONE ENCOUNTER
LM on vm to call back.  
Pt's daughter was transferred to my ext for Dr. Recinos & left msg asking for a call bk.      Thanks,  Shanice   
No bruits; no thyromegaly or nodules

## 2023-02-23 LAB
CHOLEST SERPL-MCNC: 142 MG/DL (ref 0–200)
HBA1C MFR BLD: 5 % (ref 4.8–5.6)
HDLC SERPL-MCNC: 30 MG/DL (ref 40–60)
LDLC SERPL CALC-MCNC: 84 MG/DL (ref 0–100)
MICROALBUMIN UR-MCNC: 11.3 UG/ML
TRIGL SERPL-MCNC: 161 MG/DL (ref 0–150)
VLDLC SERPL CALC-MCNC: 28 MG/DL (ref 5–40)

## 2023-03-27 DIAGNOSIS — Z79.899 ENCOUNTER FOR LONG-TERM CURRENT USE OF HIGH RISK MEDICATION: ICD-10-CM

## 2023-03-27 DIAGNOSIS — D50.9 IRON DEFICIENCY ANEMIA, UNSPECIFIED IRON DEFICIENCY ANEMIA TYPE: ICD-10-CM

## 2023-03-27 DIAGNOSIS — D47.1 MYELOPROLIFERATIVE DISORDER: Primary | ICD-10-CM

## 2023-03-28 ENCOUNTER — LAB (OUTPATIENT)
Dept: LAB | Facility: HOSPITAL | Age: 83
End: 2023-03-28
Payer: MEDICARE

## 2023-03-28 ENCOUNTER — SPECIALTY PHARMACY (OUTPATIENT)
Dept: ONCOLOGY | Facility: HOSPITAL | Age: 83
End: 2023-03-28
Payer: MEDICARE

## 2023-03-28 ENCOUNTER — OFFICE VISIT (OUTPATIENT)
Dept: ONCOLOGY | Facility: CLINIC | Age: 83
End: 2023-03-28
Payer: MEDICARE

## 2023-03-28 VITALS
TEMPERATURE: 97.1 F | BODY MASS INDEX: 28.45 KG/M2 | HEART RATE: 81 BPM | OXYGEN SATURATION: 96 % | DIASTOLIC BLOOD PRESSURE: 75 MMHG | SYSTOLIC BLOOD PRESSURE: 146 MMHG | WEIGHT: 154.6 LBS | HEIGHT: 62 IN | RESPIRATION RATE: 16 BRPM

## 2023-03-28 DIAGNOSIS — E83.52 HYPERCALCEMIA: ICD-10-CM

## 2023-03-28 DIAGNOSIS — D50.9 IRON DEFICIENCY ANEMIA, UNSPECIFIED IRON DEFICIENCY ANEMIA TYPE: ICD-10-CM

## 2023-03-28 DIAGNOSIS — Z79.899 ENCOUNTER FOR LONG-TERM CURRENT USE OF HIGH RISK MEDICATION: ICD-10-CM

## 2023-03-28 DIAGNOSIS — D47.1 MYELOPROLIFERATIVE DISORDER: Primary | ICD-10-CM

## 2023-03-28 DIAGNOSIS — D47.1 MYELOPROLIFERATIVE DISORDER: ICD-10-CM

## 2023-03-28 DIAGNOSIS — Z85.038 HISTORY OF COLON CANCER: ICD-10-CM

## 2023-03-28 DIAGNOSIS — N28.9 KIDNEY LESION: ICD-10-CM

## 2023-03-28 LAB
ALBUMIN SERPL-MCNC: 4.4 G/DL (ref 3.5–5.2)
ALBUMIN/GLOB SERPL: 1.8 G/DL (ref 1.1–2.4)
ALP SERPL-CCNC: 82 U/L (ref 38–116)
ALT SERPL W P-5'-P-CCNC: 15 U/L (ref 0–33)
ANION GAP SERPL CALCULATED.3IONS-SCNC: 10.2 MMOL/L (ref 5–15)
AST SERPL-CCNC: 23 U/L (ref 0–32)
BASOPHILS # BLD AUTO: 0.19 10*3/MM3 (ref 0–0.2)
BASOPHILS NFR BLD AUTO: 1.1 % (ref 0–1.5)
BILIRUB SERPL-MCNC: 0.7 MG/DL (ref 0.2–1.2)
BUN SERPL-MCNC: 20 MG/DL (ref 6–20)
BUN/CREAT SERPL: 18.3 (ref 7.3–30)
CALCIUM SPEC-SCNC: 11.1 MG/DL (ref 8.5–10.2)
CHLORIDE SERPL-SCNC: 106 MMOL/L (ref 98–107)
CO2 SERPL-SCNC: 23.8 MMOL/L (ref 22–29)
CREAT SERPL-MCNC: 1.09 MG/DL (ref 0.6–1.1)
DEPRECATED RDW RBC AUTO: 57 FL (ref 37–54)
EGFRCR SERPLBLD CKD-EPI 2021: 51.1 ML/MIN/1.73
EOSINOPHIL # BLD AUTO: 0.62 10*3/MM3 (ref 0–0.4)
EOSINOPHIL NFR BLD AUTO: 3.5 % (ref 0.3–6.2)
ERYTHROCYTE [DISTWIDTH] IN BLOOD BY AUTOMATED COUNT: 15.6 % (ref 12.3–15.4)
FERRITIN SERPL-MCNC: 41.6 NG/ML (ref 13–150)
GLOBULIN UR ELPH-MCNC: 2.5 GM/DL (ref 1.8–3.5)
GLUCOSE SERPL-MCNC: 108 MG/DL (ref 74–124)
HCT VFR BLD AUTO: 34.7 % (ref 34–46.6)
HGB BLD-MCNC: 11.7 G/DL (ref 12–15.9)
IMM GRANULOCYTES # BLD AUTO: 1.7 10*3/MM3 (ref 0–0.05)
IMM GRANULOCYTES NFR BLD AUTO: 9.6 % (ref 0–0.5)
IRON 24H UR-MRATE: 63 MCG/DL (ref 37–145)
IRON SATN MFR SERPL: 17 % (ref 14–48)
LYMPHOCYTES # BLD AUTO: 1.26 10*3/MM3 (ref 0.7–3.1)
LYMPHOCYTES NFR BLD AUTO: 7.1 % (ref 19.6–45.3)
MCH RBC QN AUTO: 33.9 PG (ref 26.6–33)
MCHC RBC AUTO-ENTMCNC: 33.7 G/DL (ref 31.5–35.7)
MCV RBC AUTO: 100.6 FL (ref 79–97)
MONOCYTES # BLD AUTO: 0.86 10*3/MM3 (ref 0.1–0.9)
MONOCYTES NFR BLD AUTO: 4.8 % (ref 5–12)
NEUTROPHILS NFR BLD AUTO: 13.16 10*3/MM3 (ref 1.7–7)
NEUTROPHILS NFR BLD AUTO: 73.9 % (ref 42.7–76)
NRBC BLD AUTO-RTO: 0.1 /100 WBC (ref 0–0.2)
PLATELET # BLD AUTO: 323 10*3/MM3 (ref 140–450)
PMV BLD AUTO: 10.3 FL (ref 6–12)
POTASSIUM SERPL-SCNC: 4.7 MMOL/L (ref 3.5–4.7)
PROT SERPL-MCNC: 6.9 G/DL (ref 6.3–8)
RBC # BLD AUTO: 3.45 10*6/MM3 (ref 3.77–5.28)
SODIUM SERPL-SCNC: 140 MMOL/L (ref 134–145)
TIBC SERPL-MCNC: 367 MCG/DL (ref 249–505)
TRANSFERRIN SERPL-MCNC: 262 MG/DL (ref 200–360)
WBC NRBC COR # BLD: 17.79 10*3/MM3 (ref 3.4–10.8)

## 2023-03-28 PROCEDURE — 84466 ASSAY OF TRANSFERRIN: CPT

## 2023-03-28 PROCEDURE — 3078F DIAST BP <80 MM HG: CPT | Performed by: INTERNAL MEDICINE

## 2023-03-28 PROCEDURE — 36415 COLL VENOUS BLD VENIPUNCTURE: CPT

## 2023-03-28 PROCEDURE — 1126F AMNT PAIN NOTED NONE PRSNT: CPT | Performed by: INTERNAL MEDICINE

## 2023-03-28 PROCEDURE — 99214 OFFICE O/P EST MOD 30 MIN: CPT | Performed by: INTERNAL MEDICINE

## 2023-03-28 PROCEDURE — 3077F SYST BP >= 140 MM HG: CPT | Performed by: INTERNAL MEDICINE

## 2023-03-28 PROCEDURE — 1160F RVW MEDS BY RX/DR IN RCRD: CPT | Performed by: INTERNAL MEDICINE

## 2023-03-28 PROCEDURE — 80053 COMPREHEN METABOLIC PANEL: CPT

## 2023-03-28 PROCEDURE — 83540 ASSAY OF IRON: CPT

## 2023-03-28 PROCEDURE — 82728 ASSAY OF FERRITIN: CPT

## 2023-03-28 PROCEDURE — 85025 COMPLETE CBC W/AUTO DIFF WBC: CPT

## 2023-03-28 PROCEDURE — 1159F MED LIST DOCD IN RCRD: CPT | Performed by: INTERNAL MEDICINE

## 2023-03-28 NOTE — PROGRESS NOTES
Subjective     CHIEF COMPLAINT:      Chief Complaint   Patient presents with   • Follow-up     Discuss labs     HISTORY OF PRESENT ILLNESS:     Ayla Castellano is a 83 y.o. female patient who returns today for follow up on her myeloproliferative disorder.  She returns today for follow-up accompanied by her daughter.  She reports feeling good.  She is not having problem with fatigue.  She is tolerating Hydrea.  No nausea or vomiting.      ROS:  Pertinent ROS is in the HPI.     Past medical, surgical, social and family history were reviewed.     MEDICATIONS:    Current Outpatient Medications:   •  Accu-Chek FastClix Lancets misc, To test blood sugar 3 times daily., Disp: 100 each, Rfl: 8  •  amLODIPine (NORVASC) 5 MG tablet, Take 1 tablet by mouth Daily., Disp: 90 tablet, Rfl: 3  •  aspirin 81 MG tablet, Take 1 tablet by mouth Daily., Disp: , Rfl:   •  atorvastatin (LIPITOR) 20 MG tablet, TAKE 1 TABLET BY MOUTH DAILY, Disp: 90 tablet, Rfl: 3  •  Cyanocobalamin (B-12) 2500 MCG sublingual tablet, Place 1 tablet under the tongue Daily., Disp: 30 tablet, Rfl: 11  •  diclofenac (VOLTAREN) 50 MG EC tablet, Take 1 tablet by mouth 2 (Two) Times a Day., Disp: 60 tablet, Rfl: 0  •  glucose blood (ONE TOUCH ULTRA TEST) test strip, USE TO TEST BLOOD SUGAR EVERY DAY. (E11.9), Disp: 100 each, Rfl: 2  •  hydroxyurea (HYDREA) 500 MG capsule, Take 1 capsule by mouth Take As Directed. On Mondays, Wednesdays Fridays and Sundays, Disp: 48 capsule, Rfl: 3  •  isosorbide mononitrate (IMDUR) 60 MG 24 hr tablet, Take 1 tablet by mouth Daily., Disp: 90 tablet, Rfl: 3  •  ketoconazole (NIZORAL) 2 % cream, Apply  topically to the appropriate area as directed Daily., Disp: 30 g, Rfl: 11  •  metFORMIN (GLUCOPHAGE) 500 MG tablet, Take 1 tablet by mouth 3 (Three) Times a Day., Disp: 90 tablet, Rfl: 11  •  mupirocin (Bactroban) 2 % ointment, Apply  topically to the appropriate area as directed 3 (Three) Times a Day., Disp: 30 g, Rfl: 11  •   "ofloxacin (OCUFLOX) 0.3 % ophthalmic solution, Administer 2 drops to both eyes 4 (Four) Times a Day., Disp: 10 mL, Rfl: 11  •  oxybutynin (DITROPAN) 5 MG tablet, Take 1 tablet by mouth 2 (Two) Times a Day., Disp: 60 tablet, Rfl: 5  •  valsartan-hydrochlorothiazide (DIOVAN-HCT) 320-25 MG per tablet, Take 1 tablet by mouth Daily., Disp: 90 tablet, Rfl: 3  Objective     VITAL SIGNS:     Vitals:    03/28/23 1004   BP: 146/75   Pulse: 81   Resp: 16   Temp: 97.1 °F (36.2 °C)   TempSrc: Temporal   SpO2: 96%   Weight: 70.1 kg (154 lb 9.6 oz)   Height: 157.5 cm (62.01\")   PainSc: 0-No pain     Body mass index is 28.27 kg/m².     Wt Readings from Last 5 Encounters:   03/28/23 70.1 kg (154 lb 9.6 oz)   02/15/23 71.2 kg (157 lb)   12/01/22 69.6 kg (153 lb 8 oz)   11/18/22 68 kg (150 lb)   11/03/22 67.1 kg (148 lb)     PHYSICAL EXAMINATION:  GENERAL: The patient appears in good general condition, not in acute distress.   SKIN: No ecchymosis.  EYES: No jaundice. Pallor.  LYMPHATICS: No cervical lymphadenopathy.  CHEST: Normal respiratory effort.  Lungs clear bilaterally.  No added sounds.  CVS: Normal S1-S2.  No murmurs.  ABDOMEN: Soft. No tenderness. No Hepatomegaly. No Splenomegaly. No masses.  EXTREMITIES: No noted deformity.     DIAGNOSTIC DATA:     Results from last 7 days   Lab Units 03/28/23  0946   WBC 10*3/mm3 17.79*   NEUTROS ABS 10*3/mm3 13.16*   HEMOGLOBIN g/dL 11.7*   HEMATOCRIT % 34.7   PLATELETS 10*3/mm3 323     Results from last 7 days   Lab Units 03/28/23  0946   SODIUM mmol/L 140   POTASSIUM mmol/L 4.7   CHLORIDE mmol/L 106   CO2 mmol/L 23.8   BUN mg/dL 20   CREATININE mg/dL 1.09   CALCIUM mg/dL 11.1*   ALBUMIN g/dL 4.4   BILIRUBIN mg/dL 0.7   ALK PHOS U/L 82   ALT (SGPT) U/L 15   AST (SGOT) U/L 23   GLUCOSE mg/dL 108         Lab 03/28/23  0946   IRON 63   IRON SATURATION 17   TIBC 367   TRANSFERRIN 262   FERRITIN 41.60        Assessment & Plan    *Myeloproliferative disorder, OBINNA-2 mutation positive.   · OBINNA-2 " mutation test was positive for the V617F on 2/20/2020.   · She was under the care of an outside Hematologist before transferring care to our practice. Records indicate that she was previously diagnosed with Essential thrombocythemia.   · She has elevated WBC and basophil count.   · Therefore, a diagnosis of myeloproliferative disorder or polycythemia vera was favored over ET.  · BCR ABL was negative by FISH on 2/2/2021.  · In February 2021, WBC count was gradually increasing indicating that her disease was not under a good control likely due to the dose of Hydroxyurea not being effective (500 mg every 4 days).   · The dose was increased to every 3 days on 2/2/2021.  · Due to the persistence of the leukocytosis, the dose of Hydrea was increased to 4 days a week.  · Hemoglobin decreased to 10.0 on 10/4/2021.  WBC count was 15,270.  · Due to the worsening anemia, we reduced the dose of Hydrea back to 3 days a week.  · Labs on 1/6/2022 revealed a hemoglobin of 10.2.  WBC was 16,600.  Platelets were 352,000.  · Spleen measured 14.7 cm on CT scan on 1/14/2022.  · Labs on 5/5/2022 revealed a hemoglobin of 9.9.  WBC decreased to 14,980.  Platelets were 357,000.  · WBC increased to 19,200 on 7/28/2022.  · Hydrea was increased to 500 mg 4 days a week.  · WBC decreased to 16,260 on 12/1/2022.  · Platelets are normal at 323,000.  · She is tolerating Hydrea.    *Iron deficiency anemia.   · This is attributed to iron deficiency with a transferrin saturation of 17% and a ferritin of 26.   · The patient did not tolerate oral in the past with development of upset stomach, abdominal pain, headaches and dizziness when she took the oral iron in the past.  · Patient received IV iron the 2020 and tolerated that well.   · Patient was given IV Injectafer on 2/5/2021 and 2/12/2021.  · Hemoglobin was 10.0 on 10/4/2021.  · Hemoglobin decreased to 9.9 on 5/5/2022.  Iron stores were adequate.  · Hemoglobin improved to 10.7 on  7/28/2022.  · Hemoglobin was 12.0 on 9/30/2022.  · Hemoglobin decreased to 10.8 on 12/1/2022-likely secondary to blood loss following a fall with development of hematoma.  · Hemoglobin improved to 11.7 today.  · However, iron stores decreased with ferritin at 41 and transferrin saturation is 17%.    *History of colon cancer, stage I.   · She is s/p partial colectomy by Dr. Ku.   · She did not require adjuvant chemotherapy or radiation therapy.    · CT scan on 1/14/2022 revealed no evidence of recurrence.   · CT scan on 7/22/2022 revealed no evidence of recurrence.  · She has occasional diarrhea.  No blood in the stool..    *Right renal lesion.    · CT on 1/14/2022 revealed an incidental finding of a 1.7 cm rounded structure in the right kidney.    · It was considered to be most likely representing a prominent renal lobule.    · It slightly increased in size compared to 2015.   · CT abdomen pelvis with renal protocol on 7/22/2022 showed no suspicious renal lesion.  · The previously reported lesion represented cortical tissue.    *Hypercalcemia.  · This is chronic.  · 7/28/2022: Calcium 11.2.   · This was previously evaluated by her PCP.  · PTH was normal at 62 on 8/12/2022.  · 12/1/2022: Calcium 11.3.  · We recommended reducing intake of calcium rich food.  · 3/28/2023: Calcium 11.1.    PLAN:    1.   Continue Hydrea 500 mg 4 days a week (Mondays Wednesdays Fridays and Sundays).    2.  We will hold off on IV iron at this point and repeat iron studies in 2 and 4 months.   3.  Reduce intake of calcium rich food.  4.  Repeat CBC in 2 months with RN review.  5.  Obtain CT scan of the chest abdomen pelvis in late July 2023.  6.  Follow-up in August 2023.  CBC CMP ferritin iron panel Ca1 be obtained.        Izzy Martinez MD  03/28/23

## 2023-03-28 NOTE — PROGRESS NOTES
Specialty Pharmacy Patient Management Program  Oncology 6-Month Clinical Assessment       Ayla Castellano is a 83 y.o. female with myeloproliferative disorder seen today to assess adherence and side effects.    Regimen: hydrea 500 mg on Mon, Wed, Fri, Sun    Reason for Outreach: Routine medication check-in .       Problem list reviewed by Alicia Wick RPH on 3/28/2023 at 10:18 AM  Medicines reviewed by Alicia Wick RPH on 3/28/2023 at 10:18 AM  Allergies reviewed by Alicia Wick RPH on 3/28/2023 at 10:18 AM     Goals     • Specialty Pharmacy General Goal     • Specialty Pharmacy General Goal      Blood counts within normal limits          Medication Assessment:  • Medication Assessment  o Follow Up Clinical Assessment  o Medication(s) assessed: hydrea  o Therapeutic appropriateness: Appropriate  o Medication tolerability: Tolerating with no to minimal ADRs  o Medication plan: Continue therapy with normal follow-up  o Quality of Life Improvement Scale: No change  o Administration: as prescribed .   o Patient can self administer medications: yes  o Medication Follow-Up Plan: Next clinical assessment  • Lab Review: The labs listed below have been reviewed. No dose adjustments are needed for the oral specialty medication(s) based on the labs.    Lab Results   Component Value Date    GLUCOSE 111 12/01/2022    CALCIUM 11.3 (C) 12/01/2022     12/01/2022    K 4.5 12/01/2022    CO2 27.5 12/01/2022     12/01/2022    BUN 18 12/01/2022    CREATININE 0.93 12/01/2022    EGFRIFAFRI 65 02/01/2022    EGFRIFNONA 57 (L) 02/01/2022    BCR 19.4 12/01/2022    ANIONGAP 9.5 12/01/2022     Lab Results   Component Value Date    WBC 17.79 (H) 03/28/2023    RBC 3.45 (L) 03/28/2023    HGB 11.7 (L) 03/28/2023    HCT 34.7 03/28/2023    .6 (H) 03/28/2023    MCH 33.9 (H) 03/28/2023    MCHC 33.7 03/28/2023    RDW 15.6 (H) 03/28/2023    RDWSD 57.0 (H) 03/28/2023    MPV 10.3 03/28/2023     03/28/2023     NEUTRORELPCT 73.9 03/28/2023    LYMPHORELPCT 7.1 (L) 03/28/2023    MONORELPCT 4.8 (L) 03/28/2023    EOSRELPCT 3.5 03/28/2023    BASORELPCT 1.1 03/28/2023    AUTOIGPER 9.6 (H) 03/28/2023    NEUTROABS 13.16 (H) 03/28/2023    LYMPHSABS 1.26 03/28/2023    MONOSABS 0.86 03/28/2023    EOSABS 0.62 (H) 03/28/2023    BASOSABS 0.19 03/28/2023    AUTOIGNUM 1.70 (H) 03/28/2023    NRBC 0.1 03/28/2023     • Drug-drug interactions  o Completed medication reconciliation today to assess for drug interactions. Patient denies starting or stopping any medications.    o Assessed medication list for interactions, no significant drug interactions noted.   o Advised patient to call the clinic if any new medications are started so we can assess for drug-drug interactions.  • Drug-food interactions discussed: none  • Vaccines are coordinated by the patient's oncologist and primary care provider.    Allergies  Known allergies and reactions were discussed with the patient. The patient's chart has been reviewed for allergy information and updated as necessary.   Allergies   Allergen Reactions   • Latex Unknown - Low Severity     blisters        Hospitalizations and Urgent Care Visits Since Last Assessment:  • Unplanned hospitalizations or inpatient admissions: no  • ED Visits: no  • Urgent Office Visits: no    Adherence Assessment:  Adherence Questions  Medication(s) assessed: hydrea  On average, how many doses/injections does the patient miss per month?: 5  What are the identified reasons for non-adherence or missed doses? : no problems identfied  What is the estimated medication adherence level?: %  Based on the patient/caregiver response and refill history, does this patient require an MTP to track adherence improvements?: no    Quality of Life Assessment:  Quality of Life Assessment  Quality of Life Improvement Scale: No change  -- Quality of Life: 7/10    Financial Assessment:  Medication availability/affordability: Patient has had  no issues obtaining medication from pharmacy.      All questions addressed and patient had no additional concerns. Patient has pharmacy contact information.    Name/Credentials: Alicia Wick PharmD, BCPS    3/28/2023  10:21 EDT

## 2023-03-30 ENCOUNTER — SPECIALTY PHARMACY (OUTPATIENT)
Dept: PHARMACY | Facility: HOSPITAL | Age: 83
End: 2023-03-30
Payer: MEDICARE

## 2023-05-05 ENCOUNTER — CLINICAL SUPPORT NO REQUIREMENTS (OUTPATIENT)
Dept: CARDIOLOGY | Facility: CLINIC | Age: 83
End: 2023-05-05
Payer: MEDICARE

## 2023-05-05 ENCOUNTER — OFFICE VISIT (OUTPATIENT)
Dept: CARDIOLOGY | Facility: CLINIC | Age: 83
End: 2023-05-05
Payer: MEDICAID

## 2023-05-05 VITALS
HEIGHT: 62 IN | HEART RATE: 83 BPM | DIASTOLIC BLOOD PRESSURE: 70 MMHG | WEIGHT: 151.8 LBS | SYSTOLIC BLOOD PRESSURE: 138 MMHG | BODY MASS INDEX: 27.94 KG/M2

## 2023-05-05 DIAGNOSIS — R06.02 EXERTIONAL SHORTNESS OF BREATH: ICD-10-CM

## 2023-05-05 DIAGNOSIS — I49.5 SSS (SICK SINUS SYNDROME): ICD-10-CM

## 2023-05-05 DIAGNOSIS — I10 PRIMARY HYPERTENSION: ICD-10-CM

## 2023-05-05 DIAGNOSIS — I49.5 SSS (SICK SINUS SYNDROME): Primary | ICD-10-CM

## 2023-05-05 DIAGNOSIS — E78.2 MIXED HYPERLIPIDEMIA: Primary | ICD-10-CM

## 2023-05-05 DIAGNOSIS — I45.5 SINUS PAUSE: ICD-10-CM

## 2023-05-05 NOTE — PROGRESS NOTES
PATIENTINFORMATION    Date of Office Visit: 2023  Encounter Provider: Curry Recinos MD  Place of Service: [facility] CARDIOLOGY  Patient Name: Ayla Castellano  : 1940    Subjective:     Encounter Date:2023      Patient ID: Ayla Castellano is a 83 y.o. female.    No chief complaint on file.    HPI  Ms. Castellano is a very pleasant 83 years old lady who came to cardiology clinic for follow-up visit.  She used to follow-up with Myla.  Daughter was helping with translating Indonesian.  She is doing fairly well but reports intermittent episodes of left arm pain particularly during exertion and doing chores and kitchen relieved with rest.  She denies any significant breathing problems today.  No orthopnea, PND, palpitations, presyncope syncope or extremity swelling.  Other than doing house chores she does not exercise or walk regularly.  No ER visit or hospitalizations since last clinic visit.  Blood pressure fairly controlled during home monitoring.      ROS  All systems reviewed and negative except as noted in HPI.    Past Medical History:   Diagnosis Date   • Arrhythmia    • Colitis    • Colon cancer     s/p partial colectomy in    • Diabetes mellitus    • Gallbladder disease    • Hard to intubate    • Hyperlipidemia    • Hypertension    • Left arm pain    • SSS (sick sinus syndrome)     [employer] dual-chamber pacemaker on 2021   • Syncope     Secondary to medications (clonidine and metoprolol) in 3/15.  Had severe bradycardia (sinus arrest with junctional escape beats).  Resolved after medications discontinued.       Past Surgical History:   Procedure Laterality Date   • CARDIAC ELECTROPHYSIOLOGY PROCEDURE N/A 2021    Procedure: Pacemaker DC new;  Surgeon: Richard Alaniz MD;  Location: [facility] INVASIVE LOCATION;  Service: Cardiology;  Laterality: N/A;   • CHOLECYSTECTOMY  10/2019   • COLECTOMY PARTIAL / TOTAL       for  "colon cancer   • COLONOSCOPY  01/20/2020   • UTERINE FIBROID SURGERY      s/p uterine fibroma resection       Social History     Socioeconomic History   • Marital status:    Tobacco Use   • Smoking status: Never   • Smokeless tobacco: Never   Vaping Use   • Vaping Use: Never used   Substance and Sexual Activity   • Alcohol use: No   • Drug use: No   • Sexual activity: Defer       Family History   Problem Relation Age of Onset   • Heart disease Mother         pacemaker placement   • Stroke Mother    • Colon cancer Brother    • Stroke Father    • Diabetes Other    • Kidney disease Other    • Hyperlipidemia Other    • Arthritis Other            ECG 12 Lead    Date/Time: 5/5/2023 12:06 PM  Performed by: Curry Recinos MD  Authorized by: Curry Recinos MD   Comparison: compared with previous ECG from 3/21/2022  Similar to previous ECG  Rhythm: sinus rhythm  Rate: normal  Conduction: conduction normal  ST Segments: ST segments normal  T Waves: T waves normal  QRS axis: normal  Other: no other findings    Clinical impression: normal ECG               Objective:     /70   Pulse 83   Ht 157.5 cm (62.01\")   Wt 68.9 kg (151 lb 12.8 oz)   BMI 27.76 kg/m²  Body mass index is 27.76 kg/m².     Constitutional:       General: Not in acute distress.     Appearance: Well-developed. Not diaphoretic.   Eyes:      Pupils: Pupils are equal, round, and reactive to light.   HENT:      Head: Normocephalic and atraumatic.   Neck:      Thyroid: No thyromegaly.   Pulmonary:      Effort: Pulmonary effort is normal. No respiratory distress.      Breath sounds: Normal breath sounds. No wheezing. No rales.   Chest:      Chest wall: Not tender to palpatation.   Cardiovascular:      Normal rate. Regular rhythm.      No gallop.   Pulses:     Intact distal pulses.   Edema:     Peripheral edema absent.   Abdominal:      General: Bowel sounds are normal. There is no distension.      Palpations: Abdomen is soft.      " Tenderness: There is no guarding.   Musculoskeletal: Normal range of motion.         General: No deformity.      Cervical back: Normal range of motion and neck supple. Skin:     General: Skin is warm and dry.      Findings: No rash.   Neurological:      Mental Status: Alert and oriented to person, place, and time.      Cranial Nerves: No cranial nerve deficit.      Deep Tendon Reflexes: Reflexes are normal and symmetric.   Psychiatric:         Judgment: Judgment normal.         Review Of Data: I have reviewed pertinent recent labs, images and documents and pertinent findings included in HPI or assessment below.    Lipid Panel        7/21/2022    08:54 2/22/2023    08:56   Lipid Panel   Total Cholesterol 143   142     Triglycerides 205   161     HDL Cholesterol 30   30     VLDL Cholesterol 35   28     LDL Cholesterol  78   84     LDL/HDL Ratio 2.6            Assessment/Plan:       1. Essential hypertension that is fairly controlled on current regimen.  2. 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  Most recent device interrogation showing 34% a pacing.  In normal sinus rhythm during exam  3. Exertional chest pain-get myocardial perfusion study  4. Hyperlipidemia- on statin.  Mild hypertriglyceridemia otherwise fairly controlled.  5. Hypercalcemia-followed by PCP    Further recommendations to follow myocardial perfusion study    Diagnosis and plan of care discussed with patient and verbalized understanding.            Your medication list          Accurate as of May 5, 2023  4:24 PM. If you have any questions, ask your nurse or doctor.            CONTINUE taking these medications      Instructions Last Dose Given Next Dose Due   Accu-Chek FastClix Lancets misc      To test blood sugar 3 times daily.       amLODIPine 5 MG tablet  Commonly known as: NORVASC      Take 1 tablet by mouth Daily.       aspirin 81 MG tablet      Take 1 tablet by mouth Daily.       atorvastatin 20 MG  tablet  Commonly known as: LIPITOR      TAKE 1 TABLET BY MOUTH DAILY       B-12 2500 MCG sublingual tablet      Place 1 tablet under the tongue Daily.       glucose blood test strip  Commonly known as: ONE TOUCH ULTRA TEST      USE TO TEST BLOOD SUGAR EVERY DAY. (E11.9)       hydroxyurea 500 MG capsule  Commonly known as: HYDREA      Take 1 capsule by mouth Take As Directed. On Mondays, Wednesdays Fridays and Sundays       ketoconazole 2 % cream  Commonly known as: NIZORAL      Apply  topically to the appropriate area as directed Daily.       metFORMIN 500 MG tablet  Commonly known as: GLUCOPHAGE      Take 1 tablet by mouth 3 (Three) Times a Day.       mupirocin 2 % ointment  Commonly known as: Bactroban      Apply  topically to the appropriate area as directed 3 (Three) Times a Day.       ofloxacin 0.3 % ophthalmic solution  Commonly known as: OCUFLOX      Administer 2 drops to both eyes 4 (Four) Times a Day.       oxybutynin 5 MG tablet  Commonly known as: DITROPAN      Take 1 tablet by mouth 2 (Two) Times a Day.       valsartan-hydrochlorothiazide 320-25 MG per tablet  Commonly known as: DIOVAN-HCT      Take 1 tablet by mouth Daily.          STOP taking these medications    diclofenac 50 MG EC tablet  Commonly known as: VOLTAREN  Stopped by: Curry Recinos MD        isosorbide mononitrate 60 MG 24 hr tablet  Commonly known as: IMDUR  Stopped by: MD Curry Basurto MD  05/05/23  16:24 EDT

## 2023-05-16 ENCOUNTER — OFFICE VISIT (OUTPATIENT)
Dept: FAMILY MEDICINE CLINIC | Facility: CLINIC | Age: 83
End: 2023-05-16
Payer: MEDICARE

## 2023-05-16 VITALS
BODY MASS INDEX: 27.43 KG/M2 | SYSTOLIC BLOOD PRESSURE: 142 MMHG | WEIGHT: 150 LBS | TEMPERATURE: 97.8 F | OXYGEN SATURATION: 95 % | HEART RATE: 79 BPM | DIASTOLIC BLOOD PRESSURE: 63 MMHG

## 2023-05-16 DIAGNOSIS — I10 PRIMARY HYPERTENSION: ICD-10-CM

## 2023-05-16 DIAGNOSIS — M81.8 OTHER OSTEOPOROSIS WITHOUT CURRENT PATHOLOGICAL FRACTURE: ICD-10-CM

## 2023-05-16 DIAGNOSIS — E11.9 TYPE 2 DIABETES MELLITUS WITHOUT COMPLICATION, WITHOUT LONG-TERM CURRENT USE OF INSULIN: Primary | ICD-10-CM

## 2023-05-16 NOTE — PROGRESS NOTES
"Chief Complaint  Diabetes    Subjective        Ayla Castellano presents to [facility] PRIMARY [facility]  History of Present Illness   Having some issues with elevated HR and chest pain. Has stress test scheduled for tomorrow. Has chemical stress test. BP today 142/63. Has not taken medication today. Sees heme/onc on Friday. Has labs ordered. Has osteoporosis and was getting prolia. Sees glucose 150-160 in the am fasting. Takes metformin 2-3 x through the day.     Objective   Vital Signs:  /63 (BP Location: Right arm, Patient Position: Sitting, Cuff Size: Large Adult)   Pulse 79   Temp 97.8 °F (36.6 °C)   Wt 68 kg (150 lb)   SpO2 95%   BMI 27.43 kg/m²   Estimated body mass index is 27.43 kg/m² as calculated from the following:    Height as of 5/5/23: 157.5 cm (62.01\").    Weight as of this encounter: 68 kg (150 lb).             Physical Exam  Vitals and nursing note reviewed.   Constitutional:       General: She is not in acute distress.     Appearance: Normal appearance. She is not ill-appearing.   HENT:      Head: Normocephalic and atraumatic.      Nose: Nose normal.      Mouth/Throat:      Mouth: Mucous membranes are moist.   Eyes:      Extraocular Movements: Extraocular movements intact.      Conjunctiva/sclera: Conjunctivae normal.   Cardiovascular:      Rate and Rhythm: Normal rate and regular rhythm.      Heart sounds: Normal heart sounds. No murmur heard.    No gallop.   Pulmonary:      Effort: Pulmonary effort is normal. No respiratory distress.      Breath sounds: Normal breath sounds. No stridor. No wheezing, rhonchi or rales.   Chest:      Chest wall: No tenderness.   Skin:     General: Skin is warm and dry.   Neurological:      General: No focal deficit present.      Mental Status: She is alert and oriented to person, place, and time. Mental status is at baseline.   Psychiatric:         Mood and Affect: Mood normal.         Behavior: Behavior normal.        Result Review " :                   Assessment and Plan   Diagnoses and all orders for this visit:    1. Type 2 diabetes mellitus without complication, without long-term current use of insulin (Primary)  -     metFORMIN (GLUCOPHAGE) 500 MG tablet; Take 1 tablet by mouth 2 (Two) Times a Day With Meals.  Dispense: 180 tablet; Refill: 2    2. Other osteoporosis without current pathological fracture  -     DEXA Bone Density Axial  -     Ambulatory Referral to [facility] For Infusion Treatment    3. Primary hypertension  Comments:  BP elevated today but did not take medications. No med changes at this time.     Other orders  -     denosumab (PROLIA) syringe 60 mg      Did not have BP meds today, will take when she gets home - no changes.  metformin BID instead of sometimes 2-3 x per day.  Restart prolia, new DEXA ordered.   Pt to schedule eye exam.        Follow Up   Return in about 4 months (around 9/16/2023).  Patient was given instructions and counseling regarding her condition or for health maintenance advice. Please see specific information pulled into the AVS if appropriate.

## 2023-05-17 ENCOUNTER — HOSPITAL ENCOUNTER (OUTPATIENT)
Dept: CARDIOLOGY | Facility: HOSPITAL | Age: 83
Discharge: HOME | End: 2023-05-17
Admitting: INTERNAL MEDICINE
Payer: MEDICARE

## 2023-05-17 VITALS — WEIGHT: 152.12 LBS | BODY MASS INDEX: 27.99 KG/M2 | HEIGHT: 62 IN

## 2023-05-17 DIAGNOSIS — R06.02 EXERTIONAL SHORTNESS OF BREATH: ICD-10-CM

## 2023-05-17 LAB
BH CV NUCLEAR PRIOR STUDY: 2
BH CV REST NUCLEAR ISOTOPE DOSE: 10.7 MCI
BH CV STRESS BP STAGE 1: NORMAL
BH CV STRESS COMMENTS STAGE 1: NORMAL
BH CV STRESS DOSE REGADENOSON STAGE 1: 0.4
BH CV STRESS DURATION MIN STAGE 1: 0
BH CV STRESS DURATION SEC STAGE 1: 10
BH CV STRESS HR STAGE 1: 81
BH CV STRESS NUCLEAR ISOTOPE DOSE: 34.6 MCI
BH CV STRESS PROTOCOL 1: NORMAL
BH CV STRESS RECOVERY BP: NORMAL MMHG
BH CV STRESS RECOVERY HR: 68 BPM
BH CV STRESS STAGE 1: 1
LV EF NUC BP: 67 %
MAXIMAL PREDICTED HEART RATE: 139 BPM
PERCENT MAX PREDICTED HR: 58.27 %
STRESS BASELINE BP: NORMAL MMHG
STRESS BASELINE HR: 61 BPM
STRESS PERCENT HR: 69 %
STRESS POST EXERCISE DUR SEC: 10 SEC
STRESS POST PEAK BP: NORMAL MMHG
STRESS POST PEAK HR: 81 BPM
STRESS TARGET HR: 118 BPM

## 2023-05-17 PROCEDURE — 93017 CV STRESS TEST TRACING ONLY: CPT

## 2023-05-17 PROCEDURE — 0 TECHNETIUM TETROFOSMIN KIT: Performed by: INTERNAL MEDICINE

## 2023-05-17 PROCEDURE — 78452 HT MUSCLE IMAGE SPECT MULT: CPT

## 2023-05-17 PROCEDURE — 25010000002 REGADENOSON 0.4 MG/5ML SOLUTION: Performed by: INTERNAL MEDICINE

## 2023-05-17 PROCEDURE — A9502 TC99M TETROFOSMIN: HCPCS | Performed by: INTERNAL MEDICINE

## 2023-05-17 RX ORDER — REGADENOSON 0.08 MG/ML
0.4 INJECTION, SOLUTION INTRAVENOUS
Status: COMPLETED | OUTPATIENT
Start: 2023-05-17 | End: 2023-05-17

## 2023-05-17 RX ADMIN — TETROFOSMIN 1 DOSE: 1.38 INJECTION, POWDER, LYOPHILIZED, FOR SOLUTION INTRAVENOUS at 12:20

## 2023-05-17 RX ADMIN — TETROFOSMIN 1 DOSE: 1.38 INJECTION, POWDER, LYOPHILIZED, FOR SOLUTION INTRAVENOUS at 11:42

## 2023-05-17 RX ADMIN — REGADENOSON 0.4 MG: 0.08 INJECTION, SOLUTION INTRAVENOUS at 12:20

## 2023-05-18 NOTE — PROGRESS NOTES
Please notify Mrs Aguila that stress test does not show a evidence for significant blockages of heart arteries.  Let me know if she has any questions.  Thank you

## 2023-05-19 ENCOUNTER — TELEPHONE (OUTPATIENT)
Dept: ONCOLOGY | Facility: CLINIC | Age: 83
End: 2023-05-19
Payer: MEDICARE

## 2023-05-19 ENCOUNTER — CLINICAL SUPPORT (OUTPATIENT)
Dept: ONCOLOGY | Facility: HOSPITAL | Age: 83
End: 2023-05-19
Payer: MEDICARE

## 2023-05-19 ENCOUNTER — LAB (OUTPATIENT)
Dept: LAB | Facility: HOSPITAL | Age: 83
End: 2023-05-19
Payer: MEDICARE

## 2023-05-19 DIAGNOSIS — E83.52 HYPERCALCEMIA: ICD-10-CM

## 2023-05-19 DIAGNOSIS — N28.9 KIDNEY LESION: ICD-10-CM

## 2023-05-19 DIAGNOSIS — Z79.899 ENCOUNTER FOR LONG-TERM CURRENT USE OF HIGH RISK MEDICATION: ICD-10-CM

## 2023-05-19 DIAGNOSIS — Z85.038 HISTORY OF COLON CANCER: ICD-10-CM

## 2023-05-19 DIAGNOSIS — D47.1 MYELOPROLIFERATIVE DISORDER: ICD-10-CM

## 2023-05-19 DIAGNOSIS — D50.9 IRON DEFICIENCY ANEMIA, UNSPECIFIED IRON DEFICIENCY ANEMIA TYPE: ICD-10-CM

## 2023-05-19 LAB
BASOPHILS # BLD AUTO: 0.24 10*3/MM3 (ref 0–0.2)
BASOPHILS NFR BLD AUTO: 1.7 % (ref 0–1.5)
DEPRECATED RDW RBC AUTO: 61 FL (ref 37–54)
EOSINOPHIL # BLD AUTO: 0.48 10*3/MM3 (ref 0–0.4)
EOSINOPHIL NFR BLD AUTO: 3.5 % (ref 0.3–6.2)
ERYTHROCYTE [DISTWIDTH] IN BLOOD BY AUTOMATED COUNT: 16.8 % (ref 12.3–15.4)
FERRITIN SERPL-MCNC: 41.6 NG/ML (ref 13–150)
HCT VFR BLD AUTO: 34.6 % (ref 34–46.6)
HGB BLD-MCNC: 11.3 G/DL (ref 12–15.9)
IMM GRANULOCYTES # BLD AUTO: 1.34 10*3/MM3 (ref 0–0.05)
IMM GRANULOCYTES NFR BLD AUTO: 9.7 % (ref 0–0.5)
IRON 24H UR-MRATE: 60 MCG/DL (ref 37–145)
IRON SATN MFR SERPL: 18 % (ref 14–48)
LYMPHOCYTES # BLD AUTO: 1.28 10*3/MM3 (ref 0.7–3.1)
LYMPHOCYTES NFR BLD AUTO: 9.3 % (ref 19.6–45.3)
MCH RBC QN AUTO: 32.8 PG (ref 26.6–33)
MCHC RBC AUTO-ENTMCNC: 32.7 G/DL (ref 31.5–35.7)
MCV RBC AUTO: 100.6 FL (ref 79–97)
MONOCYTES # BLD AUTO: 0.79 10*3/MM3 (ref 0.1–0.9)
MONOCYTES NFR BLD AUTO: 5.7 % (ref 5–12)
NEUTROPHILS NFR BLD AUTO: 70.1 % (ref 42.7–76)
NEUTROPHILS NFR BLD AUTO: 9.7 10*3/MM3 (ref 1.7–7)
NRBC BLD AUTO-RTO: 0.1 /100 WBC (ref 0–0.2)
PLATELET # BLD AUTO: 326 10*3/MM3 (ref 140–450)
PMV BLD AUTO: 11.1 FL (ref 6–12)
RBC # BLD AUTO: 3.44 10*6/MM3 (ref 3.77–5.28)
TIBC SERPL-MCNC: 340 MCG/DL (ref 249–505)
TRANSFERRIN SERPL-MCNC: 243 MG/DL (ref 200–360)
WBC NRBC COR # BLD: 13.83 10*3/MM3 (ref 3.4–10.8)

## 2023-05-19 PROCEDURE — 84466 ASSAY OF TRANSFERRIN: CPT

## 2023-05-19 PROCEDURE — 82728 ASSAY OF FERRITIN: CPT

## 2023-05-19 PROCEDURE — 85025 COMPLETE CBC W/AUTO DIFF WBC: CPT

## 2023-05-19 PROCEDURE — 36415 COLL VENOUS BLD VENIPUNCTURE: CPT

## 2023-05-19 PROCEDURE — 83540 ASSAY OF IRON: CPT

## 2023-05-19 PROCEDURE — G0463 HOSPITAL OUTPT CLINIC VISIT: HCPCS

## 2023-05-19 NOTE — TELEPHONE ENCOUNTER
----- Message from Izzy Martinez MD sent at 5/19/2023  1:49 PM EDT -----  Based on the patient's labs showing iron deficiency anemia, I recommend IV Venofer weekly x 5 doses.    Thank you

## 2023-05-19 NOTE — NURSING NOTE
Patient is here for lab review with RN.  CBC reviewed, counts are stable for this patient at this time. Patient has no complaints. Copy of labs given to patient and follow up appointment reviewed. Patient is instructed to call the office with any concerns or new symptoms prior to next visit. Patient verbalized understanding and discharged in stable condition.  Her Iron studies are pending and will need to be evaluated for Iron replacement once results are back.  Lab Results   Component Value Date    WBC 13.83 (H) 05/19/2023    HGB 11.3 (L) 05/19/2023    HCT 34.6 05/19/2023    .6 (H) 05/19/2023     05/19/2023     Addendum 1104 Iron studies reviewed and message sent to Dr. Martinez and his clinic RN regarding the results.  Patient is aware that the Iron results will need to be reviewed by Provider and a determination made regarding Iron replacement.  She v/u. Daughter as .

## 2023-05-22 ENCOUNTER — TELEPHONE (OUTPATIENT)
Dept: ONCOLOGY | Facility: CLINIC | Age: 83
End: 2023-05-22
Payer: MEDICARE

## 2023-05-26 ENCOUNTER — TELEPHONE (OUTPATIENT)
Dept: ONCOLOGY | Facility: CLINIC | Age: 83
End: 2023-05-26
Payer: MEDICARE

## 2023-05-26 NOTE — TELEPHONE ENCOUNTER
Caller: Daily Castellano    Relationship: Emergency Contact    Best call back number: [phone]    What was the call regarding: DAUGHTER CALLED TO SCHEDULE THE REST OF PATIENTS INFUSIONS    Do you require a callback: YES

## 2023-05-26 NOTE — TELEPHONE ENCOUNTER
Spoke with pts daughter and scheduled last 3 infusions. Daily confirmed dates and times for appt.

## 2023-06-06 ENCOUNTER — INFUSION (OUTPATIENT)
Dept: ONCOLOGY | Facility: HOSPITAL | Age: 83
End: 2023-06-06
Payer: MEDICARE

## 2023-06-06 VITALS
WEIGHT: 151.4 LBS | HEART RATE: 76 BPM | RESPIRATION RATE: 16 BRPM | OXYGEN SATURATION: 95 % | TEMPERATURE: 98.7 F | SYSTOLIC BLOOD PRESSURE: 158 MMHG | DIASTOLIC BLOOD PRESSURE: 65 MMHG | BODY MASS INDEX: 27.86 KG/M2

## 2023-06-06 DIAGNOSIS — D50.9 IRON DEFICIENCY ANEMIA, UNSPECIFIED IRON DEFICIENCY ANEMIA TYPE: Primary | ICD-10-CM

## 2023-06-06 DIAGNOSIS — T45.4X5A ADVERSE EFFECT OF PREPARATION OF IRON COMPOUND, INITIAL ENCOUNTER: ICD-10-CM

## 2023-06-06 PROCEDURE — 63710000001 DIPHENHYDRAMINE PER 50 MG: Performed by: INTERNAL MEDICINE

## 2023-06-06 PROCEDURE — 96365 THER/PROPH/DIAG IV INF INIT: CPT

## 2023-06-06 PROCEDURE — 96375 TX/PRO/DX INJ NEW DRUG ADDON: CPT

## 2023-06-06 PROCEDURE — 25010000002 IRON SUCROSE PER 1 MG: Performed by: INTERNAL MEDICINE

## 2023-06-06 RX ORDER — DIPHENHYDRAMINE HCL 25 MG
25 CAPSULE ORAL ONCE
Status: COMPLETED | OUTPATIENT
Start: 2023-06-06 | End: 2023-06-06

## 2023-06-06 RX ORDER — FAMOTIDINE 10 MG/ML
20 INJECTION, SOLUTION INTRAVENOUS ONCE
Status: COMPLETED | OUTPATIENT
Start: 2023-06-06 | End: 2023-06-06

## 2023-06-06 RX ORDER — SODIUM CHLORIDE 9 MG/ML
250 INJECTION, SOLUTION INTRAVENOUS ONCE
Status: COMPLETED | OUTPATIENT
Start: 2023-06-06 | End: 2023-06-06

## 2023-06-06 RX ADMIN — SODIUM CHLORIDE 250 ML: 9 INJECTION, SOLUTION INTRAVENOUS at 13:55

## 2023-06-06 RX ADMIN — FAMOTIDINE 20 MG: 10 INJECTION INTRAVENOUS at 13:55

## 2023-06-06 RX ADMIN — DIPHENHYDRAMINE HYDROCHLORIDE 25 MG: 25 CAPSULE ORAL at 13:51

## 2023-06-06 RX ADMIN — SODIUM CHLORIDE 300 MG: 900 INJECTION, SOLUTION INTRAVENOUS at 14:22

## 2023-06-09 ENCOUNTER — INFUSION (OUTPATIENT)
Dept: ONCOLOGY | Facility: HOSPITAL | Age: 83
End: 2023-06-09
Payer: MEDICARE

## 2023-06-09 VITALS
DIASTOLIC BLOOD PRESSURE: 73 MMHG | SYSTOLIC BLOOD PRESSURE: 138 MMHG | HEART RATE: 73 BPM | TEMPERATURE: 97.8 F | OXYGEN SATURATION: 95 % | RESPIRATION RATE: 16 BRPM | BODY MASS INDEX: 27.97 KG/M2 | WEIGHT: 152 LBS

## 2023-06-09 DIAGNOSIS — D50.9 IRON DEFICIENCY ANEMIA, UNSPECIFIED IRON DEFICIENCY ANEMIA TYPE: Primary | ICD-10-CM

## 2023-06-09 DIAGNOSIS — T45.4X5A ADVERSE EFFECT OF PREPARATION OF IRON COMPOUND, INITIAL ENCOUNTER: ICD-10-CM

## 2023-06-09 PROCEDURE — 96375 TX/PRO/DX INJ NEW DRUG ADDON: CPT

## 2023-06-09 PROCEDURE — 25010000002 IRON SUCROSE PER 1 MG: Performed by: INTERNAL MEDICINE

## 2023-06-09 PROCEDURE — 63710000001 DIPHENHYDRAMINE PER 50 MG: Performed by: INTERNAL MEDICINE

## 2023-06-09 PROCEDURE — 96366 THER/PROPH/DIAG IV INF ADDON: CPT

## 2023-06-09 PROCEDURE — 96365 THER/PROPH/DIAG IV INF INIT: CPT

## 2023-06-09 RX ORDER — FAMOTIDINE 10 MG/ML
20 INJECTION, SOLUTION INTRAVENOUS ONCE
Status: COMPLETED | OUTPATIENT
Start: 2023-06-09 | End: 2023-06-09

## 2023-06-09 RX ORDER — SODIUM CHLORIDE 9 MG/ML
250 INJECTION, SOLUTION INTRAVENOUS ONCE
Status: COMPLETED | OUTPATIENT
Start: 2023-06-09 | End: 2023-06-09

## 2023-06-09 RX ORDER — DIPHENHYDRAMINE HCL 25 MG
25 CAPSULE ORAL ONCE
Status: COMPLETED | OUTPATIENT
Start: 2023-06-09 | End: 2023-06-09

## 2023-06-09 RX ADMIN — SODIUM CHLORIDE 250 ML: 9 INJECTION, SOLUTION INTRAVENOUS at 13:19

## 2023-06-09 RX ADMIN — FAMOTIDINE 20 MG: 10 INJECTION INTRAVENOUS at 13:09

## 2023-06-09 RX ADMIN — DIPHENHYDRAMINE HYDROCHLORIDE 25 MG: 25 CAPSULE ORAL at 13:04

## 2023-06-09 RX ADMIN — SODIUM CHLORIDE 300 MG: 900 INJECTION, SOLUTION INTRAVENOUS at 13:19

## 2023-06-14 ENCOUNTER — INFUSION (OUTPATIENT)
Dept: ONCOLOGY | Facility: HOSPITAL | Age: 83
End: 2023-06-14
Payer: MEDICARE

## 2023-06-14 VITALS
BODY MASS INDEX: 27.46 KG/M2 | DIASTOLIC BLOOD PRESSURE: 70 MMHG | SYSTOLIC BLOOD PRESSURE: 123 MMHG | TEMPERATURE: 97.8 F | RESPIRATION RATE: 16 BRPM | WEIGHT: 149.2 LBS | HEART RATE: 81 BPM | OXYGEN SATURATION: 95 %

## 2023-06-14 DIAGNOSIS — T45.4X5A ADVERSE EFFECT OF PREPARATION OF IRON COMPOUND, INITIAL ENCOUNTER: ICD-10-CM

## 2023-06-14 DIAGNOSIS — D50.9 IRON DEFICIENCY ANEMIA, UNSPECIFIED IRON DEFICIENCY ANEMIA TYPE: Primary | ICD-10-CM

## 2023-06-14 PROCEDURE — 63710000001 DIPHENHYDRAMINE PER 50 MG: Performed by: INTERNAL MEDICINE

## 2023-06-14 PROCEDURE — 96365 THER/PROPH/DIAG IV INF INIT: CPT

## 2023-06-14 PROCEDURE — 96375 TX/PRO/DX INJ NEW DRUG ADDON: CPT

## 2023-06-14 PROCEDURE — 25010000002 IRON SUCROSE PER 1 MG: Performed by: INTERNAL MEDICINE

## 2023-06-14 RX ORDER — SODIUM CHLORIDE 9 MG/ML
250 INJECTION, SOLUTION INTRAVENOUS ONCE
Status: COMPLETED | OUTPATIENT
Start: 2023-06-14 | End: 2023-06-14

## 2023-06-14 RX ORDER — DIPHENHYDRAMINE HCL 25 MG
25 CAPSULE ORAL ONCE
Status: COMPLETED | OUTPATIENT
Start: 2023-06-14 | End: 2023-06-14

## 2023-06-14 RX ORDER — FAMOTIDINE 10 MG/ML
20 INJECTION, SOLUTION INTRAVENOUS ONCE
Status: COMPLETED | OUTPATIENT
Start: 2023-06-14 | End: 2023-06-14

## 2023-06-14 RX ADMIN — SODIUM CHLORIDE 250 ML: 9 INJECTION, SOLUTION INTRAVENOUS at 13:41

## 2023-06-14 RX ADMIN — SODIUM CHLORIDE 300 MG: 900 INJECTION, SOLUTION INTRAVENOUS at 14:03

## 2023-06-14 RX ADMIN — FAMOTIDINE 20 MG: 10 INJECTION INTRAVENOUS at 13:41

## 2023-06-14 RX ADMIN — DIPHENHYDRAMINE HYDROCHLORIDE 25 MG: 25 CAPSULE ORAL at 13:41

## 2023-07-25 ENCOUNTER — HOSPITAL ENCOUNTER (OUTPATIENT)
Dept: CT IMAGING | Facility: HOSPITAL | Age: 83
Discharge: HOME | End: 2023-07-25
Admitting: INTERNAL MEDICINE
Payer: MEDICARE

## 2023-07-25 DIAGNOSIS — D47.1 MYELOPROLIFERATIVE DISORDER: ICD-10-CM

## 2023-07-25 DIAGNOSIS — E83.52 HYPERCALCEMIA: ICD-10-CM

## 2023-07-25 DIAGNOSIS — Z85.038 HISTORY OF COLON CANCER: ICD-10-CM

## 2023-07-25 DIAGNOSIS — N28.9 KIDNEY LESION: ICD-10-CM

## 2023-07-25 PROCEDURE — 74177 CT ABD & PELVIS W/CONTRAST: CPT

## 2023-07-25 PROCEDURE — 25510000001 IOPAMIDOL 61 % SOLUTION: Performed by: INTERNAL MEDICINE

## 2023-07-25 PROCEDURE — 71260 CT THORAX DX C+: CPT

## 2023-07-25 PROCEDURE — 82565 ASSAY OF CREATININE: CPT

## 2023-07-25 RX ADMIN — IOPAMIDOL 85 ML: 612 INJECTION, SOLUTION INTRAVENOUS at 11:01

## 2023-07-26 LAB — CREAT BLDA-MCNC: 1.2 MG/DL (ref 0.6–1.3)

## 2023-08-08 ENCOUNTER — SPECIALTY PHARMACY (OUTPATIENT)
Dept: PHARMACY | Facility: HOSPITAL | Age: 83
End: 2023-08-08
Payer: MEDICARE

## 2023-08-15 ENCOUNTER — HOSPITAL ENCOUNTER (OUTPATIENT)
Dept: BONE DENSITY | Facility: HOSPITAL | Age: 83
Discharge: HOME | End: 2023-08-15
Admitting: STUDENT IN AN ORGANIZED HEALTH CARE EDUCATION/TRAINING PROGRAM
Payer: MEDICARE

## 2023-08-15 DIAGNOSIS — M81.8 OTHER OSTEOPOROSIS WITHOUT CURRENT PATHOLOGICAL FRACTURE: ICD-10-CM

## 2023-08-15 PROCEDURE — 77080 DXA BONE DENSITY AXIAL: CPT

## 2023-08-25 ENCOUNTER — OFFICE VISIT (OUTPATIENT)
Dept: ONCOLOGY | Facility: CLINIC | Age: 83
End: 2023-08-25
Payer: MEDICARE

## 2023-08-25 ENCOUNTER — LAB (OUTPATIENT)
Dept: LAB | Facility: HOSPITAL | Age: 83
End: 2023-08-25
Payer: MEDICARE

## 2023-08-25 VITALS
SYSTOLIC BLOOD PRESSURE: 121 MMHG | WEIGHT: 151.1 LBS | BODY MASS INDEX: 28.53 KG/M2 | HEART RATE: 70 BPM | TEMPERATURE: 98 F | HEIGHT: 61 IN | OXYGEN SATURATION: 99 % | DIASTOLIC BLOOD PRESSURE: 71 MMHG | RESPIRATION RATE: 18 BRPM

## 2023-08-25 DIAGNOSIS — D47.1 MYELOPROLIFERATIVE DISORDER: Primary | ICD-10-CM

## 2023-08-25 DIAGNOSIS — N28.9 KIDNEY LESION: ICD-10-CM

## 2023-08-25 DIAGNOSIS — Z85.038 HISTORY OF COLON CANCER: ICD-10-CM

## 2023-08-25 DIAGNOSIS — Z79.899 ENCOUNTER FOR LONG-TERM CURRENT USE OF HIGH RISK MEDICATION: ICD-10-CM

## 2023-08-25 DIAGNOSIS — E83.52 HYPERCALCEMIA: ICD-10-CM

## 2023-08-25 DIAGNOSIS — D50.9 IRON DEFICIENCY ANEMIA, UNSPECIFIED IRON DEFICIENCY ANEMIA TYPE: ICD-10-CM

## 2023-08-25 DIAGNOSIS — D47.1 MYELOPROLIFERATIVE DISORDER: ICD-10-CM

## 2023-08-25 DIAGNOSIS — R35.0 INCREASED URINARY FREQUENCY: ICD-10-CM

## 2023-08-25 LAB
ALBUMIN SERPL-MCNC: 4.3 G/DL (ref 3.5–5.2)
ALBUMIN/GLOB SERPL: 2 G/DL
ALP SERPL-CCNC: 79 U/L (ref 39–117)
ALT SERPL W P-5'-P-CCNC: 25 U/L (ref 1–33)
ANION GAP SERPL CALCULATED.3IONS-SCNC: 12 MMOL/L (ref 5–15)
AST SERPL-CCNC: 27 U/L (ref 1–32)
BASOPHILS # BLD AUTO: 0.21 10*3/MM3 (ref 0–0.2)
BASOPHILS NFR BLD AUTO: 1.1 % (ref 0–1.5)
BILIRUB SERPL-MCNC: 0.7 MG/DL (ref 0–1.2)
BILIRUB UR QL STRIP: NEGATIVE
BUN SERPL-MCNC: 32 MG/DL (ref 8–23)
BUN/CREAT SERPL: 29.1 (ref 7–25)
CALCIUM SPEC-SCNC: 10.2 MG/DL (ref 8.6–10.5)
CEA SERPL-MCNC: 1.44 NG/ML
CHLORIDE SERPL-SCNC: 99 MMOL/L (ref 98–107)
CLARITY UR: CLEAR
CO2 SERPL-SCNC: 23 MMOL/L (ref 22–29)
COLOR UR: YELLOW
CREAT SERPL-MCNC: 1.1 MG/DL (ref 0.6–1.1)
DEPRECATED RDW RBC AUTO: 67.1 FL (ref 37–54)
EGFRCR SERPLBLD CKD-EPI 2021: 50.6 ML/MIN/1.73
EOSINOPHIL # BLD AUTO: 0.46 10*3/MM3 (ref 0–0.4)
EOSINOPHIL NFR BLD AUTO: 2.5 % (ref 0.3–6.2)
ERYTHROCYTE [DISTWIDTH] IN BLOOD BY AUTOMATED COUNT: 16.8 % (ref 12.3–15.4)
FERRITIN SERPL-MCNC: 579 NG/ML (ref 13–150)
FOLATE SERPL-MCNC: >20 NG/ML (ref 4.78–24.2)
GLOBULIN UR ELPH-MCNC: 2.2 GM/DL
GLUCOSE SERPL-MCNC: 151 MG/DL (ref 65–99)
GLUCOSE UR STRIP-MCNC: NEGATIVE MG/DL
HCT VFR BLD AUTO: 29.2 % (ref 34–46.6)
HGB BLD-MCNC: 10 G/DL (ref 12–15.9)
HGB UR QL STRIP.AUTO: ABNORMAL
IMM GRANULOCYTES # BLD AUTO: 1.97 10*3/MM3 (ref 0–0.05)
IMM GRANULOCYTES NFR BLD AUTO: 10.6 % (ref 0–0.5)
IRON 24H UR-MRATE: 117 MCG/DL (ref 37–145)
IRON SATN MFR SERPL: 46 % (ref 20–50)
KETONES UR QL STRIP: NEGATIVE
LEUKOCYTE ESTERASE UR QL STRIP.AUTO: ABNORMAL
LYMPHOCYTES # BLD AUTO: 1.67 10*3/MM3 (ref 0.7–3.1)
LYMPHOCYTES NFR BLD AUTO: 9 % (ref 19.6–45.3)
MCH RBC QN AUTO: 37.2 PG (ref 26.6–33)
MCHC RBC AUTO-ENTMCNC: 34.2 G/DL (ref 31.5–35.7)
MCV RBC AUTO: 108.6 FL (ref 79–97)
MONOCYTES # BLD AUTO: 0.92 10*3/MM3 (ref 0.1–0.9)
MONOCYTES NFR BLD AUTO: 4.9 % (ref 5–12)
NEUTROPHILS NFR BLD AUTO: 13.41 10*3/MM3 (ref 1.7–7)
NEUTROPHILS NFR BLD AUTO: 71.9 % (ref 42.7–76)
NITRITE UR QL STRIP: NEGATIVE
NRBC BLD AUTO-RTO: 0.1 /100 WBC (ref 0–0.2)
PH UR STRIP.AUTO: 5.5 [PH] (ref 4.5–8)
PLATELET # BLD AUTO: 361 10*3/MM3 (ref 140–450)
PMV BLD AUTO: 10.7 FL (ref 6–12)
POTASSIUM SERPL-SCNC: 3.8 MMOL/L (ref 3.5–5.2)
PROT SERPL-MCNC: 6.5 G/DL (ref 6–8.5)
PROT UR QL STRIP: NEGATIVE
RBC # BLD AUTO: 2.69 10*6/MM3 (ref 3.77–5.28)
SODIUM SERPL-SCNC: 134 MMOL/L (ref 136–145)
SP GR UR STRIP: 1.01 (ref 1–1.03)
TIBC SERPL-MCNC: 255 MCG/DL (ref 298–536)
TRANSFERRIN SERPL-MCNC: 182 MG/DL (ref 200–360)
UROBILINOGEN UR QL STRIP: ABNORMAL
VIT B12 BLD-MCNC: >2000 PG/ML (ref 211–946)
WBC NRBC COR # BLD: 18.64 10*3/MM3 (ref 3.4–10.8)

## 2023-08-25 PROCEDURE — 81003 URINALYSIS AUTO W/O SCOPE: CPT | Performed by: INTERNAL MEDICINE

## 2023-08-25 PROCEDURE — 80053 COMPREHEN METABOLIC PANEL: CPT

## 2023-08-25 PROCEDURE — 87086 URINE CULTURE/COLONY COUNT: CPT | Performed by: INTERNAL MEDICINE

## 2023-08-25 PROCEDURE — 82378 CARCINOEMBRYONIC ANTIGEN: CPT | Performed by: INTERNAL MEDICINE

## 2023-08-25 PROCEDURE — 83540 ASSAY OF IRON: CPT

## 2023-08-25 PROCEDURE — 82607 VITAMIN B-12: CPT | Performed by: INTERNAL MEDICINE

## 2023-08-25 PROCEDURE — 82728 ASSAY OF FERRITIN: CPT

## 2023-08-25 PROCEDURE — 85025 COMPLETE CBC W/AUTO DIFF WBC: CPT

## 2023-08-25 PROCEDURE — 36415 COLL VENOUS BLD VENIPUNCTURE: CPT

## 2023-08-25 PROCEDURE — 84466 ASSAY OF TRANSFERRIN: CPT

## 2023-08-25 PROCEDURE — 82746 ASSAY OF FOLIC ACID SERUM: CPT | Performed by: INTERNAL MEDICINE

## 2023-08-25 NOTE — PROGRESS NOTES
Subjective     CHIEF COMPLAINT:      Chief Complaint   Patient presents with    Follow-up     Discuss CT Scan and abdomen pain(no pain now)      HISTORY OF PRESENT ILLNESS:     Ayla Castellano is a 83 y.o. female patient who returns today for follow up on her myeloproliferative disorder and iron deficiency anemia. She is accompanied by her daughter today who helped with interpretation.     Patient reports feeling better after receiving the IV iron. She noticed improvement in her energy level.     Patient reports having intermittent abdominal pain. She reports increased urinary frequency. No burning with urination.     ROS:  Pertinent ROS is in the HPI.     Past medical, surgical, social and family history were reviewed.     MEDICATIONS:    Current Outpatient Medications:     Accu-Chek FastClix Lancets misc, To test blood sugar 3 times daily., Disp: 100 each, Rfl: 8    amLODIPine (NORVASC) 5 MG tablet, Take 1 tablet by mouth Daily., Disp: 90 tablet, Rfl: 0    aspirin 81 MG tablet, Take 1 tablet by mouth Daily., Disp: , Rfl:     atorvastatin (LIPITOR) 20 MG tablet, TAKE 1 TABLET BY MOUTH DAILY, Disp: 90 tablet, Rfl: 3    Cyanocobalamin (B-12) 2500 MCG sublingual tablet, Place 1 tablet under the tongue Daily., Disp: 30 tablet, Rfl: 11    glucose blood (ONE TOUCH ULTRA TEST) test strip, USE TO TEST BLOOD SUGAR EVERY DAY. (E11.9), Disp: 100 each, Rfl: 2    hydroxyurea (HYDREA) 500 MG capsule, Take 1 capsule by mouth Take As Directed. On Mondays, Wednesdays Fridays and Sundays, Disp: 48 capsule, Rfl: 3    isosorbide mononitrate (IMDUR) 60 MG 24 hr tablet, Take 1 tablet by mouth Daily., Disp: , Rfl:     metFORMIN (GLUCOPHAGE) 500 MG tablet, Take 1 tablet by mouth 2 (Two) Times a Day With Meals., Disp: 180 tablet, Rfl: 2    ofloxacin (OCUFLOX) 0.3 % ophthalmic solution, Administer 2 drops to both eyes 4 (Four) Times a Day., Disp: 10 mL, Rfl: 11    oxybutynin (DITROPAN) 5 MG tablet, Take 1 tablet by mouth 2 (Two) Times  "a Day., Disp: 60 tablet, Rfl: 5    valsartan-hydrochlorothiazide (DIOVAN-HCT) 320-25 MG per tablet, Take 1 tablet by mouth Daily., Disp: 90 tablet, Rfl: 0    ketoconazole (NIZORAL) 2 % cream, Apply  topically to the appropriate area as directed Daily. (Patient not taking: Reported on 8/25/2023), Disp: 30 g, Rfl: 11    mupirocin (Bactroban) 2 % ointment, Apply  topically to the appropriate area as directed 3 (Three) Times a Day. (Patient not taking: Reported on 8/25/2023), Disp: 30 g, Rfl: 11  Objective     VITAL SIGNS:     Vitals:    08/25/23 1054   BP: 121/71   Pulse: 70   Resp: 18   Temp: 98 øF (36.7 øC)   TempSrc: Temporal   SpO2: 99%   Weight: 68.5 kg (151 lb 1.6 oz)   Height: 154.9 cm (61\")   PainSc: 0-No pain     Body mass index is 28.55 kg/mý.     Wt Readings from Last 5 Encounters:   08/25/23 68.5 kg (151 lb 1.6 oz)   06/28/23 69.1 kg (152 lb 6.4 oz)   06/20/23 68.4 kg (150 lb 12.8 oz)   06/14/23 67.7 kg (149 lb 3.2 oz)   06/09/23 68.9 kg (152 lb)     PHYSICAL EXAMINATION:   GENERAL: The patient appears in fair general condition, not in acute distress.   SKIN: No ecchymosis.  EYES: No jaundice. No pallor.  LYMPHATICS: No cervical lymphadenopathy.  CHEST: Normal respiratory effort. Lungs clear bilaterally. No added sounds.  CVS: Normal S1 and S2. No murmurs.  ABDOMEN: Soft.  Hypogastric tenderness. No Hepatomegaly. No Splenomegaly. No masses.  EXTREMITIES: No noted deformity.     DIAGNOSTIC DATA:     Results from last 7 days   Lab Units 08/25/23  1034   WBC 10*3/mm3 18.64*   NEUTROS ABS 10*3/mm3 13.41*   HEMOGLOBIN g/dL 10.0*   HEMATOCRIT % 29.2*   PLATELETS 10*3/mm3 361     Results from last 7 days   Lab Units 08/25/23  1034   SODIUM mmol/L 134*   POTASSIUM mmol/L 3.8   CHLORIDE mmol/L 99   CO2 mmol/L 23.0   BUN mg/dL 32*   CREATININE mg/dL 1.10   CALCIUM mg/dL 10.2   ALBUMIN g/dL 4.3   BILIRUBIN mg/dL 0.7   ALK PHOS U/L 79   ALT (SGPT) U/L 25   AST (SGOT) U/L 27   GLUCOSE mg/dL 151*         Lab " 08/25/23  1034   IRON 117   IRON SATURATION (TSAT) 46   TIBC 255*   TRANSFERRIN 182*   FERRITIN 579.00*      CT chest abdomen pelvis on 7/25/2023:  CHEST: There is significant breathing artifact. There is a stable sub-6  mm right lower lobe pulmonary nodule. There is a sub-6 mm pleural-based  nodular density at the posterior aspect of the left upper lobe which may  have a tiny internal calcification. There are no suspicious pulmonary  opacities and there are no pleural or pericardial effusions. There is a  1.4 cm low-attenuation left thyroid lobe nodule.     ABDOMEN/PELVIS: There has been interval increase in splenic size  measuring 15.7 cm in craniocaudad span, previously 14.5 cm. There is no  lymphadenopathy. The liver appears unremarkable other than a stable  subcentimeter low-attenuation focus at the left hepatic lobe. No acute  abnormality seen at the pancreas, adrenals, or kidneys. Renal cysts  appear stable. There is no acute bowel abnormality. Uterus and adnexa  appear unremarkable. There is no free fluid or lymphadenopathy. There  are moderately extensive abdominal aortic atherosclerotic changes  without aneurysmal dilatation.     IMPRESSION:  Interval increase in splenomegaly. There is no  lymphadenopathy.    Assessment & Plan    *Myeloproliferative disorder, 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   She was under the care of an outside Hematologist before transferring care to our practice. Records indicate that she was previously diagnosed with Essential thrombocythemia.   She has elevated WBC and basophil count.   Therefore, a diagnosis of myeloproliferative disorder or polycythemia vera was favored over ET.  BCR ABL was negative by FISH on 2/2/2021.  In February 2021, WBC count was gradually increasing indicating that her disease was not under a good control likely due to the dose of Hydroxyurea not being effective (500 mg every 4 days).   The dose was  increased to every 3 days on 2/2/2021.  Due to the persistence of the leukocytosis, the dose of Hydrea was increased to 4 days a week.  Hemoglobin decreased to 10.0 on 10/4/2021.  WBC count was 15,270.  Due to the worsening anemia, we reduced the dose of Hydrea back to 3 days a week.  Labs on 1/6/2022 revealed a hemoglobin of 10.2.  WBC was 16,600.  Platelets were 352,000.  Spleen measured 14.7 cm on CT scan on 1/14/2022.  Labs on 5/5/2022 revealed a hemoglobin of 9.9.  WBC decreased to 14,980.  Platelets were 357,000.  WBC increased to 19,200 on 7/28/2022.  Hydrea was increased to 500 mg 4 days a week.  WBC decreased to 16,260 on 12/1/2022.  CT on 7/25/2023 showed increase in the size of the spleen from 14.5cm to 15.7 cm.  8/25/2023: WBC increased to 18,640.  Platelets normal at 361,000.    Hemoglobin decreased to 10.0 ? Due to infection vs disease progression.      *Iron deficiency anemia.   This is attributed to iron deficiency with a transferrin saturation of 17% and a ferritin of 26.   The patient did not tolerate oral in the past with development of upset stomach, abdominal pain, headaches and dizziness when she took the oral iron in the past.  Patient received IV iron the 2020 and tolerated that well.   Patient was given IV Injectafer on 2/5/2021 and 2/12/2021.  Hemoglobin was 10.0 on 10/4/2021.  Hemoglobin decreased to 9.9 on 5/5/2022.  Iron stores were adequate.  Hemoglobin improved to 10.7 on 7/28/2022.  Hemoglobin was 12.0 on 9/30/2022.  Hemoglobin decreased to 10.8 on 12/1/2022-likely secondary to blood loss following a fall with development of hematoma.  Hemoglobin decreased to 11.3 on 5/19/2023.    Ferritin was 41.6.  Transferrin saturation was 18%.  She was given IV Venofer 300 mg weekly x5 doses between 6/6/2023 and 6/28/2023.  8/25/2023: Hemoglobin decreased to 10.0.  However, ferritin increased to 579. Transferrin saturation increased to 46%.  The worsening of the anemia is likely due to suspected  bladder infection.    *History of colon cancer, stage I.   She is s/p partial colectomy by Dr. Ku.   She did not require adjuvant chemotherapy or radiation therapy.    CT scan on 1/14/2022 revealed no evidence of recurrence.   CT scan on 7/22/2022 revealed no evidence of recurrence.  CT scan on 7/25/2023 revealed no evidence of recurrence.    *Right renal lesion.    CT on 1/14/2022 revealed an incidental finding of a 1.7 cm rounded structure in the right kidney.    It was considered to be most likely representing a prominent renal lobule.    It slightly increased in size compared to 2015.   CT abdomen pelvis with renal protocol on 7/22/2022 showed no suspicious renal lesion.  CT scan on 7/25/2023 revealed stable renal cysts.  No suspicious renal lesions.    *Hypercalcemia.  This is chronic.  7/28/2022: Calcium 11.2.   This was previously evaluated by her PCP.  PTH was normal at 62 on 8/12/2022.  12/1/2022: Calcium 11.3.  We recommended reducing intake of calcium rich food.  3/28/2023: Calcium 11.1.  8/25/2023: Calcium decreased to 10.2.    *Lower abdominal pain.  She reports increased urinary frequency.   There is tenderness on exam today.  UA was obtained and was positive for leukocyte esterase.     *Diarrhea.  Patient is having recurrent episodes of diarrhea.  This might be attributed to post surgical changes from the colon surgery.  Patient advised to take Imodium PRN.   If this does not help, would recommend a referral to GI.   She will discuss with her PCP next week.     PLAN:    1.  Continue Hydrea 500 mg 4 days a week (usually takes it on Mondays Wednesdays Fridays and Sundays).  2.  She does not need additional IV iron at this point.  3.  Continue vitamin B12 1000 mcg daily.  4.  Obtain CEA today.  5.  Obtain urine culture.  6.  Follow up in 2 months. We will obtain CBC CMP ferritin iron panel and LDH.     I spent 42 minutes caring for Ayla on this date of service. This time includes time spent by  me in the following activities: preparing for the visit, reviewing tests, obtaining and/or reviewing a separately obtained history, performing a medically appropriate examination and/or evaluation, counseling and educating the patient/family/caregiver, ordering medications, tests, or procedures, documenting information in the medical record, independently interpreting results and communicating that information with the patient/family/caregiver, and care coordination     Izzy Martinez MD  08/25/23

## 2023-08-26 LAB — BACTERIA SPEC AEROBE CULT: NORMAL

## 2023-08-28 ENCOUNTER — TELEPHONE (OUTPATIENT)
Dept: ONCOLOGY | Facility: CLINIC | Age: 83
End: 2023-08-28
Payer: MEDICARE

## 2023-08-28 ENCOUNTER — SPECIALTY PHARMACY (OUTPATIENT)
Dept: PHARMACY | Facility: HOSPITAL | Age: 83
End: 2023-08-28
Payer: MEDICARE

## 2023-08-28 NOTE — TELEPHONE ENCOUNTER
----- Message from Izzy Martinez MD sent at 8/28/2023  7:59 AM EDT -----  Please inform the patient's daughter that the urine culture did not show a definite infection.    Thank you

## 2023-08-28 NOTE — TELEPHONE ENCOUNTER
Izzy Martinez MD Noel, Shelbie, RN  Patient reported having episodes of diarrhea. This may be attributed to her previous colon surgery. Ok to take Imodium as needed for the diarrhea.    Reviewed Dr. Martinez's note and instructions with the patients daughter, she v/u.

## 2023-08-29 ENCOUNTER — OFFICE VISIT (OUTPATIENT)
Dept: FAMILY MEDICINE CLINIC | Facility: CLINIC | Age: 83
End: 2023-08-29
Payer: MEDICARE

## 2023-08-29 VITALS
SYSTOLIC BLOOD PRESSURE: 126 MMHG | BODY MASS INDEX: 28.72 KG/M2 | HEART RATE: 64 BPM | WEIGHT: 152 LBS | OXYGEN SATURATION: 97 % | TEMPERATURE: 97.2 F | DIASTOLIC BLOOD PRESSURE: 73 MMHG

## 2023-08-29 DIAGNOSIS — E83.52 HYPERCALCEMIA: ICD-10-CM

## 2023-08-29 DIAGNOSIS — E11.9 TYPE 2 DIABETES MELLITUS WITHOUT COMPLICATION, WITHOUT LONG-TERM CURRENT USE OF INSULIN: ICD-10-CM

## 2023-08-29 DIAGNOSIS — D50.9 IRON DEFICIENCY ANEMIA, UNSPECIFIED IRON DEFICIENCY ANEMIA TYPE: ICD-10-CM

## 2023-08-29 DIAGNOSIS — Z00.00 MEDICARE ANNUAL WELLNESS VISIT, SUBSEQUENT: Primary | ICD-10-CM

## 2023-08-29 DIAGNOSIS — I49.5 SSS (SICK SINUS SYNDROME): ICD-10-CM

## 2023-08-29 DIAGNOSIS — I25.118 CORONARY ARTERY DISEASE OF NATIVE HEART WITH STABLE ANGINA PECTORIS, UNSPECIFIED VESSEL OR LESION TYPE: ICD-10-CM

## 2023-08-29 DIAGNOSIS — Z23 IMMUNIZATION DUE: ICD-10-CM

## 2023-08-29 DIAGNOSIS — E11.51 PERIPHERAL VASCULAR DISORDER DUE TO DIABETES MELLITUS: ICD-10-CM

## 2023-08-29 DIAGNOSIS — Z85.038 HISTORY OF COLON CANCER: ICD-10-CM

## 2023-08-29 DIAGNOSIS — E78.2 MIXED HYPERLIPIDEMIA: ICD-10-CM

## 2023-08-29 DIAGNOSIS — I10 PRIMARY HYPERTENSION: ICD-10-CM

## 2023-08-29 LAB — METHYLMALONATE SERPL-SCNC: 178 NMOL/L (ref 0–378)

## 2023-08-29 NOTE — PROGRESS NOTES
The ABCs of the Annual Wellness Visit  Subsequent Medicare Wellness Visit    Subjective    Ayla Castellano is a 83 y.o. female who presents for a Subsequent Medicare Wellness Visit.    The following portions of the patient's history were reviewed and   updated as appropriate: allergies, current medications, past family history, past medical history, past social history, past surgical history, and problem list.    Compared to one year ago, the patient feels her physical   health is worse.    Compared to one year ago, the patient feels her mental   health is the same.    Recent Hospitalizations:  She was not admitted to the hospital during the last year.       Current Medical Providers:  Patient Care Team:  Annmarie Palacio DO as PCP - General (Family Medicine)  Manisha Morales MD as PCP - Family Medicine  Manisha Morales MD as Referring Physician (Family Medicine)  Izzy Martinez MD as Consulting Physician (Hematology and Oncology)    Outpatient Medications Prior to Visit   Medication Sig Dispense Refill    Accu-Chek FastClix Lancets misc To test blood sugar 3 times daily. 100 each 8    amLODIPine (NORVASC) 5 MG tablet Take 1 tablet by mouth Daily. 90 tablet 0    aspirin 81 MG tablet Take 1 tablet by mouth Daily.      atorvastatin (LIPITOR) 20 MG tablet TAKE 1 TABLET BY MOUTH DAILY 90 tablet 3    Cyanocobalamin (B-12) 2500 MCG sublingual tablet Place 1 tablet under the tongue Daily. 30 tablet 11    glucose blood (ONE TOUCH ULTRA TEST) test strip USE TO TEST BLOOD SUGAR EVERY DAY. (E11.9) 100 each 2    hydroxyurea (HYDREA) 500 MG capsule Take 1 capsule by mouth Take As Directed. On Mondays, Wednesdays Fridays and Sundays 48 capsule 3    isosorbide mononitrate (IMDUR) 60 MG 24 hr tablet Take 1 tablet by mouth Daily.      ketoconazole (NIZORAL) 2 % cream Apply  topically to the appropriate area as directed Daily. 30 g 11    mupirocin (Bactroban) 2 % ointment Apply  topically to the appropriate area as  directed 3 (Three) Times a Day. 30 g 11    ofloxacin (OCUFLOX) 0.3 % ophthalmic solution Administer 2 drops to both eyes 4 (Four) Times a Day. 10 mL 11    oxybutynin (DITROPAN) 5 MG tablet Take 1 tablet by mouth 2 (Two) Times a Day. 60 tablet 5    valsartan-hydrochlorothiazide (DIOVAN-HCT) 320-25 MG per tablet Take 1 tablet by mouth Daily. 90 tablet 0    metFORMIN (GLUCOPHAGE) 500 MG tablet Take 1 tablet by mouth 2 (Two) Times a Day With Meals. 180 tablet 2     No facility-administered medications prior to visit.       No opioid medication identified on active medication list. I have reviewed chart for other potential  high risk medication/s and harmful drug interactions in the elderly.        Aspirin is on active medication list. Aspirin use is indicated based on review of current medical condition/s. Pros and cons of this therapy have been discussed today. Benefits of this medication outweigh potential harm.  Patient has been encouraged to continue taking this medication.  .      Patient Active Problem List   Diagnosis    Hypertension    Coronary artery disease of native heart with stable angina pectoris    Type 2 diabetes mellitus without complication, without long-term current use of insulin    Hyperlipidemia    Other osteoporosis without current pathological fracture    Medicare annual wellness visit, subsequent    Iron deficiency anemia    History of colon cancer    Myeloproliferative disorder    Adverse reaction to compound iron preparation    Chest pain    SSS (sick sinus syndrome)    Sinus pause    Hypercalcemia    Arthritis    Immunization due    Fall    Peripheral vascular disorder due to diabetes mellitus     Advance Care Planning   Advance Care Planning     Advance Directive is not on file.  ACP discussion was held with the patient during this visit. Patient does not have an advance directive, information provided.     Objective    Vitals:    08/29/23 0935   BP: 126/73   BP Location: Right arm   Patient  "Position: Sitting   Cuff Size: Large Adult   Pulse: 64   Temp: 97.2 øF (36.2 øC)   SpO2: 97%   Weight: 68.9 kg (152 lb)     Estimated body mass index is 28.72 kg/mý as calculated from the following:    Height as of 23: 154.9 cm (61\").    Weight as of this encounter: 68.9 kg (152 lb).    BMI is >= 25 and <30. (Overweight) The following options were offered after discussion;: exercise counseling/recommendations and nutrition counseling/recommendations. Has some issues with holding food due to diarrhea from hx of colon cancer. Does try to eat healthy foods. Not able to do much exercise. Working on strengthening her legs. Declines PT referral.       Does the patient have evidence of cognitive impairment? Yes some issues with forgetting things. Does not pay her bills, remembers but cannot use technology so others have to do things for her.           HEALTH RISK ASSESSMENT    Smoking Status:  Social History     Tobacco Use   Smoking Status Never   Smokeless Tobacco Never     Alcohol Consumption:  Social History     Substance and Sexual Activity   Alcohol Use No     Fall Risk Screen:    STEADI Fall Risk Assessment was completed, and patient is at LOW risk for falls.Assessment completed on:2023    Depression Screenin/29/2023     9:35 AM   PHQ-2/PHQ-9 Depression Screening   Little Interest or Pleasure in Doing Things 0-->not at all   Feeling Down, Depressed or Hopeless 0-->not at all   PHQ-9: Brief Depression Severity Measure Score 0       Health Habits and Functional and Cognitive Screenin/29/2023     9:33 AM   Functional & Cognitive Status   Do you have difficulty preparing food and eating? No   Do you have difficulty bathing yourself, getting dressed or grooming yourself? Yes   Do you have difficulty using the toilet? No   Do you have difficulty moving around from place to place? Yes   Do you have trouble with steps or getting out of a bed or a chair? No   Current Diet Well Balanced Diet "   Dental Exam Up to date   Eye Exam Up to date   Exercise (times per week) 3 times per week   Current Exercises Include Home Exercise Program (TV, Computer, Etc.)   Do you need help using the phone?  Yes   Are you deaf or do you have serious difficulty hearing?  Yes   Do you need help to go to places out of walking distance? Yes   Do you need help shopping? Yes   Do you need help preparing meals?  Yes   Do you need help with housework?  Yes   Do you need help with laundry? Yes   Do you need help taking your medications? No   Do you need help managing money? Yes   Do you ever drive or ride in a car without wearing a seat belt? No   Have you felt unusual stress, anger or loneliness in the last month? No   Who do you live with? Child   If you need help, do you have trouble finding someone available to you? No   Have you been bothered in the last four weeks by sexual problems? No   Do you have difficulty concentrating, remembering or making decisions? No       Age-appropriate Screening Schedule:  Refer to the list below for future screening recommendations based on patient's age, sex and/or medical conditions. Orders for these recommended tests are listed in the plan section. The patient has been provided with a written plan.    Health Maintenance   Topic Date Due    TDAP/TD VACCINES (1 - Tdap) Never done    COVID-19 Vaccine (5 - Pfizer series) 03/18/2023    BMI FOLLOWUP  08/12/2023    HEMOGLOBIN A1C  08/22/2023    INFLUENZA VACCINE  10/01/2023    LIPID PANEL  02/22/2024    URINE MICROALBUMIN  02/22/2024    DIABETIC EYE EXAM  07/05/2024    ANNUAL WELLNESS VISIT  08/29/2024    DXA SCAN  08/15/2025    COLONOSCOPY  12/17/2029    Pneumococcal Vaccine 65+  Discontinued    ZOSTER VACCINE  Discontinued                  CMS Preventative Services Quick Reference  Risk Factors Identified During Encounter  Inactivity/Sedentary:  patient has limitations in exercise but is working on leg strengthening exercises   The above  risks/problems have been discussed with the patient.  Pertinent information has been shared with the patient in the After Visit Summary.  An After Visit Summary and PPPS were made available to the patient.    Follow Up:   Next Medicare Wellness visit to be scheduled in 1 year.       Additional E&M Note during same encounter follows:  Patient has multiple medical problems which are significant and separately identifiable that require additional work above and beyond the Medicare Wellness Visit.      Chief Complaint  Medicare Wellness-subsequent    Subjective        HPI  Ayla Castellano is also being seen today for anemia. Saw hematologists and hgb as dropped to 10 from 11.3..  Blood sugar is staying 170-180 in the morning. Felt better one day when she was 152 one day in the morning.   Not feeling great overall. Having some nausea. Loss of appetite. Weight loss.        Objective   Vital Signs:  /73 (BP Location: Right arm, Patient Position: Sitting, Cuff Size: Large Adult)   Pulse 64   Temp 97.2 øF (36.2 øC)   Wt 68.9 kg (152 lb)   SpO2 97%   BMI 28.72 kg/mý     Physical Exam  Vitals and nursing note reviewed.   Constitutional:       General: She is not in acute distress.     Appearance: Normal appearance. She is not ill-appearing.   HENT:      Head: Normocephalic and atraumatic.      Nose: Nose normal.      Mouth/Throat:      Mouth: Mucous membranes are moist.   Eyes:      Extraocular Movements: Extraocular movements intact.      Conjunctiva/sclera: Conjunctivae normal.   Cardiovascular:      Rate and Rhythm: Normal rate and regular rhythm.      Heart sounds: Normal heart sounds. No murmur heard.    No gallop.   Pulmonary:      Effort: Pulmonary effort is normal. No respiratory distress.      Breath sounds: Normal breath sounds. No stridor. No wheezing, rhonchi or rales.   Chest:      Chest wall: No tenderness.   Skin:     General: Skin is warm and dry.      Comments: Erythematous and scaly patch near  hair line on nape of neck    Neurological:      General: No focal deficit present.      Mental Status: She is alert and oriented to person, place, and time. Mental status is at baseline.   Psychiatric:         Mood and Affect: Mood normal.         Behavior: Behavior normal.                       Assessment and Plan   Diagnoses and all orders for this visit:    1. Medicare annual wellness visit, subsequent (Primary)    2. Type 2 diabetes mellitus without complication, without long-term current use of insulin  -     Hemoglobin A1c  -     metFORMIN (GLUCOPHAGE) 500 MG tablet; Take 1 tablet by mouth 2 (Two) Times a Day With Meals.  Dispense: 180 tablet; Refill: 2    3. Primary hypertension    4. Coronary artery disease of native heart with stable angina pectoris, unspecified vessel or lesion type    5. Mixed hyperlipidemia  -     Lipid panel    6. SSS (sick sinus syndrome)    7. Peripheral vascular disorder due to diabetes mellitus    8. Hypercalcemia    9. Iron deficiency anemia, unspecified iron deficiency anemia type    10. History of colon cancer    11. Immunization due    Other orders  -     empagliflozin (Jardiance) 10 MG tablet tablet; Take 1 tablet by mouth Daily.  Dispense: 90 tablet; Refill: 0  -     triamcinolone (KENALOG) 0.1 % ointment; Apply 1 application  topically to the appropriate area as directed 2 (Two) Times a Day. Apply to nape of neck  Dispense: 30 g; Refill: 2          Add jardiance.   Has good family support. Daughter helps with things at home and they live together.   Preventative health info provided.        Follow Up   No follow-ups on file.  Patient was given instructions and counseling regarding her condition or for health maintenance advice. Please see specific information pulled into the AVS if appropriate.

## 2023-08-31 ENCOUNTER — SPECIALTY PHARMACY (OUTPATIENT)
Dept: PHARMACY | Facility: HOSPITAL | Age: 83
End: 2023-08-31
Payer: MEDICARE

## 2023-08-31 RX ORDER — HYDROXYUREA 500 MG/1
CAPSULE ORAL
Qty: 48 CAPSULE | Refills: 3 | Status: SHIPPED | OUTPATIENT
Start: 2023-08-31

## 2023-08-31 NOTE — PROGRESS NOTES
Re: Refills of Oral Specialty Medication - Hydrea (hydroxyurea)    Drug-Drug Interactions: The current medication list was reviewed and there are no relevant drug-drug interactions.  Medication Allergies: The patient has no relevant allergies as it relates to their oral specialty medication  Review of Labs/Dose Adjustments: NO DOSE CHANGE - I reviewed the most recent note and labs and the patient will continue without any dose changes.  I sent refills as described below.    Drug: Hydrea (hydroxyurea)  Strength: 500 mg  Directions: Take one capsule by mouth daily on Monday, Wednesday, Friday, and Sunday  Quantity: 48  Refills: 3  Pharmacy prescription sent to:  [facility]    Name/Credentials: Edward SpenceD, BCPS    8/31/2023  10:20 EDT

## 2023-08-31 NOTE — PROGRESS NOTES
Drug: Hydrea (hydroxyurea)  Strength: 500 mg  Directions: Take one capsule by mouth daily on Monday, Wednesday, Friday, and Sunday  Quantity: 48  Refills: 3  Pharmacy prescription sent to:  [facility]    Completed independent double check on medication order/RX.  Vance Dobson, EdwardD, BCOP

## 2023-09-03 DIAGNOSIS — E78.5 HYPERLIPIDEMIA, UNSPECIFIED HYPERLIPIDEMIA TYPE: ICD-10-CM

## 2023-09-05 ENCOUNTER — LAB (OUTPATIENT)
Dept: FAMILY MEDICINE CLINIC | Facility: CLINIC | Age: 83
End: 2023-09-05
Payer: MEDICARE

## 2023-09-05 DIAGNOSIS — M81.8 OTHER OSTEOPOROSIS WITHOUT CURRENT PATHOLOGICAL FRACTURE: ICD-10-CM

## 2023-09-05 DIAGNOSIS — M81.8 OTHER OSTEOPOROSIS WITHOUT CURRENT PATHOLOGICAL FRACTURE: Primary | ICD-10-CM

## 2023-09-05 RX ORDER — ATORVASTATIN CALCIUM 20 MG/1
20 TABLET, FILM COATED ORAL DAILY
Qty: 90 TABLET | Refills: 3 | Status: SHIPPED | OUTPATIENT
Start: 2023-09-05

## 2023-09-06 ENCOUNTER — TELEPHONE (OUTPATIENT)
Dept: FAMILY MEDICINE CLINIC | Facility: CLINIC | Age: 83
End: 2023-09-06

## 2023-09-06 LAB
25(OH)D3+25(OH)D2 SERPL-MCNC: 50.2 NG/ML (ref 30–100)
ALBUMIN SERPL-MCNC: 4.5 G/DL (ref 3.5–5.2)
ALBUMIN/GLOB SERPL: 2.1 G/DL
ALP SERPL-CCNC: 79 U/L (ref 39–117)
ALT SERPL-CCNC: 25 U/L (ref 1–33)
AST SERPL-CCNC: 24 U/L (ref 1–32)
BILIRUB SERPL-MCNC: 0.9 MG/DL (ref 0–1.2)
BUN SERPL-MCNC: 21 MG/DL (ref 8–23)
BUN/CREAT SERPL: 20.2 (ref 7–25)
CALCIUM SERPL-MCNC: 10.5 MG/DL (ref 8.6–10.5)
CHLORIDE SERPL-SCNC: 99 MMOL/L (ref 98–107)
CO2 SERPL-SCNC: 26.5 MMOL/L (ref 22–29)
CREAT SERPL-MCNC: 1.04 MG/DL (ref 0.57–1)
EGFRCR SERPLBLD CKD-EPI 2021: 54.1 ML/MIN/1.73
GLOBULIN SER CALC-MCNC: 2.1 GM/DL
GLUCOSE SERPL-MCNC: 99 MG/DL (ref 65–99)
MAGNESIUM SERPL-MCNC: 1.7 MG/DL (ref 1.6–2.4)
PHOSPHATE SERPL-MCNC: 3.2 MG/DL (ref 2.5–4.5)
POTASSIUM SERPL-SCNC: 4.5 MMOL/L (ref 3.5–5.2)
PROT SERPL-MCNC: 6.6 G/DL (ref 6–8.5)
SODIUM SERPL-SCNC: 136 MMOL/L (ref 136–145)

## 2023-09-06 NOTE — TELEPHONE ENCOUNTER
Caller: Daily Castellano    Relationship: Emergency Contact    Best call back number: [phone]      What test was performed: LABS     When was the test performed:09/05    Where was the test performed: OFFICE    Additional notes: PLEASE CALL PATIENT'S DAUGHTER TO DISCUSS TEST RESULTS.

## 2023-09-07 ENCOUNTER — TELEPHONE (OUTPATIENT)
Dept: FAMILY MEDICINE CLINIC | Facility: CLINIC | Age: 83
End: 2023-09-07
Payer: MEDICARE

## 2023-09-08 DIAGNOSIS — E11.9 TYPE 2 DIABETES MELLITUS WITHOUT COMPLICATION, WITHOUT LONG-TERM CURRENT USE OF INSULIN: Primary | ICD-10-CM

## 2023-09-15 ENCOUNTER — SPECIALTY PHARMACY (OUTPATIENT)
Dept: PHARMACY | Facility: HOSPITAL | Age: 83
End: 2023-09-15
Payer: MEDICARE

## 2023-09-18 DIAGNOSIS — E78.5 HYPERLIPIDEMIA, UNSPECIFIED HYPERLIPIDEMIA TYPE: ICD-10-CM

## 2023-09-18 DIAGNOSIS — E11.9 TYPE 2 DIABETES MELLITUS WITHOUT COMPLICATION, WITHOUT LONG-TERM CURRENT USE OF INSULIN: Primary | ICD-10-CM

## 2023-09-19 LAB
CHOLEST SERPL-MCNC: 105 MG/DL (ref 0–200)
HBA1C MFR BLD: 4.5 % (ref 4.8–5.6)
HDLC SERPL-MCNC: 30 MG/DL (ref 40–60)
LDLC SERPL CALC-MCNC: 46 MG/DL (ref 0–100)
TRIGL SERPL-MCNC: 175 MG/DL (ref 0–150)
VLDLC SERPL CALC-MCNC: 29 MG/DL (ref 5–40)

## 2023-09-20 ENCOUNTER — TELEPHONE (OUTPATIENT)
Dept: FAMILY MEDICINE CLINIC | Facility: CLINIC | Age: 83
End: 2023-09-20

## 2023-09-20 NOTE — TELEPHONE ENCOUNTER
Caller: Daily Castellano    Relationship: Emergency Contact    Best call back number: [phone]     What test was performed: LABS    When was the test performed: 9/18/2023    Where was the test performed: [facility]    Additional notes: ALSO REQUESTING A CALL TO CHECK ON ENDOCRINOLOGY REFERRAL. ORDER WAS PLACED 3 WEEKS AGO

## 2023-09-27 PROCEDURE — 93294 REM INTERROG EVL PM/LDLS PM: CPT | Performed by: INTERNAL MEDICINE

## 2023-09-27 PROCEDURE — 93296 REM INTERROG EVL PM/IDS: CPT | Performed by: INTERNAL MEDICINE

## 2023-09-28 DIAGNOSIS — I10 ESSENTIAL HYPERTENSION: ICD-10-CM

## 2023-09-28 RX ORDER — VALSARTAN AND HYDROCHLOROTHIAZIDE 320; 25 MG/1; MG/1
1 TABLET, FILM COATED ORAL DAILY
Qty: 90 TABLET | Refills: 0 | Status: SHIPPED | OUTPATIENT
Start: 2023-09-28

## 2023-09-28 RX ORDER — AMLODIPINE BESYLATE 5 MG/1
5 TABLET ORAL DAILY
Qty: 90 TABLET | Refills: 0 | Status: SHIPPED | OUTPATIENT
Start: 2023-09-28

## 2023-10-09 NOTE — PROGRESS NOTES
Date of Office Visit: 3/21/2022  Encounter Provider: Israel Lanza MD  Place of Service: [facility]  Patient Name: Ayla Castellano  :1940    Chief complaint: Follow-up for sick sinus syndrome, permanent pacemaker,   hypertension.    History of Present Illness:    Dear Dr. Morales:      I again had the pleasure of seeing your patient in cardiology office on 3/21/2022.  As   you well know, she is a very pleasant 82 year-old [place] female who presents for  follow-up. The patient was admitted in 3/2015 after a syncopal episode. Upon  presentation in the emergency department, she was found to have severe bradycardia   with what appeared to be sinus arrest with junctional escape beats. Her heart rate was   as low as the 20s at times, and she was having very significant pauses. She was   actually given atropine in the emergency room initially. She was on both clonidine and   metoprolol at the time and these were discontinued. She was briefly placed on a   Dopamine drip, and her heart rate actually improved over time.    At her office visit on 2021, she had been having palpitations, as well as episodes   of lightheadedness.  A Holter monitor at that time showed multiple sinus pauses.    She subsequently wore a 14-day event recorder starting on 2021.  This showed   208 sinus pauses of over 3 seconds, with the longest being 4.1 seconds in length.    She was symptomatic during several of these episodes.  It was felt this represented   sick sinus syndrome, and she ultimately underwent placement of a [employer]   dual-chamber permanent pacemaker on 2021 by Dr. Alaniz.     The patient presents today for follow-up.  She is much better since having the   pacemaker placed.  All of her symptoms have resolved, and she has had no   palpitations or lightheaded episodes since the pacemaker.  She denied any chest   pain.  She has some mild shortness of  breath, which is baseline and unchanged.    Her blood pressure has been well controlled.      Past Medical History:   Diagnosis Date   • Arrhythmia    • Colitis    • Colon cancer (HCC)     s/p partial colectomy in 2011   • Diabetes mellitus (HCC)    • Gallbladder disease    • Hard to intubate    • Hyperlipidemia    • Hypertension    • Left arm pain    • SSS (sick sinus syndrome) (HCC)     [employer] dual-chamber pacemaker on 9/17/2021   • Syncope     Secondary to medications (clonidine and metoprolol) in 3/15.  Had severe bradycardia (sinus arrest with junctional escape beats).  Resolved after medications discontinued.       Past Surgical History:   Procedure Laterality Date   • CARDIAC ELECTROPHYSIOLOGY PROCEDURE N/A 9/17/2021    Procedure: Pacemaker DC new;  Surgeon: Richard Alaniz MD;  Location: [facility] INVASIVE LOCATION;  Service: Cardiology;  Laterality: N/A;   • CHOLECYSTECTOMY  10/2019   • COLECTOMY PARTIAL / TOTAL      2011 for colon cancer   • COLONOSCOPY  01/20/2020   • UTERINE FIBROID SURGERY      s/p uterine fibroma resection       Current Outpatient Medications on File Prior to Visit   Medication Sig Dispense Refill   • Accu-Chek FastClix Lancets misc To test blood sugar 3 times daily. 100 each 8   • amLODIPine (NORVASC) 5 MG tablet Take 1 tablet by mouth Daily. 90 tablet 3   • aspirin 81 MG tablet Take 81 mg by mouth Daily.     • atorvastatin (LIPITOR) 20 MG tablet Take 1 tablet by mouth Daily. 90 tablet 3   • Cyanocobalamin (B-12) 2500 MCG sublingual tablet Place 1 tablet under the tongue Daily. 30 tablet 3   • diclofenac (VOLTAREN) 1 % gel gel Apply 4 g topically to the appropriate area as directed 3 (Three) Times a Day. 5 tube 11   • glucose blood (ONE TOUCH ULTRA TEST) test strip USE TO TEST BLOOD SUGAR EVERY DAY. (E11.9) 100 each 2   • hydroxyurea (HYDREA) 500 MG capsule Take 500 mg by mouth 3 (Three) Times a Week. Monday Wednesday Friday     • isosorbide mononitrate (IMDUR) 60 MG  "24 hr tablet Take 1 tablet by mouth Daily. 30 tablet 11   • ketoconazole (NIZORAL) 2 % cream Apply  topically to the appropriate area as directed Daily. 30 g 11   • metFORMIN (GLUCOPHAGE) 500 MG tablet Take 1 tablet by mouth 2 (Two) Times a Day With Meals. 60 tablet 11   • ofloxacin (OCUFLOX) 0.3 % ophthalmic solution Administer 2 drops to both eyes 4 (Four) Times a Day. 10 mL 11   • oxybutynin (DITROPAN) 5 MG tablet Take 1 tablet by mouth 2 (Two) Times a Day. 60 tablet 5   • mupirocin (Bactroban) 2 % ointment Apply  topically to the appropriate area as directed 3 (Three) Times a Day. 30 g 11   • oxyCODONE-acetaminophen (PERCOCET) 5-325 MG per tablet Take 1-2 tablets by mouth Every 4 (Four) Hours As Needed (Pain). 10 tablet 0   • valsartan-hydrochlorothiazide (DIOVAN-HCT) 320-25 MG per tablet Take 1 tablet by mouth Daily. 90 tablet 3     No current facility-administered medications on file prior to visit.     Allergies as of 03/21/2022 - Reviewed 02/01/2022   Allergen Reaction Noted   • Latex Unknown - Low Severity 02/02/2021     Social History     Socioeconomic History   • Marital status:    Tobacco Use   • Smoking status: Never Smoker   • Smokeless tobacco: Never Used   Vaping Use   • Vaping Use: Never used   Substance and Sexual Activity   • Alcohol use: No   • Drug use: No   • Sexual activity: Defer     Family History   Problem Relation Age of Onset   • Heart disease Mother         pacemaker placement   • Stroke Mother    • Colon cancer Brother    • Stroke Father    • Diabetes Other    • Kidney disease Other    • Hyperlipidemia Other    • Arthritis Other        Review of Systems   Constitutional: Positive for malaise/fatigue.   All other systems reviewed and are negative.     Objective:     Vitals:    03/21/22 1458   BP: 126/82   Pulse: 76   Resp: 16   SpO2: 98%   Weight: 69.4 kg (153 lb)   Height: 144.8 cm (57\")     Body mass index is 33.11 kg/m².    Physical Exam  Constitutional:       Appearance: She " is well-developed.   HENT:      Head: Normocephalic and atraumatic.   Eyes:      Conjunctiva/sclera: Conjunctivae normal.   Cardiovascular:      Rate and Rhythm: Normal rate and regular rhythm.      Heart sounds: No murmur heard.    No friction rub. No gallop.   Pulmonary:      Effort: Pulmonary effort is normal.      Breath sounds: Normal breath sounds.   Abdominal:      Palpations: Abdomen is soft.      Tenderness: There is no abdominal tenderness.   Musculoskeletal:      Cervical back: Neck supple.   Skin:     General: Skin is warm.   Neurological:      Mental Status: She is alert and oriented to person, place, and time.   Psychiatric:         Behavior: Behavior normal.       Lab Review:     ECG 12 Lead    Date/Time: 3/21/2022 2:55 PM  Performed by: Israle Lanza MD  Authorized by: Israel Lanza MD   Comparison: compared with previous ECG from 2/4/2021  Similar to previous ECG  Rhythm: sinus rhythm  Rate: normal  BPM: 76    Clinical impression: normal ECG            Cardiac Procedures:  1. Echocardiogram on 3/23/2015 revealed an ejection fraction of 60-65%. There was   no significant valvular disease noted.   2. Lexiscan Myoview stress test on 10/5/2015: Normal with no evidence of ischemia   or infarction.   3.  14-day Zio patch starting on 8/16/2021: 208 sinus pauses greater than 3 seconds,   with the longest being 4.1 seconds.  Findings were consistent with sick sinus   syndrome.    Assessment:       Diagnosis Plan   1. SSS (sick sinus syndrome) ([facility])     2. Presence of cardiac pacemaker     3. Primary hypertension       Plan:        Again, since having her pacemaker placed on 9/17/2021, she has felt much better.  She has more energy, and has not had any further lightheaded episodes or palpitations.  Her pacemaker is functioning normally.  She is scheduled for another interrogation soon.    She will continue on the amlodipine at 5 mg/day, isosorbide 60 mg/day, and Diovan HCT at 320/25 mg/day.  Her  blood pressure has been good.  The amlodipine may be causing some lower extremity edema, although she is comfortable with this, and is elevating her legs.  She wants to stay on this medication.  I will see her back in the office in 6 months unless other issues arise.       Tazorac Pregnancy And Lactation Text: This medication is not safe during pregnancy. It is unknown if this medication is excreted in breast milk.

## 2023-10-12 ENCOUNTER — SPECIALTY PHARMACY (OUTPATIENT)
Dept: PHARMACY | Facility: HOSPITAL | Age: 83
End: 2023-10-12
Payer: MEDICARE

## 2023-10-20 ENCOUNTER — SPECIALTY PHARMACY (OUTPATIENT)
Dept: ONCOLOGY | Facility: HOSPITAL | Age: 83
End: 2023-10-20
Payer: MEDICARE

## 2023-10-20 ENCOUNTER — LAB (OUTPATIENT)
Dept: LAB | Facility: HOSPITAL | Age: 83
End: 2023-10-20
Payer: MEDICARE

## 2023-10-20 ENCOUNTER — OFFICE VISIT (OUTPATIENT)
Dept: ONCOLOGY | Facility: CLINIC | Age: 83
End: 2023-10-20
Payer: MEDICARE

## 2023-10-20 VITALS
TEMPERATURE: 97.7 F | SYSTOLIC BLOOD PRESSURE: 127 MMHG | HEIGHT: 61 IN | DIASTOLIC BLOOD PRESSURE: 71 MMHG | OXYGEN SATURATION: 99 % | BODY MASS INDEX: 28.32 KG/M2 | WEIGHT: 150 LBS | HEART RATE: 66 BPM | RESPIRATION RATE: 16 BRPM

## 2023-10-20 VITALS — WEIGHT: 150 LBS | BODY MASS INDEX: 28.36 KG/M2

## 2023-10-20 DIAGNOSIS — N28.9 KIDNEY LESION: ICD-10-CM

## 2023-10-20 DIAGNOSIS — Z79.899 ENCOUNTER FOR LONG-TERM CURRENT USE OF HIGH RISK MEDICATION: ICD-10-CM

## 2023-10-20 DIAGNOSIS — Z85.038 HISTORY OF COLON CANCER: ICD-10-CM

## 2023-10-20 DIAGNOSIS — D47.1 MYELOPROLIFERATIVE DISORDER: Primary | ICD-10-CM

## 2023-10-20 DIAGNOSIS — E83.52 HYPERCALCEMIA: ICD-10-CM

## 2023-10-20 DIAGNOSIS — D47.1 MYELOPROLIFERATIVE DISORDER: ICD-10-CM

## 2023-10-20 DIAGNOSIS — R35.0 INCREASED URINARY FREQUENCY: ICD-10-CM

## 2023-10-20 DIAGNOSIS — D50.9 IRON DEFICIENCY ANEMIA, UNSPECIFIED IRON DEFICIENCY ANEMIA TYPE: ICD-10-CM

## 2023-10-20 LAB
ALBUMIN SERPL-MCNC: 3.9 G/DL (ref 3.5–5.2)
ALBUMIN/GLOB SERPL: 1.6 G/DL
ALP SERPL-CCNC: 83 U/L (ref 39–117)
ALT SERPL W P-5'-P-CCNC: 15 U/L (ref 1–33)
ANION GAP SERPL CALCULATED.3IONS-SCNC: 16.2 MMOL/L (ref 5–15)
AST SERPL-CCNC: 30 U/L (ref 1–32)
BASOPHILS # BLD AUTO: 0.41 10*3/MM3 (ref 0–0.2)
BASOPHILS NFR BLD AUTO: 2.3 % (ref 0–1.5)
BILIRUB SERPL-MCNC: 0.9 MG/DL (ref 0–1.2)
BUN SERPL-MCNC: 22 MG/DL (ref 8–23)
BUN/CREAT SERPL: 21.8 (ref 7–25)
CALCIUM SPEC-SCNC: 10.2 MG/DL (ref 8.6–10.5)
CHLORIDE SERPL-SCNC: 100 MMOL/L (ref 98–107)
CO2 SERPL-SCNC: 19.8 MMOL/L (ref 22–29)
CREAT SERPL-MCNC: 1.01 MG/DL (ref 0.6–1.1)
DEPRECATED RDW RBC AUTO: 62.1 FL (ref 37–54)
EGFRCR SERPLBLD CKD-EPI 2021: 56 ML/MIN/1.73
EOSINOPHIL # BLD AUTO: 0.57 10*3/MM3 (ref 0–0.4)
EOSINOPHIL NFR BLD AUTO: 3.2 % (ref 0.3–6.2)
ERYTHROCYTE [DISTWIDTH] IN BLOOD BY AUTOMATED COUNT: 15 % (ref 12.3–15.4)
FERRITIN SERPL-MCNC: 319 NG/ML (ref 13–150)
GLOBULIN UR ELPH-MCNC: 2.4 GM/DL
GLUCOSE SERPL-MCNC: 90 MG/DL (ref 65–99)
HCT VFR BLD AUTO: 33.8 % (ref 34–46.6)
HGB BLD-MCNC: 11.7 G/DL (ref 12–15.9)
IMM GRANULOCYTES # BLD AUTO: 1.71 10*3/MM3 (ref 0–0.05)
IMM GRANULOCYTES NFR BLD AUTO: 9.5 % (ref 0–0.5)
IRON 24H UR-MRATE: 64 MCG/DL (ref 37–145)
IRON SATN MFR SERPL: 26 % (ref 20–50)
LDH SERPL-CCNC: 435 U/L (ref 135–214)
LYMPHOCYTES # BLD AUTO: 1.41 10*3/MM3 (ref 0.7–3.1)
LYMPHOCYTES NFR BLD AUTO: 7.9 % (ref 19.6–45.3)
MCH RBC QN AUTO: 39 PG (ref 26.6–33)
MCHC RBC AUTO-ENTMCNC: 34.6 G/DL (ref 31.5–35.7)
MCV RBC AUTO: 112.7 FL (ref 79–97)
MONOCYTES # BLD AUTO: 0.97 10*3/MM3 (ref 0.1–0.9)
MONOCYTES NFR BLD AUTO: 5.4 % (ref 5–12)
NEUTROPHILS NFR BLD AUTO: 12.87 10*3/MM3 (ref 1.7–7)
NEUTROPHILS NFR BLD AUTO: 71.7 % (ref 42.7–76)
NRBC BLD AUTO-RTO: 0 /100 WBC (ref 0–0.2)
PLATELET # BLD AUTO: 320 10*3/MM3 (ref 140–450)
PMV BLD AUTO: 10.8 FL (ref 6–12)
POTASSIUM SERPL-SCNC: 3.9 MMOL/L (ref 3.5–5.2)
PROT SERPL-MCNC: 6.3 G/DL (ref 6–8.5)
RBC # BLD AUTO: 3 10*6/MM3 (ref 3.77–5.28)
SODIUM SERPL-SCNC: 136 MMOL/L (ref 136–145)
TIBC SERPL-MCNC: 244 MCG/DL (ref 298–536)
TRANSFERRIN SERPL-MCNC: 174 MG/DL (ref 200–360)
WBC NRBC COR # BLD: 17.94 10*3/MM3 (ref 3.4–10.8)

## 2023-10-20 PROCEDURE — 83540 ASSAY OF IRON: CPT

## 2023-10-20 PROCEDURE — 3074F SYST BP LT 130 MM HG: CPT | Performed by: INTERNAL MEDICINE

## 2023-10-20 PROCEDURE — 85025 COMPLETE CBC W/AUTO DIFF WBC: CPT

## 2023-10-20 PROCEDURE — 99214 OFFICE O/P EST MOD 30 MIN: CPT | Performed by: INTERNAL MEDICINE

## 2023-10-20 PROCEDURE — 83615 LACTATE (LD) (LDH) ENZYME: CPT

## 2023-10-20 PROCEDURE — 36415 COLL VENOUS BLD VENIPUNCTURE: CPT

## 2023-10-20 PROCEDURE — 82728 ASSAY OF FERRITIN: CPT

## 2023-10-20 PROCEDURE — 3078F DIAST BP <80 MM HG: CPT | Performed by: INTERNAL MEDICINE

## 2023-10-20 PROCEDURE — 1126F AMNT PAIN NOTED NONE PRSNT: CPT | Performed by: INTERNAL MEDICINE

## 2023-10-20 PROCEDURE — 80053 COMPREHEN METABOLIC PANEL: CPT

## 2023-10-20 PROCEDURE — 84466 ASSAY OF TRANSFERRIN: CPT

## 2023-10-20 NOTE — PROGRESS NOTES
Subjective     CHIEF COMPLAINT:      Chief Complaint   Patient presents with    Follow-up     No concerns      HISTORY OF PRESENT ILLNESS:     Ayla Castellano is a 83 y.o. female patient who returns today for follow up on her myeloproliferative disorder. She is accompanied by her daughter who provided interpretation during the visit per the patient's request. She reports feeling better than the way she felt a few months ago. She reports constipation alternating with diarrhea. No abdominal pain.     Patient repots that she is concerned about a genetic cause for her cancer. Her brother who was 2 years younger than her passed away from colon cancer.     ROS:  Pertinent ROS is in the HPI.     Past medical, surgical, social and family history were reviewed.     MEDICATIONS:    Current Outpatient Medications:     Accu-Chek FastClix Lancets misc, To test blood sugar 3 times daily., Disp: 100 each, Rfl: 8    amLODIPine (NORVASC) 5 MG tablet, Take 1 tablet by mouth Daily., Disp: 90 tablet, Rfl: 0    aspirin 81 MG tablet, Take 1 tablet by mouth Daily., Disp: , Rfl:     atorvastatin (LIPITOR) 20 MG tablet, TAKE 1 TABLET BY MOUTH DAILY, Disp: 90 tablet, Rfl: 3    Cyanocobalamin (B-12) 2500 MCG sublingual tablet, Place 1 tablet under the tongue Daily., Disp: 30 tablet, Rfl: 11    empagliflozin (Jardiance) 10 MG tablet tablet, Take 1 tablet by mouth Daily., Disp: 90 tablet, Rfl: 0    glucose blood (ONE TOUCH ULTRA TEST) test strip, USE TO TEST BLOOD SUGAR EVERY DAY. (E11.9), Disp: 100 each, Rfl: 2    hydroxyurea (HYDREA) 500 MG capsule, TAKE 1 CAPSULE BY MOUTH ON MONDAYS, WEDNESDAYS, FRIDAYS AND SUNDAYS, Disp: 48 capsule, Rfl: 3    isosorbide mononitrate (IMDUR) 60 MG 24 hr tablet, Take 1 tablet by mouth Daily., Disp: , Rfl:     ketoconazole (NIZORAL) 2 % cream, Apply  topically to the appropriate area as directed Daily., Disp: 30 g, Rfl: 11    metFORMIN (GLUCOPHAGE) 500 MG tablet, Take 1 tablet by mouth 2 (Two) Times a Day  "With Meals., Disp: 180 tablet, Rfl: 2    mupirocin (Bactroban) 2 % ointment, Apply  topically to the appropriate area as directed 3 (Three) Times a Day., Disp: 30 g, Rfl: 11    ofloxacin (OCUFLOX) 0.3 % ophthalmic solution, Administer 2 drops to both eyes 4 (Four) Times a Day., Disp: 10 mL, Rfl: 11    oxybutynin (DITROPAN) 5 MG tablet, Take 1 tablet by mouth 2 (Two) Times a Day., Disp: 60 tablet, Rfl: 5    triamcinolone (KENALOG) 0.1 % ointment, Apply 1 application  topically to the appropriate area as directed 2 (Two) Times a Day. Apply to nape of neck, Disp: 30 g, Rfl: 2    valsartan-hydrochlorothiazide (DIOVAN-HCT) 320-25 MG per tablet, Take 1 tablet by mouth Daily., Disp: 90 tablet, Rfl: 0  Objective     VITAL SIGNS:     Vitals:    10/20/23 0847   BP: 127/71   Pulse: 66   Resp: 16   Temp: 97.7 °F (36.5 °C)   TempSrc: Temporal   SpO2: 99%   Weight: 68 kg (150 lb)   Height: 154.9 cm (60.98\")   PainSc: 0-No pain     Body mass index is 28.36 kg/m².     Wt Readings from Last 5 Encounters:   10/20/23 68 kg (150 lb)   10/20/23 68 kg (150 lb)   08/29/23 68.9 kg (152 lb)   08/25/23 68.5 kg (151 lb 1.6 oz)   06/28/23 69.1 kg (152 lb 6.4 oz)     PHYSICAL EXAMINATION:   GENERAL: The patient appears in good general condition, not in acute distress.   SKIN: No ecchymosis.  EYES: No jaundice. Pallor.  CHEST: Normal respiratory effort. Lungs clear bilaterally. No added sounds.   CVS: Normal S1 and S2. No murmurs.  ABDOMEN: Soft. No tenderness. No Hepatomegaly. No Splenomegaly. No masses.  EXTREMITIES: No edema.    DIAGNOSTIC DATA:     Results from last 7 days   Lab Units 10/20/23  0830   WBC 10*3/mm3 17.94*   NEUTROS ABS 10*3/mm3 12.87*   HEMOGLOBIN g/dL 11.7*   HEMATOCRIT % 33.8*   PLATELETS 10*3/mm3 320     Results from last 7 days   Lab Units 10/20/23  0830   SODIUM mmol/L 136   POTASSIUM mmol/L 3.9   CHLORIDE mmol/L 100   CO2 mmol/L 19.8*   BUN mg/dL 22   CREATININE mg/dL 1.01   CALCIUM mg/dL 10.2   ALBUMIN g/dL 3.9 "   BILIRUBIN mg/dL 0.9   ALK PHOS U/L 83   ALT (SGPT) U/L 15   AST (SGOT) U/L 30   GLUCOSE mg/dL 90         Lab 10/20/23  0830   IRON 64   IRON SATURATION (TSAT) 26   TIBC 244*   TRANSFERRIN 174*   FERRITIN 319.00*      Component      Latest Ref Rng 2/2/2021 10/20/2023   LDH      135 - 214 U/L 441 (H)  435 (H)       Component      Latest Ref Rng 8/25/2023   Methylmalonic Acid      0 - 378 nmol/L 178      Lab Results   Component Value Date    CEA 1.44 08/25/2023    CEA 0.6 03/24/2015      Assessment & Plan    *Myeloproliferative disorder, 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   She was under the care of an outside Hematologist before transferring care to our practice. Records indicate that she was previously diagnosed with Essential thrombocythemia.   She has elevated WBC and basophil count.   Therefore, a diagnosis of myeloproliferative disorder or polycythemia vera was favored over ET.  BCR ABL was negative by FISH on 2/2/2021.  In February 2021, WBC count was gradually increasing indicating that her disease was not under a good control likely due to the dose of Hydroxyurea not being effective (500 mg every 4 days).   The dose was increased to every 3 days on 2/2/2021.  Due to the persistence of the leukocytosis, the dose of Hydrea was increased to 4 days a week.  Hemoglobin decreased to 10.0 on 10/4/2021.  WBC count was 15,270.  Due to the worsening anemia, we reduced the dose of Hydrea back to 3 days a week.  Labs on 1/6/2022 revealed a hemoglobin of 10.2.  WBC was 16,600.  Platelets were 352,000.  Spleen measured 14.7 cm on CT scan on 1/14/2022.  Labs on 5/5/2022 revealed a hemoglobin of 9.9.  WBC decreased to 14,980.  Platelets were 357,000.  WBC increased to 19,200 on 7/28/2022.  Hydrea was increased to 500 mg 4 days a week.  WBC decreased to 16,260 on 12/1/2022.  CT on 7/25/2023 showed increase in the size of the spleen from 14.5 cm to 15.7 cm.  8/25/2023: WBC  increased to 18,640.  Platelets normal at 361,000.    10/20/2023: WBC 17,940.  Platelets 320,000.  Hemoglobin improved to 11.7.  She is tolerating Hydrea. She reports feeling better than the way she felt over the past few months..     *Iron deficiency anemia.   This is attributed to iron deficiency with a transferrin saturation of 17% and a ferritin of 26.   The patient did not tolerate oral in the past with development of upset stomach, abdominal pain, headaches and dizziness when she took the oral iron in the past.  Patient received IV iron the  and tolerated that well.   Patient was given IV Injectafer on 2021 and 2021.  Hemoglobin was 10.0 on 10/4/2021.  Hemoglobin decreased to 9.9 on 2022.  Iron stores were adequate.  Hemoglobin improved to 10.7 on 2022.  Hemoglobin was 12.0 on 2022.  Hemoglobin decreased to 10.8 on 2022-likely secondary to blood loss following a fall with development of hematoma.  Hemoglobin decreased to 11.3 on 2023.    Ferritin was 41.6.  Transferrin saturation was 18%.  She was given IV Venofer 300 mg weekly x5 doses between 2023 and 2023.  2023: Hemoglobin decreased to 10.0.  However, ferritin increased to 579. Transferrin saturation increased to 46%.  The decrease in hemoglobin was attributed to infection.  10/20/2023: Hemoglobin improved to 11.7.  Iron store adequate with ferritin 319. Transferrin saturation 26%.    *History of colon cancer, stage I.   She is s/p partial colectomy by Dr. Ku.   She did not require adjuvant chemotherapy or radiation therapy.    CT scan on 2022 revealed no evidence of recurrence.   CT scan on 2022 revealed no evidence of recurrence.  CT scan on 2023 revealed no evidence of recurrence.  2023: CEA 1.44.  She has a family history of colon cancer. Her brother who was 2 years younger  from metastatic colon cancer.   I recommended a referral to the Genetics clinic.      *Hypercalcemia.  This is chronic.  7/28/2022: Calcium 11.2.   This was previously evaluated by her PCP.  PTH was normal at 62 on 8/12/2022.  12/1/2022: Calcium 11.3.  We recommended reducing intake of calcium rich food.  3/28/2023: Calcium 11.1.  8/25/2023: Calcium decreased to 10.2.  10/20/2023: Calcium 10.2.    *Diarrhea.  Patient is having recurrent episodes of diarrhea.  This might be attributed to post surgical changes from the colon surgery.  Patient advised to take Imodium PRN.   She has intermittent constipation alternating with diarrhea.    PLAN:    1.  Continue Hydrea 500 mg 4 days a week.  2.  She does not need IV iron therapy at this time.  3.  Continue vitamin B12 1000 mcg daily.  4.  Refer to genetics clinic.  5.  I recommended adding Metamucil once daily.  6.  Repeat CBC CMP ferritin iron panel in 2 months.  7.  Follow-up in 4 months.  With CBC CMP ferritin iron panel CEA LDH.        Izzy Martinez MD  10/20/23

## 2023-10-20 NOTE — PROGRESS NOTES
Specialty Pharmacy Patient Management Program  Oncology 6-Month Clinical Assessment       Ayla Castellano is a 83 y.o. female with myeloproliferative disorder seen today to assess adherence and side effects.    Regimen: Hydrea 500mg PO once daily four times a week    Reason for Outreach: Routine medication check-in .         Goals Addressed Today    None       Medication Assessment:  Medication Assessment   Patient reports minimal to no side effects with medication. Plan to continue current therapy.  Administration: as prescribed .   Patient can self administer medications: yes  Medication Follow-Up Plan: Next clinical assessment  Lab Review: The labs listed below have been reviewed. No dose adjustments are needed for the oral specialty medication(s) based on the labs.    Lab Results   Component Value Date    GLUCOSE 99 09/05/2023    CALCIUM 10.5 09/05/2023     09/05/2023    K 4.5 09/05/2023    CO2 26.5 09/05/2023    CL 99 09/05/2023    BUN 21 09/05/2023    CREATININE 1.04 (H) 09/05/2023    EGFRIFAFRI 65 02/01/2022    EGFRIFNONA 57 (L) 02/01/2022    BCR 20.2 09/05/2023    ANIONGAP 12.0 08/25/2023     Lab Results   Component Value Date    WBC 17.94 (H) 10/20/2023    RBC 3.00 (L) 10/20/2023    HGB 11.7 (L) 10/20/2023    HCT 33.8 (L) 10/20/2023    .7 (H) 10/20/2023    MCH 39.0 (H) 10/20/2023    MCHC 34.6 10/20/2023    RDW 15.0 10/20/2023    RDWSD 62.1 (H) 10/20/2023    MPV 10.8 10/20/2023     10/20/2023    NEUTRORELPCT 71.7 10/20/2023    LYMPHORELPCT 7.9 (L) 10/20/2023    MONORELPCT 5.4 10/20/2023    EOSRELPCT 3.2 10/20/2023    BASORELPCT 2.3 (H) 10/20/2023    AUTOIGPER 9.5 (H) 10/20/2023    NEUTROABS 12.87 (H) 10/20/2023    LYMPHSABS 1.41 10/20/2023    MONOSABS 0.97 (H) 10/20/2023    EOSABS 0.57 (H) 10/20/2023    BASOSABS 0.41 (H) 10/20/2023    AUTOIGNUM 1.71 (H) 10/20/2023    NRBC 0.0 10/20/2023     Drug-drug interactions  Completed medication reconciliation today to assess for drug  interactions. Patient denies starting or stopping any medications.    Assessed medication list for interactions, no significant drug interactions noted.   Advised patient to call the clinic if any new medications are started so we can assess for drug-drug interactions.  Drug-food interactions discussed:  none  Vaccines are coordinated by the patient's oncologist and primary care provider.    Allergies  Known allergies and reactions were discussed with the patient. The patient's chart has been reviewed for allergy information and updated as necessary.   Allergies   Allergen Reactions    Latex Unknown - Low Severity     blisters        Hospitalizations and Urgent Care Visits Since Last Assessment:  Unplanned hospitalizations or inpatient admissions: none  ED Visits: none  Urgent Office Visits: none    Adherence Assessment:   Patient reports % adherence with no missed doses in the last month. No barriers to adherence noted.     Quality of Life Assessment:     -- Quality of Life: 7/10    Financial Assessment:  Medication availability/affordability: Patient has had no issues obtaining medication from pharmacy.    Attestation     I attest the patient was actively involved in and has agreed to the above plan of care.  If the prescribed therapy is at any point deemed not appropriate based on the current or future assessments, a consultation will be initiated with the patient's specialty care provider to determine the best course of action. The revised plan of therapy will be documented along with any required assessments and/or additional patient education provided.       All questions addressed and patient had no additional concerns. Patient has pharmacy contact information.    Nelda Bonilla, Pharmacy Student  10/20/2023  09:13 EDT      Vance Dobson, EdwardD, BCOP

## 2023-10-23 ENCOUNTER — SPECIALTY PHARMACY (OUTPATIENT)
Dept: PHARMACY | Facility: HOSPITAL | Age: 83
End: 2023-10-23
Payer: MEDICARE

## 2023-10-31 LAB — COPPER SERPL-MCNC: 81 UG/DL (ref 80–158)

## 2023-11-02 ENCOUNTER — TELEPHONE (OUTPATIENT)
Dept: ONCOLOGY | Facility: CLINIC | Age: 83
End: 2023-11-02
Payer: MEDICARE

## 2023-11-02 NOTE — TELEPHONE ENCOUNTER
Phoned patient's daughter and informed her that patient's copper level was low normal. Per Dr. Martinez, patient is to begin taking oral OTC copper supplement 2 mg daily. Patient's daughter v/u.

## 2023-11-07 ENCOUNTER — OFFICE VISIT (OUTPATIENT)
Dept: CARDIOLOGY | Facility: CLINIC | Age: 83
End: 2023-11-07
Payer: MEDICARE

## 2023-11-07 VITALS
SYSTOLIC BLOOD PRESSURE: 120 MMHG | HEIGHT: 60 IN | WEIGHT: 152.4 LBS | OXYGEN SATURATION: 98 % | HEART RATE: 73 BPM | BODY MASS INDEX: 29.92 KG/M2 | DIASTOLIC BLOOD PRESSURE: 78 MMHG

## 2023-11-07 DIAGNOSIS — E78.2 MIXED HYPERLIPIDEMIA: ICD-10-CM

## 2023-11-07 DIAGNOSIS — D47.1 MYELOPROLIFERATIVE DISORDER: ICD-10-CM

## 2023-11-07 DIAGNOSIS — I49.5 SSS (SICK SINUS SYNDROME): Primary | ICD-10-CM

## 2023-11-07 DIAGNOSIS — I10 PRIMARY HYPERTENSION: ICD-10-CM

## 2023-11-07 DIAGNOSIS — I10 ESSENTIAL HYPERTENSION: ICD-10-CM

## 2023-11-07 DIAGNOSIS — I25.118 CORONARY ARTERY DISEASE OF NATIVE HEART WITH STABLE ANGINA PECTORIS, UNSPECIFIED VESSEL OR LESION TYPE: ICD-10-CM

## 2023-11-07 PROBLEM — R07.9 CHEST PAIN: Status: RESOLVED | Noted: 2021-04-23 | Resolved: 2023-11-07

## 2023-11-07 PROBLEM — I45.5 SINUS PAUSE: Status: RESOLVED | Noted: 2021-09-14 | Resolved: 2023-11-07

## 2023-11-07 PROCEDURE — 93000 ELECTROCARDIOGRAM COMPLETE: CPT | Performed by: PHYSICIAN ASSISTANT

## 2023-11-07 PROCEDURE — 99214 OFFICE O/P EST MOD 30 MIN: CPT | Performed by: PHYSICIAN ASSISTANT

## 2023-11-07 PROCEDURE — 1160F RVW MEDS BY RX/DR IN RCRD: CPT | Performed by: PHYSICIAN ASSISTANT

## 2023-11-07 PROCEDURE — 3074F SYST BP LT 130 MM HG: CPT | Performed by: PHYSICIAN ASSISTANT

## 2023-11-07 PROCEDURE — 3078F DIAST BP <80 MM HG: CPT | Performed by: PHYSICIAN ASSISTANT

## 2023-11-07 PROCEDURE — 1159F MED LIST DOCD IN RCRD: CPT | Performed by: PHYSICIAN ASSISTANT

## 2023-11-07 RX ORDER — AMLODIPINE BESYLATE 5 MG/1
2.5 TABLET ORAL DAILY
Start: 2023-11-07 | End: 2023-11-07

## 2023-11-07 NOTE — PROGRESS NOTES
CARDIOLOGY    Date of Office Visit: 2023  Patient Name: Ayla Castellano  : 1940  Encounter Provider: Pedrito Lemus PA-C  Primary Cardiologist: Curry Recinos MD and previously Dr. Lanza    CHIEF COMPLAINT / REASON FOR OFFICE VISIT     6 month follow up with labs      HISTORY OF PRESENT ILLNESS     This is a 83 y.o. year old female who presents to [facility] CARDIOLOGY for a for a 6 month follow up.     For started following up at our office in  after a syncopal episode.  She was seen to have junctional bradycardia with heart rates into the 20s and heart rates improved with dopamine and she was discharged home.  In  she had a heart monitor completed showing multiple sinus pauses and eventually she underwent a [employer] dual-chamber pacemaker implantation on 2021.    At her last office visit, she was having intermittent episodes of left arm pain and discomfort when she was doing chores around her kitchen.  She denies any associated shortness of breath.  She needs a relatively an active lifestyle and does not do any formal exercise or walking.  Due to the symptoms she was recommended to complete a stress test for further evaluation.  Stress test did not show any evidence of ischemic changes.    She follows with oncology for myeloproliferative disorder.  She last saw them on 10/20/2023 and remains on Hydrea 500 mg 4 days/week.    Today the patient reports she has been doing fairly well over the past 2 weeks.  She continues to have mild chest discomfort when she really exerts herself but has not had any episodes at rest.  She states her energy has been at baseline.  She has had a few episodes of low blood pressure with systolic readings into the 30s and 40s.  She started holding her Norvasc at home.  When she has these episodes she feels dizzy and lightheaded.  I was agreeable to stopping her Norvasc.  I did have a long discussion with her and  "her daughter that if she does start developing the chest discomfort symptoms at rest and without exertion she will need to follow-up in our office.    She is present today with her daughter who acts as our .  She is previously from [facility], [place].    PMHx: CAD, HTN, HL, sick sinus syndrome s/p [employer] PPM, diabetes mellitus type 2, PAD, osteoporosis, arthritis    PHYSICAL EXAMINATION     Vital Signs:  /78 (BP Location: Right arm, Patient Position: Sitting, Cuff Size: Adult)   Pulse 73   Ht 152.4 cm (60\")   Wt 69.1 kg (152 lb 6.4 oz)   SpO2 98%   BMI 29.76 kg/m²   Estimated body mass index is 29.76 kg/m² as calculated from the following:    Height as of this encounter: 152.4 cm (60\").    Weight as of this encounter: 69.1 kg (152 lb 6.4 oz).       Physical Exam  Constitutional:       Appearance: Normal appearance.   HENT:      Head: Normocephalic and atraumatic.   Cardiovascular:      Rate and Rhythm: Normal rate and regular rhythm.      Pulses: Normal pulses.      Heart sounds: Normal heart sounds.   Pulmonary:      Effort: Pulmonary effort is normal.      Breath sounds: Normal breath sounds.   Musculoskeletal:      Right lower leg: No edema.      Left lower leg: No edema.   Skin:     General: Skin is warm and dry.   Neurological:      General: No focal deficit present.      Mental Status: She is alert and oriented to person, place, and time.          Cardiac Testing/Results     Cardiac Testing:   -[employer] device interrogation 6/29/2023 through 9/27/2023: 26% atrial paced beats, no new events or arrhythmias noted.    -Stress Test 5/17/2023: EF 67% with normal myocardial perfusion.  No evidence of ischemia.    Result Review :  The following data was reviewed by: Pedrito Lemus PA-C on 11/07/2023:    Lipid Panel          2/22/2023    08:56 9/18/2023    09:29   Lipid Panel   Total Cholesterol 142  105    Triglycerides 161  175    HDL Cholesterol 30  30    VLDL Cholesterol 28  29 " "   LDL Cholesterol  84  46       Lab Results   Component Value Date     10/20/2023     09/05/2023    K 3.9 10/20/2023    K 4.5 09/05/2023     10/20/2023    CL 99 09/05/2023    CO2 19.8 (L) 10/20/2023    CO2 26.5 09/05/2023    BUN 22 10/20/2023    BUN 21 09/05/2023    CREATININE 1.01 10/20/2023    CREATININE 1.04 (H) 09/05/2023    EGFRIFNONA 57 (L) 02/01/2022    EGFRIFNONA 60 (L) 01/06/2022    EGFRIFAFRI 65 02/01/2022    EGFRIFAFRI 71 10/14/2021    GLUCOSE 90 10/20/2023    GLUCOSE 99 09/05/2023    CALCIUM 10.2 10/20/2023    CALCIUM 10.5 09/05/2023    ALBUMIN 3.9 10/20/2023    ALBUMIN 4.5 09/05/2023    AST 30 10/20/2023    AST 24 09/05/2023    ALT 15 10/20/2023    ALT 25 09/05/2023     Lab Results   Component Value Date    WBC 17.94 (H) 10/20/2023    WBC 18.64 (H) 08/25/2023    HGB 11.7 (L) 10/20/2023    HGB 10.0 (L) 08/25/2023    HCT 33.8 (L) 10/20/2023    HCT 29.2 (L) 08/25/2023    .7 (H) 10/20/2023    .6 (H) 08/25/2023     10/20/2023     08/25/2023     No results found for: \"PROBNP\", \"BNP\"  Lab Results   Component Value Date    TROPONINT <0.01 03/24/2015     Lab Results   Component Value Date    TSH 4.11 03/22/2015         Data reviewed : Consultant notes oncology note 10/2023        ECG 12 Lead    Date/Time: 11/7/2023 11:04 AM  Performed by: Pedrito Oates PA-C    Authorized by: Pedrito Oates PA-C  Comparison: compared with previous ECG from 5/5/2023  Rhythm: sinus rhythm  Rate: normal  BPM: 73  Conduction: incomplete right bundle branch block  QRS axis: normal    Clinical impression: normal ECG                ASSESSMENT & PLAN       Diagnoses and all orders for this visit:    1. SSS (sick sinus syndrome) (Primary)  S/p [employer] pacemaker.  Recent device interrogations with normal function.  No episodes of dizziness or lightheadedness or near syncope  2. Coronary artery disease of native heart with stable angina pectoris, unspecified vessel or " lesion type  Remote history of CAD, recent stress testing 5/2023 shows no evidence of recurrent ischemia  Continue medical therapy in the form of aspirin, Imdur, and statin.  She previously been taken off of beta-blockers due to bradycardia and they have not been resumed since the procedure putting a pacemaker  The chest discomfort has been less compared to prior episodes and only occur with extreme exertion.  If she continues to have episodes I would recommend possibly transitioning her to a beta-blocker.  I did not do this at this time due to the fact that she has been having hypotension  3. Mixed hyperlipidemia  Goal LDL less than 70.  Most recent LDL below goal.  Continue atorvastatin  4. Primary hypertension  Keep blood pressure log.  On multiple antihypertensive medications.  Continue  Diovan 320-25 mg daily and Imdur.  Discontinue amlodipine due to episodes of hypotension at home  5.  Myeloproliferative disorder  Follows with oncology and is currently on Hydrea.  She also is getting vitamin B12 injections    Follow Up:  Return in about 6 months (around 5/7/2024) for Dr. Recinos-Nasir.  Patient was given instructions and counseling regarding her condition or for health maintenance advice. Please contact office if worsening symptoms or proceed to ER when appropriate.      Pedrito Lemus PA-C  11/07/23  11:00 EST    MEDICATIONS         Discharge Medications            Accurate as of November 7, 2023 11:00 AM. If you have any questions, ask your nurse or doctor.                Continue These Medications        Instructions Start Date   Accu-Chek FastClix Lancets misc   To test blood sugar 3 times daily.      amLODIPine 5 MG tablet  Commonly known as: NORVASC   5 mg, Oral, Daily      aspirin 81 MG tablet   81 mg, Oral, Daily      atorvastatin 20 MG tablet  Commonly known as: LIPITOR   20 mg, Oral, Daily      B-12 2500 MCG sublingual tablet   1 tablet, Sublingual, Daily      empagliflozin 10 MG tablet  tablet  Commonly known as: Jardiance   10 mg, Oral, Daily      glucose blood test strip  Commonly known as: ONE TOUCH ULTRA TEST   USE TO TEST BLOOD SUGAR EVERY DAY. (E11.9)      hydroxyurea 500 MG capsule  Commonly known as: HYDREA   TAKE 1 CAPSULE BY MOUTH ON MONDAYS, WEDNESDAYS, FRIDAYS AND SUNDAYS      isosorbide mononitrate 60 MG 24 hr tablet  Commonly known as: IMDUR   1 tablet, Oral, Daily      ketoconazole 2 % cream  Commonly known as: NIZORAL   Topical, Daily      metFORMIN 500 MG tablet  Commonly known as: GLUCOPHAGE   500 mg, Oral, 2 Times Daily With Meals      mupirocin 2 % ointment  Commonly known as: Bactroban   Topical, 3 Times Daily      ofloxacin 0.3 % ophthalmic solution  Commonly known as: OCUFLOX   2 drops, Both Eyes, 4 Times Daily      oxybutynin 5 MG tablet  Commonly known as: DITROPAN   5 mg, Oral, 2 Times Daily      triamcinolone 0.1 % ointment  Commonly known as: KENALOG   1 application , Topical, 2 Times Daily, Apply to nape of neck      valsartan-hydrochlorothiazide 320-25 MG per tablet  Commonly known as: DIOVAN-HCT   1 tablet, Oral, Daily                   **Dragon Disclaimer: This note was dictated using an electronic transcription. The electronic translation of spoken language may permit erroneous, or at times, nonsensical words or phrases to be inadvertently transcribed. Although I have reviewed the note for such errors, some may still exist.

## 2023-11-15 ENCOUNTER — OFFICE VISIT (OUTPATIENT)
Dept: ENDOCRINOLOGY | Age: 83
End: 2023-11-15
Payer: MEDICARE

## 2023-11-15 ENCOUNTER — SPECIALTY PHARMACY (OUTPATIENT)
Dept: ENDOCRINOLOGY | Age: 83
End: 2023-11-15
Payer: MEDICARE

## 2023-11-15 VITALS
SYSTOLIC BLOOD PRESSURE: 130 MMHG | DIASTOLIC BLOOD PRESSURE: 70 MMHG | TEMPERATURE: 96.8 F | WEIGHT: 147.6 LBS | OXYGEN SATURATION: 99 % | HEART RATE: 87 BPM | HEIGHT: 61 IN | BODY MASS INDEX: 27.87 KG/M2

## 2023-11-15 DIAGNOSIS — E11.65 TYPE 2 DIABETES MELLITUS WITH HYPERGLYCEMIA, WITHOUT LONG-TERM CURRENT USE OF INSULIN: Primary | ICD-10-CM

## 2023-11-15 PROCEDURE — 3075F SYST BP GE 130 - 139MM HG: CPT | Performed by: NURSE PRACTITIONER

## 2023-11-15 PROCEDURE — 3078F DIAST BP <80 MM HG: CPT | Performed by: NURSE PRACTITIONER

## 2023-11-15 PROCEDURE — 99213 OFFICE O/P EST LOW 20 MIN: CPT | Performed by: NURSE PRACTITIONER

## 2023-11-15 NOTE — PROGRESS NOTES
"Chief Complaint  Diabetes (Checks blood sugars daily)    Subjective        Ayal Castellano presents to [facility] ENDOCRINOLOGY  History of Present Illness    Patient is seen in consult today for E11.9 (ICD-10-CM) Type 2 diabetes mellitus without complication, without long-term current use of insulin from Luna De La Cruz MD.    Daughter wanted an endocrinology referral as her mother's BS are running 180-200 in the morning over the last 2 months. Daughter translates for the patient, she is a nurse    PCP did start her on Jardiance but they do not think it has helped too much with her fasting hyperglycemia     Diabetes Type 2  Diagnosed with diabetes about 10 years ago  Checking BS once a day only in the morning    CVA- denies   Retinopathy- denies, + cataracts   Yearly eye exams- yes  Thyroid disorder- denies   CAD- has a pacemaker for arrythmia, + HTN  Lung disorder- denies   Gastroparesis- denies   Pancreatitis- yes, chronic, gallbladder removed around 2019    complications- struggles with chronic UTIs  + CKD  Neuropathy- denies, feet are cold from anemia    Last diabetic foot exam- follows with podiatry q3m  Activity level- sedentary r/t fatigue   2011- colon cancer   She is unexpectedly losing weight   Follows a very healthy diet   Stress level- manageable   Sleep- no concerns   Uses dentures   Recent hospitalization: denies    Hypoglycemia: denies     Current diabetic regimen: Jardiance 10mg daily- reports her UTIs have not been affected. Also on metformin 500mg BID           Objective   Vital Signs:  /70   Pulse 87   Temp 96.8 °F (36 °C) (Temporal)   Ht 154.9 cm (60.98\")   Wt 67 kg (147 lb 9.6 oz)   SpO2 99%   BMI 27.91 kg/m²   Estimated body mass index is 27.91 kg/m² as calculated from the following:    Height as of this encounter: 154.9 cm (60.98\").    Weight as of this encounter: 67 kg (147 lb 9.6 oz).         Physical Exam  Vitals reviewed.   Constitutional:       " General: She is not in acute distress.  HENT:      Head: Normocephalic and atraumatic.   Cardiovascular:      Rate and Rhythm: Normal rate.   Pulmonary:      Effort: Pulmonary effort is normal. No respiratory distress.   Musculoskeletal:         General: No signs of injury. Normal range of motion.      Cervical back: Normal range of motion and neck supple.   Skin:     General: Skin is warm and dry.   Neurological:      Mental Status: She is alert and oriented to person, place, and time. Mental status is at baseline.   Psychiatric:         Mood and Affect: Mood normal.         Behavior: Behavior normal.         Thought Content: Thought content normal.         Judgment: Judgment normal.        Result Review :  The following data was reviewed by: LENA Greene on 11/15/2023:  Common labs          9/5/2023    09:49 9/18/2023    09:29 10/20/2023    08:30   Common Labs   Glucose 99   90    BUN 21   22    Creatinine 1.04   1.01    Sodium 136   136    Potassium 4.5   3.9    Chloride 99   100    Calcium 10.5   10.2    Total Protein 6.6      Albumin 4.5   3.9    Total Bilirubin 0.9   0.9    Alkaline Phosphatase 79   83    AST (SGOT) 24   30    ALT (SGPT) 25   15    WBC   17.94    Hemoglobin   11.7    Hematocrit   33.8    Platelets   320    Total Cholesterol  105     Triglycerides  175     HDL Cholesterol  30     LDL Cholesterol   46     Hemoglobin A1C  4.50                    Assessment and Plan   Diagnoses and all orders for this visit:    1. Type 2 diabetes mellitus with hyperglycemia, without long-term current use of insulin (Primary)  -     Fructosamine             Follow Up   Return in about 3 months (around 2/15/2024).    Check fructosamine today  Asked patient to check her BS before bed as well as in the morning to determine if her BS are high before bed leading to fasting hyperglycemia or if her BS are rising through the night while she sleeps    Educated on caution to use with Jardiance and reports of chronic  UTIs  Consider glipizide at dinner or low dose long acting insulin depending on results of nighttime versus fasting BS results     Goal fasting BS ~ 150    Patient was given instructions and counseling regarding her condition or for health maintenance advice. Please see specific information pulled into the AVS if appropriate.     Adore Morales APRN

## 2023-11-15 NOTE — PROGRESS NOTES
Benefits Investigation Summary    Prescription: Renewal     Disp[facility]    Copay amount: Jardiance $0/90 days    Prior Auth and Med Assistance notes: N/A    BIN: [id number]  PCN: [id number]  RX GROUP: MPLAURYN Jones PharmD, MM   Clinical Speciality Pharmacist, Endocrinology   11/15/2023  15:21 EST

## 2023-11-16 LAB — FRUCTOSAMINE SERPL-SCNC: 317 UMOL/L (ref 0–285)

## 2023-11-17 ENCOUNTER — PATIENT ROUNDING (BHMG ONLY) (OUTPATIENT)
Dept: ENDOCRINOLOGY | Age: 83
End: 2023-11-17
Payer: MEDICARE

## 2023-11-17 ENCOUNTER — TELEPHONE (OUTPATIENT)
Dept: ENDOCRINOLOGY | Age: 83
End: 2023-11-17
Payer: MEDICARE

## 2023-11-17 RX ORDER — GLIPIZIDE 5 MG/1
2.5 TABLET ORAL
Qty: 45 TABLET | Refills: 1 | Status: SHIPPED | OUTPATIENT
Start: 2023-11-17

## 2023-11-17 NOTE — TELEPHONE ENCOUNTER
PT'S DAUGHTER CALLED AND STATED THAT SHE TOOK HER MOTHER'S BLOOD SUGAR LAST NIGHT AND IT  AND THEN SHE CHECKED THIS MORNING AND IT . SHE WAS WONDERING IF EZRA COULD LOOK AT HER BLOOD WORK ALONG WITH HER BLOOD SUGAR READINGS AND SEE IF SHE COULD BE PLACE ON ANY MEDS AT THIS TIME. SHE IS STATING HER MOTHER ISN'T FEELING GOOD AND SHE BELIEVES IT'S DUE TO HER HIGH BLOOD SUGAR.         BRITTANY GARCES [phone]

## 2023-11-17 NOTE — TELEPHONE ENCOUNTER
Left message  Start glipizide 2.5mg with dinner  Do not take if not eating  Caution with hypoglycemia

## 2023-11-27 DIAGNOSIS — I10 ESSENTIAL HYPERTENSION: ICD-10-CM

## 2023-11-27 RX ORDER — EMPAGLIFLOZIN 10 MG/1
10 TABLET, FILM COATED ORAL DAILY
Qty: 90 TABLET | Refills: 0 | Status: SHIPPED | OUTPATIENT
Start: 2023-11-27

## 2023-11-28 ENCOUNTER — CLINICAL SUPPORT (OUTPATIENT)
Dept: GENETICS | Facility: HOSPITAL | Age: 83
End: 2023-11-28

## 2023-11-28 ENCOUNTER — LAB (OUTPATIENT)
Dept: LAB | Facility: HOSPITAL | Age: 83
End: 2023-11-28
Payer: MEDICARE

## 2023-11-28 DIAGNOSIS — Z13.79 GENETIC TESTING: Primary | ICD-10-CM

## 2023-11-28 DIAGNOSIS — Z80.0 FAMILY HISTORY OF COLON CANCER: ICD-10-CM

## 2023-11-28 DIAGNOSIS — D47.1 MYELOPROLIFERATIVE DISORDER: ICD-10-CM

## 2023-11-28 DIAGNOSIS — Z85.038 HISTORY OF COLON CANCER: ICD-10-CM

## 2023-11-28 PROCEDURE — 96040: CPT

## 2023-11-28 RX ORDER — AMLODIPINE BESYLATE 5 MG/1
5 TABLET ORAL DAILY
Qty: 90 TABLET | Refills: 3 | Status: SHIPPED | OUTPATIENT
Start: 2023-11-28

## 2023-11-28 NOTE — PROGRESS NOTES
Ayla Castellano a 83-year-old female, was seen for genetic counseling due to a personal history of colon cancer. Genetic counseling was performed with the aid of a [place] . Ms. Castellano's daughter was also present at the appointment. She has a personal history of colon cancer diagnosed at age 71. She underwent partial colectomy and did not require adjuvant chemotherapy or radiation therapy. There is no record of MMR/IHC testing in the chart. She was diagnosed with a myeloproliferative disorder, OBINNA-2 mutation positive. She is currently undergoing treatment for this. She was age 16 at menarche and had her first child at age 27. She went through menopause at age 41 when she underwent a hysterectomy. She retains her ovaries. 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 She stopped mammograms at age 77 and reports no prior breast biopsies. She had her last colonoscopy in 2020 and reports a 5 year interval. She reports the interval might be changed to every 10 if her next colonoscopy is clear. Ms. Castellano was interested in discussing Her risk of a hereditary cancer syndrome. she was interested in pursuing genetic testing, and therefore, the Common Hereditary Cancers panel through [facility] was ordered which analyzes 47 genes associated with an increased cancer risk. Her blood was drawn today, 11/28/2023, for this testing. Results from genetic testing are expected in 2-3 weeks after the sample is received.     FAMILY HISTORY:   Brother:   Colon cancer, 60  Mother:   Skin cancer  Mat. Grandmother:  Liver cancer    We do not have medical records regarding the diagnoses in Ms. Castellano's family.  RISK ASSESSMENT:  Ms. Castellano's personal history of colon cancer raised the question of a hereditary cancer syndrome. NCCN criteria for genetic testing for Tse syndrome states that any individual with colon cancer diagnosed below the age of 50 may consider genetic testing. Ms. Castellano does not meet  this criteria. Despite this, we discussed how genetic testing is still available to her. Ms. Castellano was interested in discussing genetic testing. We discussed the standard approach to genetic testing is through comprehensive multigene panel testing that would evaluate a greater number of genes associated with hereditary cancer. These risk assessments are based on the family history information provided at the time of the appointment and could change in the future should new information be obtained.    GENETIC COUNSELING: We reviewed the family history information in detail.  Cases of cancer follow three general patterns: sporadic, familial, and hereditary.  While most cancer is sporadic, some cases appear to occur in family clusters.  These cases are said to be familial and account for 10-20% of cancer cases. Familial cases may be due to a combination of shared genes and environmental factors among family members. In even fewer families, the cancer is said to be inherited, and the genes responsible for the cancer are known.      The pedigree patterns observed in sporadic versus hereditary cancer families were reviewed.  Family histories typical of hereditary cancer syndromes usually include multiple first- and second-degree relatives diagnosed with cancer types that define a syndrome.  These cases tend to be diagnosed at younger-than-expected ages and can be bilateral or multifocal.  The cancer in these families follows an autosomal dominant inheritance pattern, which indicates the likely presence of a mutation in a cancer susceptibility gene.  Children and siblings of an individual believed to carry this mutation have a 50% chance of inheriting that mutation, thereby inheriting the increased risk to develop cancer. These mutations can be passed down from the maternal or the paternal lineage.    Hereditary colon cancer accounts for 5-10% of all cases of colon cancer. The most common hereditary condition  associated with an increased risk for colon cancer is Tse syndrome.  The lifetime risk for colon cancer for individuals with Tse syndrome is up to 80% if there is no intervention (i.e. removal of polyps detected on colonoscopy).  Routine screening colonoscopy has been shown to reduce the incidence and mortality of colon cancer in Tse syndrome families by as much as 65%.  Other risks include cancer of the endometrium/uterus, ovary, stomach, urinary tract, small intestines, biliary tract, and brain.  MLH1 and MSH2 carry these higher risks, while MSH6 and PMS2 carry significantly lower risks.     There are also other, less common, hereditary cancer syndromes. Some of these conditions have well defined cancer risks and established management guidelines.  Other genes that can be tested for have been more recently described, and there may be less data regarding the risks and therefore may not have established management guidelines. We discussed these limitations at length. Genetic testing is typically performed through a multigene panel, evaluating the Tse syndrome genes as well as other genes associated with hereditary risk for colon cancer and other cancers. Ms. Castellano elected to pursue genetic testing to learn more information about potential risks to herself and relatives.    GENETIC TESTING:  The risks, benefits, and limitations of genetic testing and implications for clinical management following testing were reviewed.  DNA test results can influence decisions regarding screening, prevention, and surgical management.  Genetic testing can have significant psychological implications for both individuals and families.  Also discussed was the possibility of employment and insurance discrimination based on genetic test results and the laws in place to prevent this (ALAYNA).      We discussed panel testing, which would involve testing 48 genes associated with increased cancer risk. The implications of a positive  or negative test result were discussed. We discussed the possibility that, in some cases, genetic test results may be ambiguous due to the identification of a genetic variant of uncertain significance (VUS). These variants may or may not be associated with an increased cancer risk. With multigene panel testing, it is not uncommon for a VUS to be identified. ?If a VUS is identified, testing family members is typically not recommended and screening recommendations are made based on the family history. ?The laboratories that perform genetic testing work to reclassify the VUS and send out an amended report if and when a VUS is reclassified. ?The majority of variant findings are ultimately reclassified to a negative result. Given Ms. Castellano's personal history, a negative test result does not eliminate all cancer risk to relatives, although the risk may not be as high as it would with positive genetic testing.      Because Ms. Castellano has an active hematologic disease, the sensitivity and specificity of this genetic test result is reduced. If a substantial number of circulating tumor cells were present at the time of sample collection, caution should be applied in interpreting this result. We discussed the possibility that this could be a mutation present in her myeloproliferative disorder and not in her germline (every cell in his body) and the potential for confirmatory testing using skin fibroblasts.    PLAN: Genetic testing was ordered via the Common Hereditary Cancers panel through [facility]. Her blood was drawn today, 11/28/2023, for testing. Results are expected in 2-3 weeks once Guilherme receives the sample.?If she has any questions in the meantime, she is welcome to call me at [phone].   ?   Fawn Galvez, MS, [facility] [place] [facility]    Licensed Certified Genetic Counselor

## 2023-11-30 ENCOUNTER — TELEPHONE (OUTPATIENT)
Dept: VASCULAR SURGERY | Facility: CLINIC | Age: 83
End: 2023-11-30
Payer: MEDICARE

## 2023-11-30 NOTE — TELEPHONE ENCOUNTER
SPOKE WITH PATIENT'S DAUGHTER 'ANTNOIA' REGARDING MOTHER'S COVERAGE THAT WILL NO LONGER BE COVERED AS OF 1/1/24. DAUGHTER STATED THAT THEY ARE AWARE AND HAS ALREADY SWITCHED TO [employer].

## 2023-12-04 ENCOUNTER — TELEPHONE (OUTPATIENT)
Dept: ONCOLOGY | Facility: CLINIC | Age: 83
End: 2023-12-04
Payer: MEDICARE

## 2023-12-06 ENCOUNTER — TELEPHONE (OUTPATIENT)
Dept: GENETICS | Facility: HOSPITAL | Age: 83
End: 2023-12-06
Payer: MEDICARE

## 2023-12-06 ENCOUNTER — DOCUMENTATION (OUTPATIENT)
Dept: GENETICS | Facility: HOSPITAL | Age: 83
End: 2023-12-06
Payer: MEDICARE

## 2023-12-06 NOTE — TELEPHONE ENCOUNTER
Pt asked us to disclose her genetic test results to her daughter.    Spoke with patient's daughter and disclosed pt's negative genetic results. Informed her these results would be on [employer] and sent to her Dr. Patient's daughter requested a copy be mailed to her.    We discussed genetic testing was not recommended for the daughter based on her mother's negative genetic test results; However, the daughter's father and his family have a hx of cancer. Pt's daughter understands she may want to still consider genetic testing based on paternal family hx and she has genetics scheduling phone number to do so.

## 2023-12-06 NOTE — PROGRESS NOTES
Ayla Castellano a 83-year-old female, was seen for genetic counseling due to a personal history of colon cancer. Genetic counseling was performed with the aid of a Mongolian . Ms. Castellano's daughter was also present at the appointment. She has a personal history of colon cancer diagnosed at age 71. She underwent partial colectomy and did not require adjuvant chemotherapy or radiation therapy. There is no record of MMR/IHC testing in the chart. She was diagnosed with a myeloproliferative disorder, OBINNA-2 mutation positive. She is currently undergoing treatment for this. She was age 16 at menarche and had her first child at age 27. She went through menopause at age 41 when she underwent a hysterectomy. She retains her ovaries. 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 She stopped mammograms at age 77 and reports no prior breast biopsies. She had her last colonoscopy in 2020 and reports a 5 year interval. She reports the interval might be changed to every 10 if her next colonoscopy is clear. Ms. Castellano was interested in discussing Her risk of a hereditary cancer syndrome. she was interested in pursuing genetic testing, and therefore, the Common Hereditary Cancers panel through [facility] was ordered which analyzes 48 genes associated with an increased cancer risk. Genetic testing was negative for known pathogenic mutations in the 48 genes on this panel. These normal results were discussed with Ms. Castellano's daughter by telephone on 12/6/2023.     FAMILY HISTORY:   Brother:                       Colon cancer, 60  Mother:                        Skin cancer  Mat. Grandmother:      Liver cancer     We do not have medical records regarding the diagnoses in Ms. Castellano's family.    RISK ASSESSMENT:  Ms. Castellano's personal history of colon cancer raised the question of a hereditary cancer syndrome. NCCN criteria for genetic testing for Tse syndrome states that any individual with colon cancer  diagnosed below the age of 50 may consider genetic testing. Ms. Castellano does not meet this criteria. Despite this, we discussed how genetic testing is still available to her. Ms. Castellano was interested in discussing genetic testing. We discussed the standard approach to genetic testing is through comprehensive multigene panel testing that would evaluate a greater number of genes associated with hereditary cancer. These risk assessments are based on the family history information provided at the time of the appointment and could change in the future should new information be obtained.     GENETIC COUNSELING: We reviewed the family history information in detail.  Cases of cancer follow three general patterns: sporadic, familial, and hereditary.  While most cancer is sporadic, some cases appear to occur in family clusters.  These cases are said to be familial and account for 10-20% of cancer cases. Familial cases may be due to a combination of shared genes and environmental factors among family members. In even fewer families, the cancer is said to be inherited, and the genes responsible for the cancer are known.       The pedigree patterns observed in sporadic versus hereditary cancer families were reviewed.  Family histories typical of hereditary cancer syndromes usually include multiple first- and second-degree relatives diagnosed with cancer types that define a syndrome.  These cases tend to be diagnosed at younger-than-expected ages and can be bilateral or multifocal.  The cancer in these families follows an autosomal dominant inheritance pattern, which indicates the likely presence of a mutation in a cancer susceptibility gene.  Children and siblings of an individual believed to carry this mutation have a 50% chance of inheriting that mutation, thereby inheriting the increased risk to develop cancer. These mutations can be passed down from the maternal or the paternal lineage.     Hereditary colon cancer  accounts for 5-10% of all cases of colon cancer. The most common hereditary condition associated with an increased risk for colon cancer is Tse syndrome.  The lifetime risk for colon cancer for individuals with Tse syndrome is up to 80% if there is no intervention (i.e. removal of polyps detected on colonoscopy).  Routine screening colonoscopy has been shown to reduce the incidence and mortality of colon cancer in Tse syndrome families by as much as 65%.  Other risks include cancer of the endometrium/uterus, ovary, stomach, urinary tract, small intestines, biliary tract, and brain.  MLH1 and MSH2 carry these higher risks, while MSH6 and PMS2 carry significantly lower risks.      There are also other, less common, hereditary cancer syndromes. Some of these conditions have well defined cancer risks and established management guidelines.  Other genes that can be tested for have been more recently described, and there may be less data regarding the risks and therefore may not have established management guidelines. We discussed these limitations at length. Genetic testing is typically performed through a multigene panel, evaluating the Tse syndrome genes as well as other genes associated with hereditary risk for colon cancer and other cancers. Ms. Castellano elected to pursue genetic testing to learn more information about potential risks to herself and relatives.     GENETIC TESTING:  The risks, benefits, and limitations of genetic testing and implications for clinical management following testing were reviewed.  DNA test results can influence decisions regarding screening, prevention, and surgical management.  Genetic testing can have significant psychological implications for both individuals and families.  Also discussed was the possibility of employment and insurance discrimination based on genetic test results and the laws in place to prevent this (ALAY[place]).       We discussed panel testing, which would  involve testing 48 genes associated with increased cancer risk. The implications of a positive or negative test result were discussed. We discussed the possibility that, in some cases, genetic test results may be ambiguous due to the identification of a genetic variant of uncertain significance (VUS). These variants may or may not be associated with an increased cancer risk. With multigene panel testing, it is not uncommon for a VUS to be identified. ?If a VUS is identified, testing family members is typically not recommended and screening recommendations are made based on the family history. ?The laboratories that perform genetic testing work to reclassify the VUS and send out an amended report if and when a VUS is reclassified. ?The majority of variant findings are ultimately reclassified to a negative result. Given Ms. Castellano's personal history, a negative test result does not eliminate all cancer risk to relatives, although the risk may not be as high as it would with positive genetic testing.       Because Ms. Castellano has an active hematologic disease, the sensitivity and specificity of this genetic test result is reduced. If a substantial number of circulating tumor cells were present at the time of sample collection, caution should be applied in interpreting this result. We discussed the possibility that this could be a mutation present in her myeloproliferative disorder and not in her germline (every cell in his body) and the potential for confirmatory testing using skin fibroblasts.     TEST RESULTS:  Genetic testing was negative for known pathogenic mutations by sequencing, rearrangement testing, and RNA analysis for the 48 genes included on the Common Hereditary Cancers panel.  This negative result greatly lowers but does not eliminate the risk of a hereditary cancer syndrome for Ms. Castellano. This assessment is based on the information provided at the time of consultation and could change should  new information be obtained regarding his personal and/or family history.     CANCER SCREENING: Ms. Castellano's management and surveillance should be determined by her oncology team. For her family members, current NCCN guidelines recommend that individuals who have a first-degree relative diagnosed with colorectal cancer at any age should begin colonoscopy screening at age 40 or ten years before the earliest diagnosis of colorectal cancer in the family, whichever is earliest, and repeat every five years or more frequently based on personal clinical findings. This assessment is based on the information provided at the time of the consultation and could change should new information become available regarding the family history.    PLAN:  Genetic counseling remains available to Ms. Castellano and her family.  Ms. Castellano is welcome to contact us with any questions in the future at [phone].  ?    Fawn Galvez, MS, [facility] [place] [facility]    Licensed Certified Genetic Counselor      Cc: Izzy Castro MD

## 2023-12-18 ENCOUNTER — LAB (OUTPATIENT)
Dept: LAB | Facility: HOSPITAL | Age: 83
End: 2023-12-18
Payer: MEDICARE

## 2023-12-18 DIAGNOSIS — D47.1 MYELOPROLIFERATIVE DISORDER: ICD-10-CM

## 2023-12-18 DIAGNOSIS — D50.9 IRON DEFICIENCY ANEMIA, UNSPECIFIED IRON DEFICIENCY ANEMIA TYPE: ICD-10-CM

## 2023-12-18 DIAGNOSIS — Z85.038 HISTORY OF COLON CANCER: ICD-10-CM

## 2023-12-18 LAB
ALBUMIN SERPL-MCNC: 4.1 G/DL (ref 3.5–5.2)
ALBUMIN/GLOB SERPL: 1.8 G/DL
ALP SERPL-CCNC: 82 U/L (ref 39–117)
ALT SERPL W P-5'-P-CCNC: 22 U/L (ref 1–33)
ANION GAP SERPL CALCULATED.3IONS-SCNC: 8.4 MMOL/L (ref 5–15)
AST SERPL-CCNC: 25 U/L (ref 1–32)
BASOPHILS # BLD AUTO: 0.16 10*3/MM3 (ref 0–0.2)
BASOPHILS NFR BLD AUTO: 1.1 % (ref 0–1.5)
BILIRUB SERPL-MCNC: 0.7 MG/DL (ref 0–1.2)
BUN SERPL-MCNC: 19 MG/DL (ref 8–23)
BUN/CREAT SERPL: 19 (ref 7–25)
CALCIUM SPEC-SCNC: 10.3 MG/DL (ref 8.6–10.5)
CHLORIDE SERPL-SCNC: 104 MMOL/L (ref 98–107)
CO2 SERPL-SCNC: 26.6 MMOL/L (ref 22–29)
CREAT SERPL-MCNC: 1 MG/DL (ref 0.57–1)
DEPRECATED RDW RBC AUTO: 58.9 FL (ref 37–54)
EGFRCR SERPLBLD CKD-EPI 2021: 56.7 ML/MIN/1.73
EOSINOPHIL # BLD AUTO: 0.6 10*3/MM3 (ref 0–0.4)
EOSINOPHIL NFR BLD AUTO: 4 % (ref 0.3–6.2)
ERYTHROCYTE [DISTWIDTH] IN BLOOD BY AUTOMATED COUNT: 15.2 % (ref 12.3–15.4)
GLOBULIN UR ELPH-MCNC: 2.3 GM/DL
GLUCOSE SERPL-MCNC: 93 MG/DL (ref 65–99)
HCT VFR BLD AUTO: 31.8 % (ref 34–46.6)
HGB BLD-MCNC: 11 G/DL (ref 12–15.9)
IMM GRANULOCYTES # BLD AUTO: 1.41 10*3/MM3 (ref 0–0.05)
IMM GRANULOCYTES NFR BLD AUTO: 9.4 % (ref 0–0.5)
LYMPHOCYTES # BLD AUTO: 1.39 10*3/MM3 (ref 0.7–3.1)
LYMPHOCYTES NFR BLD AUTO: 9.2 % (ref 19.6–45.3)
MCH RBC QN AUTO: 37 PG (ref 26.6–33)
MCHC RBC AUTO-ENTMCNC: 34.6 G/DL (ref 31.5–35.7)
MCV RBC AUTO: 107.1 FL (ref 79–97)
MONOCYTES # BLD AUTO: 0.81 10*3/MM3 (ref 0.1–0.9)
MONOCYTES NFR BLD AUTO: 5.4 % (ref 5–12)
NEUTROPHILS NFR BLD AUTO: 10.7 10*3/MM3 (ref 1.7–7)
NEUTROPHILS NFR BLD AUTO: 70.9 % (ref 42.7–76)
NRBC BLD AUTO-RTO: 0 /100 WBC (ref 0–0.2)
PLATELET # BLD AUTO: 322 10*3/MM3 (ref 140–450)
PMV BLD AUTO: 10.3 FL (ref 6–12)
POTASSIUM SERPL-SCNC: 4 MMOL/L (ref 3.5–5.2)
PROT SERPL-MCNC: 6.4 G/DL (ref 6–8.5)
RBC # BLD AUTO: 2.97 10*6/MM3 (ref 3.77–5.28)
SODIUM SERPL-SCNC: 139 MMOL/L (ref 136–145)
WBC NRBC COR # BLD AUTO: 15.07 10*3/MM3 (ref 3.4–10.8)

## 2023-12-18 PROCEDURE — 85025 COMPLETE CBC W/AUTO DIFF WBC: CPT

## 2023-12-18 PROCEDURE — 80053 COMPREHEN METABOLIC PANEL: CPT

## 2023-12-18 PROCEDURE — 36415 COLL VENOUS BLD VENIPUNCTURE: CPT

## 2023-12-20 ENCOUNTER — OFFICE VISIT (OUTPATIENT)
Dept: FAMILY MEDICINE CLINIC | Facility: CLINIC | Age: 83
End: 2023-12-20
Payer: MEDICARE

## 2023-12-20 VITALS
TEMPERATURE: 98.4 F | BODY MASS INDEX: 27.98 KG/M2 | HEART RATE: 68 BPM | OXYGEN SATURATION: 97 % | SYSTOLIC BLOOD PRESSURE: 123 MMHG | DIASTOLIC BLOOD PRESSURE: 77 MMHG | WEIGHT: 148 LBS

## 2023-12-20 DIAGNOSIS — Z23 ENCOUNTER FOR IMMUNIZATION: ICD-10-CM

## 2023-12-20 DIAGNOSIS — N32.81 OVERACTIVE BLADDER: ICD-10-CM

## 2023-12-20 DIAGNOSIS — E78.5 HYPERLIPIDEMIA, UNSPECIFIED HYPERLIPIDEMIA TYPE: ICD-10-CM

## 2023-12-20 DIAGNOSIS — E78.2 MIXED HYPERLIPIDEMIA: ICD-10-CM

## 2023-12-20 DIAGNOSIS — E11.9 TYPE 2 DIABETES MELLITUS WITHOUT COMPLICATION, WITHOUT LONG-TERM CURRENT USE OF INSULIN: Primary | ICD-10-CM

## 2023-12-20 DIAGNOSIS — I10 PRIMARY HYPERTENSION: ICD-10-CM

## 2023-12-20 RX ORDER — OXYBUTYNIN CHLORIDE 5 MG/1
5 TABLET ORAL 2 TIMES DAILY
Qty: 180 TABLET | Refills: 3 | Status: SHIPPED | OUTPATIENT
Start: 2023-12-20

## 2023-12-20 RX ORDER — ATORVASTATIN CALCIUM 20 MG/1
20 TABLET, FILM COATED ORAL DAILY
Qty: 90 TABLET | Refills: 3 | Status: SHIPPED | OUTPATIENT
Start: 2023-12-20

## 2023-12-20 RX ORDER — GLIPIZIDE 5 MG/1
2.5 TABLET ORAL
Qty: 45 TABLET | Refills: 1 | Status: SHIPPED | OUTPATIENT
Start: 2023-12-20

## 2023-12-20 RX ORDER — VALSARTAN AND HYDROCHLOROTHIAZIDE 320; 25 MG/1; MG/1
1 TABLET, FILM COATED ORAL DAILY
Qty: 90 TABLET | Refills: 3 | Status: SHIPPED | OUTPATIENT
Start: 2023-12-20

## 2023-12-20 RX ORDER — ISOSORBIDE MONONITRATE 60 MG/1
60 TABLET, EXTENDED RELEASE ORAL DAILY
Qty: 90 TABLET | Refills: 3 | Status: SHIPPED | OUTPATIENT
Start: 2023-12-20

## 2023-12-20 NOTE — PROGRESS NOTES
"Translation provided by daughter     Chief Complaint  Diabetes    Subjective        Ayla Castellano presents to [facility]  History of Present Illness    Here for follow up. BP well controlled today. Pt has not had her Imdur. She says pharmacy had sent a request - looks like to maybe cardiology.   Having some pain in her low back near her kidneys. Worse if she has tomatoes. No urinary symptoms.   Has been started on glipizide. Does not notice any low glucoses.     Objective   Vital Signs:  /77 (BP Location: Right arm, Patient Position: Sitting, Cuff Size: Large Adult)   Pulse 68   Temp 98.4 °F (36.9 °C)   Wt 67.1 kg (148 lb)   SpO2 97%   BMI 27.98 kg/m²   Estimated body mass index is 27.98 kg/m² as calculated from the following:    Height as of 11/15/23: 154.9 cm (60.98\").    Weight as of this encounter: 67.1 kg (148 lb).               Physical Exam  Vitals and nursing note reviewed.   Constitutional:       General: She is not in acute distress.     Appearance: Normal appearance. She is not ill-appearing.   HENT:      Head: Normocephalic and atraumatic.      Nose: Nose normal.      Mouth/Throat:      Mouth: Mucous membranes are moist.   Eyes:      Extraocular Movements: Extraocular movements intact.      Conjunctiva/sclera: Conjunctivae normal.   Cardiovascular:      Rate and Rhythm: Normal rate and regular rhythm.      Heart sounds: Normal heart sounds. No murmur heard.     No gallop.   Pulmonary:      Effort: Pulmonary effort is normal. No respiratory distress.      Breath sounds: Normal breath sounds. No stridor. No wheezing, rhonchi or rales.   Chest:      Chest wall: No tenderness.   Skin:     General: Skin is warm and dry.   Neurological:      General: No focal deficit present.      Mental Status: She is alert and oriented to person, place, and time. Mental status is at baseline.   Psychiatric:         Mood and Affect: Mood normal.         Behavior: Behavior " normal.      Result Review :                   Assessment and Plan   Diagnoses and all orders for this visit:    1. Type 2 diabetes mellitus without complication, without long-term current use of insulin (Primary)  -     metFORMIN (GLUCOPHAGE) 500 MG tablet; Take 1 tablet by mouth 2 (Two) Times a Day With Meals.  Dispense: 180 tablet; Refill: 2    2. Primary hypertension  -     isosorbide mononitrate (IMDUR) 60 MG 24 hr tablet; Take 1 tablet by mouth Daily.  Dispense: 90 tablet; Refill: 3  -     valsartan-hydrochlorothiazide (DIOVAN-HCT) 320-25 MG per tablet; Take 1 tablet by mouth Daily.  Dispense: 90 tablet; Refill: 3    3. Mixed hyperlipidemia    4. Encounter for immunization  -     Fluzone High-Dose 65+yrs ([id number])    5. Hyperlipidemia, unspecified hyperlipidemia type  -     atorvastatin (LIPITOR) 20 MG tablet; Take 1 tablet by mouth Daily.  Dispense: 90 tablet; Refill: 3    6. Overactive bladder  -     oxybutynin (DITROPAN) 5 MG tablet; Take 1 tablet by mouth 2 (Two) Times a Day.  Dispense: 180 tablet; Refill: 3    Other orders  -     glipizide (Glucotrol) 5 MG tablet; Take 0.5 tablets by mouth Daily Before Supper.  Dispense: 45 tablet; Refill: 1             Follow Up   No follow-ups on file.  Patient was given instructions and counseling regarding her condition or for health maintenance advice. Please see specific information pulled into the AVS if appropriate.

## 2024-01-25 ENCOUNTER — TELEPHONE (OUTPATIENT)
Dept: ENDOCRINOLOGY | Age: 84
End: 2024-01-25
Payer: MEDICARE

## 2024-01-25 NOTE — TELEPHONE ENCOUNTER
Called and spoke with pt's daughter, regarding Christal's message. Pt's daughter would like to know if her mother needs to continuing taking the Glipizide before dinner or if she needs to be taking it in the morning.

## 2024-01-25 NOTE — TELEPHONE ENCOUNTER
Hub staff attempted to follow warm transfer process and was unsuccessful     Caller: Daily Castellano    Relationship to patient: Emergency Contact    Best call back number: [phone]    Patient is needing: DTR CALLED IN TO LET EZRA KNOW ABOUT THE PT HAVING HIGH BLOOD SUGAR LEVELS BETWEEN [phone]. PLEASE CALL THE DTR BACK.

## 2024-01-25 NOTE — TELEPHONE ENCOUNTER
Hub staff attempted to follow warm transfer process and was unsuccessful     Caller: Daily Castellano    Relationship to patient: Emergency Contact    Best call back number: [phone]    Patient is needing: PT RETURNING ANGELA'S PHONE CALL, UNABLE TO WT, PLEASE CALL BACK.

## 2024-01-25 NOTE — TELEPHONE ENCOUNTER
According to patient's chart she has been taking half tablet of glipizide.  Change to taking whole tablet once a day along with her metformin.  Adore will be back in the office middle of next week and can reassess at that time.

## 2024-01-25 NOTE — TELEPHONE ENCOUNTER
Called and spoke with patient's daughter. Past 10 days blood sugars have been high. Before breakfast was 162 and stays 150-160    She is only on metformin and glipizide.

## 2024-01-25 NOTE — TELEPHONE ENCOUNTER
Called and spoke with pt daughter. Informed her that her mother needs to continue taking the Glipizide before dinner.

## 2024-02-15 DIAGNOSIS — E11.9 TYPE 2 DIABETES MELLITUS WITHOUT COMPLICATION, WITHOUT LONG-TERM CURRENT USE OF INSULIN: ICD-10-CM

## 2024-02-20 ENCOUNTER — OFFICE VISIT (OUTPATIENT)
Dept: ENDOCRINOLOGY | Age: 84
End: 2024-02-20
Payer: MEDICARE

## 2024-02-20 VITALS
WEIGHT: 150 LBS | TEMPERATURE: 96.8 F | SYSTOLIC BLOOD PRESSURE: 124 MMHG | OXYGEN SATURATION: 99 % | DIASTOLIC BLOOD PRESSURE: 60 MMHG | HEIGHT: 61 IN | HEART RATE: 75 BPM | BODY MASS INDEX: 28.32 KG/M2

## 2024-02-20 DIAGNOSIS — Z87.19 HISTORY OF PANCREATITIS: ICD-10-CM

## 2024-02-20 DIAGNOSIS — N18.9 CHRONIC KIDNEY DISEASE, UNSPECIFIED CKD STAGE: ICD-10-CM

## 2024-02-20 DIAGNOSIS — Z95.0 STATUS CARDIAC PACEMAKER: ICD-10-CM

## 2024-02-20 DIAGNOSIS — E11.65 TYPE 2 DIABETES MELLITUS WITH HYPERGLYCEMIA, WITHOUT LONG-TERM CURRENT USE OF INSULIN: Primary | ICD-10-CM

## 2024-02-20 PROCEDURE — 99214 OFFICE O/P EST MOD 30 MIN: CPT | Performed by: NURSE PRACTITIONER

## 2024-02-20 PROCEDURE — 3078F DIAST BP <80 MM HG: CPT | Performed by: NURSE PRACTITIONER

## 2024-02-20 PROCEDURE — 3074F SYST BP LT 130 MM HG: CPT | Performed by: NURSE PRACTITIONER

## 2024-02-20 RX ORDER — GLIPIZIDE 5 MG/1
5 TABLET ORAL
Qty: 90 TABLET | Refills: 1 | Status: SHIPPED | OUTPATIENT
Start: 2024-02-20

## 2024-02-20 NOTE — PROGRESS NOTES
"Chief Complaint  Diabetes (Checks bs 2x /day checked yesterday was 156 before bedtime was 172. )    Subjective        Ayla Castellano presents to [facility]  History of Present Illness    Patient is seen today for followup from new patient visit  11/15/2023: E11.9 (ICD-10-CM) Type 2 diabetes mellitus without complication, without long-term current use of insulin from Luna De La Cruz MD. Daughter wanted an endocrinology referral as her mother's BS are running 180-200 in the morning over the last 2 months. Daughter translates for the patient, she is a nurse.   Glipizde 2.5mg wa started at dinner and then increased to 5mg about 2-3 weeks ago        Diabetes Type 2, > 10 years ago  (+)pacemaker for arrythmia, + HTN  (+)Pancreatitis; chronic, gallbladder removed around 2019   struggles with chronic UTIs, nothing recent  (+) CKD- on ARB   feet stay cold from anemia    follows with podiatry q3m  Activity level- sedentary r/t fatigue   2011- colon cancer   Weight has been stable  Follows a very healthy diet   Hypoglycemia: none reported     Current diabetic regimen:  metformin 500mg BID and glipizide 5mg QD   Jardiance stopped r/t h/o chronic UTIs.     Objective   Vital Signs:  /60   Pulse 75   Temp 96.8 °F (36 °C) (Temporal)   Ht 154.9 cm (60.98\")   Wt 68 kg (150 lb)   SpO2 99%   BMI 28.36 kg/m²   Estimated body mass index is 28.36 kg/m² as calculated from the following:    Height as of this encounter: 154.9 cm (60.98\").    Weight as of this encounter: 68 kg (150 lb).               Physical Exam  Vitals reviewed.   Constitutional:       General: She is not in acute distress.  HENT:      Head: Normocephalic and atraumatic.   Cardiovascular:      Rate and Rhythm: Normal rate.   Pulmonary:      Effort: Pulmonary effort is normal. No respiratory distress.   Musculoskeletal:         General: No signs of injury. Normal range of motion.      Cervical back: Normal range of motion " and neck supple.   Skin:     General: Skin is warm and dry.   Neurological:      Mental Status: She is alert and oriented to person, place, and time. Mental status is at baseline.   Psychiatric:         Mood and Affect: Mood normal.         Behavior: Behavior normal.         Thought Content: Thought content normal.         Judgment: Judgment normal.        Result Review :    The following data was reviewed by: LENA Greene on 02/20/2024:  Common labs          9/18/2023    09:29 10/20/2023    08:30 12/18/2023    10:16   Common Labs   Glucose  90  93    BUN  22  19    Creatinine  1.01  1.00    Sodium  136  139    Potassium  3.9  4.0    Chloride  100  104    Calcium  10.2  10.3    Albumin  3.9  4.1    Total Bilirubin  0.9  0.7    Alkaline Phosphatase  83  82    AST (SGOT)  30  25    ALT (SGPT)  15  22    WBC  17.94  15.07    Hemoglobin  11.7  11.0    Hematocrit  33.8  31.8    Platelets  320  322    Total Cholesterol 105      Triglycerides 175      HDL Cholesterol 30      LDL Cholesterol  46      Hemoglobin A1C 4.50                     Assessment and Plan     Diagnoses and all orders for this visit:    1. Type 2 diabetes mellitus with hyperglycemia, without long-term current use of insulin (Primary)  -     Hemoglobin A1c  -     Basic Metabolic Panel  -     Microalbumin / Creatinine Urine Ratio - Urine, Clean Catch    2. Chronic kidney disease, unspecified CKD stage    3. Status cardiac pacemaker    4. History of pancreatitis    Other orders  -     glipizide (Glucotrol) 5 MG tablet; Take 1 tablet by mouth Daily Before Supper.  Dispense: 90 tablet; Refill: 1             Follow Up     Return in about 3 months (around 5/20/2024).    Labs today, may consider increasing glipizide to BID if continued hyperglycemia    Patient was given instructions and counseling regarding her condition or for health maintenance advice. Please see specific information pulled into the AVS if appropriate.     LENA Greene

## 2024-02-21 LAB
BUN SERPL-MCNC: 26 MG/DL (ref 8–23)
BUN/CREAT SERPL: 23 (ref 7–25)
CALCIUM SERPL-MCNC: 10.9 MG/DL (ref 8.6–10.5)
CHLORIDE SERPL-SCNC: 104 MMOL/L (ref 98–107)
CO2 SERPL-SCNC: 25.1 MMOL/L (ref 22–29)
CREAT SERPL-MCNC: 1.13 MG/DL (ref 0.57–1)
EGFRCR SERPLBLD CKD-EPI 2021: 48.7 ML/MIN/1.73
GLUCOSE SERPL-MCNC: 78 MG/DL (ref 65–99)
HBA1C MFR BLD: 4.5 % (ref 4.8–5.6)
POTASSIUM SERPL-SCNC: 4.7 MMOL/L (ref 3.5–5.2)
SODIUM SERPL-SCNC: 141 MMOL/L (ref 136–145)
UNABLE TO VOID: NORMAL

## 2024-02-22 ENCOUNTER — LAB (OUTPATIENT)
Dept: LAB | Facility: HOSPITAL | Age: 84
End: 2024-02-22
Payer: MEDICARE

## 2024-02-22 ENCOUNTER — OFFICE VISIT (OUTPATIENT)
Dept: ONCOLOGY | Facility: CLINIC | Age: 84
End: 2024-02-22
Payer: MEDICARE

## 2024-02-22 VITALS
DIASTOLIC BLOOD PRESSURE: 69 MMHG | RESPIRATION RATE: 16 BRPM | OXYGEN SATURATION: 97 % | HEIGHT: 61 IN | TEMPERATURE: 97.8 F | BODY MASS INDEX: 28.4 KG/M2 | SYSTOLIC BLOOD PRESSURE: 112 MMHG | WEIGHT: 150.4 LBS | HEART RATE: 79 BPM

## 2024-02-22 DIAGNOSIS — D47.1 MYELOPROLIFERATIVE DISORDER: ICD-10-CM

## 2024-02-22 DIAGNOSIS — Z79.899 ENCOUNTER FOR LONG-TERM CURRENT USE OF HIGH RISK MEDICATION: ICD-10-CM

## 2024-02-22 DIAGNOSIS — D47.1 MYELOPROLIFERATIVE DISORDER: Primary | ICD-10-CM

## 2024-02-22 DIAGNOSIS — Z85.038 HISTORY OF COLON CANCER: ICD-10-CM

## 2024-02-22 DIAGNOSIS — D50.9 IRON DEFICIENCY ANEMIA, UNSPECIFIED IRON DEFICIENCY ANEMIA TYPE: ICD-10-CM

## 2024-02-22 DIAGNOSIS — E83.52 HYPERCALCEMIA: ICD-10-CM

## 2024-02-22 LAB
ALBUMIN SERPL-MCNC: 4.3 G/DL (ref 3.5–5.2)
ALBUMIN/GLOB SERPL: 1.9 G/DL
ALP SERPL-CCNC: 83 U/L (ref 39–117)
ALT SERPL W P-5'-P-CCNC: 10 U/L (ref 1–33)
ANION GAP SERPL CALCULATED.3IONS-SCNC: 9.7 MMOL/L (ref 5–15)
AST SERPL-CCNC: 23 U/L (ref 1–32)
BASOPHILS # BLD AUTO: 0.2 10*3/MM3 (ref 0–0.2)
BASOPHILS NFR BLD AUTO: 1.1 % (ref 0–1.5)
BILIRUB SERPL-MCNC: 0.6 MG/DL (ref 0–1.2)
BUN SERPL-MCNC: 23 MG/DL (ref 8–23)
BUN/CREAT SERPL: 21.5 (ref 7–25)
CALCIUM SPEC-SCNC: 10.4 MG/DL (ref 8.6–10.5)
CEA SERPL-MCNC: 1.27 NG/ML
CHLORIDE SERPL-SCNC: 104 MMOL/L (ref 98–107)
CO2 SERPL-SCNC: 25.3 MMOL/L (ref 22–29)
CREAT SERPL-MCNC: 1.07 MG/DL (ref 0.57–1)
DEPRECATED RDW RBC AUTO: 58.9 FL (ref 37–54)
EGFRCR SERPLBLD CKD-EPI 2021: 52 ML/MIN/1.73
EOSINOPHIL # BLD AUTO: 0.56 10*3/MM3 (ref 0–0.4)
EOSINOPHIL NFR BLD AUTO: 3.2 % (ref 0.3–6.2)
ERYTHROCYTE [DISTWIDTH] IN BLOOD BY AUTOMATED COUNT: 15.1 % (ref 12.3–15.4)
FERRITIN SERPL-MCNC: 259 NG/ML (ref 13–150)
GLOBULIN UR ELPH-MCNC: 2.3 GM/DL
GLUCOSE SERPL-MCNC: 118 MG/DL (ref 65–99)
HCT VFR BLD AUTO: 30.3 % (ref 34–46.6)
HGB BLD-MCNC: 10.5 G/DL (ref 12–15.9)
IMM GRANULOCYTES # BLD AUTO: 1.71 10*3/MM3 (ref 0–0.05)
IMM GRANULOCYTES NFR BLD AUTO: 9.8 % (ref 0–0.5)
IRON 24H UR-MRATE: 57 MCG/DL (ref 37–145)
IRON SATN MFR SERPL: 22 % (ref 20–50)
LDH SERPL-CCNC: 432 U/L (ref 135–214)
LYMPHOCYTES # BLD AUTO: 1.62 10*3/MM3 (ref 0.7–3.1)
LYMPHOCYTES NFR BLD AUTO: 9.3 % (ref 19.6–45.3)
MCH RBC QN AUTO: 37 PG (ref 26.6–33)
MCHC RBC AUTO-ENTMCNC: 34.7 G/DL (ref 31.5–35.7)
MCV RBC AUTO: 106.7 FL (ref 79–97)
MONOCYTES # BLD AUTO: 0.99 10*3/MM3 (ref 0.1–0.9)
MONOCYTES NFR BLD AUTO: 5.7 % (ref 5–12)
NEUTROPHILS NFR BLD AUTO: 12.35 10*3/MM3 (ref 1.7–7)
NEUTROPHILS NFR BLD AUTO: 70.9 % (ref 42.7–76)
NRBC BLD AUTO-RTO: 0 /100 WBC (ref 0–0.2)
PLATELET # BLD AUTO: 336 10*3/MM3 (ref 140–450)
PMV BLD AUTO: 10.3 FL (ref 6–12)
POTASSIUM SERPL-SCNC: 3.7 MMOL/L (ref 3.5–5.2)
PROT SERPL-MCNC: 6.6 G/DL (ref 6–8.5)
RBC # BLD AUTO: 2.84 10*6/MM3 (ref 3.77–5.28)
SODIUM SERPL-SCNC: 139 MMOL/L (ref 136–145)
TIBC SERPL-MCNC: 256 MCG/DL (ref 298–536)
TRANSFERRIN SERPL-MCNC: 183 MG/DL (ref 200–360)
WBC NRBC COR # BLD AUTO: 17.43 10*3/MM3 (ref 3.4–10.8)

## 2024-02-22 PROCEDURE — 3074F SYST BP LT 130 MM HG: CPT | Performed by: INTERNAL MEDICINE

## 2024-02-22 PROCEDURE — 99214 OFFICE O/P EST MOD 30 MIN: CPT | Performed by: INTERNAL MEDICINE

## 2024-02-22 PROCEDURE — 1160F RVW MEDS BY RX/DR IN RCRD: CPT | Performed by: INTERNAL MEDICINE

## 2024-02-22 PROCEDURE — 83540 ASSAY OF IRON: CPT

## 2024-02-22 PROCEDURE — 82728 ASSAY OF FERRITIN: CPT

## 2024-02-22 PROCEDURE — 83615 LACTATE (LD) (LDH) ENZYME: CPT

## 2024-02-22 PROCEDURE — 1126F AMNT PAIN NOTED NONE PRSNT: CPT | Performed by: INTERNAL MEDICINE

## 2024-02-22 PROCEDURE — 85025 COMPLETE CBC W/AUTO DIFF WBC: CPT

## 2024-02-22 PROCEDURE — 1159F MED LIST DOCD IN RCRD: CPT | Performed by: INTERNAL MEDICINE

## 2024-02-22 PROCEDURE — 80053 COMPREHEN METABOLIC PANEL: CPT

## 2024-02-22 PROCEDURE — 84466 ASSAY OF TRANSFERRIN: CPT

## 2024-02-22 PROCEDURE — 36415 COLL VENOUS BLD VENIPUNCTURE: CPT

## 2024-02-22 PROCEDURE — 3078F DIAST BP <80 MM HG: CPT | Performed by: INTERNAL MEDICINE

## 2024-02-22 PROCEDURE — 82378 CARCINOEMBRYONIC ANTIGEN: CPT | Performed by: INTERNAL MEDICINE

## 2024-02-22 NOTE — PROGRESS NOTES
Subjective     CHIEF COMPLAINT:      Chief Complaint   Patient presents with    Follow-up     HISTORY OF PRESENT ILLNESS:     Ayla Castellano is a 84 y.o. female patient who returns today for follow up on myeloproliferative disorder, colon cancer and anemia.  She returns today for follow-up accompanied by her daughter.  She reports fatigue.  She has not been drinking good amount of fluids on a regular basis.  She had labs with her endocrinologist and was found to have a increase in her creatinine.    ROS:  Pertinent ROS is in the HPI.     Past medical, surgical, social and family history were reviewed.     MEDICATIONS:    Current Outpatient Medications:     Accu-Chek FastClix Lancets misc, To test blood sugar 3 times daily., Disp: 100 each, Rfl: 8    amLODIPine (NORVASC) 5 MG tablet, TAKE 1 TABLET BY MOUTH DAILY, Disp: 90 tablet, Rfl: 3    aspirin 81 MG tablet, Take 1 tablet by mouth Daily., Disp: , Rfl:     atorvastatin (LIPITOR) 20 MG tablet, Take 1 tablet by mouth Daily., Disp: 90 tablet, Rfl: 3    Cyanocobalamin (B-12) 2500 MCG sublingual tablet, Place 1 tablet under the tongue Daily., Disp: 30 tablet, Rfl: 11    glipizide (Glucotrol) 5 MG tablet, Take 1 tablet by mouth Daily Before Supper., Disp: 90 tablet, Rfl: 1    glucose blood (ONE TOUCH ULTRA TEST) test strip, USE TO TEST BLOOD SUGAR EVERY DAY. (E11.9), Disp: 100 each, Rfl: 2    hydroxyurea (HYDREA) 500 MG capsule, TAKE 1 CAPSULE BY MOUTH ON MONDAYS, WEDNESDAYS, FRIDAYS AND SUNDAYS, Disp: 48 capsule, Rfl: 3    isosorbide mononitrate (IMDUR) 60 MG 24 hr tablet, Take 1 tablet by mouth Daily., Disp: 90 tablet, Rfl: 3    ketoconazole (NIZORAL) 2 % cream, Apply  topically to the appropriate area as directed Daily., Disp: 30 g, Rfl: 11    metFORMIN (GLUCOPHAGE) 500 MG tablet, TAKE 1 TABLET BY MOUTH TWICE DAILY WITH MEALS, Disp: 180 tablet, Rfl: 2    mupirocin (Bactroban) 2 % ointment, Apply  topically to the appropriate area as directed 3 (Three) Times a  "Day., Disp: 30 g, Rfl: 11    ofloxacin (OCUFLOX) 0.3 % ophthalmic solution, Administer 2 drops to both eyes 4 (Four) Times a Day., Disp: 10 mL, Rfl: 11    oxybutynin (DITROPAN) 5 MG tablet, Take 1 tablet by mouth 2 (Two) Times a Day., Disp: 180 tablet, Rfl: 3    triamcinolone (KENALOG) 0.1 % ointment, Apply 1 application  topically to the appropriate area as directed 2 (Two) Times a Day. Apply to nape of neck, Disp: 30 g, Rfl: 2    valsartan-hydrochlorothiazide (DIOVAN-HCT) 320-25 MG per tablet, Take 1 tablet by mouth Daily., Disp: 90 tablet, Rfl: 3    Objective     VITAL SIGNS:     Vitals:    02/22/24 1531   BP: 112/69   Pulse: 79   Resp: 16   Temp: 97.8 °F (36.6 °C)   TempSrc: Temporal   SpO2: 97%   Weight: 68.2 kg (150 lb 6.4 oz)   Height: 154.9 cm (60.98\")   PainSc: 0-No pain     Body mass index is 28.43 kg/m².     Wt Readings from Last 5 Encounters:   02/22/24 68.2 kg (150 lb 6.4 oz)   02/20/24 68 kg (150 lb)   12/20/23 67.1 kg (148 lb)   11/15/23 67 kg (147 lb 9.6 oz)   11/07/23 69.1 kg (152 lb 6.4 oz)     PHYSICAL EXAMINATION:   GENERAL: The patient appears in good general condition, not in acute distress.   SKIN: No ecchymosis.  EYES: No jaundice. No Pallor.  CHEST: Normal respiratory effort.  Lungs clear bilaterally.  No added sounds.  CVS: Normal S1-S2.  No murmurs.  ABDOMEN: Soft. No tenderness. No Hepatomegaly. No Splenomegaly. No masses.  EXTREMITIES: No edema.    DIAGNOSTIC DATA:     Results from last 7 days   Lab Units 02/22/24  1506   WBC 10*3/mm3 17.43*   NEUTROS ABS 10*3/mm3 12.35*   HEMOGLOBIN g/dL 10.5*   HEMATOCRIT % 30.3*   PLATELETS 10*3/mm3 336     Results from last 7 days   Lab Units 02/22/24  1506 02/20/24  1217   SODIUM mmol/L 139 141   POTASSIUM mmol/L 3.7 4.7   CHLORIDE mmol/L 104 104   CO2 mmol/L 25.3 25.1   BUN mg/dL 23 26*   CREATININE mg/dL 1.07* 1.13*   CALCIUM mg/dL 10.4 10.9*   ALBUMIN g/dL 4.3  --    BILIRUBIN mg/dL 0.6  --    ALK PHOS U/L 83  --    ALT (SGPT) U/L 10  --    AST " (SGOT) U/L 23  --    GLUCOSE mg/dL 118* 78         Lab 02/22/24  1506   IRON 57   IRON SATURATION (TSAT) 22   TIBC 256*   TRANSFERRIN 183*   FERRITIN 259.00*     Lab Results   Component Value Date    CEA 1.27 02/22/2024    CEA 1.44 08/25/2023    CEA 0.6 03/24/2015      Assessment & Plan    *Myeloproliferative disorder, 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   She was under the care of an outside Hematologist before transferring care to our practice. Records indicate that she was previously diagnosed with Essential thrombocythemia.   She has elevated WBC and basophil count.   Therefore, a diagnosis of myeloproliferative disorder or polycythemia vera was favored over ET.  BCR ABL was negative by FISH on 2/2/2021.  In February 2021, WBC count was gradually increasing indicating that her disease was not under a good control likely due to the dose of Hydroxyurea not being effective (500 mg every 4 days).   The dose was increased to every 3 days on 2/2/2021.  Due to the persistence of the leukocytosis, the dose of Hydrea was increased to 4 days a week.  Hemoglobin decreased to 10.0 on 10/4/2021.  WBC count was 15,270.  Due to the worsening anemia, we reduced the dose of Hydrea back to 3 days a week.  Labs on 1/6/2022 revealed a hemoglobin of 10.2.  WBC was 16,600.  Platelets were 352,000.  Spleen measured 14.7 cm on CT scan on 1/14/2022.  Labs on 5/5/2022 revealed a hemoglobin of 9.9.  WBC decreased to 14,980.  Platelets were 357,000.  WBC increased to 19,200 on 7/28/2022.  Hydrea was increased to 500 mg 4 days a week.  WBC decreased to 16,260 on 12/1/2022.  CT on 7/25/2023 showed increase in the size of the spleen from 14.5 cm to 15.7 cm.  8/25/2023: WBC increased to 18,640.  Platelets normal at 361,000.    10/20/2023: WBC 17,940.  Platelets 320,000.  Hemoglobin improved to 11.7.  She continues Hydrea 500 mg 4 days a week.   2/22/2024: WBC decreased to 17,430. Platelets  336,000.   Hemoglobin decreased to 10.5.  Since her WBC and platelets are stable, I recommended continuing the same dose of Hydrea.    *Iron deficiency anemia.   This is attributed to iron deficiency with a transferrin saturation of 17% and a ferritin of 26.   The patient did not tolerate oral in the past with development of upset stomach, abdominal pain, headaches and dizziness when she took the oral iron in the past.  Patient received IV iron the 2020 and tolerated that well.   Patient was given IV Injectafer on 2021 and 2021.  Hemoglobin was 10.0 on 10/4/2021.  Hemoglobin decreased to 9.9 on 2022.  Iron stores were adequate.  Hemoglobin improved to 10.7 on 2022.  Hemoglobin was 12.0 on 2022.  Hemoglobin decreased to 10.8 on 2022-likely secondary to blood loss following a fall with development of hematoma.  Hemoglobin decreased to 11.3 on 2023.    Ferritin was 41.6.  Transferrin saturation was 18%.  She was given IV Venofer 300 mg weekly x5 doses between 2023 and 2023.  2023: Hemoglobin decreased to 10.0.  However, ferritin increased to 579. Transferrin saturation increased to 46%.  10/20/2023: Hemoglobin improved to 11.7.  Iron store adequate with ferritin 319. Transferrin saturation 26%.  2024: Hemoglobin decreased to 10.5.  Ferritin 259.  Transferrin saturation 22%-indicating adequate iron stores.    The worsening anemia is attributed to anemia of chronic kidney disease.    *History of colon cancer, stage I.   She is s/p partial colectomy by Dr. Ku.   She did not require adjuvant chemotherapy or radiation therapy.    CT scan on 2022 revealed no evidence of recurrence.   CT scan on 2022 revealed no evidence of recurrence.  CT scan on 2023 revealed no evidence of recurrence.  2023: CEA 1.44.  She has a family history of colon cancer. Her brother who was 2 years younger  from metastatic colon cancer.   She was referred to the Genetics  clinic.   I recommended repeating CT scan chest abdomen pelvis in July 2024.    *Hypercalcemia.  This is chronic.  7/28/2022: Calcium 11.2.   This was previously evaluated by her PCP.  PTH was normal at 62 on 8/12/2022.  12/1/2022: Calcium 11.3.  We recommended reducing intake of calcium rich food.  3/28/2023: Calcium 11.1.  8/25/2023: Calcium decreased to 10.2.  10/20/2023: Calcium 10.2.  2/22/2024:  Calcium 10.4.    PLAN:    1.  Continue Hydrea 500 mg 4 days a week.   2.  Continue vitamin B12 1000 mcg daily.  3.  She does not need IV iron at this time.  4.  Increase fluid intake.    5.  Repeat CBC CMP ferritin iron panel in 2 months.    6.  Obtain CT scan chest abdomen pelvis in late July 2024.  We will see her in follow-up after the CT scan.  CBC CMP CEA ferritin iron panel folate and methylmalonic acid levels will be obtained.            Izzy Martinez MD  02/22/24

## 2024-02-23 ENCOUNTER — SPECIALTY PHARMACY (OUTPATIENT)
Dept: PHARMACY | Facility: HOSPITAL | Age: 84
End: 2024-02-23
Payer: MEDICARE

## 2024-02-23 RX ORDER — EMPAGLIFLOZIN 10 MG/1
10 TABLET, FILM COATED ORAL DAILY
Qty: 90 TABLET | Refills: 0 | OUTPATIENT
Start: 2024-02-23

## 2024-02-23 NOTE — TELEPHONE ENCOUNTER
LOV 12/20/23  NOV 3/5/24  Last fill 11/27/23    Appears this was discontinued-please advise    [facility] RMA

## 2024-03-05 ENCOUNTER — OFFICE VISIT (OUTPATIENT)
Dept: FAMILY MEDICINE CLINIC | Facility: CLINIC | Age: 84
End: 2024-03-05
Payer: MEDICARE

## 2024-03-05 VITALS
HEART RATE: 67 BPM | BODY MASS INDEX: 28.36 KG/M2 | TEMPERATURE: 97.7 F | WEIGHT: 150 LBS | DIASTOLIC BLOOD PRESSURE: 63 MMHG | SYSTOLIC BLOOD PRESSURE: 141 MMHG | OXYGEN SATURATION: 95 %

## 2024-03-05 DIAGNOSIS — I10 PRIMARY HYPERTENSION: Primary | ICD-10-CM

## 2024-03-05 DIAGNOSIS — E78.2 MIXED HYPERLIPIDEMIA: ICD-10-CM

## 2024-03-05 DIAGNOSIS — E11.9 TYPE 2 DIABETES MELLITUS WITHOUT COMPLICATION, WITHOUT LONG-TERM CURRENT USE OF INSULIN: ICD-10-CM

## 2024-03-05 NOTE — PROGRESS NOTES
"Translation provided by patients daughter at her request     Chief Complaint  Diabetes    Subjective        Ayla Castellano presents to [facility]  History of Present Illness    Blood pressure initially 141 today. Did not take her medication yet as she was fasting for blood work. Her glucose has not been good. Glipizide was increased by endo a few weeks ago due to her having highs in the 200s and this helped. 5 days ago started to speak differently on the phone w daughter. Was sweating. Checked her glucose and it was 70. Had a snack and it improved to 104. Aware she needs to take her glipizide with dinner. Since that low she has been ok.     Urology cancelled her oxybutinin and put her on a different medication. This medication does not help as much but does have a follow up scheduled with urology for later this month.     Objective   Vital Signs:  /63 (BP Location: Left arm, Patient Position: Sitting, Cuff Size: Large Adult)   Pulse 67   Temp 97.7 °F (36.5 °C)   Wt 68 kg (150 lb)   SpO2 95%   BMI 28.36 kg/m²   Estimated body mass index is 28.36 kg/m² as calculated from the following:    Height as of 2/22/24: 154.9 cm (60.98\").    Weight as of this encounter: 68 kg (150 lb).               Physical Exam  Vitals and nursing note reviewed.   Constitutional:       General: She is not in acute distress.     Appearance: Normal appearance. She is not ill-appearing.   HENT:      Head: Normocephalic and atraumatic.      Nose: Nose normal.      Mouth/Throat:      Mouth: Mucous membranes are moist.   Eyes:      Extraocular Movements: Extraocular movements intact.      Conjunctiva/sclera: Conjunctivae normal.   Cardiovascular:      Rate and Rhythm: Normal rate and regular rhythm.      Heart sounds: Normal heart sounds. No murmur heard.     No gallop.   Pulmonary:      Effort: Pulmonary effort is normal. No respiratory distress.      Breath sounds: Normal breath sounds. No stridor. " No wheezing, rhonchi or rales.   Chest:      Chest wall: No tenderness.   Skin:     General: Skin is warm and dry.   Neurological:      General: No focal deficit present.      Mental Status: She is alert and oriented to person, place, and time. Mental status is at baseline.   Psychiatric:         Mood and Affect: Mood normal.         Behavior: Behavior normal.        Result Review :                     Assessment and Plan     Diagnoses and all orders for this visit:    1. Primary hypertension (Primary)    2. Mixed hyperlipidemia    3. Type 2 diabetes mellitus without complication, without long-term current use of insulin    Following w endocrinology for diabetes. Recent medication changes there. No changes today.  BP mildly elevated but did not yet take medication. No changes.          Follow Up     Return in about 6 months (around 9/5/2024) for Medicare Wellness.  Patient was given instructions and counseling regarding her condition or for health maintenance advice. Please see specific information pulled into the AVS if appropriate.

## 2024-03-25 ENCOUNTER — TELEPHONE (OUTPATIENT)
Dept: ENDOCRINOLOGY | Age: 84
End: 2024-03-25
Payer: MEDICARE

## 2024-03-25 NOTE — TELEPHONE ENCOUNTER
Caller: Daily Castellano    Relationship: Emergency Contact    Best call back number: [phone]    Which medication are you concerned about: GLIPIZIDE    Who prescribed you this medication: GOVIND    When did you start taking this medication: 02/20/24    What are your concerns: HER BLOOD SUGAR IS STILL VERY LOW AND SHE IS NERVOUS TO TAKE THIS MED. VERY SICK    How long have you had these concerns: PAST 2 WEEKS

## 2024-03-26 NOTE — TELEPHONE ENCOUNTER
Called, no answer. Left message.  This is the first I've heard of this so needing more information to give further details

## 2024-03-27 NOTE — TELEPHONE ENCOUNTER
Caller: Daily Castellano    Relationship: Emergency Contact    Best call back number: [phone]    What is the best time to reach you: ANYTIME     Who are you requesting to speak with (clinical staff, provider,  specific staff member): CLINICAL STAFF     Do you know the name of the person who called: EZRA FAUST     What was the call regarding: LOW BLOOD SUGAR, EZRA NEEDS MORE INFORMATION     Is it okay if the provider responds through MyChart: NO

## 2024-03-28 ENCOUNTER — TELEPHONE (OUTPATIENT)
Dept: FAMILY MEDICINE CLINIC | Facility: CLINIC | Age: 84
End: 2024-03-28

## 2024-03-28 NOTE — TELEPHONE ENCOUNTER
Caller: Daily Castellano    Relationship: Emergency Contact    Best call back number: [phone]     What medication are you requesting: SOMETHING FOR SYMPTOMS     What are your current symptoms: COUGH / HORSE VOICE / CONGESTION / DRAINAGE     How long have you been experiencing symptoms: STARTED YESTERDAY     Have you had these symptoms before:    [] Yes  [x] No    Have you been treated for these symptoms before:   [] Yes  [x] No    If a prescription is needed, what is your preferred pharmacy and phone number: [id number] - [place], [place] - [address]  AT [facility] [phone] [facility] [phone] FX     Additional notes:PLEASE ADVISE

## 2024-04-11 ENCOUNTER — SPECIALTY PHARMACY (OUTPATIENT)
Dept: PHARMACY | Facility: HOSPITAL | Age: 84
End: 2024-04-11
Payer: MEDICARE

## 2024-04-11 NOTE — PROGRESS NOTES
Called patient for 6 month assessment, no answer. Voicemail not set up.  Will attempt to call next week.    aVnce Dobson, PharmD, BCOP  Clinical Oncology Pharmacist

## 2024-04-17 ENCOUNTER — SPECIALTY PHARMACY (OUTPATIENT)
Dept: PHARMACY | Facility: HOSPITAL | Age: 84
End: 2024-04-17
Payer: MEDICARE

## 2024-04-17 NOTE — PROGRESS NOTES
Called patient for 6 month assessment, no answer. Left voicemail to return my call at direct line [phone].       Vance Dobson, PharmD, [facility]  Clinical Oncology Pharmacist    ADDENDUM:     Specialty Pharmacy Patient Management Program  Oncology 6-Month Clinical Assessment       Ayla Castellano is a 84 y.o. female with myeloproliferative disorder called today to assess adherence and side effects.    Regimen: Hydrea 500 mg 4 days a week    Reason for Outreach: Routine medication check-in .       Problem list reviewed by Valentine Dobson RPH on 4/17/2024 at  2:54 PM  Medicines reviewed by Valentine Dobson RPH on 4/17/2024 at  2:54 PM  Allergies reviewed by Valentine Dobson RPH on 4/17/2024 at  2:54 PM     Goals Addressed Today        Specialty Pharmacy General Goal      Blood counts within normal limits            Medication Assessment:  Medication Assessment  Follow Up Clinical Assessment  Linked Medication(s) Assessed: Hydroxyurea (Antineoplastics Misc.)  Therapeutic appropriateness: Appropriate  Medication tolerability: Tolerating with no to minimal ADRs (New onset nausea, however daughter believes this may be due to other causes - potentially pancreatitis. Appointment for lab check tomorrow)  Medication plan: Continue therapy with normal follow-up  Quality of Life Improvement Scale: 4-A little worse  Comments on Quality of Life: Doing well, except for new nausea  Administration:  Patient is taking as prescribed .   Patient can self administer medications: yes  Medication Follow-Up Plan: Next clinical assessment  Lab Review: The labs listed below have been reviewed. No dose adjustments are needed for the oral specialty medication(s) based on the labs.    Lab Results   Component Value Date    GLUCOSE 118 (H) 02/22/2024    CALCIUM 10.4 02/22/2024     02/22/2024    K 3.7 02/22/2024    CO2 25.3 02/22/2024     02/22/2024    BUN 23 02/22/2024    CREATININE 1.07 (H) 02/22/2024    EGFRIFAFRI 65  02/01/2022    EGFRIFNONA 57 (L) 02/01/2022    BCR 21.5 02/22/2024    ANIONGAP 9.7 02/22/2024     Lab Results   Component Value Date    WBC 17.43 (H) 02/22/2024    RBC 2.84 (L) 02/22/2024    HGB 10.5 (L) 02/22/2024    HCT 30.3 (L) 02/22/2024    .7 (H) 02/22/2024    MCH 37.0 (H) 02/22/2024    MCHC 34.7 02/22/2024    RDW 15.1 02/22/2024    RDWSD 58.9 (H) 02/22/2024    MPV 10.3 02/22/2024     02/22/2024    NEUTRORELPCT 70.9 02/22/2024    LYMPHORELPCT 9.3 (L) 02/22/2024    MONORELPCT 5.7 02/22/2024    EOSRELPCT 3.2 02/22/2024    BASORELPCT 1.1 02/22/2024    AUTOIGPER 9.8 (H) 02/22/2024    NEUTROABS 12.35 (H) 02/22/2024    LYMPHSABS 1.62 02/22/2024    MONOSABS 0.99 (H) 02/22/2024    EOSABS 0.56 (H) 02/22/2024    BASOSABS 0.20 02/22/2024    AUTOIGNUM 1.71 (H) 02/22/2024    NRBC 0.0 02/22/2024     Drug-drug interactions  Completed medication reconciliation today to assess for drug interactions. Patient denies starting or stopping any medications.    Assessed medication list for interactions, no significant drug interactions noted.   Advised patient to call the clinic if any new medications are started so we can assess for drug-drug interactions.  Drug-food interactions discussed:  none today  Vaccines are coordinated by the patient's oncologist and primary care provider.    Allergies  Known allergies and reactions were discussed with the patient. The patient's chart has been reviewed for allergy information and updated as necessary.   Allergies   Allergen Reactions    Latex Unknown - Low Severity     blisters        Hospitalizations and Urgent Care Visits Since Last Assessment:  Unplanned hospitalizations or inpatient admissions: no  ED Visits: no  Urgent Office Visits: no    Adherence Assessment:  Adherence Questions  Linked Medication(s) Assessed: Hydroxyurea (Antineoplastics Misc.)  On average, how many doses/injections does the patient miss per month?: 0  What are the identified reasons for non-adherence  or missed doses? : no problems identified  What is the estimated medication adherence level?: %  Based on the patient/caregiver response and refill history, does this patient require an MTP to track adherence improvements?: no    Quality of Life Assessment:  Quality of Life Assessment  Quality of Life Improvement Scale: 4-A little worse  Comments on Quality of Life: Doing well, except for new nausea  -- Quality of Life: 4/10    Financial Assessment:  Medication availability/affordability: Patient has had no issues obtaining medication from pharmacy.    Attestation     I attest the patient was actively involved in and has agreed to the above plan of care.  If the prescribed therapy is at any point deemed not appropriate based on the current or future assessments, a consultation will be initiated with the patient's specialty care provider to determine the best course of action. The revised plan of therapy will be documented along with any required assessments and/or additional patient education provided.       All questions addressed and patient had no additional concerns. Patient has pharmacy contact information.    Name/Credentials: Vance Dobson, Sujey, BCOP  Clinical Oncology Pharmacist    4/17/2024  14:56 EDT

## 2024-04-18 ENCOUNTER — LAB (OUTPATIENT)
Dept: LAB | Facility: HOSPITAL | Age: 84
End: 2024-04-18
Payer: MEDICARE

## 2024-04-18 DIAGNOSIS — D47.1 MYELOPROLIFERATIVE DISORDER: ICD-10-CM

## 2024-04-18 DIAGNOSIS — Z85.038 HISTORY OF COLON CANCER: ICD-10-CM

## 2024-04-18 DIAGNOSIS — D50.9 IRON DEFICIENCY ANEMIA, UNSPECIFIED IRON DEFICIENCY ANEMIA TYPE: ICD-10-CM

## 2024-04-18 LAB
ALBUMIN SERPL-MCNC: 4.2 G/DL (ref 3.5–5.2)
ALBUMIN/GLOB SERPL: 1.7 G/DL
ALP SERPL-CCNC: 98 U/L (ref 39–117)
ALT SERPL W P-5'-P-CCNC: 22 U/L (ref 1–33)
ANION GAP SERPL CALCULATED.3IONS-SCNC: 12.3 MMOL/L (ref 5–15)
AST SERPL-CCNC: 27 U/L (ref 1–32)
BASOPHILS # BLD AUTO: 0.12 10*3/MM3 (ref 0–0.2)
BASOPHILS NFR BLD AUTO: 0.7 % (ref 0–1.5)
BILIRUB SERPL-MCNC: 0.5 MG/DL (ref 0–1.2)
BUN SERPL-MCNC: 24 MG/DL (ref 8–23)
BUN/CREAT SERPL: 24.5 (ref 7–25)
CALCIUM SPEC-SCNC: 10.5 MG/DL (ref 8.6–10.5)
CHLORIDE SERPL-SCNC: 101 MMOL/L (ref 98–107)
CO2 SERPL-SCNC: 21.7 MMOL/L (ref 22–29)
CREAT SERPL-MCNC: 0.98 MG/DL (ref 0.57–1)
DEPRECATED RDW RBC AUTO: 59.7 FL (ref 37–54)
EGFRCR SERPLBLD CKD-EPI 2021: 57.7 ML/MIN/1.73
EOSINOPHIL # BLD AUTO: 0.58 10*3/MM3 (ref 0–0.4)
EOSINOPHIL NFR BLD AUTO: 3.5 % (ref 0.3–6.2)
ERYTHROCYTE [DISTWIDTH] IN BLOOD BY AUTOMATED COUNT: 15.5 % (ref 12.3–15.4)
FERRITIN SERPL-MCNC: 185 NG/ML (ref 13–150)
GLOBULIN UR ELPH-MCNC: 2.5 GM/DL
GLUCOSE SERPL-MCNC: 153 MG/DL (ref 65–99)
HCT VFR BLD AUTO: 30.9 % (ref 34–46.6)
HGB BLD-MCNC: 10.5 G/DL (ref 12–15.9)
IMM GRANULOCYTES # BLD AUTO: 1.21 10*3/MM3 (ref 0–0.05)
IMM GRANULOCYTES NFR BLD AUTO: 7.3 % (ref 0–0.5)
IRON 24H UR-MRATE: 53 MCG/DL (ref 37–145)
IRON SATN MFR SERPL: 19 % (ref 20–50)
LYMPHOCYTES # BLD AUTO: 1.42 10*3/MM3 (ref 0.7–3.1)
LYMPHOCYTES NFR BLD AUTO: 8.6 % (ref 19.6–45.3)
MCH RBC QN AUTO: 36 PG (ref 26.6–33)
MCHC RBC AUTO-ENTMCNC: 34 G/DL (ref 31.5–35.7)
MCV RBC AUTO: 105.8 FL (ref 79–97)
MONOCYTES # BLD AUTO: 0.83 10*3/MM3 (ref 0.1–0.9)
MONOCYTES NFR BLD AUTO: 5 % (ref 5–12)
NEUTROPHILS NFR BLD AUTO: 12.32 10*3/MM3 (ref 1.7–7)
NEUTROPHILS NFR BLD AUTO: 74.9 % (ref 42.7–76)
NRBC BLD AUTO-RTO: 0 /100 WBC (ref 0–0.2)
PLATELET # BLD AUTO: 345 10*3/MM3 (ref 140–450)
PMV BLD AUTO: 10.8 FL (ref 6–12)
POTASSIUM SERPL-SCNC: 3.7 MMOL/L (ref 3.5–5.2)
PROT SERPL-MCNC: 6.7 G/DL (ref 6–8.5)
RBC # BLD AUTO: 2.92 10*6/MM3 (ref 3.77–5.28)
SODIUM SERPL-SCNC: 135 MMOL/L (ref 136–145)
TIBC SERPL-MCNC: 286 MCG/DL (ref 298–536)
TRANSFERRIN SERPL-MCNC: 192 MG/DL (ref 200–360)
WBC NRBC COR # BLD AUTO: 16.48 10*3/MM3 (ref 3.4–10.8)

## 2024-04-18 PROCEDURE — 84466 ASSAY OF TRANSFERRIN: CPT

## 2024-04-18 PROCEDURE — 80053 COMPREHEN METABOLIC PANEL: CPT

## 2024-04-18 PROCEDURE — 83540 ASSAY OF IRON: CPT

## 2024-04-18 PROCEDURE — 36415 COLL VENOUS BLD VENIPUNCTURE: CPT

## 2024-04-18 PROCEDURE — 82728 ASSAY OF FERRITIN: CPT

## 2024-04-18 PROCEDURE — 85025 COMPLETE CBC W/AUTO DIFF WBC: CPT

## 2024-04-19 ENCOUNTER — TELEPHONE (OUTPATIENT)
Dept: ONCOLOGY | Facility: CLINIC | Age: 84
End: 2024-04-19
Payer: MEDICARE

## 2024-04-19 NOTE — TELEPHONE ENCOUNTER
Caller: ZoëeliseoDaily    Relationship: Emergency Contact    Best call back number: [phone]    Caller requesting test results: SELF    What test was performed: LABS    When was the test performed: 04/18    Where was the test performed:  CBC    Additional notes:     WOULD LIKE TO SPEAK WITH DR HEALY OR NURSE  ABOUT LABS RESULTS CONCERNED ABOUT THEM

## 2024-04-22 NOTE — TELEPHONE ENCOUNTER
Izzy Martinez MD  You3 days ago     Please inform the patient's daughter that the WBC is stable. Continue the same dose of Hydrea. She does not need IV iron at this time.    Thank you     Reviewed Dr. Martinez's note and instructions with the pts daughter, she v/u.

## 2024-05-06 ENCOUNTER — TELEPHONE (OUTPATIENT)
Dept: FAMILY MEDICINE CLINIC | Facility: CLINIC | Age: 84
End: 2024-05-06
Payer: MEDICARE

## 2024-05-07 ENCOUNTER — CLINICAL SUPPORT NO REQUIREMENTS (OUTPATIENT)
Age: 84
End: 2024-05-07
Payer: MEDICARE

## 2024-05-07 DIAGNOSIS — I49.5 SSS (SICK SINUS SYNDROME): Primary | ICD-10-CM

## 2024-05-07 PROCEDURE — 93280 PM DEVICE PROGR EVAL DUAL: CPT | Performed by: INTERNAL MEDICINE

## 2024-05-15 ENCOUNTER — OFFICE VISIT (OUTPATIENT)
Dept: CARDIOLOGY | Facility: CLINIC | Age: 84
End: 2024-05-15
Payer: MEDICARE

## 2024-05-15 VITALS
OXYGEN SATURATION: 99 % | BODY MASS INDEX: 27.68 KG/M2 | WEIGHT: 146.6 LBS | DIASTOLIC BLOOD PRESSURE: 60 MMHG | HEIGHT: 61 IN | HEART RATE: 81 BPM | SYSTOLIC BLOOD PRESSURE: 118 MMHG

## 2024-05-15 DIAGNOSIS — I49.5 SSS (SICK SINUS SYNDROME): ICD-10-CM

## 2024-05-15 DIAGNOSIS — I25.10 CORONARY ARTERY CALCIFICATION: Primary | ICD-10-CM

## 2024-05-15 DIAGNOSIS — E78.2 MIXED HYPERLIPIDEMIA: ICD-10-CM

## 2024-05-15 DIAGNOSIS — I10 ESSENTIAL HYPERTENSION: ICD-10-CM

## 2024-05-15 DIAGNOSIS — I25.84 CORONARY ARTERY CALCIFICATION: Primary | ICD-10-CM

## 2024-05-15 PROCEDURE — 3074F SYST BP LT 130 MM HG: CPT | Performed by: INTERNAL MEDICINE

## 2024-05-15 PROCEDURE — 3078F DIAST BP <80 MM HG: CPT | Performed by: INTERNAL MEDICINE

## 2024-05-15 PROCEDURE — 99214 OFFICE O/P EST MOD 30 MIN: CPT | Performed by: INTERNAL MEDICINE

## 2024-05-15 RX ORDER — AMLODIPINE BESYLATE 2.5 MG/1
2.5 TABLET ORAL EVERY EVENING
Start: 2024-05-15

## 2024-05-15 NOTE — PROGRESS NOTES
PATIENTINFORMATION    Date of Office Visit: 05/15/2024  Encounter Provider: Curry Recinos MD  Place of Service: [facility] CARDIOLOGY  Patient Name: Ayla Castellano  : 1940    Subjective:     Encounter Date:05/15/2024      Patient ID: Ayla Castellano is a 84 y.o. female.    No chief complaint on file.    HPI  Ms. Castellano is a pleasant 84 years old lady who comes cardiology clinic accompanied by her  and daughter who help at in [place] translation.  She admits she does not walk regularly and mostly sedentary but she denies any significant shortness of breath or chest pain.  Denies symptoms of heart failure.  Compliant with current medications without significant side effects.  Blood pressure runs normal during home monitoring.      ROS  All systems reviewed and negative except as noted in HPI.    Past Medical History:   Diagnosis Date    Arrhythmia     Colitis     Colon cancer     s/p partial colectomy in     Diabetes mellitus     Gallbladder disease     Hard to intubate     Hyperlipidemia     Hypertension     Left arm pain     SSS (sick sinus syndrome)     [employer] dual-chamber pacemaker on 2021    Syncope     Secondary to medications (clonidine and metoprolol) in 3/15.  Had severe bradycardia (sinus arrest with junctional escape beats).  Resolved after medications discontinued.       Past Surgical History:   Procedure Laterality Date    CARDIAC ELECTROPHYSIOLOGY PROCEDURE N/A 2021    Procedure: Pacemaker DC new;  Surgeon: Richard Alaniz MD;  Location: [facility] INVASIVE LOCATION;  Service: Cardiology;  Laterality: N/A;    CHOLECYSTECTOMY  10/2019    COLECTOMY PARTIAL / TOTAL       for colon cancer    COLONOSCOPY  2020    UTERINE FIBROID SURGERY      s/p uterine fibroma resection       Social History     Socioeconomic History    Marital status:    Tobacco Use    Smoking status: Never    Smokeless tobacco: Never  "  Vaping Use    Vaping status: Never Used   Substance and Sexual Activity    Alcohol use: No    Drug use: No    Sexual activity: Not Currently     Partners: Male     Birth control/protection: None       Family History   Problem Relation Age of Onset    Heart disease Mother         pacemaker placement    Stroke Mother     Diabetes Mother         My mother diceased    Hypertension Mother     Anemia Mother     Colon cancer Brother     Cancer Brother         Colon cancer    Stroke Father     Diabetes Other     Kidney disease Other     Hyperlipidemia Other     Arthritis Other          Procedures       Objective:     /60   Pulse 81   Ht 154.9 cm (60.98\")   Wt 66.5 kg (146 lb 9.6 oz)   SpO2 99%   BMI 27.72 kg/m²  Body mass index is 27.72 kg/m².     Constitutional:       General: Not in acute distress.     Appearance: Well-developed. Not diaphoretic.   Eyes:      Pupils: Pupils are equal, round, and reactive to light.   HENT:      Head: Normocephalic and atraumatic.   Neck:      Thyroid: No thyromegaly.   Pulmonary:      Effort: Pulmonary effort is normal. No respiratory distress.      Breath sounds: Normal breath sounds. No wheezing. No rales.   Chest:      Chest wall: Not tender to palpatation.   Cardiovascular:      Normal rate. Regular rhythm.      Murmurs: There is a systolic murmur.      No gallop.    Pulses:     Intact distal pulses.   Edema:     Peripheral edema absent.   Abdominal:      General: Bowel sounds are normal. There is no distension.      Palpations: Abdomen is soft.      Tenderness: There is no guarding.   Musculoskeletal: Normal range of motion.         General: No deformity.      Cervical back: Normal range of motion and neck supple. Skin:     General: Skin is warm and dry.      Findings: No rash.   Neurological:      Mental Status: Alert and oriented to person, place, and time.      Cranial Nerves: No cranial nerve deficit.      Deep Tendon Reflexes: Reflexes are normal and symmetric. "   Psychiatric:         Judgment: Judgment normal.         Review Of Data: I have reviewed pertinent recent labs, images and documents and pertinent findings included in HPI or assessment below.    Lipid Panel          9/18/2023    09:29   Lipid Panel   Total Cholesterol 105    Triglycerides 175    HDL Cholesterol 30    VLDL Cholesterol 29    LDL Cholesterol  46          Assessment/Plan:          Essential hypertension that is fairly controlled on current regimen.  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  Pacemaker functioning normally and she paces about 30% of the time.  Extensive coronary calcification on CT with history of exertional chest pain-normal myocardial perfusion study in May 2023  Mixed hyperlipidemia on statin.  LDL at goal  Hypercalcemia: Normal PTH.  Myeloproliferative disorder on hydroxyurea-sees oncologist  Aortic sclerotic murmur    Encouraged to start walking regularly  She will decrease amlodipine dose by half because of borderline low blood pressure.(From 2.5 mg p.o. twice daily to once daily)   Echocardiogram in 1 year to evaluate aortic valve   FU in 6 months or sooner with concerns   Otherwise continue current care    Diagnosis and plan of care discussed with patient and verbalized understanding.            Your medication list            Accurate as of May 15, 2024 12:28 PM. If you have any questions, ask your nurse or doctor.                CHANGE how you take these medications        Instructions Last Dose Given Next Dose Due   amLODIPine 2.5 MG tablet  Commonly known as: NORVASC  What changed:   medication strength  how much to take  when to take this  Changed by: Curry Recinos MD      Take 1 tablet by mouth Every Evening.              CONTINUE taking these medications        Instructions Last Dose Given Next Dose Due   Accu-Chek FastClix Lancets misc      To test blood sugar 3 times daily.       aspirin 81 MG tablet      Take 1 tablet by  mouth Daily.       atorvastatin 20 MG tablet  Commonly known as: LIPITOR      Take 1 tablet by mouth Daily.       B-12 2500 MCG sublingual tablet      Place 1 tablet under the tongue Daily.       glucose blood test strip  Commonly known as: ONE TOUCH ULTRA TEST      USE TO TEST BLOOD SUGAR EVERY DAY. (E11.9)       glucose blood test strip      1 each by Other route Daily. Use as instructed       hydroxyurea 500 MG capsule  Commonly known as: HYDREA      TAKE 1 CAPSULE BY MOUTH ON MONDAYS, WEDNESDAYS, FRIDAYS AND SUNDAYS       isosorbide mononitrate 60 MG 24 hr tablet  Commonly known as: IMDUR      Take 1 tablet by mouth Daily.       ketoconazole 2 % cream  Commonly known as: NIZORAL      Apply  topically to the appropriate area as directed Daily.       metFORMIN 500 MG tablet  Commonly known as: GLUCOPHAGE      TAKE 1 TABLET BY MOUTH TWICE DAILY WITH MEALS       mupirocin 2 % ointment  Commonly known as: Bactroban      Apply  topically to the appropriate area as directed 3 (Three) Times a Day.       ofloxacin 0.3 % ophthalmic solution  Commonly known as: OCUFLOX      Administer 2 drops to both eyes 4 (Four) Times a Day.       valsartan-hydrochlorothiazide 320-25 MG per tablet  Commonly known as: DIOVAN-HCT      Take 1 tablet by mouth Daily.                 Where to Get Your Medications        Information about where to get these medications is not yet available    Ask your nurse or doctor about these medications  amLODIPine 2.5 MG tablet             Curry Recinos MD  05/15/24  12:28 EDT

## 2024-05-23 ENCOUNTER — OFFICE VISIT (OUTPATIENT)
Dept: ENDOCRINOLOGY | Age: 84
End: 2024-05-23
Payer: MEDICARE

## 2024-05-23 VITALS
SYSTOLIC BLOOD PRESSURE: 132 MMHG | DIASTOLIC BLOOD PRESSURE: 70 MMHG | OXYGEN SATURATION: 98 % | BODY MASS INDEX: 27.68 KG/M2 | HEART RATE: 79 BPM | TEMPERATURE: 98.6 F | HEIGHT: 61 IN | WEIGHT: 146.6 LBS

## 2024-05-23 DIAGNOSIS — E11.65 TYPE 2 DIABETES MELLITUS WITH HYPERGLYCEMIA, WITHOUT LONG-TERM CURRENT USE OF INSULIN: Primary | ICD-10-CM

## 2024-05-23 DIAGNOSIS — Z87.19 HISTORY OF PANCREATITIS: ICD-10-CM

## 2024-05-23 DIAGNOSIS — N18.9 CHRONIC KIDNEY DISEASE, UNSPECIFIED CKD STAGE: ICD-10-CM

## 2024-05-23 DIAGNOSIS — Z95.0 STATUS CARDIAC PACEMAKER: ICD-10-CM

## 2024-05-23 RX ORDER — GLIPIZIDE 2.5 MG/1
2.5 TABLET ORAL DAILY
Qty: 90 TABLET | Refills: 1 | Status: SHIPPED | OUTPATIENT
Start: 2024-05-23

## 2024-05-23 NOTE — PROGRESS NOTES
"Chief Complaint  Diabetes    Subjective        Ayla Castellano presents to [facility] ENDOCRINOLOGY  History of Present Illness    New patient 11/2023  Daughter translate for patient     Diabetes Type 2, > 10 years ago  Known diabetic complications: Pancreatitis; chronic, gallbladder removed around 2019, chronic UTIs, CKD- on ARB   follows with podiatry q3m  2011- colon cancer   Hypoglycemia: resolved off glipizide   CV risk: atorvastatin      Current diabetic regimen: metformin 500mg BID   glipizide stopped r/t hypoglycemia   Jardiance stopped r/t h/o chronic UTIs   She would like to try tradjenta, her  uses this and it works well for him but given her history pancreatitis we want to avoid this class of medication      Objective   Vital Signs:  /70 (BP Location: Right arm)   Pulse 79   Temp 98.6 °F (37 °C) (Oral)   Ht 154.9 cm (60.98\")   Wt 66.5 kg (146 lb 9.6 oz)   SpO2 98%   BMI 27.71 kg/m²   Estimated body mass index is 27.71 kg/m² as calculated from the following:    Height as of this encounter: 154.9 cm (60.98\").    Weight as of this encounter: 66.5 kg (146 lb 9.6 oz).           Physical Exam  Vitals reviewed.   Constitutional:       General: She is not in acute distress.  HENT:      Head: Normocephalic and atraumatic.   Cardiovascular:      Rate and Rhythm: Normal rate.   Pulmonary:      Effort: Pulmonary effort is normal. No respiratory distress.   Musculoskeletal:         General: No signs of injury. Normal range of motion.      Cervical back: Normal range of motion and neck supple.   Skin:     General: Skin is warm and dry.   Neurological:      Mental Status: She is alert and oriented to person, place, and time. Mental status is at baseline.   Psychiatric:         Mood and Affect: Mood normal.         Behavior: Behavior normal.         Thought Content: Thought content normal.         Judgment: Judgment normal.          Result Review :    The following data was " reviewed by: LENA Greene on 05/23/2024:  Common labs          2/20/2024    12:17 2/22/2024    15:06 4/18/2024    14:05   Common Labs   Glucose 78  118  153    BUN 26  23  24    Creatinine 1.13  1.07  0.98    Sodium 141  139  135    Potassium 4.7  3.7  3.7    Chloride 104  104  101    Calcium 10.9  10.4  10.5    Albumin  4.3  4.2    Total Bilirubin  0.6  0.5    Alkaline Phosphatase  83  98    AST (SGOT)  23  27    ALT (SGPT)  10  22    WBC  17.43  16.48    Hemoglobin  10.5  10.5    Hematocrit  30.3  30.9    Platelets  336  345    Hemoglobin A1C 4.50                     Assessment and Plan     Diagnoses and all orders for this visit:    1. Type 2 diabetes mellitus with hyperglycemia, without long-term current use of insulin (Primary)    2. Chronic kidney disease, unspecified CKD stage    3. Status cardiac pacemaker    4. History of pancreatitis    Other orders  -     glipiZIDE 2.5 MG tablet; Take 2.5 mg by mouth Daily. Hold if BS < 180  Dispense: 90 tablet; Refill: 1             Follow Up     Return in about 6 months (around 11/23/2024).    Continue with metformin  Small glipizide dose can be used if BS > 180  Labs before next visit   Continue arb and statin     Patient was given instructions and counseling regarding her condition or for health maintenance advice. Please see specific information pulled into the AVS if appropriate.     LENA Greene

## 2024-07-25 ENCOUNTER — HOSPITAL ENCOUNTER (OUTPATIENT)
Dept: CT IMAGING | Facility: HOSPITAL | Age: 84
Discharge: HOME | End: 2024-07-25
Admitting: INTERNAL MEDICINE
Payer: MEDICARE

## 2024-07-25 DIAGNOSIS — D47.1 MYELOPROLIFERATIVE DISORDER: ICD-10-CM

## 2024-07-25 DIAGNOSIS — Z85.038 HISTORY OF COLON CANCER: ICD-10-CM

## 2024-07-25 DIAGNOSIS — D50.9 IRON DEFICIENCY ANEMIA, UNSPECIFIED IRON DEFICIENCY ANEMIA TYPE: ICD-10-CM

## 2024-07-25 LAB
CREAT SERPL-MCNC: 1.07 MG/DL (ref 0.57–1)
EGFRCR SERPLBLD CKD-EPI 2021: 52 ML/MIN/1.73

## 2024-07-25 PROCEDURE — 25510000001 IOPAMIDOL 61 % SOLUTION: Performed by: INTERNAL MEDICINE

## 2024-07-25 PROCEDURE — 82565 ASSAY OF CREATININE: CPT | Performed by: INTERNAL MEDICINE

## 2024-07-25 PROCEDURE — 74177 CT ABD & PELVIS W/CONTRAST: CPT

## 2024-07-25 PROCEDURE — 71260 CT THORAX DX C+: CPT

## 2024-07-25 RX ADMIN — IOPAMIDOL 85 ML: 612 INJECTION, SOLUTION INTRAVENOUS at 13:06

## 2024-07-31 RX ORDER — GLIPIZIDE 5 MG/1
TABLET ORAL
Qty: 45 TABLET | Refills: 1 | OUTPATIENT
Start: 2024-07-31

## 2024-08-01 ENCOUNTER — OFFICE VISIT (OUTPATIENT)
Dept: ONCOLOGY | Facility: CLINIC | Age: 84
End: 2024-08-01
Payer: MEDICARE

## 2024-08-01 ENCOUNTER — LAB (OUTPATIENT)
Dept: LAB | Facility: HOSPITAL | Age: 84
End: 2024-08-01
Payer: MEDICARE

## 2024-08-01 VITALS
BODY MASS INDEX: 23.99 KG/M2 | SYSTOLIC BLOOD PRESSURE: 150 MMHG | WEIGHT: 144 LBS | RESPIRATION RATE: 18 BRPM | OXYGEN SATURATION: 99 % | DIASTOLIC BLOOD PRESSURE: 79 MMHG | HEIGHT: 65 IN | HEART RATE: 75 BPM | TEMPERATURE: 97.9 F

## 2024-08-01 DIAGNOSIS — D47.1 MYELOPROLIFERATIVE DISORDER: ICD-10-CM

## 2024-08-01 DIAGNOSIS — D50.9 IRON DEFICIENCY ANEMIA, UNSPECIFIED IRON DEFICIENCY ANEMIA TYPE: ICD-10-CM

## 2024-08-01 DIAGNOSIS — Z79.899 ENCOUNTER FOR LONG-TERM CURRENT USE OF HIGH RISK MEDICATION: ICD-10-CM

## 2024-08-01 DIAGNOSIS — Z85.038 HISTORY OF COLON CANCER: ICD-10-CM

## 2024-08-01 DIAGNOSIS — E83.52 HYPERCALCEMIA: ICD-10-CM

## 2024-08-01 DIAGNOSIS — D47.1 MYELOPROLIFERATIVE DISORDER: Primary | ICD-10-CM

## 2024-08-01 DIAGNOSIS — R93.5 ABNORMAL CT OF THE ABDOMEN: ICD-10-CM

## 2024-08-01 LAB
ALBUMIN SERPL-MCNC: 4.3 G/DL (ref 3.5–5.2)
ALBUMIN/GLOB SERPL: 1.7 G/DL
ALP SERPL-CCNC: 86 U/L (ref 39–117)
ALT SERPL W P-5'-P-CCNC: 21 U/L (ref 1–33)
ANION GAP SERPL CALCULATED.3IONS-SCNC: 12 MMOL/L (ref 5–15)
AST SERPL-CCNC: 24 U/L (ref 1–32)
BASOPHILS # BLD AUTO: 0.16 10*3/MM3 (ref 0–0.2)
BASOPHILS NFR BLD AUTO: 1 % (ref 0–1.5)
BILIRUB SERPL-MCNC: 0.6 MG/DL (ref 0–1.2)
BUN SERPL-MCNC: 20 MG/DL (ref 8–23)
BUN/CREAT SERPL: 20.4 (ref 7–25)
CALCIUM SPEC-SCNC: 10.7 MG/DL (ref 8.6–10.5)
CEA SERPL-MCNC: 1.19 NG/ML
CHLORIDE SERPL-SCNC: 97 MMOL/L (ref 98–107)
CO2 SERPL-SCNC: 25 MMOL/L (ref 22–29)
CREAT SERPL-MCNC: 0.98 MG/DL (ref 0.57–1)
DEPRECATED RDW RBC AUTO: 55.9 FL (ref 37–54)
EGFRCR SERPLBLD CKD-EPI 2021: 57.7 ML/MIN/1.73
EOSINOPHIL # BLD AUTO: 0.66 10*3/MM3 (ref 0–0.4)
EOSINOPHIL NFR BLD AUTO: 4 % (ref 0.3–6.2)
ERYTHROCYTE [DISTWIDTH] IN BLOOD BY AUTOMATED COUNT: 15.9 % (ref 12.3–15.4)
FERRITIN SERPL-MCNC: 35.7 NG/ML (ref 13–150)
FOLATE SERPL-MCNC: >20 NG/ML (ref 4.78–24.2)
GLOBULIN UR ELPH-MCNC: 2.6 GM/DL
GLUCOSE SERPL-MCNC: 122 MG/DL (ref 65–99)
HCT VFR BLD AUTO: 32.9 % (ref 34–46.6)
HGB BLD-MCNC: 11 G/DL (ref 12–15.9)
IMM GRANULOCYTES # BLD AUTO: 1.47 10*3/MM3 (ref 0–0.05)
IMM GRANULOCYTES NFR BLD AUTO: 8.9 % (ref 0–0.5)
IRON 24H UR-MRATE: 51 MCG/DL (ref 37–145)
IRON SATN MFR SERPL: 12 % (ref 20–50)
LYMPHOCYTES # BLD AUTO: 1.64 10*3/MM3 (ref 0.7–3.1)
LYMPHOCYTES NFR BLD AUTO: 9.9 % (ref 19.6–45.3)
MCH RBC QN AUTO: 33 PG (ref 26.6–33)
MCHC RBC AUTO-ENTMCNC: 33.4 G/DL (ref 31.5–35.7)
MCV RBC AUTO: 98.8 FL (ref 79–97)
MONOCYTES # BLD AUTO: 0.75 10*3/MM3 (ref 0.1–0.9)
MONOCYTES NFR BLD AUTO: 4.5 % (ref 5–12)
NEUTROPHILS NFR BLD AUTO: 11.81 10*3/MM3 (ref 1.7–7)
NEUTROPHILS NFR BLD AUTO: 71.7 % (ref 42.7–76)
NRBC BLD AUTO-RTO: 0.2 /100 WBC (ref 0–0.2)
PLATELET # BLD AUTO: 365 10*3/MM3 (ref 140–450)
PMV BLD AUTO: 10.9 FL (ref 6–12)
POTASSIUM SERPL-SCNC: 4 MMOL/L (ref 3.5–5.2)
PROT SERPL-MCNC: 6.9 G/DL (ref 6–8.5)
RBC # BLD AUTO: 3.33 10*6/MM3 (ref 3.77–5.28)
SODIUM SERPL-SCNC: 134 MMOL/L (ref 136–145)
TIBC SERPL-MCNC: 410 MCG/DL (ref 298–536)
TRANSFERRIN SERPL-MCNC: 275 MG/DL (ref 200–360)
WBC NRBC COR # BLD AUTO: 16.49 10*3/MM3 (ref 3.4–10.8)

## 2024-08-01 PROCEDURE — 83540 ASSAY OF IRON: CPT | Performed by: INTERNAL MEDICINE

## 2024-08-01 PROCEDURE — 82746 ASSAY OF FOLIC ACID SERUM: CPT | Performed by: INTERNAL MEDICINE

## 2024-08-01 PROCEDURE — 85025 COMPLETE CBC W/AUTO DIFF WBC: CPT

## 2024-08-01 PROCEDURE — 82728 ASSAY OF FERRITIN: CPT | Performed by: INTERNAL MEDICINE

## 2024-08-01 PROCEDURE — 84466 ASSAY OF TRANSFERRIN: CPT | Performed by: INTERNAL MEDICINE

## 2024-08-01 PROCEDURE — 82378 CARCINOEMBRYONIC ANTIGEN: CPT | Performed by: INTERNAL MEDICINE

## 2024-08-01 PROCEDURE — 80053 COMPREHEN METABOLIC PANEL: CPT | Performed by: INTERNAL MEDICINE

## 2024-08-01 PROCEDURE — 36415 COLL VENOUS BLD VENIPUNCTURE: CPT

## 2024-08-01 RX ORDER — HYDROXYUREA 500 MG/1
500 CAPSULE ORAL TAKE AS DIRECTED
Qty: 60 CAPSULE | Refills: 1 | Status: SHIPPED | OUTPATIENT
Start: 2024-08-01

## 2024-08-01 NOTE — PROGRESS NOTES
Subjective     CHIEF COMPLAINT:      Chief Complaint   Patient presents with    Follow-up     HISTORY OF PRESENT ILLNESS:     Ayla Castellano is a 84 y.o. female patient who returns today for follow up on her myeloproliferative disorder, iron deficiency anemia and colon cancer.  She is on Hydrea 500 mg 4 days a week.  She is tolerating it.  She is not having problem with fatigue.  No chest pain or shortness of breath.    ROS:  Pertinent ROS is in the HPI.     Past medical, surgical, social and family history were reviewed.     MEDICATIONS:    Current Outpatient Medications:     Accu-Chek FastClix Lancets misc, To test blood sugar 3 times daily., Disp: 100 each, Rfl: 8    amLODIPine (NORVASC) 2.5 MG tablet, Take 1 tablet by mouth Every Evening., Disp: , Rfl:     aspirin 81 MG tablet, Take 1 tablet by mouth Daily., Disp: , Rfl:     atorvastatin (LIPITOR) 20 MG tablet, Take 1 tablet by mouth Daily., Disp: 90 tablet, Rfl: 3    Cyanocobalamin (B-12) 2500 MCG sublingual tablet, Place 1 tablet under the tongue Daily., Disp: 30 tablet, Rfl: 11    glipiZIDE 2.5 MG tablet, Take 2.5 mg by mouth Daily. Hold if BS < 180, Disp: 90 tablet, Rfl: 1    glucose blood (ONE TOUCH ULTRA TEST) test strip, USE TO TEST BLOOD SUGAR EVERY DAY. (E11.9), Disp: 100 each, Rfl: 2    glucose blood test strip, 1 each by Other route Daily. Use as instructed, Disp: 100 each, Rfl: 1    hydroxyurea (HYDREA) 500 MG capsule, TAKE 1 CAPSULE BY MOUTH ON MONDAYS, WEDNESDAYS, FRIDAYS AND SUNDAYS, Disp: 48 capsule, Rfl: 3    isosorbide mononitrate (IMDUR) 60 MG 24 hr tablet, Take 1 tablet by mouth Daily., Disp: 90 tablet, Rfl: 3    ketoconazole (NIZORAL) 2 % cream, Apply  topically to the appropriate area as directed Daily., Disp: 30 g, Rfl: 11    metFORMIN (GLUCOPHAGE) 500 MG tablet, TAKE 1 TABLET BY MOUTH TWICE DAILY WITH MEALS, Disp: 180 tablet, Rfl: 2    mupirocin (Bactroban) 2 % ointment, Apply  topically to the appropriate area as directed 3 (Three)  "Times a Day., Disp: 30 g, Rfl: 11    ofloxacin (OCUFLOX) 0.3 % ophthalmic solution, Administer 2 drops to both eyes 4 (Four) Times a Day., Disp: 10 mL, Rfl: 11    valsartan-hydrochlorothiazide (DIOVAN-HCT) 320-25 MG per tablet, Take 1 tablet by mouth Daily., Disp: 90 tablet, Rfl: 3    Objective     VITAL SIGNS:     Vitals:    08/01/24 0959   BP: 150/79   Pulse: 75   Resp: 18   Temp: 97.9 °F (36.6 °C)   TempSrc: Oral   SpO2: 99%   Weight: 65.3 kg (144 lb)   Height: 164.9 cm (64.92\")   PainSc: 0-No pain     Body mass index is 24.02 kg/m².     Wt Readings from Last 5 Encounters:   08/01/24 65.3 kg (144 lb)   05/23/24 66.5 kg (146 lb 9.6 oz)   05/15/24 66.5 kg (146 lb 9.6 oz)   03/05/24 68 kg (150 lb)   02/22/24 68.2 kg (150 lb 6.4 oz)     PHYSICAL EXAMINATION:   GENERAL: The patient appears in good general condition, not in acute distress.   SKIN: No ecchymosis.  EYES: No jaundice. No Pallor.  CHEST: Normal respiratory effort.  Lungs clear bilaterally.  No added sounds.  CVS: Normal S1-S2.  No murmurs.  ABDOMEN: Soft. No tenderness. No Hepatomegaly. No Splenomegaly. No masses.  EXTREMITIES: No edema.    DIAGNOSTIC DATA:     Results from last 7 days   Lab Units 08/01/24  0945   WBC 10*3/mm3 16.49*   NEUTROS ABS 10*3/mm3 11.81*   HEMOGLOBIN g/dL 11.0*   HEMATOCRIT % 32.9*   PLATELETS 10*3/mm3 365     Results from last 7 days   Lab Units 08/01/24  0945   SODIUM mmol/L 134*   POTASSIUM mmol/L 4.0   CHLORIDE mmol/L 97*   CO2 mmol/L 25.0   BUN mg/dL 20   CREATININE mg/dL 0.98   CALCIUM mg/dL 10.7*   ALBUMIN g/dL 4.3   BILIRUBIN mg/dL 0.6   ALK PHOS U/L 86   ALT (SGPT) U/L 21   AST (SGOT) U/L 24   GLUCOSE mg/dL 122*         Lab 08/01/24  0945   IRON 51   IRON SATURATION (TSAT) 12*   TIBC 410   TRANSFERRIN 275   FERRITIN 35.70   FOLATE >20.00      Lab Results   Component Value Date    CEA 1.19 08/01/2024    CEA 1.27 02/22/2024    CEA 1.44 08/25/2023    CEA 0.6 03/24/2015      CT chest abdomen pelvis on 7/25/2024:  CHEST: "   Scattered tiny granulomatous calcifications in the lungs. Additional  tiny noncalcified micronodule in the left lung base on image 56 is  stable. Stable thyroid nodules. There is no evidence of lymphadenopathy.  There is no evidence for pleural effusion. Stable small pericardial  effusion. Thoracic spine degenerative changes.     ABDOMEN:   Stable tiny cyst within the liver. Cholecystectomy. Spleen is stable in  size measuring about 15 cm in greatest dimension. Multiple bilateral  renal cysts are again demonstrated. There is an additional 1.9 cm round  enhancing structure in the midpole of the right kidney again  demonstrated. It has been present since at least 2015 and measured about  1.4 cm at that time. The adrenal glands are unremarkable. The pancreas  is unremarkable.     PELVIS:  The bladder is unremarkable. The uterus and adnexal structures are  unremarkable. There is no free fluid in the pelvis. Postoperative  changes of the colon. The appendix is normal. No acute bony abnormality.     IMPRESSION:  1. No lymphadenopathy  2. Stable spleen size  3. Chronic round enhancing structure in the midpole of the right kidney  again demonstrated measuring about 1.9 cm. It has been suggested in the  past that this may be a column of Moody, however this structure does  brightly enhance, similar to the arterial enhancement and the  possibility of a intrarenal aneurysm cannot be excluded. Would recommend  evaluation with a renal ultrasound with duplex to determine if this  structure is an aneurysm versus a prominent column of Moody.    Assessment & Plan    *Myeloproliferative disorder, 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   She was under the care of an outside Hematologist before transferring care to our practice. Records indicate that she was previously diagnosed with Essential thrombocythemia.   She has elevated WBC and basophil count.   Therefore, a diagnosis of  myeloproliferative disorder or polycythemia vera was favored over ET.  BCR ABL was negative by FISH on 2/2/2021.  In February 2021, WBC count was gradually increasing indicating that her disease was not under a good control likely due to the dose of Hydroxyurea not being effective (500 mg every 4 days).   The dose was increased to every 3 days on 2/2/2021.  Due to the persistence of the leukocytosis, the dose of Hydrea was increased to 4 days a week.  Hemoglobin decreased to 10.0 on 10/4/2021.  WBC count was 15,270.  Due to the worsening anemia, we reduced the dose of Hydrea back to 3 days a week.  Labs on 1/6/2022 revealed a hemoglobin of 10.2.  WBC was 16,600.  Platelets were 352,000.  Spleen measured 14.7 cm on CT scan on 1/14/2022.  Labs on 5/5/2022 revealed a hemoglobin of 9.9.  WBC decreased to 14,980.  Platelets were 357,000.  WBC increased to 19,200 on 7/28/2022.  Hydrea was increased to 500 mg 4 days a week.  WBC decreased to 16,260 on 12/1/2022.  CT on 7/25/2023 showed increase in the size of the spleen from 14.5 cm to 15.7 cm.  8/25/2023: WBC increased to 18,640.  Platelets normal at 361,000.    10/20/2023: WBC 17,940.  Platelets 320,000.  2/22/2024: WBC decreased to 17,430. Platelets 336,000.   Hemoglobin decreased to 10.5.  She was continued on Hydrea 500 mg 4 days a week.  CT on 7/25/2024 revealed the spleen to measure 15 cm in greatest dimension (16.4 cm on re-measurement).  8/1/2024: Hemoglobin improved to 11.0.  WBC 16,490.  Platelets 365,000.  Due to the increase in the WBC count, I recommended increasing Hydrea to 500 mg 5 days a week.    *Iron deficiency anemia.   This is attributed to iron deficiency with a transferrin saturation of 17% and a ferritin of 26.   The patient did not tolerate oral in the past with development of upset stomach, abdominal pain, headaches and dizziness when she took the oral iron in the past.  Patient received IV iron the 2020 and tolerated that well.   Patient was  given IV Injectafer on 2021 and 2021.  Hemoglobin was 10.0 on 10/4/2021.  Hemoglobin decreased to 9.9 on 2022.  Iron stores were adequate.  Hemoglobin improved to 10.7 on 2022.  Hemoglobin was 12.0 on 2022.  Hemoglobin decreased to 10.8 on 2022-likely secondary to blood loss following a fall with development of hematoma.  Hemoglobin decreased to 11.3 on 2023.    Ferritin was 41.6.  Transferrin saturation was 18%.  She was given IV Venofer 300 mg weekly x5 doses between 2023 and 2023.  2023: Hemoglobin decreased to 10.0.  However, ferritin increased to 579. Transferrin saturation increased to 46%.  10/20/2023: Hemoglobin improved to 11.7.  Iron store adequate with ferritin 319. Transferrin saturation 26%.  2024: Hemoglobin decreased to 10.5.  Ferritin 259.  Transferrin saturation 22%-indicating adequate iron stores.    The worsening anemia is attributed to anemia of chronic kidney disease.  2024: Hemoglobin improved to 11.0.  Ferritin 35.  Transferrin saturation 12%.  She continues vitamin B12 2500 mcg sublingually daily.    *History of colon cancer, stage I.   She is s/p partial colectomy by Dr. Ku.   She did not require adjuvant chemotherapy or radiation therapy.    CT scan on 2022 revealed no evidence of recurrence.   CT scan on 2022 revealed no evidence of recurrence.  CT scan on 2023 revealed no evidence of recurrence.  2023: CEA 1.44.  She has a family history of colon cancer. Her brother who was 2 years younger  from metastatic colon cancer.   She was referred to the Genetics clinic.   CT chest abdomen pelvis on 2024 revealed no evidence of recurrence.  2024: CEA 1.19.    *Enhancing structure in the midpole of the right kidney.  It measured 1.9 cm  Due to the arterial enhancement, and intrarenal aneurysm could not be excluded.  The radiologist recommended evaluation with renal ultrasound with duplex to determine if  it is an aneurysm.    *Hypercalcemia.  This is chronic.  7/28/2022: Calcium 11.2.   This was previously evaluated by her PCP.  PTH was normal at 62 on 8/12/2022.  12/1/2022: Calcium 11.3.  We recommended reducing intake of calcium rich food.  8/25/2023: Calcium decreased to 10.2.  8/20/2024: Calcium 10.7.    PLAN:    1.  Increase Hydrea to 500 mg 5 days a week.  2.  Continue vitamin B12 sublingually daily.  3.  Obtain right renal ultrasound with Doppler.  4.  Follow-up in 3 months.  Will obtain CBC CMP ferritin iron panel.    5.  If her anemia worsens and Venofer becomes available, we will consider IV iron therapy with Venofer.      I spent 42 minutes caring for Ayla on this date of service. This time includes time spent by me in the following activities: preparing for the visit, reviewing tests, performing a medically appropriate examination and/or evaluation, counseling and educating the patient/family/caregiver, documenting information in the medical record, independently interpreting results and communicating that information with the patient/family/caregiver, care coordination, ordering medications, and ordering test(s)       Izzy Martinez MD  08/01/24

## 2024-08-02 ENCOUNTER — SPECIALTY PHARMACY (OUTPATIENT)
Dept: PHARMACY | Facility: HOSPITAL | Age: 84
End: 2024-08-02
Payer: MEDICARE

## 2024-08-02 ENCOUNTER — TELEPHONE (OUTPATIENT)
Dept: ONCOLOGY | Facility: CLINIC | Age: 84
End: 2024-08-02
Payer: MEDICARE

## 2024-08-02 NOTE — PROGRESS NOTES
Specialty Pharmacy Note: Hydrea (hydroxyurea)    Ayla Castellano is a 84 y.o. female with myeloproliferative disorder was seen 8/1/24 by Dr. Martinez. Per provider dictation, increase Hydrea to 500 mg 5 days a week. .  Labs Review: The CMP and CBC from 8/1/24 have been reviewed. Dose adjustments are needed for the oral specialty medication(s) based on WBC.  The dose will be changed to increased.    Specialty pharmacy will continue to follow patient.    Vance Dobson, Sujey, [facility]  Clinical Oncology Pharmacist    8/2/2024  12:50 EDT

## 2024-08-02 NOTE — TELEPHONE ENCOUNTER
Daily wanted to confirm the patient did not need follow up before 3 months, I reviewed Dr. Martinez's note and let her know he does want to follow up in 3 months. Advised that they call if she becomes symptomatic and we can bring her in for labs, if needed. She v/u.

## 2024-08-02 NOTE — TELEPHONE ENCOUNTER
Caller: Daily Castellano    Relationship: Emergency Contact    Best call back number: [phone]    What was the call regarding: DAUGHTER CALLED TO SPEAK TO DR MONET GALARZA NURSE

## 2024-08-09 LAB — METHYLMALONATE SERPL-SCNC: 190 NMOL/L (ref 0–378)

## 2024-08-20 ENCOUNTER — HOSPITAL ENCOUNTER (OUTPATIENT)
Dept: ULTRASOUND IMAGING | Facility: HOSPITAL | Age: 84
Discharge: HOME | End: 2024-08-20
Admitting: INTERNAL MEDICINE
Payer: MEDICARE

## 2024-08-20 DIAGNOSIS — R93.5 ABNORMAL CT OF THE ABDOMEN: ICD-10-CM

## 2024-08-20 PROCEDURE — 76775 US EXAM ABDO BACK WALL LIM: CPT

## 2024-08-21 ENCOUNTER — TELEPHONE (OUTPATIENT)
Dept: ONCOLOGY | Facility: CLINIC | Age: 84
End: 2024-08-21
Payer: MEDICARE

## 2024-08-21 NOTE — TELEPHONE ENCOUNTER
Message  Received: Yesterday  Izzy Martinez MD Frantz, Shelbie, YADIRA  Please inform the patient's daughter that the ultrasound did not show an aneurysm. There is a lesion that will be monitored on future scans.       Reviewed Dr. Martinez's message with the pts daughter, she v/u.

## 2024-08-21 NOTE — TELEPHONE ENCOUNTER
Caller: Daily Castellano    Relationship: Emergency Contact    Best call back number: [phone]     Caller requesting test results: YES    What test was performed: BILATERAL KIDNEY US    When was the test performed: 8/20/24    Where was the test performed: [facility] [address]

## 2024-09-01 DIAGNOSIS — E78.5 HYPERLIPIDEMIA, UNSPECIFIED HYPERLIPIDEMIA TYPE: ICD-10-CM

## 2024-09-03 RX ORDER — ATORVASTATIN CALCIUM 20 MG/1
20 TABLET, FILM COATED ORAL DAILY
Qty: 90 TABLET | Refills: 3 | Status: SHIPPED | OUTPATIENT
Start: 2024-09-03

## 2024-09-09 RX ORDER — GLIPIZIDE 2.5 MG/1
TABLET ORAL
Qty: 90 TABLET | Refills: 0 | Status: SHIPPED | OUTPATIENT
Start: 2024-09-09

## 2024-09-09 NOTE — TELEPHONE ENCOUNTER
Rx Refill Note  Requested Prescriptions     Pending Prescriptions Disp Refills    glipiZIDE 2.5 MG tablet [Pharmacy Med Name: GLIPIZIDE 2.5MG TABLETS] 90 tablet 1     Sig: TAKE 1 TABLET BY MOUTH EVERY DAY, HOLD IF BLOOD SUGAR IS LESS THAN 180.      Last office visit with prescribing clinician: 5/23/2024   Last telemedicine visit with prescribing clinician: Visit date not found   Next office visit with prescribing clinician: 12/3/2024                         Would you like a call back once the refill request has been completed: [] Yes [] No    If the office needs to give you a call back, can they leave a voicemail: [] Yes [] No    Christal Miramontes MA  09/09/24, 08:18 EDT

## 2024-09-19 ENCOUNTER — OFFICE VISIT (OUTPATIENT)
Dept: FAMILY MEDICINE CLINIC | Facility: CLINIC | Age: 84
End: 2024-09-19
Payer: MEDICARE

## 2024-09-19 VITALS
SYSTOLIC BLOOD PRESSURE: 100 MMHG | OXYGEN SATURATION: 98 % | HEIGHT: 65 IN | WEIGHT: 150.2 LBS | HEART RATE: 94 BPM | DIASTOLIC BLOOD PRESSURE: 60 MMHG | BODY MASS INDEX: 25.02 KG/M2

## 2024-09-19 DIAGNOSIS — R41.3 MEMORY CHANGE: ICD-10-CM

## 2024-09-19 DIAGNOSIS — I10 PRIMARY HYPERTENSION: ICD-10-CM

## 2024-09-19 DIAGNOSIS — E83.52 HYPERCALCEMIA: ICD-10-CM

## 2024-09-19 DIAGNOSIS — E11.9 TYPE 2 DIABETES MELLITUS WITHOUT COMPLICATION, WITHOUT LONG-TERM CURRENT USE OF INSULIN: ICD-10-CM

## 2024-09-19 DIAGNOSIS — D50.9 IRON DEFICIENCY ANEMIA, UNSPECIFIED IRON DEFICIENCY ANEMIA TYPE: ICD-10-CM

## 2024-09-19 DIAGNOSIS — E78.2 MIXED HYPERLIPIDEMIA: ICD-10-CM

## 2024-09-19 DIAGNOSIS — I10 ESSENTIAL HYPERTENSION: ICD-10-CM

## 2024-09-19 DIAGNOSIS — Z23 ENCOUNTER FOR IMMUNIZATION: ICD-10-CM

## 2024-09-19 DIAGNOSIS — I25.118 CORONARY ARTERY DISEASE OF NATIVE HEART WITH STABLE ANGINA PECTORIS, UNSPECIFIED VESSEL OR LESION TYPE: ICD-10-CM

## 2024-09-19 DIAGNOSIS — Z00.00 ENCOUNTER FOR SUBSEQUENT ANNUAL WELLNESS VISIT (AWV) IN MEDICARE PATIENT: Primary | ICD-10-CM

## 2024-09-19 RX ORDER — VIBEGRON 75 MG/1
75 TABLET, FILM COATED ORAL DAILY
COMMUNITY
Start: 2024-09-17

## 2024-09-19 RX ORDER — AMLODIPINE BESYLATE 5 MG/1
1 TABLET ORAL DAILY
COMMUNITY
Start: 2024-09-02

## 2024-09-19 RX ORDER — ISOSORBIDE MONONITRATE 60 MG/1
60 TABLET, EXTENDED RELEASE ORAL DAILY
Qty: 90 TABLET | Refills: 3 | Status: SHIPPED | OUTPATIENT
Start: 2024-09-19

## 2024-09-21 LAB
ALBUMIN SERPL-MCNC: 4.4 G/DL (ref 3.5–5.2)
ALBUMIN/GLOB SERPL: 2 G/DL
ALP SERPL-CCNC: 83 U/L (ref 39–117)
ALT SERPL-CCNC: 17 U/L (ref 1–33)
AST SERPL-CCNC: 23 U/L (ref 1–32)
BILIRUB SERPL-MCNC: 0.7 MG/DL (ref 0–1.2)
BUN SERPL-MCNC: 21 MG/DL (ref 8–23)
BUN/CREAT SERPL: 20.8 (ref 7–25)
CA-I SERPL ISE-MCNC: 5.4 MG/DL (ref 4.5–5.6)
CALCIUM SERPL-MCNC: 10.6 MG/DL (ref 8.6–10.5)
CHLORIDE SERPL-SCNC: 99 MMOL/L (ref 98–107)
CHOLEST SERPL-MCNC: 135 MG/DL (ref 0–200)
CO2 SERPL-SCNC: 23.9 MMOL/L (ref 22–29)
CREAT SERPL-MCNC: 1.01 MG/DL (ref 0.57–1)
EGFRCR SERPLBLD CKD-EPI 2021: 55.7 ML/MIN/1.73
GLOBULIN SER CALC-MCNC: 2.2 GM/DL
GLUCOSE SERPL-MCNC: 90 MG/DL (ref 65–99)
HBA1C MFR BLD: 4.9 % (ref 4.8–5.6)
HDLC SERPL-MCNC: 33 MG/DL (ref 40–60)
LDLC SERPL CALC-MCNC: 73 MG/DL (ref 0–100)
POTASSIUM SERPL-SCNC: 4.3 MMOL/L (ref 3.5–5.2)
PROT SERPL-MCNC: 6.6 G/DL (ref 6–8.5)
RPR SER QL: NON REACTIVE
SODIUM SERPL-SCNC: 136 MMOL/L (ref 136–145)
TRIGL SERPL-MCNC: 166 MG/DL (ref 0–150)
VIT B12 SERPL-MCNC: >2000 PG/ML (ref 211–946)
VLDLC SERPL CALC-MCNC: 29 MG/DL (ref 5–40)

## 2024-09-24 ENCOUNTER — TELEPHONE (OUTPATIENT)
Dept: FAMILY MEDICINE CLINIC | Facility: CLINIC | Age: 84
End: 2024-09-24

## 2024-09-24 NOTE — TELEPHONE ENCOUNTER
Caller: Daily Castellano    Relationship: Emergency Contact    Best call back number:     [phone] (Mobile)       Caller requesting test results: BLOOD WORK    What test was performed: BLOOD WORK    When was the test performed: 09/19/24    Where was the test performed:     Additional notes: PATIENT'S DAUGHTER DAILY CALLED TO REQUEST A CALLBACK TO DISCUSS THE RESULTS FROM HER BLOOD WORK.     PLEASE CONTACT DAILY TO ADVISE.          THANKS

## 2024-09-24 NOTE — TELEPHONE ENCOUNTER
Called and discussed results.  Daughter is concerned about the HDL.  Wants to know if there is anything she can do to improve the HDL.

## 2024-10-08 RX ORDER — HYDROXYUREA 500 MG/1
CAPSULE ORAL
Qty: 60 CAPSULE | Refills: 1 | Status: SHIPPED | OUTPATIENT
Start: 2024-10-08 | End: 2024-10-08 | Stop reason: SDUPTHER

## 2024-10-08 RX ORDER — HYDROXYUREA 500 MG/1
CAPSULE ORAL
Qty: 60 CAPSULE | Refills: 1 | Status: SHIPPED | OUTPATIENT
Start: 2024-10-08

## 2024-10-08 NOTE — TELEPHONE ENCOUNTER
Specialty Pharmacy Patient Management Program  Per Protocol Prescription Order or Refill       Requested Prescriptions     Signed Prescriptions Disp Refills    hydroxyurea (HYDREA) 500 MG capsule 60 capsule 1     Sig: Take 1 capsule by mouth 5 days a week (Monday-Friday)     Authorizing Provider: SAROJ HEALY     Ordering User: MIKAEL BLAKELY     Prescription orders above were sent to  [facility]  per Collaborative Care Agreement Protocol.     Completed independent double check on medication order/RX.    Haleigh Stern Rph, BCOP  Clinical Specialty Pharmacist, Oncology  10/8/2024  15:52 EDT

## 2024-10-08 NOTE — TELEPHONE ENCOUNTER
Specialty Pharmacy Patient Management Program  Per Protocol Prescription Order or Refill     Patient will be filling or currently fills medications at  [facility]  and is enrolled in the Patient Management Program.    Requested Prescriptions     Signed Prescriptions Disp Refills    hydroxyurea (HYDREA) 500 MG capsule 60 capsule 1     Sig: Take 1 capsule by mouth 5 days a week (Monday-Friday)     Authorizing Provider: SAROJ HEALY     Ordering User: MIKAEL BLAKELY     Prescription orders above were sent to the pharmacy per Collaborative Care Agreement Protocol.     Last Office Visit: 8/1/24  Next Office Visit: 11/4/24    Vance Blakely PharmD, BCOP  Clinical Specialty Pharmacist, Oncology  10/8/2024  15:45 EDT

## 2024-11-04 ENCOUNTER — LAB (OUTPATIENT)
Dept: LAB | Facility: HOSPITAL | Age: 84
End: 2024-11-04
Payer: MEDICARE

## 2024-11-04 ENCOUNTER — SPECIALTY PHARMACY (OUTPATIENT)
Dept: ONCOLOGY | Facility: HOSPITAL | Age: 84
End: 2024-11-04
Payer: MEDICARE

## 2024-11-04 ENCOUNTER — TELEPHONE (OUTPATIENT)
Dept: ONCOLOGY | Facility: CLINIC | Age: 84
End: 2024-11-04

## 2024-11-04 ENCOUNTER — OFFICE VISIT (OUTPATIENT)
Dept: ONCOLOGY | Facility: CLINIC | Age: 84
End: 2024-11-04
Payer: MEDICARE

## 2024-11-04 VITALS
TEMPERATURE: 97.3 F | SYSTOLIC BLOOD PRESSURE: 131 MMHG | RESPIRATION RATE: 18 BRPM | BODY MASS INDEX: 29.23 KG/M2 | DIASTOLIC BLOOD PRESSURE: 73 MMHG | WEIGHT: 154.8 LBS | HEIGHT: 61 IN | HEART RATE: 92 BPM | OXYGEN SATURATION: 99 %

## 2024-11-04 DIAGNOSIS — D47.1 MYELOPROLIFERATIVE DISORDER: Primary | ICD-10-CM

## 2024-11-04 DIAGNOSIS — Z85.038 HISTORY OF COLON CANCER: ICD-10-CM

## 2024-11-04 DIAGNOSIS — D50.9 IRON DEFICIENCY ANEMIA, UNSPECIFIED IRON DEFICIENCY ANEMIA TYPE: ICD-10-CM

## 2024-11-04 DIAGNOSIS — R93.5 ABNORMAL CT OF THE ABDOMEN: ICD-10-CM

## 2024-11-04 DIAGNOSIS — D47.1 MYELOPROLIFERATIVE DISORDER: ICD-10-CM

## 2024-11-04 DIAGNOSIS — R10.13 EPIGASTRIC PAIN: ICD-10-CM

## 2024-11-04 LAB
ALBUMIN SERPL-MCNC: 3.9 G/DL (ref 3.5–5.2)
ALBUMIN/GLOB SERPL: 1.4 G/DL
ALP SERPL-CCNC: 77 U/L (ref 39–117)
ALT SERPL W P-5'-P-CCNC: 16 U/L (ref 1–33)
ANION GAP SERPL CALCULATED.3IONS-SCNC: 13 MMOL/L (ref 5–15)
AST SERPL-CCNC: 24 U/L (ref 1–32)
BASOPHILS # BLD AUTO: 0.08 10*3/MM3 (ref 0–0.2)
BASOPHILS NFR BLD AUTO: 0.7 % (ref 0–1.5)
BILIRUB SERPL-MCNC: 0.5 MG/DL (ref 0–1.2)
BUN SERPL-MCNC: 19 MG/DL (ref 8–23)
BUN/CREAT SERPL: 17.4 (ref 7–25)
CALCIUM SPEC-SCNC: 9.8 MG/DL (ref 8.6–10.5)
CHLORIDE SERPL-SCNC: 99 MMOL/L (ref 98–107)
CO2 SERPL-SCNC: 22 MMOL/L (ref 22–29)
CREAT SERPL-MCNC: 1.09 MG/DL (ref 0.57–1)
DEPRECATED RDW RBC AUTO: 59.7 FL (ref 37–54)
EGFRCR SERPLBLD CKD-EPI 2021: 50.8 ML/MIN/1.73
EOSINOPHIL # BLD AUTO: 0.38 10*3/MM3 (ref 0–0.4)
EOSINOPHIL NFR BLD AUTO: 3.5 % (ref 0.3–6.2)
ERYTHROCYTE [DISTWIDTH] IN BLOOD BY AUTOMATED COUNT: 16.3 % (ref 12.3–15.4)
FERRITIN SERPL-MCNC: 16.6 NG/ML (ref 13–150)
GLOBULIN UR ELPH-MCNC: 2.7 GM/DL
GLUCOSE SERPL-MCNC: 100 MG/DL (ref 65–99)
HCT VFR BLD AUTO: 29.8 % (ref 34–46.6)
HGB BLD-MCNC: 9.9 G/DL (ref 12–15.9)
IMM GRANULOCYTES # BLD AUTO: 0.59 10*3/MM3 (ref 0–0.05)
IMM GRANULOCYTES NFR BLD AUTO: 5.4 % (ref 0–0.5)
IRON 24H UR-MRATE: 66 MCG/DL (ref 37–145)
IRON SATN MFR SERPL: 17 % (ref 20–50)
LIPASE SERPL-CCNC: 15 U/L (ref 13–60)
LYMPHOCYTES # BLD AUTO: 1.05 10*3/MM3 (ref 0.7–3.1)
LYMPHOCYTES NFR BLD AUTO: 9.7 % (ref 19.6–45.3)
MCH RBC QN AUTO: 33.6 PG (ref 26.6–33)
MCHC RBC AUTO-ENTMCNC: 33.2 G/DL (ref 31.5–35.7)
MCV RBC AUTO: 101 FL (ref 79–97)
MONOCYTES # BLD AUTO: 0.72 10*3/MM3 (ref 0.1–0.9)
MONOCYTES NFR BLD AUTO: 6.6 % (ref 5–12)
NEUTROPHILS NFR BLD AUTO: 74.1 % (ref 42.7–76)
NEUTROPHILS NFR BLD AUTO: 8.04 10*3/MM3 (ref 1.7–7)
NRBC BLD AUTO-RTO: 0.2 /100 WBC (ref 0–0.2)
PLATELET # BLD AUTO: 301 10*3/MM3 (ref 140–450)
PMV BLD AUTO: 10.5 FL (ref 6–12)
POTASSIUM SERPL-SCNC: 4.4 MMOL/L (ref 3.5–5.2)
PROT SERPL-MCNC: 6.6 G/DL (ref 6–8.5)
RBC # BLD AUTO: 2.95 10*6/MM3 (ref 3.77–5.28)
SODIUM SERPL-SCNC: 134 MMOL/L (ref 136–145)
TIBC SERPL-MCNC: 398 MCG/DL (ref 298–536)
TRANSFERRIN SERPL-MCNC: 267 MG/DL (ref 200–360)
WBC NRBC COR # BLD AUTO: 10.86 10*3/MM3 (ref 3.4–10.8)

## 2024-11-04 PROCEDURE — 1159F MED LIST DOCD IN RCRD: CPT | Performed by: INTERNAL MEDICINE

## 2024-11-04 PROCEDURE — 83690 ASSAY OF LIPASE: CPT | Performed by: INTERNAL MEDICINE

## 2024-11-04 PROCEDURE — 84466 ASSAY OF TRANSFERRIN: CPT

## 2024-11-04 PROCEDURE — 3078F DIAST BP <80 MM HG: CPT | Performed by: INTERNAL MEDICINE

## 2024-11-04 PROCEDURE — 99214 OFFICE O/P EST MOD 30 MIN: CPT | Performed by: INTERNAL MEDICINE

## 2024-11-04 PROCEDURE — 3075F SYST BP GE 130 - 139MM HG: CPT | Performed by: INTERNAL MEDICINE

## 2024-11-04 PROCEDURE — 83540 ASSAY OF IRON: CPT

## 2024-11-04 PROCEDURE — 85025 COMPLETE CBC W/AUTO DIFF WBC: CPT

## 2024-11-04 PROCEDURE — 1160F RVW MEDS BY RX/DR IN RCRD: CPT | Performed by: INTERNAL MEDICINE

## 2024-11-04 PROCEDURE — 1126F AMNT PAIN NOTED NONE PRSNT: CPT | Performed by: INTERNAL MEDICINE

## 2024-11-04 PROCEDURE — 80053 COMPREHEN METABOLIC PANEL: CPT

## 2024-11-04 PROCEDURE — 82728 ASSAY OF FERRITIN: CPT

## 2024-11-04 PROCEDURE — 36415 COLL VENOUS BLD VENIPUNCTURE: CPT

## 2024-11-04 NOTE — TELEPHONE ENCOUNTER
Caller: Daily Castellano    Relationship to patient: Emergency Contact    Best call back number: [phone]    Chief complaint: DAUGHTER CALLED TO RESCHEDULE     Type of visit: INFUSION       If rescheduling, when is the original appointment: 11-22-24

## 2024-11-04 NOTE — PROGRESS NOTES
Subjective     CHIEF COMPLAINT:      Chief Complaint   Patient presents with    Follow-up     HISTORY OF PRESENT ILLNESS:     Ayla Castellano is a 84 y.o. female patient who returns today for follow up on her myeloproliferative neoplasm, colon cancer and iron deficiency anemia.  She is on Hydrea 500 mg 5 days a week.  She is tolerating it without nausea or vomiting.  However, she reports having epigastric abdominal pain.  She thinks she has pancreatitis.    Patient is a difficult stick.  It was difficult for her to have the IV placed for her previous scans.    ROS:  Pertinent ROS is in the HPI.     Past medical, surgical, social and family history were reviewed.     MEDICATIONS:    Current Outpatient Medications:     Accu-Chek FastClix Lancets misc, To test blood sugar 3 times daily., Disp: 100 each, Rfl: 8    amLODIPine (NORVASC) 5 MG tablet, Take 1 tablet by mouth Daily., Disp: , Rfl:     aspirin 81 MG tablet, Take 1 tablet by mouth Daily., Disp: , Rfl:     atorvastatin (LIPITOR) 20 MG tablet, TAKE 1 TABLET BY MOUTH DAILY, Disp: 90 tablet, Rfl: 3    Cyanocobalamin (B-12) 2500 MCG sublingual tablet, Place 1 tablet under the tongue Daily., Disp: 30 tablet, Rfl: 11    Gemtesa 75 MG tablet, Take 1 tablet by mouth Daily., Disp: , Rfl:     glipiZIDE 2.5 MG tablet, TAKE 1 TABLET BY MOUTH EVERY DAY, HOLD IF BLOOD SUGAR IS LESS THAN 180., Disp: 90 tablet, Rfl: 0    glucose blood (ONE TOUCH ULTRA TEST) test strip, USE TO TEST BLOOD SUGAR EVERY DAY. (E11.9), Disp: 100 each, Rfl: 2    glucose blood test strip, 1 each by Other route Daily. Use as instructed, Disp: 100 each, Rfl: 1    hydroxyurea (HYDREA) 500 MG capsule, Take 1 capsule by mouth 5 days a week (Monday-Friday), Disp: 60 capsule, Rfl: 1    isosorbide mononitrate (IMDUR) 60 MG 24 hr tablet, TAKE 1 TABLET BY MOUTH DAILY, Disp: 90 tablet, Rfl: 3    ketoconazole (NIZORAL) 2 % cream, Apply  topically to the appropriate area as directed Daily., Disp: 30 g, Rfl: 11    " metFORMIN (GLUCOPHAGE) 500 MG tablet, TAKE 1 TABLET BY MOUTH TWICE DAILY WITH MEALS, Disp: 180 tablet, Rfl: 2    mupirocin (Bactroban) 2 % ointment, Apply  topically to the appropriate area as directed 3 (Three) Times a Day., Disp: 30 g, Rfl: 11    ofloxacin (OCUFLOX) 0.3 % ophthalmic solution, Administer 2 drops to both eyes 4 (Four) Times a Day., Disp: 10 mL, Rfl: 11    valsartan-hydrochlorothiazide (DIOVAN-HCT) 320-25 MG per tablet, Take 1 tablet by mouth Daily., Disp: 90 tablet, Rfl: 3  Objective     VITAL SIGNS:     Vitals:    11/04/24 0936   BP: 131/73   Pulse: 92   Resp: 18   Temp: 97.3 °F (36.3 °C)   TempSrc: Oral   SpO2: 99%   Weight: 70.2 kg (154 lb 12.8 oz)   Height: 154.9 cm (61\")   PainSc: 0-No pain     Body mass index is 29.25 kg/m².     Wt Readings from Last 5 Encounters:   11/04/24 70.2 kg (154 lb 12.8 oz)   09/19/24 68.1 kg (150 lb 3.2 oz)   08/01/24 65.3 kg (144 lb)   05/23/24 66.5 kg (146 lb 9.6 oz)   05/15/24 66.5 kg (146 lb 9.6 oz)     PHYSICAL EXAMINATION:   GENERAL: The patient appears in fair general condition, not in acute distress.   SKIN: No Ecchymosis.  EYES: No jaundice. No Pallor.  CHEST: Normal respiratory effort. Normal breathing sounds bilaterally. No added sounds.  CVS: Normal S1 and S2. No Murmur.  ABDOMEN: Soft. Mild epigastric tenderness. No Hepatomegaly. No Splenomegaly. No masses.  EXTREMITIES: No joint deformity.     DIAGNOSTIC DATA:     Results from last 7 days   Lab Units 11/04/24  0920   WBC 10*3/mm3 10.86*   NEUTROS ABS 10*3/mm3 8.04*   HEMOGLOBIN g/dL 9.9*   HEMATOCRIT % 29.8*   PLATELETS 10*3/mm3 301     Results from last 7 days   Lab Units 11/04/24  0920   SODIUM mmol/L 134*   POTASSIUM mmol/L 4.4   CHLORIDE mmol/L 99   CO2 mmol/L 22.0   BUN mg/dL 19   CREATININE mg/dL 1.09*   CALCIUM mg/dL 9.8   ALBUMIN g/dL 3.9   BILIRUBIN mg/dL 0.5   ALK PHOS U/L 77   ALT (SGPT) U/L 16   AST (SGOT) U/L 24   GLUCOSE mg/dL 100*         Lab 11/04/24  0920   IRON 66   IRON SATURATION " (TSAT) 17*   TIBC 398   TRANSFERRIN 267   FERRITIN 16.60      Component      Latest Ref Rng 8/1/2024   Methylmalonic Acid      0 - 378 nmol/L 190      Renal ultrasound on 8/20/2024:  The lesion in question within the mid pole of the right kidney is not an  aneurysm. It is a rounded lesion isoechoic to renal cortex but does  contain some areas of internal color Doppler flow. Review of multiple  prior CT scans shows that this lesion has very slowly enlarged over time  dating back to 2015. On the most recent study it does enhance brighter  than the adjacent renal cortex and is concerning for slow-growing renal  tumor rather than a hypertrophied column. At this time, would recommend  further evaluation with a CT of the abdomen with and without IV contrast  multiphase renal mass protocol to better determine its enhancement  characteristics relative to the rest of the right kidney.    Assessment & Plan    *Myeloproliferative disorder, 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   She was under the care of an outside Hematologist before transferring care to our practice. Records indicate that she was previously diagnosed with Essential thrombocythemia.   She has elevated WBC and basophil count.   Therefore, a diagnosis of myeloproliferative disorder or polycythemia vera was favored over ET.  BCR ABL was negative by FISH on 2/2/2021.  In February 2021, WBC count was gradually increasing indicating that her disease was not under a good control likely due to the dose of Hydroxyurea not being effective (500 mg every 4 days).   The dose was increased to every 3 days on 2/2/2021.  Due to the persistence of the leukocytosis, the dose of Hydrea was increased to 4 days a week.  Hemoglobin decreased to 10.0 on 10/4/2021.  WBC count was 15,270.  Due to the worsening anemia, we reduced the dose of Hydrea back to 3 days a week.  Labs on 1/6/2022 revealed a hemoglobin of 10.2.  WBC was 16,600.   Platelets were 352,000.  Spleen measured 14.7 cm on CT scan on 1/14/2022.  Labs on 5/5/2022 revealed a hemoglobin of 9.9.  WBC decreased to 14,980.  Platelets were 357,000.  WBC increased to 19,200 on 7/28/2022.  Hydrea was increased to 500 mg 4 days a week.  WBC decreased to 16,260 on 12/1/2022.  CT on 7/25/2023 showed increase in the size of the spleen from 14.5 cm to 15.7 cm.  8/25/2023: WBC increased to 18,640.  Platelets normal at 361,000.    10/20/2023: WBC 17,940.  Platelets 320,000.  2/22/2024: WBC decreased to 17,430. Platelets 336,000.   Hemoglobin decreased to 10.5.  She was continued on Hydrea 500 mg 4 days a week.  CT on 7/25/2024 revealed the spleen to measure 15 cm in greatest dimension (16.4 cm on re-measurement).  8/1/2024: Hemoglobin improved to 11.0.  WBC 16,490.  Platelets 365,000.  Due to the increase in the WBC count, Hydrea was increased to 500 mg 5 days a week.  11/4/2024: WBC improved to 10,860.  Platelets 301,000.   Since her WBC improved with the dose increase since her and platelets have remained normal, I recommended continuing the same dose of Hydrea.    *Iron deficiency anemia.   This is attributed to iron deficiency with a transferrin saturation of 17% and a ferritin of 26.   The patient did not tolerate oral in the past with development of upset stomach, abdominal pain, headaches and dizziness when she took the oral iron in the past.  Patient received IV iron the 2020 and tolerated that well.   Patient was given IV Injectafer on 2/5/2021 and 2/12/2021.  Hemoglobin was 10.0 on 10/4/2021.  Hemoglobin decreased to 9.9 on 5/5/2022.  Iron stores were adequate.  Hemoglobin improved to 10.7 on 7/28/2022.  Hemoglobin was 12.0 on 9/30/2022.  Hemoglobin decreased to 10.8 on 12/1/2022-likely secondary to blood loss following a fall with development of hematoma.  Hemoglobin decreased to 11.3 on 5/19/2023.    Ferritin was 41.6.  Transferrin saturation was 18%.  She was given IV Venofer 300 mg  weekly x5 doses between 2023 and 2023.  2023: Hemoglobin decreased to 10.0.  However, ferritin increased to 579. Transferrin saturation increased to 46%.  10/20/2023: Hemoglobin improved to 11.7.  Iron store adequate with ferritin 319. Transferrin saturation 26%.  2024: Hemoglobin decreased to 10.5.  Ferritin 259.  Transferrin saturation 22%-indicating adequate iron stores.    The worsening anemia is attributed to anemia of chronic kidney disease.  2024: Hemoglobin improved to 11.0.  Ferritin 35.  Transferrin saturation 12%.  She continues vitamin B12 2500 mcg sublingually daily.  2024: Hemoglobin decreased to 9.9.   Ferritin decreased to 16.6.  Transferrin saturation 17%.  I recommended IV iron therapy with IV Injectafer 750 mg x 2 doses, 1 week apart.    *History of colon cancer, stage I.   She is s/p partial colectomy by Dr. Ku.   She did not require adjuvant chemotherapy or radiation therapy.    CT scan on 2022 revealed no evidence of recurrence.   CT scan on 2022 revealed no evidence of recurrence.  CT scan on 2023 revealed no evidence of recurrence.  2023: CEA 1.44.  She has a family history of colon cancer. Her brother who was 2 years younger  from metastatic colon cancer.   She was referred to the Genetics clinic.   CT chest abdomen pelvis on 2024 revealed no evidence of recurrence.  2024: CEA 1.19.  Patient reports intermittent epigastric abdominal pain.  She thinks she has pancreatitis.  Lipase level will be obtained today.  CEA will be rechecked in 3 months.    *Enhancing structure in the midpole of the right kidney.  It measured 1.9 cm  Due to the arterial enhancement, and intrarenal aneurysm could not be excluded.  An ultrasound was obtained and showed no evidence of an aneurysm.  Radiologist recommended CT renal mass protocol for further evaluation.      *Hypercalcemia.  This is chronic.  2022: Calcium 11.2.   This was previously  evaluated by her PCP.  PTH was normal at 62 on 8/12/2022.  12/1/2022: Calcium 11.3.  We recommended reducing intake of calcium rich food.  8/25/2023: Calcium decreased to 10.2.  8/20/2024: Calcium 10.7.  11/4/2024: Calcium 9.8.    PLAN:    1.  Continue Hydrea 500 mg 5 days a week.  2.  We will give IV Injectafer 750 mg x 2 doses, 1 week apart.  3.  Continue vitamin B12 1000 mcg sublingually daily.    4.  Obtain CT scan renal mass protocol.  Due to the patient's poor venous access, we will arrange for the CT scan to be done on the same day of the IV iron infusion.   5.  Follow-up in 3 months.  Will obtain CBC CMP ferritin iron panel and methylmalonic acid levels.         Izzy Martinez MD  11/04/24

## 2024-11-04 NOTE — PROGRESS NOTES
Specialty Pharmacy Patient Management Program  Oncology Reassessment     Ayla Castellano was referred by an their provider to the Oncology Patient Management program offered by [facility] for myeloproliferative disorder. A follow-up outreach was conducted, including assessment of continued therapy appropriateness, medication adherence, and side effect incidence and management for Hydrea (hydroxyurea).    Changes to Insurance Coverage or Financial Support  None    Relevant Past Medical History and Comorbidities  Relevant medical history and concomitant health conditions were discussed with the patient. The patient's chart has been reviewed for relevant past medical history and comorbid health conditions and updated as necessary.   Past Medical History:   Diagnosis Date    Arrhythmia     Colitis     Colon cancer     s/p partial colectomy in 2011    Diabetes mellitus     Gallbladder disease     Hard to intubate     Hyperlipidemia     Hypertension     Left arm pain     SSS (sick sinus syndrome)     [employer] dual-chamber pacemaker on 9/17/2021    Syncope     Secondary to medications (clonidine and metoprolol) in 3/15.  Had severe bradycardia (sinus arrest with junctional escape beats).  Resolved after medications discontinued.     Social History     Socioeconomic History    Marital status:    Tobacco Use    Smoking status: Never    Smokeless tobacco: Never   Vaping Use    Vaping status: Never Used   Substance and Sexual Activity    Alcohol use: No    Drug use: No    Sexual activity: Not Currently     Partners: Male     Birth control/protection: None     Problem list reviewed by Valentine Dobson RPH on 11/4/2024 at  9:54 AM    Hospitalizations and Urgent Care Since Last Assessment  ED Visits, Admissions, or Hospitalizations: none  Urgent Office Visits: none    Allergies  Known allergies and reactions were discussed with the patient. The patient's chart has been reviewed for  allergy information and updated as necessary.   Allergies   Allergen Reactions    Latex Unknown - Low Severity     blisters     Allergies reviewed by Valentine Dobson RPH on 11/4/2024 at  9:54 AM    Relevant Laboratory Values  Relevant laboratory values were discussed with the patient. The following specialty medication dose adjustment(s) are recommended: No dose adjustments are needed for the oral specialty medication(s) based on the labs.    Lab Results   Component Value Date    GLUCOSE 90 09/19/2024    CALCIUM 10.6 (H) 09/19/2024     09/19/2024    K 4.3 09/19/2024    CO2 23.9 09/19/2024    CL 99 09/19/2024    BUN 21 09/19/2024    CREATININE 1.01 (H) 09/19/2024    EGFRIFAFRI 65 02/01/2022    EGFRIFNONA 57 (L) 02/01/2022    BCR 20.8 09/19/2024    ANIONGAP 12.0 08/01/2024     Lab Results   Component Value Date    WBC 10.86 (H) 11/04/2024    RBC 2.95 (L) 11/04/2024    HGB 9.9 (L) 11/04/2024    HCT 29.8 (L) 11/04/2024    .0 (H) 11/04/2024    MCH 33.6 (H) 11/04/2024    MCHC 33.2 11/04/2024    RDW 16.3 (H) 11/04/2024    RDWSD 59.7 (H) 11/04/2024    MPV 10.5 11/04/2024     11/04/2024    NEUTRORELPCT 74.1 11/04/2024    LYMPHORELPCT 9.7 (L) 11/04/2024    MONORELPCT 6.6 11/04/2024    EOSRELPCT 3.5 11/04/2024    BASORELPCT 0.7 11/04/2024    AUTOIGPER 5.4 (H) 11/04/2024    NEUTROABS 8.04 (H) 11/04/2024    LYMPHSABS 1.05 11/04/2024    MONOSABS 0.72 11/04/2024    EOSABS 0.38 11/04/2024    BASOSABS 0.08 11/04/2024    AUTOIGNUM 0.59 (H) 11/04/2024    NRBC 0.2 11/04/2024       Current Medication List  This medication list has been reviewed with the patient and evaluated for any interactions or necessary modifications/recommendations, and updated to include all prescription medications, OTC medications, and supplements the patient is currently taking.  This list reflects what is contained in the patient's profile, which has also been marked as reviewed to communicate to other providers it is the most up to  date version of the patient's current medication therapy.     Current Outpatient Medications:     Accu-Chek FastClix Lancets misc, To test blood sugar 3 times daily., Disp: 100 each, Rfl: 8    amLODIPine (NORVASC) 5 MG tablet, Take 1 tablet by mouth Daily., Disp: , Rfl:     aspirin 81 MG tablet, Take 1 tablet by mouth Daily., Disp: , Rfl:     atorvastatin (LIPITOR) 20 MG tablet, TAKE 1 TABLET BY MOUTH DAILY, Disp: 90 tablet, Rfl: 3    Cyanocobalamin (B-12) 2500 MCG sublingual tablet, Place 1 tablet under the tongue Daily., Disp: 30 tablet, Rfl: 11    Gemtesa 75 MG tablet, Take 1 tablet by mouth Daily., Disp: , Rfl:     glipiZIDE 2.5 MG tablet, TAKE 1 TABLET BY MOUTH EVERY DAY, HOLD IF BLOOD SUGAR IS LESS THAN 180., Disp: 90 tablet, Rfl: 0    glucose blood (ONE TOUCH ULTRA TEST) test strip, USE TO TEST BLOOD SUGAR EVERY DAY. (E11.9), Disp: 100 each, Rfl: 2    glucose blood test strip, 1 each by Other route Daily. Use as instructed, Disp: 100 each, Rfl: 1    hydroxyurea (HYDREA) 500 MG capsule, Take 1 capsule by mouth 5 days a week (Monday-Friday), Disp: 60 capsule, Rfl: 1    isosorbide mononitrate (IMDUR) 60 MG 24 hr tablet, TAKE 1 TABLET BY MOUTH DAILY, Disp: 90 tablet, Rfl: 3    ketoconazole (NIZORAL) 2 % cream, Apply  topically to the appropriate area as directed Daily., Disp: 30 g, Rfl: 11    metFORMIN (GLUCOPHAGE) 500 MG tablet, TAKE 1 TABLET BY MOUTH TWICE DAILY WITH MEALS, Disp: 180 tablet, Rfl: 2    mupirocin (Bactroban) 2 % ointment, Apply  topically to the appropriate area as directed 3 (Three) Times a Day., Disp: 30 g, Rfl: 11    ofloxacin (OCUFLOX) 0.3 % ophthalmic solution, Administer 2 drops to both eyes 4 (Four) Times a Day., Disp: 10 mL, Rfl: 11    valsartan-hydrochlorothiazide (DIOVAN-HCT) 320-25 MG per tablet, Take 1 tablet by mouth Daily., Disp: 90 tablet, Rfl: 3    Medicines reviewed by Valentine Dobson RPH on 11/4/2024 at  9:54 AM    Drug Interactions  Assessed medication list for  interactions, no significant drug interactions noted.   Advised patient to call the clinic if any new medications are started so we can assess for drug-drug interactions.  Drug-food interactions discussed:  none today    Adverse Drug Reactions  Medication tolerability: Tolerating with no to minimal ADRs  Medication plan: Continue therapy with normal follow-up  Plan for ADR Management: N/A    Adherence, Self-Administration, and Current Therapy Problems  Adherence related to the patient's specialty therapy was discussed with the patient. The Adherence segment of this outreach has been reviewed and updated.     Adherence Questions  Linked Medication(s) Assessed: Hydroxyurea (HYDREA)  On average, how many doses/injections does the patient miss per month?: 0  What are the identified reasons for non-adherence or missed doses? : no problems identified  What is the estimated medication adherence level?: %  Based on the patient/caregiver response and refill history, does this patient require an MTP to track adherence improvements?: no    Additional Barriers to Patient Self-Administration: none  Methods for Supporting Patient Self-Administration: none needed at this time  Patient has had no issues obtaining medication from pharmacy.    Open Medication Therapy Problems  No medication therapy recommendations to display    Goals of Therapy  Goals related to the patient's specialty therapy were discussed with the patient. The Patient Goals segment of this outreach has been reviewed and updated.   Goals Addressed Today        Specialty Pharmacy General Goal      Blood counts within normal limits  11/4/24: Labs today with WBC 10.86, Hgb 9.9, platelets 301. Patient reports tolerating treatment well.  Will continue to follow.               Quality of Life Assessment   Quality of Life related to the patient's enrollment in the patient management program and services provided was discussed with the patient. The QOL segment of  this outreach has been reviewed and updated.  Quality of Life Improvement Scale: 7-Somewhat better    Discussed aforementioned material with patient in person, face-to-face, in clinic.     Reassessment Plan & Follow-Up  1. Medication Therapy Changes: none  2. Related Plans, Therapy Recommendations, or Issues to Be Addressed: none  3. Pharmacist to perform regular assessments no more than (6) months from the previous assessment.     Attestation  Therapeutic appropriateness: Appropriate   I attest the patient was actively involved in and has agreed to the above plan of care.  If the prescribed therapy is at any point deemed not appropriate based on the current or future assessments, a consultation will be initiated with the patient's specialty care provider to determine the best course of action. The revised plan of therapy will be documented along with any required assessments and/or additional patient education provided.     Vance Dobson, PharmD, [facility]  Clinical Specialty Pharmacist, Oncology  11/4/2024  10:02 EST

## 2024-11-05 ENCOUNTER — TELEPHONE (OUTPATIENT)
Dept: ONCOLOGY | Facility: CLINIC | Age: 84
End: 2024-11-05

## 2024-11-05 ENCOUNTER — TELEPHONE (OUTPATIENT)
Dept: ONCOLOGY | Facility: CLINIC | Age: 84
End: 2024-11-05
Payer: MEDICARE

## 2024-11-05 NOTE — TELEPHONE ENCOUNTER
Caller: Daily Castellano    Relationship: Emergency Contact    Best call back number: [phone]     What is the best time to reach you: ASAP    Who are you requesting to speak with (clinical staff, provider,  specific staff member): CLINICAL        What was the call regarding:  PLEASE CALL PT'S DAUGHTER TO DISCUSS PT'S LAB RESULTS FROM YESTERDAY.

## 2024-11-05 NOTE — PROGRESS NOTES
Specialty Pharmacy Note: Hydrea (hydroxyurea)    Ayla Castellano is a 84 y.o. female with myeloproliferative neoplasm was seen 11/4/24 by Dr. Martinez. Per provider dictation, no changes to oral oncology regimen Hydrea (hydroxyurea).  Labs Review: The CMP and CBC from 11/4/24 have been reviewed. No dose adjustments are needed for the oral specialty medication(s) based on the labs.    Specialty pharmacy will continue to follow patient.    Vance Dobson, PharmD, [facility]  Clinical Oncology Pharmacist    11/5/2024  08:31 EST

## 2024-11-05 NOTE — TELEPHONE ENCOUNTER
Called Daily, no answer and have a voicemail box that has not been set up yet. I will try and call back at later time.

## 2024-11-05 NOTE — TELEPHONE ENCOUNTER
Caller: Daily Castellano    Relationship: Emergency Contact    Best call back number: [phone]    What is the best time to reach you: ANYTIME    Who are you requesting to speak with (clinical staff, provider,  specific staff member): NERISSA / CLINICAL    What was the call regarding: RETURNING MISSED CALL FROM NERISSA

## 2024-11-08 ENCOUNTER — OFFICE VISIT (OUTPATIENT)
Dept: CARDIOLOGY | Facility: CLINIC | Age: 84
End: 2024-11-08
Payer: MEDICARE

## 2024-11-08 VITALS
BODY MASS INDEX: 35.18 KG/M2 | HEART RATE: 77 BPM | HEIGHT: 55 IN | DIASTOLIC BLOOD PRESSURE: 80 MMHG | SYSTOLIC BLOOD PRESSURE: 120 MMHG | WEIGHT: 152 LBS

## 2024-11-08 DIAGNOSIS — I49.5 SSS (SICK SINUS SYNDROME): ICD-10-CM

## 2024-11-08 DIAGNOSIS — I10 ESSENTIAL HYPERTENSION: ICD-10-CM

## 2024-11-08 DIAGNOSIS — E78.2 MIXED HYPERLIPIDEMIA: ICD-10-CM

## 2024-11-08 DIAGNOSIS — I25.10 CORONARY ARTERY CALCIFICATION: Primary | ICD-10-CM

## 2024-11-08 PROCEDURE — 3074F SYST BP LT 130 MM HG: CPT | Performed by: INTERNAL MEDICINE

## 2024-11-08 PROCEDURE — 3079F DIAST BP 80-89 MM HG: CPT | Performed by: INTERNAL MEDICINE

## 2024-11-08 PROCEDURE — 99214 OFFICE O/P EST MOD 30 MIN: CPT | Performed by: INTERNAL MEDICINE

## 2024-11-08 NOTE — PROGRESS NOTES
PATIENTINFORMATION    Date of Office Visit: 2024  Encounter Provider: Curry Recinos MD  Place of Service: [facility] CARDIOLOGY  Patient Name: Ayla Castellano  : 1940    Subjective:     Encounter Date:2024      Patient ID: Ayla Castellano is a 84 y.o. female.    Chief Complaint   Patient presents with    Follow-up       HPI  Ms. Castellano is a pleasant 84 years old lady who came to cardiology clinic for follow-up visit.  She is fairly active and denies any significant new symptoms since last clinic visit.  Compliant with current medications without known significant side effects.  No recent ER visit or hospitalization.    ROS  All systems reviewed and negative except as noted in HPI.    Past Medical History:   Diagnosis Date    Arrhythmia     Colitis     Colon cancer     s/p partial colectomy in     Diabetes mellitus     Gallbladder disease     Hard to intubate     Hyperlipidemia     Hypertension     Left arm pain     SSS (sick sinus syndrome)     [employer] dual-chamber pacemaker on 2021    Syncope     Secondary to medications (clonidine and metoprolol) in 3/15.  Had severe bradycardia (sinus arrest with junctional escape beats).  Resolved after medications discontinued.       Past Surgical History:   Procedure Laterality Date    CARDIAC ELECTROPHYSIOLOGY PROCEDURE N/A 2021    Procedure: Pacemaker DC new;  Surgeon: Richard Alaniz MD;  Location:  INVASIVE LOCATION;  Service: Cardiology;  Laterality: N/A;    CHOLECYSTECTOMY  10/2019    COLECTOMY PARTIAL / TOTAL       for colon cancer    COLONOSCOPY  2020    UTERINE FIBROID SURGERY      s/p uterine fibroma resection       Social History     Socioeconomic History    Marital status:    Tobacco Use    Smoking status: Never    Smokeless tobacco: Never   Vaping Use    Vaping status: Never Used   Substance and Sexual Activity    Alcohol use: No    Drug use: No     "Sexual activity: Not Currently     Partners: Male     Birth control/protection: None       Family History   Problem Relation Age of Onset    Heart disease Mother         pacemaker placement    Stroke Mother     Diabetes Mother         My mother diceased    Hypertension Mother     Anemia Mother     Colon cancer Brother     Cancer Brother         Colon cancer    Stroke Father     Diabetes Other     Kidney disease Other     Hyperlipidemia Other     Arthritis Other          Procedures       Objective:     /80   Pulse 77   Ht 61 cm (24.02\")   Wt 68.9 kg (152 lb)   .29 kg/m²  Body mass index is 185.29 kg/m².     Constitutional:       General: Not in acute distress.     Appearance: Well-developed. Not diaphoretic.   Eyes:      Pupils: Pupils are equal, round, and reactive to light.   HENT:      Head: Normocephalic and atraumatic.   Neck:      Thyroid: No thyromegaly.   Pulmonary:      Effort: Pulmonary effort is normal. No respiratory distress.      Breath sounds: Normal breath sounds. No wheezing. No rales.   Chest:      Chest wall: Not tender to palpatation.   Cardiovascular:      Normal rate. Regular rhythm.      No gallop.    Pulses:     Intact distal pulses.   Edema:     Peripheral edema absent.   Abdominal:      General: Bowel sounds are normal. There is no distension.      Palpations: Abdomen is soft.      Tenderness: There is no guarding.   Musculoskeletal: Normal range of motion.         General: No deformity.      Cervical back: Normal range of motion and neck supple. Skin:     General: Skin is warm and dry.      Findings: No rash.   Neurological:      Mental Status: Alert and oriented to person, place, and time.      Cranial Nerves: No cranial nerve deficit.      Deep Tendon Reflexes: Reflexes are normal and symmetric.   Psychiatric:         Judgment: Judgment normal.       Review Of Data: I have reviewed pertinent recent labs, images and documents and pertinent findings included in HPI or " assessment below.    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   Extensive coronary calcification on CT with history of exertional chest pain-normal myocardial perfusion study in May 2023  Mixed hyperlipidemia on statin.  LDL at goal  Hypercalcemia: Normal PTH.  Myeloproliferative disorder on hydroxyurea-sees oncologist  Aortic sclerotic murmur    She is doing fairly well from cardiac standpoint.  Excellent blood pressure control.  Lipid panel is near goal.  Courage to increase level of activity and continue current care.  Follow-up in cardiac clinic in 1 year or sooner with any concerns    Diagnosis and plan of care discussed with patient and verbalized understanding.            Your medication list            Accurate as of November 8, 2024  1:05 PM. If you have any questions, ask your nurse or doctor.                CONTINUE taking these medications        Instructions Last Dose Given Next Dose Due   Accu-Chek FastClix Lancets misc      To test blood sugar 3 times daily.       amLODIPine 5 MG tablet  Commonly known as: NORVASC      Take 1 tablet by mouth Daily.       aspirin 81 MG tablet      Take 1 tablet by mouth Daily.       atorvastatin 20 MG tablet  Commonly known as: LIPITOR      TAKE 1 TABLET BY MOUTH DAILY       B-12 2500 MCG sublingual tablet      Place 1 tablet under the tongue Daily.       Gemtesa 75 MG tablet  Generic drug: Vibegron      Take 1 tablet by mouth Daily.       glipiZIDE 2.5 MG tablet      TAKE 1 TABLET BY MOUTH EVERY DAY, HOLD IF BLOOD SUGAR IS LESS THAN 180.       glucose blood test strip  Commonly known as: ONE TOUCH ULTRA TEST      USE TO TEST BLOOD SUGAR EVERY DAY. (E11.9)       glucose blood test strip      1 each by Other route  Daily. Use as instructed       hydroxyurea 500 MG capsule  Commonly known as: HYDREA      Take 1 capsule by mouth 5 days a week (Monday-Friday)       isosorbide mononitrate 60 MG 24 hr tablet  Commonly known as: IMDUR      TAKE 1 TABLET BY MOUTH DAILY       ketoconazole 2 % cream  Commonly known as: NIZORAL      Apply  topically to the appropriate area as directed Daily.       metFORMIN 500 MG tablet  Commonly known as: GLUCOPHAGE      TAKE 1 TABLET BY MOUTH TWICE DAILY WITH MEALS       mupirocin 2 % ointment  Commonly known as: Bactroban      Apply  topically to the appropriate area as directed 3 (Three) Times a Day.       ofloxacin 0.3 % ophthalmic solution  Commonly known as: OCUFLOX      Administer 2 drops to both eyes 4 (Four) Times a Day.       valsartan-hydrochlorothiazide 320-25 MG per tablet  Commonly known as: DIOVAN-HCT      Take 1 tablet by mouth Daily.                    Curry Recinos MD  11/08/24  13:05 EST

## 2024-11-11 ENCOUNTER — INFUSION (OUTPATIENT)
Dept: ONCOLOGY | Facility: HOSPITAL | Age: 84
End: 2024-11-11
Payer: MEDICARE

## 2024-11-11 VITALS
OXYGEN SATURATION: 97 % | RESPIRATION RATE: 16 BRPM | BODY MASS INDEX: 186.02 KG/M2 | SYSTOLIC BLOOD PRESSURE: 144 MMHG | HEART RATE: 76 BPM | DIASTOLIC BLOOD PRESSURE: 69 MMHG | WEIGHT: 152.6 LBS | TEMPERATURE: 97.8 F

## 2024-11-11 DIAGNOSIS — T45.4X5A ADVERSE EFFECT OF PREPARATION OF IRON COMPOUND, INITIAL ENCOUNTER: ICD-10-CM

## 2024-11-11 DIAGNOSIS — D50.9 IRON DEFICIENCY ANEMIA, UNSPECIFIED IRON DEFICIENCY ANEMIA TYPE: Primary | ICD-10-CM

## 2024-11-11 PROCEDURE — 96374 THER/PROPH/DIAG INJ IV PUSH: CPT

## 2024-11-11 PROCEDURE — 25010000002 FERRIC CARBOXYMALTOSE 750 MG/15ML SOLUTION 15 ML VIAL: Performed by: INTERNAL MEDICINE

## 2024-11-11 PROCEDURE — 63710000001 PROCHLORPERAZINE MALEATE PER 10 MG: Performed by: INTERNAL MEDICINE

## 2024-11-11 RX ORDER — PROCHLORPERAZINE MALEATE 10 MG
10 TABLET ORAL ONCE
Status: COMPLETED | OUTPATIENT
Start: 2024-11-11 | End: 2024-11-11

## 2024-11-11 RX ADMIN — FERRIC CARBOXYMALTOSE INJECTION 750 MG: 50 INJECTION, SOLUTION INTRAVENOUS at 10:18

## 2024-11-11 RX ADMIN — PROCHLORPERAZINE MALEATE 10 MG: 10 TABLET ORAL at 10:04

## 2024-11-18 ENCOUNTER — INFUSION (OUTPATIENT)
Dept: ONCOLOGY | Facility: HOSPITAL | Age: 84
End: 2024-11-18
Payer: MEDICARE

## 2024-11-18 VITALS
DIASTOLIC BLOOD PRESSURE: 71 MMHG | BODY MASS INDEX: 187.73 KG/M2 | OXYGEN SATURATION: 98 % | HEART RATE: 79 BPM | SYSTOLIC BLOOD PRESSURE: 137 MMHG | TEMPERATURE: 98 F | RESPIRATION RATE: 16 BRPM | WEIGHT: 154 LBS

## 2024-11-18 DIAGNOSIS — D50.9 IRON DEFICIENCY ANEMIA, UNSPECIFIED IRON DEFICIENCY ANEMIA TYPE: Primary | ICD-10-CM

## 2024-11-18 DIAGNOSIS — T45.4X5A ADVERSE EFFECT OF PREPARATION OF IRON COMPOUND, INITIAL ENCOUNTER: ICD-10-CM

## 2024-11-18 PROCEDURE — 96374 THER/PROPH/DIAG INJ IV PUSH: CPT

## 2024-11-18 PROCEDURE — 63710000001 PROCHLORPERAZINE MALEATE PER 10 MG: Performed by: INTERNAL MEDICINE

## 2024-11-18 PROCEDURE — 25010000002 FERRIC CARBOXYMALTOSE 750 MG/15ML SOLUTION 15 ML VIAL: Performed by: INTERNAL MEDICINE

## 2024-11-18 RX ORDER — PROCHLORPERAZINE MALEATE 10 MG
10 TABLET ORAL ONCE
Status: COMPLETED | OUTPATIENT
Start: 2024-11-18 | End: 2024-11-18

## 2024-11-18 RX ORDER — SODIUM CHLORIDE 9 MG/ML
20 INJECTION, SOLUTION INTRAVENOUS ONCE
Status: DISCONTINUED | OUTPATIENT
Start: 2024-11-18 | End: 2024-11-18 | Stop reason: HOSPADM

## 2024-11-18 RX ADMIN — FERRIC CARBOXYMALTOSE INJECTION 750 MG: 50 INJECTION, SOLUTION INTRAVENOUS at 10:38

## 2024-11-18 RX ADMIN — PROCHLORPERAZINE MALEATE 10 MG: 10 TABLET ORAL at 09:56

## 2024-11-19 ENCOUNTER — HOSPITAL ENCOUNTER (OUTPATIENT)
Dept: PET IMAGING | Facility: HOSPITAL | Age: 84
Discharge: HOME | End: 2024-11-19
Admitting: INTERNAL MEDICINE
Payer: MEDICARE

## 2024-11-19 ENCOUNTER — TELEPHONE (OUTPATIENT)
Dept: ENDOCRINOLOGY | Age: 84
End: 2024-11-19

## 2024-11-19 DIAGNOSIS — R93.5 ABNORMAL CT OF THE ABDOMEN: ICD-10-CM

## 2024-11-19 PROCEDURE — 74177 CT ABD & PELVIS W/CONTRAST: CPT

## 2024-11-19 PROCEDURE — 25510000002 DIATRIZOATE MEGLUMINE & SODIUM PER 1 ML: Performed by: INTERNAL MEDICINE

## 2024-11-19 PROCEDURE — 25510000001 IOPAMIDOL 61 % SOLUTION: Performed by: INTERNAL MEDICINE

## 2024-11-19 RX ORDER — IOPAMIDOL 612 MG/ML
100 INJECTION, SOLUTION INTRAVASCULAR
Status: COMPLETED | OUTPATIENT
Start: 2024-11-19 | End: 2024-11-19

## 2024-11-19 RX ORDER — DIATRIZOATE MEGLUMINE AND DIATRIZOATE SODIUM 660; 100 MG/ML; MG/ML
30 SOLUTION ORAL; RECTAL
Status: COMPLETED | OUTPATIENT
Start: 2024-11-19 | End: 2024-11-19

## 2024-11-19 RX ADMIN — IOPAMIDOL 85 ML: 612 INJECTION, SOLUTION INTRAVENOUS at 13:11

## 2024-11-19 RX ADMIN — DIATRIZOATE MEGLUMINE AND DIATRIZOATE SODIUM 30 ML: 660; 100 LIQUID ORAL; RECTAL at 13:11

## 2024-11-19 NOTE — TELEPHONE ENCOUNTER
Hub staff attempted to follow warm transfer process and was unsuccessful     Caller: Daily Castellano    Relationship to patient: Emergency Contact    Best call back number: [phone]    Patient is needing: PT'S DAUGHTER CALLED AND NEEDS TO CHANGE PT'S LAB APPT ON 11/26. UNABLE TO WT, PLEASE CALL BACK.

## 2024-11-21 DIAGNOSIS — E11.65 TYPE 2 DIABETES MELLITUS WITH HYPERGLYCEMIA, WITHOUT LONG-TERM CURRENT USE OF INSULIN: Primary | ICD-10-CM

## 2024-11-22 ENCOUNTER — TELEPHONE (OUTPATIENT)
Dept: ONCOLOGY | Facility: CLINIC | Age: 84
End: 2024-11-22
Payer: MEDICARE

## 2024-11-22 DIAGNOSIS — N28.89 RIGHT KIDNEY MASS: ICD-10-CM

## 2024-11-22 DIAGNOSIS — R93.5 ABNORMAL CT OF THE ABDOMEN: Primary | ICD-10-CM

## 2024-11-22 NOTE — TELEPHONE ENCOUNTER
Caller: Daily Castellano    Relationship: Emergency Contact    Best call back number: [phone]     What is the best time to reach you: ASAP    Who are you requesting to speak with (clinical staff, provider,  specific staff member): CLINICAL      What was the call regarding: PT'S DAUGHTER NEEDS TO SPEAK TO SOMEONE TO DISCUSS THE PT'S ABDOMINAL CT SCAN.  PLEASE CALL DAILY TO DISCUSS WHAT IS NEEDED.

## 2024-11-26 NOTE — TELEPHONE ENCOUNTER
Caller: Daily Castellano    Relationship: Emergency Contact    Best call back number: [phone]     What is the best time to reach you: ASAP    Who are you requesting to speak with (clinical staff, provider,  specific staff member): CLINICAL        What was the call regarding: PT'S DAUGHTER IS CALLING BACK TO FIND OUT CT RESULTS, SHE IS CONCERNED THAT SHE HAD NOT HEARD BACK YET AND WOULD LIKE TO SPEAK WITH SOMEONE TODAY IF POSSIBLE.

## 2024-11-26 NOTE — TELEPHONE ENCOUNTER
zIzy Martinez MD  You5 hours ago (7:49 AM)       Please inform the patient's daughter that the CT scan showed that the mass in the right kidney may represent a kidney cancer.  I recommend a referral to urology for further evaluation.    Thank you     Reviewed Dr. Martinez's note and instructions with the pts daughter, she v/u.

## 2024-12-02 RX ORDER — AMLODIPINE BESYLATE 5 MG/1
TABLET ORAL DAILY
Qty: 90 TABLET | Refills: 1 | Status: SHIPPED | OUTPATIENT
Start: 2024-12-02

## 2024-12-03 ENCOUNTER — OFFICE VISIT (OUTPATIENT)
Dept: ENDOCRINOLOGY | Age: 84
End: 2024-12-03
Payer: MEDICARE

## 2024-12-03 VITALS
OXYGEN SATURATION: 97 % | HEIGHT: 61 IN | BODY MASS INDEX: 29.38 KG/M2 | DIASTOLIC BLOOD PRESSURE: 68 MMHG | SYSTOLIC BLOOD PRESSURE: 132 MMHG | WEIGHT: 155.6 LBS | HEART RATE: 78 BPM | TEMPERATURE: 98.3 F

## 2024-12-03 DIAGNOSIS — N18.9 CHRONIC KIDNEY DISEASE, UNSPECIFIED CKD STAGE: ICD-10-CM

## 2024-12-03 DIAGNOSIS — Z87.19 HISTORY OF PANCREATITIS: ICD-10-CM

## 2024-12-03 DIAGNOSIS — E11.65 TYPE 2 DIABETES MELLITUS WITH HYPERGLYCEMIA, WITHOUT LONG-TERM CURRENT USE OF INSULIN: Primary | ICD-10-CM

## 2024-12-03 DIAGNOSIS — I10 ESSENTIAL HYPERTENSION: ICD-10-CM

## 2024-12-03 DIAGNOSIS — I25.118 CORONARY ARTERY DISEASE OF NATIVE HEART WITH STABLE ANGINA PECTORIS, UNSPECIFIED VESSEL OR LESION TYPE: ICD-10-CM

## 2024-12-03 DIAGNOSIS — E78.2 MIXED HYPERLIPIDEMIA: ICD-10-CM

## 2024-12-03 DIAGNOSIS — E11.9 TYPE 2 DIABETES MELLITUS WITHOUT COMPLICATION, WITHOUT LONG-TERM CURRENT USE OF INSULIN: ICD-10-CM

## 2024-12-03 PROCEDURE — 3078F DIAST BP <80 MM HG: CPT | Performed by: NURSE PRACTITIONER

## 2024-12-03 PROCEDURE — 99214 OFFICE O/P EST MOD 30 MIN: CPT | Performed by: NURSE PRACTITIONER

## 2024-12-03 PROCEDURE — 3075F SYST BP GE 130 - 139MM HG: CPT | Performed by: NURSE PRACTITIONER

## 2024-12-03 RX ORDER — LANCETS
EACH MISCELLANEOUS
Qty: 300 EACH | Refills: 5 | Status: SHIPPED | OUTPATIENT
Start: 2024-12-03

## 2024-12-03 RX ORDER — GLIPIZIDE 2.5 MG/1
2.5 TABLET ORAL DAILY
Qty: 90 TABLET | Refills: 1 | Status: SHIPPED | OUTPATIENT
Start: 2024-12-03

## 2024-12-03 NOTE — PROGRESS NOTES
"Chief Complaint  Diabetes    Subjective        Ayla Castellano presents to [facility] ENDOCRINOLOGY  History of Present Illness    New patient 11/2023  Daughter translate for patient      Diabetes Type 2, > 10 years ago  Known diabetic complications: Pancreatitis; chronic, gallbladder removed around 2019, chronic UTIs, CKD- on valsartan 320mg QD  follows with podiatry q3m  Had eye exam 10/2024  Hypoglycemia: denies  BS rarely above 200  CV risk: atorvastatin 20mg QD  Current diabetic regimen: metformin 500mg BID and glipizide 2.5mg QD         Objective   Vital Signs:  /68   Pulse 78   Temp 98.3 °F (36.8 °C) (Oral)   Ht 154.9 cm (60.98\")   Wt 70.6 kg (155 lb 9.6 oz)   SpO2 97%   BMI 29.42 kg/m²   Estimated body mass index is 29.42 kg/m² as calculated from the following:    Height as of this encounter: 154.9 cm (60.98\").    Weight as of this encounter: 70.6 kg (155 lb 9.6 oz).            Physical Exam  Vitals reviewed.   Constitutional:       General: She is not in acute distress.  HENT:      Head: Normocephalic and atraumatic.   Cardiovascular:      Rate and Rhythm: Normal rate.   Pulmonary:      Effort: Pulmonary effort is normal. No respiratory distress.   Musculoskeletal:         General: No signs of injury. Normal range of motion.      Cervical back: Normal range of motion and neck supple.   Skin:     General: Skin is warm and dry.   Neurological:      Mental Status: She is alert and oriented to person, place, and time. Mental status is at baseline.   Psychiatric:         Mood and Affect: Mood normal.         Behavior: Behavior normal.         Thought Content: Thought content normal.         Judgment: Judgment normal.        Result Review :  The following data was reviewed by: LENA Greene on 12/03/2024:  Common labs          9/19/2024    09:42 11/4/2024    09:20 11/26/2024    13:04 11/26/2024    13:19   Common Labs   Glucose 90  100  92     BUN 21  19  20     Creatinine " 1.01  1.09  0.99     Sodium 136  134  134     Potassium 4.3  4.4  4.4     Chloride 99  99  98     Calcium 10.6  9.8  10.3     Total Protein 6.6   6.6     Albumin 4.4  3.9  4.5     Total Bilirubin 0.7  0.5  0.6     Alkaline Phosphatase 83  77  103     AST (SGOT) 23  24  33     ALT (SGPT) 17  16  29     WBC  10.86      Hemoglobin  9.9      Hematocrit  29.8      Platelets  301      Total Cholesterol 135   125     Triglycerides 166   140     HDL Cholesterol 33   35     LDL Cholesterol  73   65     Hemoglobin A1C 4.90   CANCELED     Microalbumin, Urine    4.1                Assessment and Plan   Diagnoses and all orders for this visit:    1. Type 2 diabetes mellitus with hyperglycemia, without long-term current use of insulin (Primary)  -     Lipid Panel; Future  -     Hemoglobin A1c; Future  -     Comprehensive Metabolic Panel; Future  -     Microalbumin / Creatinine Urine Ratio - Urine, Clean Catch; Future    2. Chronic kidney disease, unspecified CKD stage    3. History of pancreatitis    4. Coronary artery disease of native heart with stable angina pectoris, unspecified vessel or lesion type    5. Essential hypertension    6. Mixed hyperlipidemia    7. Type 2 diabetes mellitus without complication, without long-term current use of insulin  -     metFORMIN (GLUCOPHAGE) 500 MG tablet; Take 1 tablet by mouth 2 (Two) Times a Day With Meals.  Dispense: 180 tablet; Refill: 2    Other orders  -     Accu-Chek FastClix Lancets misc; To test blood sugar 3 times daily  Dispense: 300 each; Refill: 5  -     glipiZIDE 2.5 MG tablet; Take 2.5 mg by mouth Daily.  Dispense: 90 tablet; Refill: 1             Follow Up   Return in about 6 months (around 6/3/2025).    Continue metformin and glipizide  Labs before next visit, preferred to not be stuck again for A1c   Continue statin  No additional changes made at today's visit     Patient was given instructions and counseling regarding her condition or for health maintenance advice.  Please see specific information pulled into the AVS if appropriate.     Adore Morales APRN

## 2024-12-04 RX ORDER — BLOOD SUGAR DIAGNOSTIC
1 STRIP MISCELLANEOUS 2 TIMES DAILY
Qty: 180 STRIP | Refills: 3 | Status: SHIPPED | OUTPATIENT
Start: 2024-12-04

## 2024-12-04 NOTE — TELEPHONE ENCOUNTER
Caller: Daily Castellano    Relationship: Emergency Contact    Best call back number: [phone]     Requested Prescriptions:   Requested Prescriptions     Pending Prescriptions Disp Refills    glucose blood (ONE TOUCH ULTRA TEST) test strip 100 each 2     Sig: USE TO TEST BLOOD SUGAR EVERY DAY. (E11.9)        Pharmacy where request should be sent: [id number] [place], [place] - [address] [facility] [phone] [facility] [phone]      Last office visit with prescribing clinician: 9/19/2024   Last telemedicine visit with prescribing clinician: Visit date not found   Next office visit with prescribing clinician: 3/19/2025     Does the patient have less than a 3 day supply:  [x] Yes  [] No    Would you like a call back once the refill request has been completed: [] Yes [x] No    If the office needs to give you a call back, can they leave a voicemail: [] Yes [x] No    Claire Turner Rep   12/04/24 10:49 EST

## 2024-12-04 NOTE — TELEPHONE ENCOUNTER
LOV                   9/19/2024  NOV                   3/19/2025  Last refill             9/13/21  Protocol              met    Not prescribed by provider     Please advise

## 2024-12-05 NOTE — TELEPHONE ENCOUNTER
Caller: Daily Castellano    Relationship: Emergency Contact    Best call back number: [phone]    Requested Prescriptions:   Requested Prescriptions     Pending Prescriptions Disp Refills    hydroxyurea (HYDREA) 500 MG capsule 60 capsule 1     Sig: Take 1 capsule by mouth 5 days a week (Monday-Friday)        Pharmacy where request should be sent:  [facility]    Last office visit with prescribing clinician: 11/4/2024   Last telemedicine visit with prescribing clinician: Visit date not found   Next office visit with prescribing clinician: 2/27/2025     Additional details provided by patient: NA    Does the patient have less than a 3 day supply:  [x] Yes  [] No    Would you like a call back once the refill request has been completed: [] Yes [] No    If the office needs to give you a call back, can they leave a voicemail: [] Yes [] No    Claire Ayala Rep   12/05/24 11:54 EST

## 2024-12-06 RX ORDER — HYDROXYUREA 500 MG/1
CAPSULE ORAL
Qty: 60 CAPSULE | Refills: 1 | Status: SHIPPED | OUTPATIENT
Start: 2024-12-06

## 2024-12-06 NOTE — TELEPHONE ENCOUNTER
Specialty Pharmacy Patient Management Program  Per Protocol Prescription Order or Refill       Requested Prescriptions     Signed Prescriptions Disp Refills    hydroxyurea (HYDREA) 500 MG capsule 60 capsule 1     Sig: Take 1 capsule by mouth 5 days a week (Monday-Friday)     Authorizing Provider: SAROJ HEALY     Ordering User: MIKAEL BLAKELY     Prescription orders above were sent to  [facility]  per Collaborative Care Agreement Protocol.     Last Office Visit: 11/4/24  Next Office Visit: 2/27/25  Completed independent double check on medication order/RX.    Joellen BHANDARI, PharmD  Clinical Specialty Pharmacist, Oncology/Hematology  12/6/2024  10:23 EST

## 2024-12-06 NOTE — TELEPHONE ENCOUNTER
Specialty Pharmacy Patient Management Program  Per Protocol Prescription Order or Refill     Patient will be filling or currently fills medications at  [facility]  and is enrolled in the Patient Management Program.    Requested Prescriptions     Pending Prescriptions Disp Refills    hydroxyurea (HYDREA) 500 MG capsule 60 capsule 1     Sig: Take 1 capsule by mouth 5 days a week (Monday-Friday)     Prescription orders above were sent to the pharmacy per Collaborative Care Agreement Protocol.     Last Office Visit: 11/4/24  Next Office Visit: 2/27/25    Vance Dobson PharmD, BCOP  Clinical Specialty Pharmacist, Oncology  12/6/2024  09:31 EST

## 2024-12-12 NOTE — TELEPHONE ENCOUNTER
Caller: Daily Castellano    Relationship: Emergency Contact    Best call back number:[phone]     Requested Prescriptions:   Requested Prescriptions     Pending Prescriptions Disp Refills    OneTouch Verio test strip [Pharmacy Med Name: ONE TOUCH VERIO TEST ST(NEW)100S]       Sig: USE 1 STRIP TO TEST DAILY        Pharmacy where request should be sent: [id number] [place], [place] - [address] [facility] [phone] [facility] [phone]      Last office visit with prescribing clinician: Visit date not found   Last telemedicine visit with prescribing clinician: Visit date not found   Next office visit with prescribing clinician: Visit date not found     Additional details provided by patient: PATIENT IS OUT OF TEST STRIPS. PHARMACY HAS NOT RECEIVED THE PRESCRIPTION    Does the patient have less than a 3 day supply:  [x] Yes  [] No    Claire Bhakta Rep   12/12/24 13:45 EST

## 2024-12-13 RX ORDER — BLOOD SUGAR DIAGNOSTIC
STRIP MISCELLANEOUS
Qty: 100 EACH | Refills: 5 | Status: SHIPPED | OUTPATIENT
Start: 2024-12-13

## 2024-12-13 NOTE — TELEPHONE ENCOUNTER
LOV                  9/19/2024                   NOV               3/19/2025     LAST REFILL     12/4/2024  PROTOCOL       Met

## 2024-12-14 DIAGNOSIS — N32.81 OVERACTIVE BLADDER: ICD-10-CM

## 2024-12-17 RX ORDER — OXYBUTYNIN CHLORIDE 5 MG/1
5 TABLET ORAL 2 TIMES DAILY
Qty: 180 TABLET | Refills: 1 | Status: SHIPPED | OUTPATIENT
Start: 2024-12-17

## 2025-01-10 ENCOUNTER — TELEPHONE (OUTPATIENT)
Dept: CARDIOLOGY | Facility: CLINIC | Age: 85
End: 2025-01-10
Payer: MEDICARE

## 2025-01-10 DIAGNOSIS — I10 PRIMARY HYPERTENSION: ICD-10-CM

## 2025-01-10 RX ORDER — VALSARTAN AND HYDROCHLOROTHIAZIDE 320; 25 MG/1; MG/1
1 TABLET, FILM COATED ORAL DAILY
Qty: 90 TABLET | Refills: 3 | Status: SHIPPED | OUTPATIENT
Start: 2025-01-10

## 2025-01-10 RX ORDER — VALSARTAN AND HYDROCHLOROTHIAZIDE 320; 25 MG/1; MG/1
1 TABLET, FILM COATED ORAL DAILY
Qty: 90 TABLET | Refills: 3 | OUTPATIENT
Start: 2025-01-10

## 2025-01-10 NOTE — TELEPHONE ENCOUNTER
Pt needing a refill on valsartan/HCTZ.  I told her that the PCP prescribes this, but their office isn't answering the phone and pt only has a few days left.  She's wanting to know if we can order this refill for her?  I confirmed [facility] on [address] [address] [facility] is correct.    Thank you,    Carmen CARDOSO RN  Triage [facility]  01/10/25 12:56 EST

## 2025-02-27 ENCOUNTER — OFFICE VISIT (OUTPATIENT)
Dept: ONCOLOGY | Facility: CLINIC | Age: 85
End: 2025-02-27
Payer: MEDICARE

## 2025-02-27 ENCOUNTER — LAB (OUTPATIENT)
Dept: LAB | Facility: HOSPITAL | Age: 85
End: 2025-02-27
Payer: MEDICARE

## 2025-02-27 VITALS
TEMPERATURE: 98.1 F | WEIGHT: 151.8 LBS | DIASTOLIC BLOOD PRESSURE: 70 MMHG | SYSTOLIC BLOOD PRESSURE: 125 MMHG | HEIGHT: 61 IN | BODY MASS INDEX: 28.66 KG/M2 | OXYGEN SATURATION: 96 % | HEART RATE: 77 BPM

## 2025-02-27 DIAGNOSIS — J20.9 ACUTE BRONCHITIS, UNSPECIFIED ORGANISM: ICD-10-CM

## 2025-02-27 DIAGNOSIS — D50.9 IRON DEFICIENCY ANEMIA, UNSPECIFIED IRON DEFICIENCY ANEMIA TYPE: ICD-10-CM

## 2025-02-27 DIAGNOSIS — D47.1 MYELOPROLIFERATIVE DISORDER: Primary | ICD-10-CM

## 2025-02-27 DIAGNOSIS — Z85.038 HISTORY OF COLON CANCER: ICD-10-CM

## 2025-02-27 DIAGNOSIS — N28.89 RIGHT KIDNEY MASS: ICD-10-CM

## 2025-02-27 DIAGNOSIS — E11.9 TYPE 2 DIABETES MELLITUS WITHOUT COMPLICATION, WITHOUT LONG-TERM CURRENT USE OF INSULIN: ICD-10-CM

## 2025-02-27 LAB
ALBUMIN SERPL-MCNC: 4.1 G/DL (ref 3.5–5.2)
ALBUMIN/GLOB SERPL: 1.6 G/DL
ALP SERPL-CCNC: 74 U/L (ref 39–117)
ALT SERPL W P-5'-P-CCNC: 32 U/L (ref 1–33)
ANION GAP SERPL CALCULATED.3IONS-SCNC: 16.2 MMOL/L (ref 5–15)
AST SERPL-CCNC: 32 U/L (ref 1–32)
BASOPHILS # BLD AUTO: 0.17 10*3/MM3 (ref 0–0.2)
BASOPHILS NFR BLD AUTO: 0.8 % (ref 0–1.5)
BILIRUB SERPL-MCNC: 0.7 MG/DL (ref 0–1.2)
BUN SERPL-MCNC: 16 MG/DL (ref 8–23)
BUN/CREAT SERPL: 16.5 (ref 7–25)
CALCIUM SPEC-SCNC: 9.7 MG/DL (ref 8.6–10.5)
CEA SERPL-MCNC: 1.17 NG/ML
CHLORIDE SERPL-SCNC: 92 MMOL/L (ref 98–107)
CO2 SERPL-SCNC: 21.8 MMOL/L (ref 22–29)
CREAT SERPL-MCNC: 0.97 MG/DL (ref 0.57–1)
DEPRECATED RDW RBC AUTO: 56.3 FL (ref 37–54)
EGFRCR SERPLBLD CKD-EPI 2021: 58.1 ML/MIN/1.73
EOSINOPHIL # BLD AUTO: 0.55 10*3/MM3 (ref 0–0.4)
EOSINOPHIL NFR BLD AUTO: 2.7 % (ref 0.3–6.2)
ERYTHROCYTE [DISTWIDTH] IN BLOOD BY AUTOMATED COUNT: 14.4 % (ref 12.3–15.4)
FERRITIN SERPL-MCNC: 378 NG/ML (ref 13–150)
GLOBULIN UR ELPH-MCNC: 2.6 GM/DL
GLUCOSE SERPL-MCNC: 148 MG/DL (ref 65–99)
HBA1C MFR BLD: <4.3 % (ref 4.8–5.6)
HCT VFR BLD AUTO: 29.2 % (ref 34–46.6)
HGB BLD-MCNC: 10.4 G/DL (ref 12–15.9)
IMM GRANULOCYTES # BLD AUTO: 2.58 10*3/MM3 (ref 0–0.05)
IMM GRANULOCYTES NFR BLD AUTO: 12.6 % (ref 0–0.5)
IRON 24H UR-MRATE: 48 MCG/DL (ref 37–145)
IRON SATN MFR SERPL: 16 % (ref 20–50)
LYMPHOCYTES # BLD AUTO: 1.85 10*3/MM3 (ref 0.7–3.1)
LYMPHOCYTES NFR BLD AUTO: 9.1 % (ref 19.6–45.3)
MCH RBC QN AUTO: 39 PG (ref 26.6–33)
MCHC RBC AUTO-ENTMCNC: 35.6 G/DL (ref 31.5–35.7)
MCV RBC AUTO: 109.4 FL (ref 79–97)
MONOCYTES # BLD AUTO: 1.21 10*3/MM3 (ref 0.1–0.9)
MONOCYTES NFR BLD AUTO: 5.9 % (ref 5–12)
NEUTROPHILS NFR BLD AUTO: 14.08 10*3/MM3 (ref 1.7–7)
NEUTROPHILS NFR BLD AUTO: 68.9 % (ref 42.7–76)
NRBC BLD AUTO-RTO: 0.3 /100 WBC (ref 0–0.2)
PLATELET # BLD AUTO: 474 10*3/MM3 (ref 140–450)
PMV BLD AUTO: 10.2 FL (ref 6–12)
POTASSIUM SERPL-SCNC: 3.9 MMOL/L (ref 3.5–5.2)
PROT SERPL-MCNC: 6.7 G/DL (ref 6–8.5)
RBC # BLD AUTO: 2.67 10*6/MM3 (ref 3.77–5.28)
SODIUM SERPL-SCNC: 130 MMOL/L (ref 136–145)
TIBC SERPL-MCNC: 295 MCG/DL (ref 298–536)
TRANSFERRIN SERPL-MCNC: 198 MG/DL (ref 200–360)
WBC NRBC COR # BLD AUTO: 20.44 10*3/MM3 (ref 3.4–10.8)

## 2025-02-27 PROCEDURE — 80053 COMPREHEN METABOLIC PANEL: CPT

## 2025-02-27 PROCEDURE — 83036 HEMOGLOBIN GLYCOSYLATED A1C: CPT | Performed by: INTERNAL MEDICINE

## 2025-02-27 PROCEDURE — 82728 ASSAY OF FERRITIN: CPT

## 2025-02-27 PROCEDURE — 83540 ASSAY OF IRON: CPT

## 2025-02-27 PROCEDURE — 85025 COMPLETE CBC W/AUTO DIFF WBC: CPT

## 2025-02-27 PROCEDURE — 84466 ASSAY OF TRANSFERRIN: CPT

## 2025-02-27 PROCEDURE — 36415 COLL VENOUS BLD VENIPUNCTURE: CPT

## 2025-02-27 PROCEDURE — 82378 CARCINOEMBRYONIC ANTIGEN: CPT | Performed by: INTERNAL MEDICINE

## 2025-02-27 RX ORDER — BENZONATATE 100 MG/1
100 CAPSULE ORAL 3 TIMES DAILY PRN
Qty: 90 CAPSULE | Refills: 1 | Status: SHIPPED | OUTPATIENT
Start: 2025-02-27 | End: 2025-02-28 | Stop reason: SDUPTHER

## 2025-02-27 RX ORDER — AZITHROMYCIN 250 MG/1
TABLET, FILM COATED ORAL
Qty: 6 TABLET | Refills: 0 | Status: SHIPPED | OUTPATIENT
Start: 2025-02-27

## 2025-02-27 RX ORDER — HYDROXYUREA 500 MG/1
500 CAPSULE ORAL DAILY
Qty: 90 CAPSULE | Refills: 0 | Status: SHIPPED | OUTPATIENT
Start: 2025-02-27

## 2025-02-27 NOTE — PROGRESS NOTES
Subjective     CHIEF COMPLAINT:      Chief Complaint   Patient presents with    Follow-up     Lab review  Cough     HISTORY OF PRESENT ILLNESS:     Ayla Castellano is a 85 y.o. female patient who returns today for follow up on her polycythemia vera, iron deficiency anemia and history of colon cancer. She received IV Injectafer on 11/11/2024 and 11/18/2024. She felt somewhat better after the infusion.     Patient reports developing cough and congestion after her  develop an URI. No fever or chills. She continues to have cough. No SOB.    ROS:  Pertinent ROS is in the HPI.     Past medical, surgical, social and family history were reviewed.     MEDICATIONS:    Current Outpatient Medications:     Accu-Chek FastClix Lancets misc, To test blood sugar 3 times daily, Disp: 300 each, Rfl: 5    amLODIPine (NORVASC) 5 MG tablet, TAKE 1 TABLET BY MOUTH DAILY, Disp: 90 tablet, Rfl: 1    aspirin 81 MG tablet, Take 1 tablet by mouth Daily., Disp: , Rfl:     atorvastatin (LIPITOR) 20 MG tablet, TAKE 1 TABLET BY MOUTH DAILY, Disp: 90 tablet, Rfl: 3    Cyanocobalamin (B-12) 2500 MCG sublingual tablet, Place 1 tablet under the tongue Daily., Disp: 30 tablet, Rfl: 11    Gemtesa 75 MG tablet, Take 1 tablet by mouth Daily., Disp: , Rfl:     glipiZIDE 2.5 MG tablet, Take 2.5 mg by mouth Daily., Disp: 90 tablet, Rfl: 1    Glucose Blood (Blood Glucose Test Strips 333) strip, 1 each by Other route 2 (Two) Times a Day. USE TO TEST BLOOD SUGAR EVERY DAY. (E11.9), Disp: 180 strip, Rfl: 3    hydroxyurea (HYDREA) 500 MG capsule, Take 1 capsule by mouth Daily., Disp: 90 capsule, Rfl: 0    isosorbide mononitrate (IMDUR) 60 MG 24 hr tablet, TAKE 1 TABLET BY MOUTH DAILY, Disp: 90 tablet, Rfl: 3    ketoconazole (NIZORAL) 2 % cream, Apply  topically to the appropriate area as directed Daily., Disp: 30 g, Rfl: 11    metFORMIN (GLUCOPHAGE) 500 MG tablet, Take 1 tablet by mouth 2 (Two) Times a Day With Meals., Disp: 180 tablet, Rfl: 2     "mupirocin (Bactroban) 2 % ointment, Apply  topically to the appropriate area as directed 3 (Three) Times a Day., Disp: 30 g, Rfl: 11    ofloxacin (OCUFLOX) 0.3 % ophthalmic solution, Administer 2 drops to both eyes 4 (Four) Times a Day., Disp: 10 mL, Rfl: 11    valsartan-hydrochlorothiazide (DIOVAN-HCT) 320-25 MG per tablet, Take 1 tablet by mouth Daily., Disp: 90 tablet, Rfl: 3    azithromycin (Zithromax) 250 MG tablet, Take 2 tablets the first day, then 1 tablet daily for 4 days., Disp: 6 tablet, Rfl: 0    benzonatate (Tessalon Perles) 100 MG capsule, Take 1 capsule by mouth 3 (Three) Times a Day As Needed for Cough., Disp: 90 capsule, Rfl: 1    glucose blood (OneTouch Verio) test strip, USE 1 STRIP TO TEST DAILY, Disp: 100 each, Rfl: 5    oxybutynin (DITROPAN) 5 MG tablet, TAKE 1 TABLET BY MOUTH TWICE DAILY (Patient not taking: Reported on 2/27/2025), Disp: 180 tablet, Rfl: 1  Objective     VITAL SIGNS:     Vitals:    02/27/25 1041   BP: 125/70   Pulse: 77   Temp: 98.1 °F (36.7 °C)   TempSrc: Oral   SpO2: 96%   Weight: 68.9 kg (151 lb 12.8 oz)   Height: 154.9 cm (60.98\")   PainSc: 0-No pain     Body mass index is 28.7 kg/m².     Wt Readings from Last 5 Encounters:   02/27/25 68.9 kg (151 lb 12.8 oz)   12/03/24 70.6 kg (155 lb 9.6 oz)   11/18/24 69.9 kg (154 lb)   11/11/24 69.2 kg (152 lb 9.6 oz)   11/08/24 68.9 kg (152 lb)     PHYSICAL EXAMINATION:   GENERAL: The patient appears in fair general condition, not in acute distress.   SKIN: No Ecchymosis.  EYES: No jaundice. Pallor.  CHEST: Normal respiratory effort. Normal breathing sounds bilaterally. No added sounds.  CVS: Normal S1 and S2. No Murmur.  ABDOMEN: Soft. No tenderness. No Hepatomegaly. No Splenomegaly. No masses.  EXTREMITIES: No joint deformity.     DIAGNOSTIC DATA:     Results from last 7 days   Lab Units 02/27/25  1019   WBC 10*3/mm3 20.44*   NEUTROS ABS 10*3/mm3 14.08*   HEMOGLOBIN g/dL 10.4*   HEMATOCRIT % 29.2*   PLATELETS 10*3/mm3 474* "     Results from last 7 days   Lab Units 02/27/25  1019   SODIUM mmol/L 130*   POTASSIUM mmol/L 3.9   CHLORIDE mmol/L 92*   CO2 mmol/L 21.8*   BUN mg/dL 16   CREATININE mg/dL 0.97   CALCIUM mg/dL 9.7   ALBUMIN g/dL 4.1   BILIRUBIN mg/dL 0.7   ALK PHOS U/L 74   ALT (SGPT) U/L 32   AST (SGOT) U/L 32   GLUCOSE mg/dL 148*         Lab 02/27/25  1019   IRON 48   IRON SATURATION (TSAT) 16*   TIBC 295*   TRANSFERRIN 198*   FERRITIN 378.00*      Assessment & Plan    *Myeloproliferative disorder, 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   She was under the care of an outside Hematologist before transferring care to our practice. Records indicate that she was previously diagnosed with Essential thrombocythemia.   She has elevated WBC and basophil count.   Therefore, a diagnosis of myeloproliferative disorder or polycythemia vera was favored over ET.  BCR ABL was negative by FISH on 2/2/2021.  In February 2021, WBC count was gradually increasing indicating that her disease was not under a good control likely due to the dose of Hydroxyurea not being effective (500 mg every 4 days).   The dose was increased to every 3 days on 2/2/2021.  Due to the persistence of the leukocytosis, the dose of Hydrea was increased to 4 days a week.  Hemoglobin decreased to 10.0 on 10/4/2021.  WBC count was 15,270.  Due to the worsening anemia, we reduced the dose of Hydrea back to 3 days a week.  Labs on 1/6/2022 revealed a hemoglobin of 10.2.  WBC was 16,600.  Platelets were 352,000.  Spleen measured 14.7 cm on CT scan on 1/14/2022.  Labs on 5/5/2022 revealed a hemoglobin of 9.9.  WBC decreased to 14,980.  Platelets were 357,000.  WBC increased to 19,200 on 7/28/2022.  Hydrea was increased to 500 mg 4 days a week.  WBC decreased to 16,260 on 12/1/2022.  CT on 7/25/2023 showed increase in the size of the spleen from 14.5 cm to 15.7 cm.  8/25/2023: WBC increased to 18,640.  Platelets normal at 361,000.     10/20/2023: WBC 17,940.  Platelets 320,000.  2/22/2024: WBC decreased to 17,430. Platelets 336,000.   Hemoglobin decreased to 10.5.  She was continued on Hydrea 500 mg 4 days a week.  CT on 7/25/2024 revealed the spleen to measure 15 cm in greatest dimension (16.4 cm on re-measurement).  8/1/2024: Hemoglobin improved to 11.0.  WBC 16,490.  Platelets 365,000.  Due to the increase in the WBC count, Hydrea was increased to 500 mg 5 days a week.  11/4/2024: WBC improved to 10,860.  Platelets 301,000.   2/27/2025: WBC increased to 20,440. Platelets increased to 474,000.  Due to this increase in WBC and platelets, I recommended increasing the dose of Hydrea to 500 mg daily.     *Iron deficiency anemia.   This is attributed to iron deficiency with a transferrin saturation of 17% and a ferritin of 26.   The patient did not tolerate oral in the past with development of upset stomach, abdominal pain, headaches and dizziness when she took the oral iron in the past.  Patient received IV iron the 2020 and tolerated that well.   Patient was given IV Injectafer on 2/5/2021 and 2/12/2021.  Hemoglobin was 10.0 on 10/4/2021.  Hemoglobin decreased to 9.9 on 5/5/2022.  Iron stores were adequate.  Hemoglobin improved to 10.7 on 7/28/2022.  Hemoglobin was 12.0 on 9/30/2022.  Hemoglobin decreased to 10.8 on 12/1/2022-likely secondary to blood loss following a fall with development of hematoma.  Hemoglobin decreased to 11.3 on 5/19/2023.    Ferritin was 41.6.  Transferrin saturation was 18%.  She was given IV Venofer 300 mg weekly x5 doses between 6/6/2023 and 6/28/2023.  8/25/2023: Hemoglobin decreased to 10.0.  However, ferritin increased to 579. Transferrin saturation increased to 46%.  10/20/2023: Hemoglobin improved to 11.7.  Iron store adequate with ferritin 319. Transferrin saturation 26%.  2/22/2024: Hemoglobin decreased to 10.5.  Ferritin 259.  Transferrin saturation 22%-indicating adequate iron stores.    The worsening anemia  is attributed to anemia of chronic kidney disease.  2024: Hemoglobin improved to 11.0.  Ferritin 35.  Transferrin saturation 12%.  She continues vitamin B12 2500 mcg sublingually daily.  2024: Hemoglobin decreased to 9.9.   Ferritin decreased to 16.6.  Transferrin saturation 17%.  S/P IV Injectafer 750 mg on 2024 and 2024.  2025: Hemoglobin improved to 10.4.    *History of colon cancer, stage I.   She is s/p partial colectomy by Dr. Ku.   She did not require adjuvant chemotherapy or radiation therapy.    CT scan on 2022 revealed no evidence of recurrence.   CT scan on 2022 revealed no evidence of recurrence.  CT scan on 2023 revealed no evidence of recurrence.  2023: CEA 1.44.  She has a family history of colon cancer. Her brother who was 2 years younger  from metastatic colon cancer.   She was referred to the Genetics clinic.   CT chest abdomen pelvis on 2024 revealed no evidence of recurrence.  2024: CEA 1.19.  Patient reports intermittent epigastric abdominal pain.  She thinks she has pancreatitis.  2025: CEA 1.17.    *Enhancing structure in the midpole of the right kidney.  It measured 1.9 cm  Due to the arterial enhancement, and intrarenal aneurysm could not be excluded.  An ultrasound was obtained and showed no evidence of an aneurysm.  CT scan on 2024 revealed the enlarging mass in the right mid kidney to be suspicous for renal cell carcinoma.  She was referred to Urology.    *Acute bronchitis.  Patient has been having recurrent cough.  No fever or chills.  Her symptoms are not improving with OTC medications.  Exam today revealed no changes of pneumonia.     PLAN:    1.  Increase Hydrea to 500 mg once daily.  2.  Continue vitamn B12 1000 mcg sublingually daily.  3.  We will treat with a course of Z-Zbigniew.  I sent a prescription for Tessalon Perles.  4.  Follow-up in 2 months. We will obtain CBC CMP ferritin iron panel and methylmalonic  acid levels.   5.  If her counts do not improve despite the dose increase or if her anemia worsens, I would recommend switching to Jakafi.       I spent 42 minutes caring for Ayla on this date of service. This time includes time spent by me in the following activities: preparing for the visit, reviewing tests, performing a medically appropriate examination and/or evaluation, counseling and educating the patient/family/caregiver, documenting information in the medical record, independently interpreting results and communicating that information with the patient/family/caregiver, care coordination, ordering medications, and ordering test(s)       Izzy Martinez MD  02/27/25

## 2025-02-28 ENCOUNTER — SPECIALTY PHARMACY (OUTPATIENT)
Dept: PHARMACY | Facility: HOSPITAL | Age: 85
End: 2025-02-28
Payer: MEDICARE

## 2025-02-28 ENCOUNTER — TELEPHONE (OUTPATIENT)
Dept: ONCOLOGY | Facility: CLINIC | Age: 85
End: 2025-02-28
Payer: MEDICARE

## 2025-02-28 RX ORDER — BENZONATATE 100 MG/1
100 CAPSULE ORAL 3 TIMES DAILY PRN
Qty: 90 CAPSULE | Refills: 1 | Status: SHIPPED | OUTPATIENT
Start: 2025-02-28

## 2025-02-28 NOTE — TELEPHONE ENCOUNTER
Caller: Daily Castellano    Relationship: Emergency Contact    Best call back number: [phone]    What is the best time to reach you: ANYTIME    Who are you requesting to speak with (clinical staff, provider,  specific staff member): CLINICAL    What was the call regarding: MEDICATION BENZONATATE WAS CALLED IN YESTERDAY FOR PT'S COUGH, THIS IS NOT COVERED BY INSURANCE AND IS WANTING TO SEE IF ANOTHER COUGH MEDICATION CAN BE CALLED IN.    [id number] - [place], [place] - [address] AT [facility] [phone] [facility] [phone] FX [[id number]]

## 2025-02-28 NOTE — PROGRESS NOTES
Specialty Pharmacy Note: Hydrea (hydroxyurea)    Ayla Castellano is a 85 y.o. female with polycythemia vera was seen 2/27/25 by Dr. Martinez. Per provider dictation, increase Hydrea to 500 mg once daily. .  Labs Review: The CMP and CBC from 2/27/25 have been reviewed. The following labs are outside of normal limits: WBC, Hgb, platelets.  Dose adjusted as above.    Specialty pharmacy will continue to follow patient.    Vance Dobson, PharmD, [facility]  Clinical Oncology Pharmacist    2/28/2025  10:45 EST

## 2025-02-28 NOTE — TELEPHONE ENCOUNTER
I called Daily, I relayed Dr. Espino's message to the patient, she asked if we could send the Rx to [facility] pharmacy on [address] since she could get it cheaper there. Rx resent. Daily v/u

## 2025-03-03 ENCOUNTER — TELEPHONE (OUTPATIENT)
Dept: ONCOLOGY | Facility: CLINIC | Age: 85
End: 2025-03-03
Payer: MEDICARE

## 2025-03-03 NOTE — TELEPHONE ENCOUNTER
Caller: Daily Castellano    Relationship: Emergency Contact    Best call back number:   [phone]    Caller requesting test results: YES    What test was performed: BLOOD WORK     When was the test performed: 2/27/25    Where was the test performed: [facility]    PATIENT ALSO NOT FEELING BETTER EVEN WITH ZPAK

## 2025-03-03 NOTE — TELEPHONE ENCOUNTER
I called Daily, she said that she is still a little sick and the patient is still coughing. She said that she started the tessalon and that it sometimes it helps and sometimes it doesn't. I asked if she has had fevers or any worsening symptoms she sates no. I asked Daily to continue the zpak as prescribed and continue symptoms management and call us and let us know if symptoms get worse or if she develops fevers. Daily wants to know if the patient can take mucinex as she feels that help her more than the tessalon. I told her can she can take the mucinex along with tessalon.

## 2025-03-05 LAB — METHYLMALONATE SERPL-SCNC: 189 NMOL/L (ref 0–378)

## 2025-03-11 RX ORDER — HYDROXYUREA 500 MG/1
500 CAPSULE ORAL DAILY
Qty: 90 CAPSULE | Refills: 1 | Status: SHIPPED | OUTPATIENT
Start: 2025-03-11

## 2025-03-11 NOTE — TELEPHONE ENCOUNTER
Specialty Pharmacy Patient Management Program  Per Protocol Prescription Order or Refill     Patient will be filling or currently fills medications at  [facility]  and is enrolled in the Patient Management Program.    Requested Prescriptions     Pending Prescriptions Disp Refills    hydroxyurea (HYDREA) 500 MG capsule 90 capsule 1     Sig: Take 1 capsule by mouth Daily.     Prescription orders above were sent to the pharmacy per Collaborative Care Agreement Protocol.     Last Office Visit: 2/27/25  Next Office Visit: 5/6/25    Vance Dobson PharmD, BCOP  Clinical Specialty Pharmacist, Oncology  3/11/2025  13:51 EDT

## 2025-03-11 NOTE — TELEPHONE ENCOUNTER
Caller: Daily Castellano    Relationship: Emergency Contact    Best call back number: [phone]    Requested Prescriptions:   Requested Prescriptions     Pending Prescriptions Disp Refills    hydroxyurea (HYDREA) 500 MG capsule 90 capsule 0     Sig: Take 1 capsule by mouth Daily.        Pharmacy where request should be sent: [id number] [place], [place] - [address] AT [facility] [phone] [facility] [phone]      Last office visit with prescribing clinician: 2/27/2025   Last telemedicine visit with prescribing clinician: Visit date not found   Next office visit with prescribing clinician: 5/6/2025     Additional details provided by patient: DAUGHTER STATES DR HEALY CHANGED THE DOSE OF THIS MEDICATION SO SHE RAN OUT FASTER THEN NORMAL     Does the patient have less than a 3 day supply:  [x] Yes  [] No      Claire Ordaz Rep   03/11/25 10:41 EDT

## 2025-03-11 NOTE — TELEPHONE ENCOUNTER
Specialty Pharmacy Patient Management Program  Per Protocol Prescription Order or Refill       Requested Prescriptions     Signed Prescriptions Disp Refills    hydroxyurea (HYDREA) 500 MG capsule 90 capsule 1     Sig: Take 1 capsule by mouth Daily.     Authorizing Provider: SAROJ HEALY     Ordering User: MIKAEL BLAKELY     Prescription orders above were sent to  [facility]  per Collaborative Care Agreement Protocol.     Completed independent double check on medication order/RX.    Haleigh Stern Rph, BCOP  Clinical Specialty Pharmacist, Oncology  3/11/2025  14:27 EDT

## 2025-03-19 ENCOUNTER — OFFICE VISIT (OUTPATIENT)
Dept: FAMILY MEDICINE CLINIC | Facility: CLINIC | Age: 85
End: 2025-03-19
Payer: MEDICARE

## 2025-03-19 ENCOUNTER — TELEPHONE (OUTPATIENT)
Dept: FAMILY MEDICINE CLINIC | Facility: CLINIC | Age: 85
End: 2025-03-19

## 2025-03-19 VITALS
OXYGEN SATURATION: 98 % | DIASTOLIC BLOOD PRESSURE: 64 MMHG | BODY MASS INDEX: 29.27 KG/M2 | HEART RATE: 68 BPM | SYSTOLIC BLOOD PRESSURE: 118 MMHG | WEIGHT: 155 LBS | HEIGHT: 61 IN

## 2025-03-19 DIAGNOSIS — E11.9 TYPE 2 DIABETES MELLITUS WITHOUT COMPLICATION, WITHOUT LONG-TERM CURRENT USE OF INSULIN: ICD-10-CM

## 2025-03-19 DIAGNOSIS — H91.93 BILATERAL HEARING LOSS, UNSPECIFIED HEARING LOSS TYPE: ICD-10-CM

## 2025-03-19 DIAGNOSIS — R11.0 NAUSEA: Primary | ICD-10-CM

## 2025-03-19 DIAGNOSIS — E87.1 HYPONATREMIA: ICD-10-CM

## 2025-03-19 DIAGNOSIS — I10 ESSENTIAL HYPERTENSION: ICD-10-CM

## 2025-03-19 DIAGNOSIS — R10.13 EPIGASTRIC PAIN: ICD-10-CM

## 2025-03-19 DIAGNOSIS — D50.9 IRON DEFICIENCY ANEMIA, UNSPECIFIED IRON DEFICIENCY ANEMIA TYPE: ICD-10-CM

## 2025-03-19 RX ORDER — PANTOPRAZOLE SODIUM 40 MG/1
40 TABLET, DELAYED RELEASE ORAL DAILY
Qty: 42 TABLET | Refills: 0 | Status: SHIPPED | OUTPATIENT
Start: 2025-03-19

## 2025-03-19 NOTE — TELEPHONE ENCOUNTER
Caller: Daily Castellano    Relationship: Emergency Contact    Best call back number: [phone]     What medication are you requesting: ONE TOUCH ULTRA TEST STRIPS     If a prescription is needed, what is your preferred pharmacy and phone number: [facility] #[id number] - [place], [place] - [address] [facility] [phone] [facility] [phone]      Additional notes: STATES PHARMACY SAID SHE NEEDS A PA FOR PRESCRIPTION

## 2025-03-19 NOTE — PROGRESS NOTES
Chief Complaint  Diabetes and Hypertension (Pt. Needs accu ck ultra test strips)    Juliet Castellano presents to [facility]    History of Present Illness  The patient presents for evaluation of epigastric pain, low sodium level, and elevated glucose level.    She reports experiencing epigastric pain, which she attributes to pancreatitis. The pain is absent when she adheres to a strict diet but recurs upon consumption of fatty foods. She also experiences nausea, particularly when her dietary intake deviates from the prescribed regimen. The onset of these symptoms dates back several years. The duration of the pain episodes is approximately 4 to 5 hours, after which they gradually subside. Despite her usual compliance with the dietary restrictions, she occasionally indulges in other foods. A CT scan of her abdomen was performed in 11/2024, as ordered by her hematologist, Dr. Pugh. The results were discussed with her, and she was referred to a urologist due to a suspicious finding. However, the urologist reassured her that there was no immediate cause for concern, including the possibility of bladder cancer. A follow-up appointment with the urologist is scheduled for 1 year from now. Her Hydrea dosage was increased to 500 mg daily during her last visit. She is scheduled for a follow-up visit with Dr. Pugh in 05/2025, at which point a new medication may be considered if her blood work does not show improvement. She has been unable to complete her blood work as requested by her endocrinologist. She consumes three bottles of water daily.    She has discontinued her oxybutynin medication.    She has not yet received the RSV vaccine.    FAMILY HISTORY  Her mother had colon cancer.    MEDICATIONS  Current: Hydrea, hydrochlorothiazide, losartan.  Discontinued: Oxybutynin.     Answers submitted by the patient for this visit:  Questionnaire about: Other medical problem  "(Submitted on 3/18/2025)  Chief Complaint: Other medical problem    Objective   Vital Signs:  /64 (BP Location: Left arm, Patient Position: Sitting, Cuff Size: Adult)   Pulse 68   Ht 154.9 cm (60.98\")   Wt 70.3 kg (155 lb)   SpO2 98%   BMI 29.31 kg/m²   Estimated body mass index is 29.31 kg/m² as calculated from the following:    Height as of this encounter: 154.9 cm (60.98\").    Weight as of this encounter: 70.3 kg (155 lb).            Physical Exam  Vitals and nursing note reviewed.   Constitutional:       General: She is not in acute distress.     Appearance: Normal appearance. She is not ill-appearing.   HENT:      Head: Normocephalic and atraumatic.      Nose: Nose normal.      Mouth/Throat:      Mouth: Mucous membranes are moist.   Eyes:      Extraocular Movements: Extraocular movements intact.      Conjunctiva/sclera: Conjunctivae normal.   Cardiovascular:      Rate and Rhythm: Normal rate and regular rhythm.      Heart sounds: Normal heart sounds. No murmur heard.     No gallop.   Pulmonary:      Effort: Pulmonary effort is normal. No respiratory distress.      Breath sounds: Normal breath sounds. No stridor. No wheezing, rhonchi or rales.   Chest:      Chest wall: No tenderness.   Skin:     General: Skin is warm and dry.   Neurological:      General: No focal deficit present.      Mental Status: She is alert and oriented to person, place, and time. Mental status is at baseline.   Psychiatric:         Mood and Affect: Mood normal.         Behavior: Behavior normal.          Physical Exam  Vital Signs  Blood pressure is 118/64.       Result Review :               Results  Laboratory Studies  Sodium was low. Glucose was slightly high.          Assessment and Plan   Diagnoses and all orders for this visit:    1. Nausea (Primary)  -     Lipase; Future  -     Amylase; Future  -     pantoprazole (Protonix) 40 MG EC tablet; Take 1 tablet by mouth Daily.  Dispense: 42 tablet; Refill: 0    2. Epigastric " pain  -     Lipase; Future  -     Amylase; Future  -     pantoprazole (Protonix) 40 MG EC tablet; Take 1 tablet by mouth Daily.  Dispense: 42 tablet; Refill: 0    3. Bilateral hearing loss, unspecified hearing loss type  -     Ambulatory Referral to ENT (Otolaryngology)    4. Hyponatremia  -     Comprehensive metabolic panel; Future    5. Essential hypertension    6. Type 2 diabetes mellitus without complication, without long-term current use of insulin    7. Iron deficiency anemia, unspecified iron deficiency anemia type        Assessment & Plan  1. Epigastric pain.  She reports intermittent epigastric pain, particularly after consuming fatty foods, which lasts about four to five hours. She has been managing her symptoms with dietary modifications. A CT scan was previously ordered by her hematologist, and she was referred to urology, where no immediate concerns were found. Blood counts will be repeated, and tests for pancreatic function and sodium levels will be conducted. If the pain worsens, becomes severe, does not improve, or if she develops a fever, she should seek immediate medical attention at the hospital.    2. Low sodium level.  Her sodium levels were noted to be low during her last blood work on 02/27/2025. This could be a random lab error, but if it persists, hydrochlorothiazide, part of her blood pressure medication, may be causing it. Sodium levels will be rechecked. If sodium levels remain low, her hydrochlorothiazide medication will need to be adjusted.    3. Elevated glucose level.  Her glucose levels were slightly elevated during her last blood work on 02/27/2025. This will be monitored closely.    4. Medication management.  She is no longer taking oxybutynin.    5. Health maintenance.  She has not yet received the RSV vaccine and is still considering it.            Follow Up   Return in about 6 months (around 9/19/2025) for Medicare Wellness.  Patient was given instructions and counseling  regarding her condition or for health maintenance advice. Please see specific information pulled into the AVS if appropriate.           Patient or patient representative verbalized consent for the use of Ambient Listening during the visit with  Annmarie Palacio DO for chart documentation. 3/19/2025  12:59 EDT

## 2025-03-21 NOTE — TELEPHONE ENCOUNTER
Caller: Daily Castellano    Relationship: Emergency Contact    Best call back number: [phone]    What is the best time to reach you: ANYTIME    Who are you requesting to speak with (clinical staff, provider,  specific staff member): SCHEDULING     What was the call regarding: PT'S DAUGHTER WILL BE BRINGING HER TO ALL SCHEDULED IRON INFUSIONS AND SHE WANTED TO GIVE YOU HER OFF DAYS FROM WORK, BEFORE SCHEDULING, NEEDS TO SEE IF PT CAN BE SCHEDULED 5-30, 6-2, 6-6 AND 6-14     Do you require a callback: IF QUESTIONS          
Spoke with patients daughter and scheduled iron infusion. Daily will call back to schedule last 3 infusions after she gets her work schedule.   
Universal Safety Interventions

## 2025-03-21 NOTE — TELEPHONE ENCOUNTER
Caller: Daily Castellano    Relationship: Emergency Contact    Best call back number:     [phone]       What was the call regarding: PLEASE CALL WITH STATUS UPDATE ON P.A.

## 2025-04-03 RX ORDER — BLOOD SUGAR DIAGNOSTIC
1 STRIP MISCELLANEOUS 2 TIMES DAILY
Qty: 180 STRIP | Refills: 3 | Status: SHIPPED | OUTPATIENT
Start: 2025-04-03

## 2025-04-03 NOTE — TELEPHONE ENCOUNTER
PATIENT['S DAUGHTER CALLED AGAIN FOR MEDICATION REFILLS OF TESTING STRIPS FOR ONE TOUCH ULTRA 2  SHE IS OUT OF TESTING STRIPS      [id number] - [place], [place] - [address] AT [facility] - [phone]  - [phone]  [phone]     CALL BACK NUMBER [phone]  PLEASE CALL WHEN SENT TO PHARMACY

## 2025-04-23 ENCOUNTER — TELEPHONE (OUTPATIENT)
Dept: FAMILY MEDICINE CLINIC | Facility: CLINIC | Age: 85
End: 2025-04-23
Payer: MEDICARE

## 2025-04-23 NOTE — TELEPHONE ENCOUNTER
Provider: DR FERGSUON    Caller: Daily Castellano    Relationship to Patient: Emergency Contact    Pharmacy: [facility] #[id number] [place], [place] - [address] [facility] - [phone]  - [phone]  [phone]     Phone Number: [phone]    Reason for Call: ANTONIA IS CALLING TO STATE THE FOLLOWING MEDICATION IS HELPING SOME, BUT NOT COMPLETELY.  SHE IS WANTING TO KNOW IF DR FERGUSON WANTS PATIENT TO CONTINUE TAKING THE SAME MEDICATION OR SWITCH TO ANOTHER MEDICATION.    IF DR FERGUSON WANTS PATIENT TO REMAIN ON THE MEDICATION SHE WILL NEED A NEW PRESCRIPTION.      PLEASE ADVISE.

## 2025-04-28 ENCOUNTER — OFFICE VISIT (OUTPATIENT)
Dept: FAMILY MEDICINE CLINIC | Facility: CLINIC | Age: 85
End: 2025-04-28
Payer: MEDICARE

## 2025-04-28 VITALS
WEIGHT: 153.4 LBS | DIASTOLIC BLOOD PRESSURE: 74 MMHG | HEART RATE: 81 BPM | HEIGHT: 61 IN | BODY MASS INDEX: 28.96 KG/M2 | OXYGEN SATURATION: 98 % | SYSTOLIC BLOOD PRESSURE: 120 MMHG

## 2025-04-28 DIAGNOSIS — E78.2 MIXED HYPERLIPIDEMIA: ICD-10-CM

## 2025-04-28 DIAGNOSIS — E83.52 HYPERCALCEMIA: ICD-10-CM

## 2025-04-28 DIAGNOSIS — I10 ESSENTIAL HYPERTENSION: Primary | ICD-10-CM

## 2025-04-28 DIAGNOSIS — R11.0 NAUSEA: ICD-10-CM

## 2025-04-28 DIAGNOSIS — E11.9 TYPE 2 DIABETES MELLITUS WITHOUT COMPLICATION, WITHOUT LONG-TERM CURRENT USE OF INSULIN: ICD-10-CM

## 2025-04-28 DIAGNOSIS — D50.9 IRON DEFICIENCY ANEMIA, UNSPECIFIED IRON DEFICIENCY ANEMIA TYPE: ICD-10-CM

## 2025-04-28 DIAGNOSIS — D47.1 MYELOPROLIFERATIVE DISORDER: ICD-10-CM

## 2025-04-28 DIAGNOSIS — R10.13 EPIGASTRIC PAIN: ICD-10-CM

## 2025-04-28 DIAGNOSIS — M81.8 OTHER OSTEOPOROSIS WITHOUT CURRENT PATHOLOGICAL FRACTURE: ICD-10-CM

## 2025-04-28 RX ORDER — LANCETS
1 EACH MISCELLANEOUS 3 TIMES DAILY
COMMUNITY
Start: 2025-04-02

## 2025-04-28 RX ORDER — PANTOPRAZOLE SODIUM 40 MG/1
40 TABLET, DELAYED RELEASE ORAL DAILY
Qty: 42 TABLET | Refills: 0 | Status: SHIPPED | OUTPATIENT
Start: 2025-04-28 | End: 2025-04-28 | Stop reason: SDUPTHER

## 2025-04-28 RX ORDER — PANTOPRAZOLE SODIUM 40 MG/1
40 TABLET, DELAYED RELEASE ORAL DAILY
Qty: 42 TABLET | Refills: 0 | Status: SHIPPED | OUTPATIENT
Start: 2025-04-28

## 2025-04-28 NOTE — PROGRESS NOTES
"Chief Complaint  Heartburn (Follow up /)    Subjective        Ayla Castellano presents to [facility]    History of Present Illness  The patient presents for evaluation of pancreatitis, diabetes mellitus, balance issues, and medication management.    History is reported by another person in the presence of the patient.    Improvement in stomach condition is reported. Pancreatitis has been managed with pantoprazole and steroids, which have proven effective. A refill of pantoprazole is requested.    No hypoglycemia is experienced at home, but rather hyperglycemia. Blood glucose levels have been recorded as 260 in the morning, 160 postprandial, and 143 and 145 on the previous day. Occasionally, blood glucose levels approach 200 during the night. A single episode of hypoglycemia occurred when glipizide was taken at 5 mg, which has since been reduced to 2.5 mg. Despite increased food intake during a recent visit from grandchildren, the blood glucose level remained at 160.    Occasional balance issues are experienced, feeling as though she might fall when standing still. No falls have occurred this year, but a few did occur in the past couple of years.    A scheduled appointment with Dr. WELSH on 05/06/2025 for blood work is noted. Based on current results, Dr. WELSH is considering a new medication regimen as her body may no longer be responding to hydroxyurea after several years of use.    A consultation with the urologist occurred in 01/2025 and a follow-up is due in 1 year. Dr. Rubin will conduct a 6-month follow-up CT scan to monitor for any changes.      Objective   Vital Signs:  /74 (BP Location: Left arm, Patient Position: Sitting, Cuff Size: Adult)   Pulse 81   Ht 154.9 cm (60.98\")   Wt 69.6 kg (153 lb 6.4 oz)   SpO2 98%   BMI 29.00 kg/m²   Estimated body mass index is 29 kg/m² as calculated from the following:    Height as of this encounter: 154.9 cm (60.98\").    Weight as " of this encounter: 69.6 kg (153 lb 6.4 oz).            Physical Exam  Vitals and nursing note reviewed.   Constitutional:       General: She is not in acute distress.     Appearance: Normal appearance. She is not ill-appearing.   HENT:      Head: Normocephalic and atraumatic.      Nose: Nose normal.      Mouth/Throat:      Mouth: Mucous membranes are moist.   Eyes:      Extraocular Movements: Extraocular movements intact.      Conjunctiva/sclera: Conjunctivae normal.   Cardiovascular:      Rate and Rhythm: Normal rate and regular rhythm.      Heart sounds: Normal heart sounds. No murmur heard.     No gallop.   Pulmonary:      Effort: Pulmonary effort is normal. No respiratory distress.      Breath sounds: Normal breath sounds. No stridor. No wheezing, rhonchi or rales.   Chest:      Chest wall: No tenderness.   Skin:     General: Skin is warm and dry.   Neurological:      General: No focal deficit present.      Mental Status: She is alert and oriented to person, place, and time. Mental status is at baseline.   Psychiatric:         Mood and Affect: Mood normal.         Behavior: Behavior normal.          Physical Exam  Respiratory: Clear to auscultation, no wheezing, rales or rhonchi       Result Review :               Results  Labs   - A1c: Less than 5   - Blood sugar levels: 260 in the morning, 160 post meal, 143 and 145 on the previous day, and close to 200 at night          Assessment and Plan   Diagnoses and all orders for this visit:    1. Essential hypertension (Primary)    2. Mixed hyperlipidemia    3. Type 2 diabetes mellitus without complication, without long-term current use of insulin  -     Fructosamine; Future    4. Hypercalcemia    5. Iron deficiency anemia, unspecified iron deficiency anemia type  -     Fructosamine; Future    6. Myeloproliferative disorder  -     Fructosamine; Future    7. Other osteoporosis without current pathological fracture    8. Nausea  -     pantoprazole (PROTONIX) 40 MG EC  tablet; Take 1 tablet by mouth Daily.  Dispense: 42 tablet; Refill: 0    9. Epigastric pain  -     pantoprazole (PROTONIX) 40 MG EC tablet; Take 1 tablet by mouth Daily.  Dispense: 42 tablet; Refill: 0        Assessment & Plan  1. Abdominal pain  - Symptoms have improved with the current treatment regimen, including pantoprazole and steroids.  - Prescription refill for pantoprazole has been provided.  - Continue taking pantoprazole for 6 weeks; if symptoms persist, medication can be continued longer.  - If symptoms return after discontinuation, pantoprazole will be restarted.    2. Diabetes Mellitus.  - A1c levels were low during the last evaluation, likely due to blood cell turnover affecting test accuracy.  - No episodes of low blood sugar reported at home; blood sugar readings vary, with occasional high readings up to 200.  - Fructosamine test ordered to be conducted alongside oncologist's labs for a more accurate assessment of average blood glucose levels.  - Monitoring blood sugar levels at home continues.    3. Balance Issues.  - Reports occasional balance issues and feeling like she might fall when standing still.  - Physical therapy was suggested but declined.  - Exercises for balance will be demonstrated to help manage symptoms.  - No falls reported this year; previous falls occurred in past years.    4. Medication Management.  - Currently on hydroxyurea 500 mg.  - Oncologist may consider changing medication based on upcoming blood work results if her body is no longer responding to hydroxyurea.  - Monitoring effectiveness and response to hydroxyurea continues.            Follow Up   Return in about 3 months (around 7/28/2025) for Chronic care.  Patient was given instructions and counseling regarding her condition or for health maintenance advice. Please see specific information pulled into the AVS if appropriate.           Patient or patient representative verbalized consent for the use of Ambient  Listening during the visit with  Annmarie Palacio DO for chart documentation. 4/29/2025  21:07 EDT

## 2025-05-06 ENCOUNTER — SPECIALTY PHARMACY (OUTPATIENT)
Dept: ONCOLOGY | Facility: HOSPITAL | Age: 85
End: 2025-05-06
Payer: MEDICARE

## 2025-05-06 ENCOUNTER — OFFICE VISIT (OUTPATIENT)
Dept: ONCOLOGY | Facility: CLINIC | Age: 85
End: 2025-05-06
Payer: MEDICARE

## 2025-05-06 ENCOUNTER — LAB (OUTPATIENT)
Dept: LAB | Facility: HOSPITAL | Age: 85
End: 2025-05-06
Payer: MEDICARE

## 2025-05-06 VITALS
HEART RATE: 75 BPM | DIASTOLIC BLOOD PRESSURE: 68 MMHG | SYSTOLIC BLOOD PRESSURE: 164 MMHG | BODY MASS INDEX: 29.45 KG/M2 | OXYGEN SATURATION: 97 % | WEIGHT: 156 LBS | HEIGHT: 61 IN | TEMPERATURE: 96.7 F

## 2025-05-06 DIAGNOSIS — Z79.899 ENCOUNTER FOR LONG-TERM CURRENT USE OF HIGH RISK MEDICATION: ICD-10-CM

## 2025-05-06 DIAGNOSIS — Z85.038 HISTORY OF COLON CANCER: ICD-10-CM

## 2025-05-06 DIAGNOSIS — D47.1 MYELOPROLIFERATIVE DISORDER: ICD-10-CM

## 2025-05-06 DIAGNOSIS — D47.1 MYELOPROLIFERATIVE DISORDER: Primary | ICD-10-CM

## 2025-05-06 DIAGNOSIS — D50.9 IRON DEFICIENCY ANEMIA, UNSPECIFIED IRON DEFICIENCY ANEMIA TYPE: ICD-10-CM

## 2025-05-06 DIAGNOSIS — E11.9 TYPE 2 DIABETES MELLITUS WITHOUT COMPLICATION, WITHOUT LONG-TERM CURRENT USE OF INSULIN: ICD-10-CM

## 2025-05-06 DIAGNOSIS — N28.89 RIGHT KIDNEY MASS: ICD-10-CM

## 2025-05-06 LAB
ALBUMIN SERPL-MCNC: 4.7 G/DL (ref 3.5–5.2)
ALBUMIN/GLOB SERPL: 2.1 G/DL
ALP SERPL-CCNC: 66 U/L (ref 39–117)
ALT SERPL W P-5'-P-CCNC: 24 U/L (ref 1–33)
ANION GAP SERPL CALCULATED.3IONS-SCNC: 11.6 MMOL/L (ref 5–15)
AST SERPL-CCNC: 25 U/L (ref 1–32)
BASOPHILS # BLD AUTO: 0.08 10*3/MM3 (ref 0–0.2)
BASOPHILS NFR BLD AUTO: 0.6 % (ref 0–1.5)
BILIRUB SERPL-MCNC: 0.8 MG/DL (ref 0–1.2)
BUN SERPL-MCNC: 25 MG/DL (ref 8–23)
BUN/CREAT SERPL: 22.3 (ref 7–25)
CALCIUM SPEC-SCNC: 11.3 MG/DL (ref 8.6–10.5)
CHLORIDE SERPL-SCNC: 98 MMOL/L (ref 98–107)
CO2 SERPL-SCNC: 25.4 MMOL/L (ref 22–29)
CREAT SERPL-MCNC: 1.12 MG/DL (ref 0.57–1)
DEPRECATED RDW RBC AUTO: 59 FL (ref 37–54)
EGFRCR SERPLBLD CKD-EPI 2021: 48.9 ML/MIN/1.73
EOSINOPHIL # BLD AUTO: 0.4 10*3/MM3 (ref 0–0.4)
EOSINOPHIL NFR BLD AUTO: 2.9 % (ref 0.3–6.2)
ERYTHROCYTE [DISTWIDTH] IN BLOOD BY AUTOMATED COUNT: 15 % (ref 12.3–15.4)
FERRITIN SERPL-MCNC: 57.2 NG/ML (ref 13–150)
GLOBULIN UR ELPH-MCNC: 2.2 GM/DL
GLUCOSE SERPL-MCNC: 85 MG/DL (ref 65–99)
HCT VFR BLD AUTO: 31.5 % (ref 34–46.6)
HGB BLD-MCNC: 10.8 G/DL (ref 12–15.9)
IMM GRANULOCYTES # BLD AUTO: 1.04 10*3/MM3 (ref 0–0.05)
IMM GRANULOCYTES NFR BLD AUTO: 7.6 % (ref 0–0.5)
IRON 24H UR-MRATE: 83 MCG/DL (ref 37–145)
IRON SATN MFR SERPL: 22 % (ref 20–50)
LYMPHOCYTES # BLD AUTO: 1.53 10*3/MM3 (ref 0.7–3.1)
LYMPHOCYTES NFR BLD AUTO: 11.1 % (ref 19.6–45.3)
MCH RBC QN AUTO: 37.2 PG (ref 26.6–33)
MCHC RBC AUTO-ENTMCNC: 34.3 G/DL (ref 31.5–35.7)
MCV RBC AUTO: 108.6 FL (ref 79–97)
MONOCYTES # BLD AUTO: 0.83 10*3/MM3 (ref 0.1–0.9)
MONOCYTES NFR BLD AUTO: 6 % (ref 5–12)
NEUTROPHILS NFR BLD AUTO: 71.8 % (ref 42.7–76)
NEUTROPHILS NFR BLD AUTO: 9.89 10*3/MM3 (ref 1.7–7)
NRBC BLD AUTO-RTO: 0 /100 WBC (ref 0–0.2)
PLATELET # BLD AUTO: 257 10*3/MM3 (ref 140–450)
PMV BLD AUTO: 10.1 FL (ref 6–12)
POTASSIUM SERPL-SCNC: 3.9 MMOL/L (ref 3.5–5.2)
PROT SERPL-MCNC: 6.9 G/DL (ref 6–8.5)
RBC # BLD AUTO: 2.9 10*6/MM3 (ref 3.77–5.28)
SODIUM SERPL-SCNC: 135 MMOL/L (ref 136–145)
TIBC SERPL-MCNC: 374 MCG/DL (ref 298–536)
TRANSFERRIN SERPL-MCNC: 251 MG/DL (ref 200–360)
WBC NRBC COR # BLD AUTO: 13.77 10*3/MM3 (ref 3.4–10.8)

## 2025-05-06 PROCEDURE — 85025 COMPLETE CBC W/AUTO DIFF WBC: CPT

## 2025-05-06 PROCEDURE — 83540 ASSAY OF IRON: CPT

## 2025-05-06 PROCEDURE — 36415 COLL VENOUS BLD VENIPUNCTURE: CPT

## 2025-05-06 PROCEDURE — 82728 ASSAY OF FERRITIN: CPT

## 2025-05-06 PROCEDURE — 84466 ASSAY OF TRANSFERRIN: CPT

## 2025-05-06 PROCEDURE — 80053 COMPREHEN METABOLIC PANEL: CPT

## 2025-05-06 NOTE — PROGRESS NOTES
Subjective     CHIEF COMPLAINT:      Chief Complaint   Patient presents with    Follow-up     HISTORY OF PRESENT ILLNESS:     Ayla Castellano is a 85 y.o. female patient who returns today for follow up on her myeloproliferative disorder and iron deficiency anemia.  She returns today for follow-up accompanied by her daughter.  She tolerated the increase in the dose of Hydrea to 500 mg a day.  No nausea or vomiting.  She was previously having upper abdominal pain.  Symptoms improved after she was placed by her PCP on pantoprazole for 6 weeks.  She is not having nausea or vomiting.    Patient is not having chest pain or shortness of breath.  No blood in the stool.    ROS:  Pertinent ROS is in the HPI.     Past medical, surgical, social and family history were reviewed.     MEDICATIONS:    Current Outpatient Medications:     Accu-Chek FastClix Lancets misc, 1 each by Other route 3 (Three) Times a Day. use to test blood sugar 3 times daily, Disp: , Rfl:     amLODIPine (NORVASC) 5 MG tablet, TAKE 1 TABLET BY MOUTH DAILY, Disp: 90 tablet, Rfl: 1    aspirin 81 MG tablet, Take 1 tablet by mouth Daily., Disp: , Rfl:     atorvastatin (LIPITOR) 20 MG tablet, TAKE 1 TABLET BY MOUTH DAILY, Disp: 90 tablet, Rfl: 3    Cyanocobalamin (B-12) 2500 MCG sublingual tablet, Place 1 tablet under the tongue Daily., Disp: 30 tablet, Rfl: 11    Gemtesa 75 MG tablet, Take 1 tablet by mouth Daily., Disp: , Rfl:     glipiZIDE 2.5 MG tablet, Take 2.5 mg by mouth Daily., Disp: 90 tablet, Rfl: 1    Glucose Blood (Blood Glucose Test Strips 333) strip, 1 each by Other route 2 (Two) Times a Day. USE TO TEST BLOOD SUGAR EVERY DAY. (E11.9), Disp: 180 strip, Rfl: 3    glucose blood (OneTouch Verio) test strip, USE 1 STRIP TO TEST DAILY, Disp: 100 each, Rfl: 5    hydroxyurea (HYDREA) 500 MG capsule, Take 1 capsule by mouth Daily., Disp: 90 capsule, Rfl: 1    isosorbide mononitrate (IMDUR) 60 MG 24 hr tablet, TAKE 1 TABLET BY MOUTH DAILY, Disp: 90  "tablet, Rfl: 3    ketoconazole (NIZORAL) 2 % cream, Apply  topically to the appropriate area as directed Daily., Disp: 30 g, Rfl: 11    metFORMIN (GLUCOPHAGE) 500 MG tablet, Take 1 tablet by mouth 2 (Two) Times a Day With Meals., Disp: 180 tablet, Rfl: 2    mupirocin (Bactroban) 2 % ointment, Apply  topically to the appropriate area as directed 3 (Three) Times a Day., Disp: 30 g, Rfl: 11    ofloxacin (OCUFLOX) 0.3 % ophthalmic solution, Administer 2 drops to both eyes 4 (Four) Times a Day., Disp: 10 mL, Rfl: 11    pantoprazole (PROTONIX) 40 MG EC tablet, Take 1 tablet by mouth Daily., Disp: 42 tablet, Rfl: 0    valsartan-hydrochlorothiazide (DIOVAN-HCT) 320-25 MG per tablet, Take 1 tablet by mouth Daily., Disp: 90 tablet, Rfl: 3    Objective     VITAL SIGNS:     Vitals:    05/06/25 0901   BP: 164/68   Pulse: 75   Temp: 96.7 °F (35.9 °C)   TempSrc: Temporal   SpO2: 97%   Weight: 70.8 kg (156 lb)   Height: 154.9 cm (60.98\")   PainSc: 0-No pain     Body mass index is 29.49 kg/m².     Wt Readings from Last 5 Encounters:   05/06/25 70.8 kg (156 lb)   04/28/25 69.6 kg (153 lb 6.4 oz)   03/19/25 70.3 kg (155 lb)   02/27/25 68.9 kg (151 lb 12.8 oz)   12/03/24 70.6 kg (155 lb 9.6 oz)     PHYSICAL EXAMINATION:   GENERAL: The patient appears in good general condition, not in acute distress.   SKIN: No Ecchymosis.  EYES: No jaundice. No Pallor.  CHEST: Normal respiratory effort. Normal breathing sounds bilaterally. No added sounds.  CVS: Normal S1 and S2. No Murmur.  ABDOMEN: Soft. No tenderness. No Hepatomegaly. No Splenomegaly. No masses.    DIAGNOSTIC DATA:     Results from last 7 days   Lab Units 05/06/25  0828   WBC 10*3/mm3 13.77*   NEUTROS ABS 10*3/mm3 9.89*   HEMOGLOBIN g/dL 10.8*   HEMATOCRIT % 31.5*   PLATELETS 10*3/mm3 257     Results from last 7 days   Lab Units 05/06/25  0828   SODIUM mmol/L 135*   POTASSIUM mmol/L 3.9   CHLORIDE mmol/L 98   CO2 mmol/L 25.4   BUN mg/dL 25*   CREATININE mg/dL 1.12*   CALCIUM mg/dL " 11.3*   ALBUMIN g/dL 4.7   BILIRUBIN mg/dL 0.8   ALK PHOS U/L 66   ALT (SGPT) U/L 24   AST (SGOT) U/L 25   GLUCOSE mg/dL 85          Lab 05/06/25  0828   IRON 83   IRON SATURATION (TSAT) 22   TIBC 374   TRANSFERRIN 251   FERRITIN 57.20      Assessment & Plan    *Myeloproliferative disorder, 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   She was under the care of an outside Hematologist before transferring care to our practice. Records indicate that she was previously diagnosed with Essential thrombocythemia.   She has elevated WBC and basophil count.   Therefore, a diagnosis of myeloproliferative disorder or polycythemia vera was favored over ET.  BCR ABL was negative by FISH on 2/2/2021.  In February 2021, WBC count was gradually increasing indicating that her disease was not under a good control likely due to the dose of Hydroxyurea not being effective (500 mg every 4 days).   The dose was increased to every 3 days on 2/2/2021.  Due to the persistence of the leukocytosis, the dose of Hydrea was increased to 4 days a week.  Hemoglobin decreased to 10.0 on 10/4/2021.  WBC count was 15,270.  Due to the worsening anemia, we reduced the dose of Hydrea back to 3 days a week.  Labs on 1/6/2022 revealed a hemoglobin of 10.2.  WBC was 16,600.  Platelets were 352,000.  Spleen measured 14.7 cm on CT scan on 1/14/2022.  Labs on 5/5/2022 revealed a hemoglobin of 9.9.  WBC decreased to 14,980.  Platelets were 357,000.  WBC increased to 19,200 on 7/28/2022.  Hydrea was increased to 500 mg 4 days a week.  WBC decreased to 16,260 on 12/1/2022.  CT on 7/25/2023 showed increase in the size of the spleen from 14.5 cm to 15.7 cm.  8/25/2023: WBC increased to 18,640.  Platelets normal at 361,000.    10/20/2023: WBC 17,940.  Platelets 320,000.  2/22/2024: WBC decreased to 17,430. Platelets 336,000.   Hemoglobin decreased to 10.5.  She was continued on Hydrea 500 mg 4 days a week.  CT on  7/25/2024 revealed the spleen to measure 15 cm in greatest dimension (16.4 cm on re-measurement).  8/1/2024: Hemoglobin improved to 11.0.  WBC 16,490.  Platelets 365,000.  Due to the increase in the WBC count, Hydrea was increased to 500 mg 5 days a week.  11/4/2024: WBC improved to 10,860.  Platelets 301,000.   2/27/2025: WBC increased to 20,440. Platelets increased to 474,000.  Hydrea was increased to 500 mg daily.  5/6/2025: WBC improved to 13,770.  Platelets improved to 257,000.  She is tolerating Hydrea at this dose.    *Iron deficiency anemia.   This is attributed to iron deficiency with a transferrin saturation of 17% and a ferritin of 26.   The patient did not tolerate oral in the past with development of upset stomach, abdominal pain, headaches and dizziness when she took the oral iron in the past.  Patient received IV iron the 2020 and tolerated that well.   Patient was given IV Injectafer on 2/5/2021 and 2/12/2021.  Hemoglobin was 10.0 on 10/4/2021.  Hemoglobin decreased to 9.9 on 5/5/2022.  Iron stores were adequate.  Hemoglobin improved to 10.7 on 7/28/2022.  Hemoglobin was 12.0 on 9/30/2022.  Hemoglobin decreased to 10.8 on 12/1/2022-likely secondary to blood loss following a fall with development of hematoma.  Hemoglobin decreased to 11.3 on 5/19/2023.    Ferritin was 41.6.  Transferrin saturation was 18%.  She was given IV Venofer 300 mg weekly x5 doses between 6/6/2023 and 6/28/2023.  8/25/2023: Hemoglobin decreased to 10.0.  However, ferritin increased to 579. Transferrin saturation increased to 46%.  10/20/2023: Hemoglobin improved to 11.7.  Iron store adequate with ferritin 319. Transferrin saturation 26%.  2/22/2024: Hemoglobin decreased to 10.5.  Ferritin 259.  Transferrin saturation 22%-indicating adequate iron stores.    The worsening anemia is attributed to anemia of chronic kidney disease.  8/20/2024: Hemoglobin improved to 11.0.  Ferritin 35.  Transferrin saturation 12%.  She continues  vitamin B12 2500 mcg sublingually daily.  2024: Hemoglobin decreased to 9.9.   Ferritin decreased to 16.6.  Transferrin saturation 17%.  S/P IV Injectafer 750 mg on 2024 and 2024.  2025: Hemoglobin improved to 10.4.  2025: Hemoglobin improved to 10.8.  Ferritin 57.  Transferrin saturation 22%.    *History of colon cancer, stage I.   She is s/p partial colectomy by Dr. Ku.   She did not require adjuvant chemotherapy or radiation therapy.    CT scan on 2022 revealed no evidence of recurrence.   CT scan on 2022 revealed no evidence of recurrence.  CT scan on 2023 revealed no evidence of recurrence.  2023: CEA 1.44.  She has a family history of colon cancer. Her brother who was 2 years younger  from metastatic colon cancer.   She was referred to the Genetics clinic.   CT chest abdomen pelvis on 2024 revealed no evidence of recurrence.  2024: CEA 1.19.  Patient reports intermittent epigastric abdominal pain.  She thinks she has pancreatitis.  2025: CEA 1.17.  She is not having symptoms to be concerning for recurrence.    *Enhancing structure in the midpole of the right kidney.  It measured 1.9 cm  Due to the arterial enhancement, and intrarenal aneurysm could not be excluded.  An ultrasound was obtained and showed no evidence of an aneurysm.  CT scan on 2024 revealed the enlarging mass in the right mid kidney to be suspicous for renal cell carcinoma.  She was referred to Urology.  The urologist recommended repeating scan in 1 year.    *Acute bronchitis diagnosed on 2025.  Patient has been having recurrent cough.  No fever or chills.  Her symptoms did not improve with OTC medications.  She was treated with [id number].  The infection subsequently resolved.    PLAN:    1.  Continue Hydrea 500 mg once daily.   2.  Continue vitamin B12 1000 mcg sublingually once daily.   3.  Repeat CBC CMP ferritin iron panel in 2 months.  4.  Follow-up in 12 months  with repeat labs.          Izzy Martinez MD  05/06/25

## 2025-05-06 NOTE — PROGRESS NOTES
Specialty Pharmacy Patient Management Program  Oncology Reassessment     Ayla Castellano was referred by an their provider to the Oncology Patient Management program offered by [facility] for MPD. A follow-up outreach was conducted, including assessment of continued therapy appropriateness, medication adherence, and side effect incidence and management for Hydrea (hydroxyurea).    Changes to Insurance Coverage or Financial Support  None    Relevant Past Medical History and Comorbidities  Relevant medical history and concomitant health conditions were discussed with the patient. The patient's chart has been reviewed for relevant past medical history and comorbid health conditions and updated as necessary.   Past Medical History:   Diagnosis Date    Arrhythmia     Colitis     Colon cancer     s/p partial colectomy in 2011    Diabetes mellitus     Gallbladder disease     Hard to intubate     Hyperlipidemia     Hypertension     Left arm pain     SSS (sick sinus syndrome)     [employer] dual-chamber pacemaker on 9/17/2021    Syncope     Secondary to medications (clonidine and metoprolol) in 3/15.  Had severe bradycardia (sinus arrest with junctional escape beats).  Resolved after medications discontinued.     Social History     Socioeconomic History    Marital status:    Tobacco Use    Smoking status: Never    Smokeless tobacco: Never   Vaping Use    Vaping status: Never Used   Substance and Sexual Activity    Alcohol use: No    Drug use: No    Sexual activity: Not Currently     Partners: Male     Birth control/protection: None     Problem list reviewed by Valentine Dobson RPH on 5/6/2025 at  9:11 AM    Hospitalizations and Urgent Care Since Last Assessment  ED Visits, Admissions, or Hospitalizations: none reported  Urgent Office Visits: none reported    Allergies  Known allergies and reactions were discussed with the patient. The patient's chart has been reviewed for allergy  information and updated as necessary.   Allergies   Allergen Reactions    Latex Unknown - Low Severity     blisters     Allergies reviewed by Valentine Dobson RPH on 5/6/2025 at  9:11 AM    Relevant Laboratory Values  Relevant laboratory values were discussed with the patient. The following specialty medication dose adjustment(s) are recommended: No dose adjustments are needed for the oral specialty medication(s) based on the labs.    Lab Results   Component Value Date    GLUCOSE 148 (H) 02/27/2025    CALCIUM 9.7 02/27/2025     (L) 02/27/2025    K 3.9 02/27/2025    CO2 21.8 (L) 02/27/2025    CL 92 (L) 02/27/2025    BUN 16 02/27/2025    CREATININE 0.97 02/27/2025    EGFRIFAFRI 65 02/01/2022    EGFRIFNONA 57 (L) 02/01/2022    BCR 16.5 02/27/2025    ANIONGAP 16.2 (H) 02/27/2025     Lab Results   Component Value Date    WBC 13.77 (H) 05/06/2025    RBC 2.90 (L) 05/06/2025    HGB 10.8 (L) 05/06/2025    HCT 31.5 (L) 05/06/2025    .6 (H) 05/06/2025    MCH 37.2 (H) 05/06/2025    MCHC 34.3 05/06/2025    RDW 15.0 05/06/2025    RDWSD 59.0 (H) 05/06/2025    MPV 10.1 05/06/2025     05/06/2025    NEUTRORELPCT 71.8 05/06/2025    LYMPHORELPCT 11.1 (L) 05/06/2025    MONORELPCT 6.0 05/06/2025    EOSRELPCT 2.9 05/06/2025    BASORELPCT 0.6 05/06/2025    AUTOIGPER 7.6 (H) 05/06/2025    NEUTROABS 9.89 (H) 05/06/2025    LYMPHSABS 1.53 05/06/2025    MONOSABS 0.83 05/06/2025    EOSABS 0.40 05/06/2025    BASOSABS 0.08 05/06/2025    AUTOIGNUM 1.04 (H) 05/06/2025    NRBC 0.0 05/06/2025       Current Medication List  This medication list has been reviewed with the patient and evaluated for any interactions or necessary modifications/recommendations, and updated to include all prescription medications, OTC medications, and supplements the patient is currently taking.  This list reflects what is contained in the patient's profile, which has also been marked as reviewed to communicate to other providers it is the most up to  date version of the patient's current medication therapy.     Current Outpatient Medications:     Accu-Chek FastClix Lancets misc, 1 each by Other route 3 (Three) Times a Day. use to test blood sugar 3 times daily, Disp: , Rfl:     amLODIPine (NORVASC) 5 MG tablet, TAKE 1 TABLET BY MOUTH DAILY, Disp: 90 tablet, Rfl: 1    aspirin 81 MG tablet, Take 1 tablet by mouth Daily., Disp: , Rfl:     atorvastatin (LIPITOR) 20 MG tablet, TAKE 1 TABLET BY MOUTH DAILY, Disp: 90 tablet, Rfl: 3    Cyanocobalamin (B-12) 2500 MCG sublingual tablet, Place 1 tablet under the tongue Daily., Disp: 30 tablet, Rfl: 11    Gemtesa 75 MG tablet, Take 1 tablet by mouth Daily., Disp: , Rfl:     glipiZIDE 2.5 MG tablet, Take 2.5 mg by mouth Daily., Disp: 90 tablet, Rfl: 1    Glucose Blood (Blood Glucose Test Strips 333) strip, 1 each by Other route 2 (Two) Times a Day. USE TO TEST BLOOD SUGAR EVERY DAY. (E11.9), Disp: 180 strip, Rfl: 3    glucose blood (OneTouch Verio) test strip, USE 1 STRIP TO TEST DAILY, Disp: 100 each, Rfl: 5    hydroxyurea (HYDREA) 500 MG capsule, Take 1 capsule by mouth Daily., Disp: 90 capsule, Rfl: 1    isosorbide mononitrate (IMDUR) 60 MG 24 hr tablet, TAKE 1 TABLET BY MOUTH DAILY, Disp: 90 tablet, Rfl: 3    ketoconazole (NIZORAL) 2 % cream, Apply  topically to the appropriate area as directed Daily., Disp: 30 g, Rfl: 11    metFORMIN (GLUCOPHAGE) 500 MG tablet, Take 1 tablet by mouth 2 (Two) Times a Day With Meals., Disp: 180 tablet, Rfl: 2    mupirocin (Bactroban) 2 % ointment, Apply  topically to the appropriate area as directed 3 (Three) Times a Day., Disp: 30 g, Rfl: 11    ofloxacin (OCUFLOX) 0.3 % ophthalmic solution, Administer 2 drops to both eyes 4 (Four) Times a Day., Disp: 10 mL, Rfl: 11    pantoprazole (PROTONIX) 40 MG EC tablet, Take 1 tablet by mouth Daily., Disp: 42 tablet, Rfl: 0    valsartan-hydrochlorothiazide (DIOVAN-HCT) 320-25 MG per tablet, Take 1 tablet by mouth Daily., Disp: 90 tablet, Rfl:  3    Medicines reviewed by Valentine Dobson RPH on 5/6/2025 at  9:11 AM    Drug Interactions  Assessed medication list for interactions, no significant drug interactions noted.   Advised patient to call the clinic if any new medications are started so we can assess for drug-drug interactions.  Drug-food interactions discussed:  none today    Adverse Drug Reactions  Medication tolerability: Tolerating with no to minimal ADRs  Medication plan: Continue therapy with normal follow-up  Plan for ADR Management: N/A    Adherence, Self-Administration, and Current Therapy Problems  Adherence related to the patient's specialty therapy was discussed with the patient. The Adherence segment of this outreach has been reviewed and updated.     Adherence Questions  Linked Medication(s) Assessed: Hydroxyurea (HYDREA)  On average, how many doses/injections does the patient miss per month?: 0  What are the identified reasons for non-adherence or missed doses? : no problems identified  What is the estimated medication adherence level?: %  Based on the patient/caregiver response and refill history, does this patient require an MTP to track adherence improvements?: no    Additional Barriers to Patient Self-Administration: none  Methods for Supporting Patient Self-Administration: none needed at this time  Patient has had no issues obtaining medication from pharmacy.    Open Medication Therapy Problems  No medication therapy recommendations to display    Goals of Therapy  Goals related to the patient's specialty therapy were discussed with the patient. The Patient Goals segment of this outreach has been reviewed and updated.   Goals Addressed Today        Specialty Pharmacy General Goal      Blood counts within normal limits  11/4/24: Labs today with WBC 10.86, Hgb 9.9, platelets 301. Patient reports tolerating treatment well.  Will continue to follow.   5/6/25: Labs stable today for patient.  WBC 13.77, Hgb 10.8, platelets 257.  Patient tolerating well. No changes to therapy plan.              Quality of Life Assessment   Quality of Life related to the patient's enrollment in the patient management program and services provided was discussed with the patient. The QOL segment of this outreach has been reviewed and updated.  Quality of Life Improvement Scale: 5-No change    Discussed aforementioned material with patient in person, face-to-face, in clinic.     Reassessment Plan & Follow-Up  1. Medication Therapy Changes: none  2. Related Plans, Therapy Recommendations, or Issues to Be Addressed: none  3. Pharmacist to perform regular assessments no more than (6) months from the previous assessment.     Attestation  Therapeutic appropriateness: Appropriate   I attest the patient was actively involved in and has agreed to the above plan of care.  If the prescribed therapy is at any point deemed not appropriate based on the current or future assessments, a consultation will be initiated with the patient's specialty care provider to determine the best course of action. The revised plan of therapy will be documented along with any required assessments and/or additional patient education provided.     Vance Dobson, [place], [facility]  Clinical Specialty Pharmacist, Oncology  5/6/2025  09:15 EDT

## 2025-05-07 LAB — FRUCTOSAMINE SERPL-SCNC: 241 UMOL/L (ref 0–285)

## 2025-05-07 NOTE — PROGRESS NOTES
Specialty Pharmacy Note: Hydrea (hydroxyurea)    Ayla Castellano is a 85 y.o. female with MPD was seen 5/5/25 by Dr. Martinez. Per provider dictation, no changes to oral oncology regimen  Hydrea 500 mg once daily .  Labs Review: The CMP and CBC from 5/6/25 have been reviewed. No dose adjustments are needed for the oral specialty medication(s) based on the labs.    Specialty pharmacy will continue to follow patient.    Vance Dobson, PharmD, [facility]  Clinical Oncology Pharmacist    5/7/2025  08:17 EDT

## 2025-05-12 LAB — METHYLMALONATE SERPL-SCNC: 185 NMOL/L (ref 0–378)

## 2025-06-02 RX ORDER — AMLODIPINE BESYLATE 5 MG/1
5 TABLET ORAL DAILY
Qty: 90 TABLET | Refills: 1 | Status: SHIPPED | OUTPATIENT
Start: 2025-06-02

## 2025-06-04 ENCOUNTER — OFFICE VISIT (OUTPATIENT)
Dept: ENDOCRINOLOGY | Age: 85
End: 2025-06-04
Payer: MEDICARE

## 2025-06-04 VITALS
WEIGHT: 153.4 LBS | HEIGHT: 61 IN | DIASTOLIC BLOOD PRESSURE: 70 MMHG | SYSTOLIC BLOOD PRESSURE: 132 MMHG | HEART RATE: 71 BPM | OXYGEN SATURATION: 97 % | BODY MASS INDEX: 28.96 KG/M2 | TEMPERATURE: 97.6 F

## 2025-06-04 DIAGNOSIS — E11.65 TYPE 2 DIABETES MELLITUS WITH HYPERGLYCEMIA, WITHOUT LONG-TERM CURRENT USE OF INSULIN: Primary | ICD-10-CM

## 2025-06-04 DIAGNOSIS — E78.2 MIXED HYPERLIPIDEMIA: ICD-10-CM

## 2025-06-04 DIAGNOSIS — N18.9 CHRONIC KIDNEY DISEASE, UNSPECIFIED CKD STAGE: ICD-10-CM

## 2025-06-04 DIAGNOSIS — I25.118 CORONARY ARTERY DISEASE OF NATIVE HEART WITH STABLE ANGINA PECTORIS, UNSPECIFIED VESSEL OR LESION TYPE: ICD-10-CM

## 2025-06-04 NOTE — PROGRESS NOTES
"Chief Complaint  Diabetes    Subjective        Ayla Castellano presents to [facility] ENDOCRINOLOG[facility]  History of Present Illness    New patient 11/2023  Daughter translates for patient   Estimated A1c ~ 5.7     Diabetes Type 2, > 10 years ago  Known diabetic complications: Pancreatitis; chronic, gallbladder removed around 2019, chronic UTIs, CKD- on valsartan 320mg QD  follows with podiatry q3m  BS range 130-280  Had eye exam 10/2024  Hypoglycemia: none  CV risk: atorvastatin 20mg QD  Current diabetic regimen: metformin 500mg BID and glipizide 2.5mg QD      Objective   Vital Signs:  /70   Pulse 71   Temp 97.6 °F (36.4 °C) (Oral)   Ht 154.9 cm (60.98\")   Wt 69.6 kg (153 lb 6.4 oz)   SpO2 97%   BMI 29.00 kg/m²   Estimated body mass index is 29 kg/m² as calculated from the following:    Height as of this encounter: 154.9 cm (60.98\").    Weight as of this encounter: 69.6 kg (153 lb 6.4 oz).            Physical Exam  Vitals reviewed.   Constitutional:       General: She is not in acute distress.  HENT:      Head: Normocephalic and atraumatic.   Cardiovascular:      Rate and Rhythm: Normal rate.   Pulmonary:      Effort: Pulmonary effort is normal. No respiratory distress.   Musculoskeletal:         General: No signs of injury. Normal range of motion.      Cervical back: Normal range of motion and neck supple.   Skin:     General: Skin is warm and dry.   Neurological:      Mental Status: She is alert and oriented to person, place, and time. Mental status is at baseline.   Psychiatric:         Mood and Affect: Mood normal.         Behavior: Behavior normal.         Thought Content: Thought content normal.         Judgment: Judgment normal.        Result Review :  The following data was reviewed by: LENA Greene on 06/04/2025:  Common labs          11/26/2024    13:04 11/26/2024    13:19 2/27/2025    10:19 2/27/2025    12:05 5/6/2025    08:28   Common Labs   Glucose 92   148   85  "   BUN 20   16   25    Creatinine 0.99   0.97   1.12    Sodium 134   130   135    Potassium 4.4   3.9   3.9    Chloride 98   92   98    Calcium 10.3   9.7   11.3    Albumin 4.5   4.1   4.7    Total Bilirubin 0.6   0.7   0.8    Alkaline Phosphatase 103   74   66    AST (SGOT) 33   32   25    ALT (SGPT) 29   32   24    WBC   20.44   13.77    Hemoglobin   10.4   10.8    Hematocrit   29.2   31.5    Platelets   474   257    Total Cholesterol 125        Triglycerides 140        HDL Cholesterol 35        LDL Cholesterol  65        Hemoglobin A1C CANCELED    <4.30     Microalbumin, Urine  4.1                   Assessment and Plan   Diagnoses and all orders for this visit:    1. Type 2 diabetes mellitus with hyperglycemia, without long-term current use of insulin (Primary)  -     Fructosamine; Future    2. Coronary artery disease of native heart with stable angina pectoris, unspecified vessel or lesion type    3. Mixed hyperlipidemia    4. Chronic kidney disease, unspecified CKD stage             Follow Up   No follow-ups on file.    Glucose control in favorable range  Noted hypercalcemia, recommend repeat but given patient being a difficult stick, daughter did not want labs done at this time, I do not see anything noted from heme/onc. Previously elevated in 2023, planned repeat labs 7/2025. Daughter is going to contact PCP for additional f/u  Continue plan as above, no additional changes made at this time     Patient was given instructions and counseling regarding her condition or for health maintenance advice. Please see specific information pulled into the AVS if appropriate.     LENA Greene

## 2025-06-05 DIAGNOSIS — E78.5 HYPERLIPIDEMIA, UNSPECIFIED HYPERLIPIDEMIA TYPE: ICD-10-CM

## 2025-06-05 RX ORDER — ATORVASTATIN CALCIUM 20 MG/1
20 TABLET, FILM COATED ORAL DAILY
Qty: 90 TABLET | Refills: 3 | Status: SHIPPED | OUTPATIENT
Start: 2025-06-05

## 2025-06-05 NOTE — TELEPHONE ENCOUNTER
LOV                   4/28/25  NOV                  9/11/25  Last refill             9/3/24  Protocol          met

## 2025-06-09 RX ORDER — GLIPIZIDE 2.5 MG/1
1 TABLET ORAL DAILY
Qty: 90 TABLET | Refills: 1 | Status: SHIPPED | OUTPATIENT
Start: 2025-06-09

## 2025-06-09 NOTE — TELEPHONE ENCOUNTER
Rx Refill Note  Requested Prescriptions     Pending Prescriptions Disp Refills    glipiZIDE 2.5 MG tablet [Pharmacy Med Name: GLIPIZIDE 2.5MG TABLETS] 90 tablet 1     Sig: TAKE 1 TABLET BY MOUTH DAILY      Last office visit with prescribing clinician: 6/4/2025   Last telemedicine visit with prescribing clinician: Visit date not found   Next office visit with prescribing clinician: 12/4/2025                         Would you like a call back once the refill request has been completed: [] Yes [] No    If the office needs to give you a call back, can they leave a voicemail: [] Yes [] No    Christal Miramontes MA  06/09/25, 10:38 EDT

## 2025-06-23 LAB
MC_CV_MDC_IDC_RATE_1: 160
MC_CV_MDC_IDC_ZONE_ID: 1
MDC_IDC_MSMT_BATTERY_REMAINING_LONGEVITY: 66 MO
MDC_IDC_MSMT_BATTERY_REMAINING_PERCENTAGE: 100 %
MDC_IDC_MSMT_BATTERY_STATUS: NORMAL
MDC_IDC_MSMT_LEADCHNL_RA_DTM: NORMAL
MDC_IDC_MSMT_LEADCHNL_RA_IMPEDANCE_VALUE: 560
MDC_IDC_MSMT_LEADCHNL_RA_PACING_THRESHOLD_AMPLITUDE: 0.6
MDC_IDC_MSMT_LEADCHNL_RA_PACING_THRESHOLD_POLARITY: NORMAL
MDC_IDC_MSMT_LEADCHNL_RA_PACING_THRESHOLD_PULSEWIDTH: 0.4
MDC_IDC_MSMT_LEADCHNL_RA_SENSING_INTR_AMPL: 3.2
MDC_IDC_MSMT_LEADCHNL_RV_DTM: NORMAL
MDC_IDC_MSMT_LEADCHNL_RV_IMPEDANCE_VALUE: 597
MDC_IDC_MSMT_LEADCHNL_RV_PACING_THRESHOLD_AMPLITUDE: 1
MDC_IDC_MSMT_LEADCHNL_RV_PACING_THRESHOLD_POLARITY: NORMAL
MDC_IDC_MSMT_LEADCHNL_RV_PACING_THRESHOLD_PULSEWIDTH: 0.4
MDC_IDC_MSMT_LEADCHNL_RV_SENSING_INTR_AMPL: 5.1
MDC_IDC_PG_IMPLANT_DTM: NORMAL
MDC_IDC_PG_MFG: NORMAL
MDC_IDC_PG_MODEL: NORMAL
MDC_IDC_PG_SERIAL: NORMAL
MDC_IDC_PG_TYPE: NORMAL
MDC_IDC_SESS_DTM: NORMAL
MDC_IDC_SESS_TYPE: NORMAL
MDC_IDC_SET_BRADY_AT_MODE_SWITCH_RATE: 170
MDC_IDC_SET_BRADY_LOWRATE: 60
MDC_IDC_SET_BRADY_MAX_SENSOR_RATE: 120
MDC_IDC_SET_BRADY_MAX_TRACKING_RATE: 120
MDC_IDC_SET_BRADY_MODE: NORMAL
MDC_IDC_SET_BRADY_PAV_DELAY: 250
MDC_IDC_SET_BRADY_SAV_DELAY: 250
MDC_IDC_SET_LEADCHNL_RA_PACING_AMPLITUDE: 2
MDC_IDC_SET_LEADCHNL_RA_PACING_POLARITY: NORMAL
MDC_IDC_SET_LEADCHNL_RA_PACING_PULSEWIDTH: 0.4
MDC_IDC_SET_LEADCHNL_RA_SENSING_POLARITY: NORMAL
MDC_IDC_SET_LEADCHNL_RA_SENSING_SENSITIVITY: 0.75
MDC_IDC_SET_LEADCHNL_RV_PACING_AMPLITUDE: 1.5
MDC_IDC_SET_LEADCHNL_RV_PACING_POLARITY: NORMAL
MDC_IDC_SET_LEADCHNL_RV_PACING_PULSEWIDTH: 0.4
MDC_IDC_SET_LEADCHNL_RV_SENSING_POLARITY: NORMAL
MDC_IDC_SET_LEADCHNL_RV_SENSING_SENSITIVITY: 2.5
MDC_IDC_SET_ZONE_STATUS: NORMAL
MDC_IDC_SET_ZONE_TYPE: NORMAL
MDC_IDC_STAT_AT_BURDEN_PERCENT: 1
MDC_IDC_STAT_BRADY_RA_PERCENT_PACED: 10
MDC_IDC_STAT_BRADY_RV_PERCENT_PACED: 0

## 2025-07-07 ENCOUNTER — LAB (OUTPATIENT)
Dept: LAB | Facility: HOSPITAL | Age: 85
End: 2025-07-07
Payer: MEDICARE

## 2025-07-07 DIAGNOSIS — D47.1 MYELOPROLIFERATIVE DISORDER: ICD-10-CM

## 2025-07-07 DIAGNOSIS — D50.9 IRON DEFICIENCY ANEMIA, UNSPECIFIED IRON DEFICIENCY ANEMIA TYPE: ICD-10-CM

## 2025-07-07 DIAGNOSIS — Z79.899 ENCOUNTER FOR LONG-TERM CURRENT USE OF HIGH RISK MEDICATION: ICD-10-CM

## 2025-07-07 LAB
ALBUMIN SERPL-MCNC: 4.4 G/DL (ref 3.5–5.2)
ALBUMIN/GLOB SERPL: 2 G/DL
ALP SERPL-CCNC: 78 U/L (ref 39–117)
ALT SERPL W P-5'-P-CCNC: 34 U/L (ref 1–33)
ANION GAP SERPL CALCULATED.3IONS-SCNC: 9.3 MMOL/L (ref 5–15)
AST SERPL-CCNC: 30 U/L (ref 1–32)
BASOPHILS # BLD AUTO: 0.1 10*3/MM3 (ref 0–0.2)
BASOPHILS NFR BLD AUTO: 0.7 % (ref 0–1.5)
BILIRUB SERPL-MCNC: 0.9 MG/DL (ref 0–1.2)
BUN SERPL-MCNC: 19.4 MG/DL (ref 8–23)
BUN/CREAT SERPL: 21.1 (ref 7–25)
CALCIUM SPEC-SCNC: 10.9 MG/DL (ref 8.6–10.5)
CHLORIDE SERPL-SCNC: 99 MMOL/L (ref 98–107)
CO2 SERPL-SCNC: 24.7 MMOL/L (ref 22–29)
CREAT SERPL-MCNC: 0.92 MG/DL (ref 0.57–1)
DEPRECATED RDW RBC AUTO: 62.6 FL (ref 37–54)
EGFRCR SERPLBLD CKD-EPI 2021: 61.9 ML/MIN/1.73
EOSINOPHIL # BLD AUTO: 0.43 10*3/MM3 (ref 0–0.4)
EOSINOPHIL NFR BLD AUTO: 2.9 % (ref 0.3–6.2)
ERYTHROCYTE [DISTWIDTH] IN BLOOD BY AUTOMATED COUNT: 16 % (ref 12.3–15.4)
FERRITIN SERPL-MCNC: 56.2 NG/ML (ref 13–150)
GLOBULIN UR ELPH-MCNC: 2.2 GM/DL
GLUCOSE SERPL-MCNC: 214 MG/DL (ref 65–99)
HCT VFR BLD AUTO: 33.8 % (ref 34–46.6)
HGB BLD-MCNC: 11.4 G/DL (ref 12–15.9)
IMM GRANULOCYTES # BLD AUTO: 1.19 10*3/MM3 (ref 0–0.05)
IMM GRANULOCYTES NFR BLD AUTO: 8 % (ref 0–0.5)
IRON 24H UR-MRATE: 92 MCG/DL (ref 37–145)
IRON SATN MFR SERPL: 26 % (ref 20–50)
LYMPHOCYTES # BLD AUTO: 1.28 10*3/MM3 (ref 0.7–3.1)
LYMPHOCYTES NFR BLD AUTO: 8.6 % (ref 19.6–45.3)
MCH RBC QN AUTO: 36.3 PG (ref 26.6–33)
MCHC RBC AUTO-ENTMCNC: 33.7 G/DL (ref 31.5–35.7)
MCV RBC AUTO: 107.6 FL (ref 79–97)
MONOCYTES # BLD AUTO: 0.81 10*3/MM3 (ref 0.1–0.9)
MONOCYTES NFR BLD AUTO: 5.4 % (ref 5–12)
NEUTROPHILS NFR BLD AUTO: 11.13 10*3/MM3 (ref 1.7–7)
NEUTROPHILS NFR BLD AUTO: 74.4 % (ref 42.7–76)
NRBC BLD AUTO-RTO: 0.2 /100 WBC (ref 0–0.2)
PLATELET # BLD AUTO: 297 10*3/MM3 (ref 140–450)
PMV BLD AUTO: 10.4 FL (ref 6–12)
POTASSIUM SERPL-SCNC: 4 MMOL/L (ref 3.5–5.2)
PROT SERPL-MCNC: 6.6 G/DL (ref 6–8.5)
RBC # BLD AUTO: 3.14 10*6/MM3 (ref 3.77–5.28)
SODIUM SERPL-SCNC: 133 MMOL/L (ref 136–145)
TIBC SERPL-MCNC: 349 MCG/DL (ref 298–536)
TRANSFERRIN SERPL-MCNC: 234 MG/DL (ref 200–360)
WBC NRBC COR # BLD AUTO: 14.94 10*3/MM3 (ref 3.4–10.8)

## 2025-07-07 PROCEDURE — 36415 COLL VENOUS BLD VENIPUNCTURE: CPT

## 2025-07-07 PROCEDURE — 80053 COMPREHEN METABOLIC PANEL: CPT

## 2025-07-07 PROCEDURE — 83540 ASSAY OF IRON: CPT

## 2025-07-07 PROCEDURE — 85025 COMPLETE CBC W/AUTO DIFF WBC: CPT

## 2025-07-07 PROCEDURE — 84466 ASSAY OF TRANSFERRIN: CPT

## 2025-07-07 PROCEDURE — 82728 ASSAY OF FERRITIN: CPT

## 2025-07-09 ENCOUNTER — TELEPHONE (OUTPATIENT)
Dept: ONCOLOGY | Facility: CLINIC | Age: 85
End: 2025-07-09
Payer: MEDICARE

## 2025-07-09 NOTE — TELEPHONE ENCOUNTER
Caller: Daily Castellano    Relationship: Emergency Contact    Best call back number: [phone]    Caller requesting test results: DAUGHTER    What test was performed: LABS    When was the test performed: 7-7    Where was the test performed: [facility]

## 2025-07-10 NOTE — TELEPHONE ENCOUNTER
"Returned call to Daily and reviewed Dr. Martinez's note \"Please inform the patient's daughter that the lab test showed improvement in the anemia. WBC slightly increased since May but it is better than February. She does not need additional IV iron at this time. I recommend continuing same dose of Hydrea.\" - she v/u to all.   "

## (undated) DEVICE — PATIENT RETURN ELECTRODE, SINGLE-USE, CONTACT QUALITY MONITORING, ADULT, WITH 9FT CORD, FOR PATIENTS WEIGING OVER 33LBS. (15KG): Brand: MEGADYNE

## (undated) DEVICE — AIRLIFE™ NASAL OXYGEN CANNULA CURVED, NONFLARED TIP WITH 21 FOOT (6.4 M) CRUSH-RESISTANT TUBING, OVER-THE-EAR STYLE: Brand: AIRLIFE™

## (undated) DEVICE — INTRO SHEATH PRELUDE SNAP .038 6F 13CM W/SDPRT

## (undated) DEVICE — Device

## (undated) DEVICE — RADIFOCUS GLIDEWIRE: Brand: GLIDEWIRE

## (undated) DEVICE — LOU PACE DEFIB: Brand: MEDLINE INDUSTRIES, INC.

## (undated) DEVICE — PENCL E/S HNDSWCH ROCKR CB